# Patient Record
Sex: MALE | Race: WHITE | Employment: FULL TIME | URBAN - METROPOLITAN AREA
[De-identification: names, ages, dates, MRNs, and addresses within clinical notes are randomized per-mention and may not be internally consistent; named-entity substitution may affect disease eponyms.]

---

## 2017-01-04 ENCOUNTER — TELEPHONE (OUTPATIENT)
Dept: FAMILY MEDICINE | Facility: CLINIC | Age: 47
End: 2017-01-04

## 2017-01-04 ENCOUNTER — OFFICE VISIT (OUTPATIENT)
Dept: FAMILY MEDICINE | Facility: CLINIC | Age: 47
End: 2017-01-04
Payer: COMMERCIAL

## 2017-01-04 VITALS
BODY MASS INDEX: 40.46 KG/M2 | OXYGEN SATURATION: 94 % | TEMPERATURE: 98.3 F | WEIGHT: 289 LBS | HEIGHT: 71 IN | SYSTOLIC BLOOD PRESSURE: 120 MMHG | HEART RATE: 90 BPM | DIASTOLIC BLOOD PRESSURE: 80 MMHG

## 2017-01-04 DIAGNOSIS — E11.42 TYPE 2 DIABETES MELLITUS WITH DIABETIC POLYNEUROPATHY, WITHOUT LONG-TERM CURRENT USE OF INSULIN (H): ICD-10-CM

## 2017-01-04 DIAGNOSIS — G62.9 PERIPHERAL POLYNEUROPATHY: ICD-10-CM

## 2017-01-04 DIAGNOSIS — F33.40 RECURRENT MAJOR DEPRESSIVE DISORDER, IN REMISSION (H): ICD-10-CM

## 2017-01-04 DIAGNOSIS — R10.13 EPIGASTRIC PAIN: Primary | ICD-10-CM

## 2017-01-04 PROCEDURE — 99214 OFFICE O/P EST MOD 30 MIN: CPT | Performed by: PHYSICIAN ASSISTANT

## 2017-01-04 RX ORDER — NICOTINE POLACRILEX 4 MG/1
20 GUM, CHEWING ORAL DAILY
Qty: 30 TABLET | Refills: 1 | Status: SHIPPED | OUTPATIENT
Start: 2017-01-04 | End: 2017-03-06

## 2017-01-04 RX ORDER — ESCITALOPRAM OXALATE 10 MG/1
10 TABLET ORAL DAILY
Qty: 30 TABLET | Refills: 1 | Status: SHIPPED | OUTPATIENT
Start: 2017-01-04 | End: 2017-04-01

## 2017-01-04 RX ORDER — GABAPENTIN 400 MG/1
1200 CAPSULE ORAL 3 TIMES DAILY
Qty: 270 CAPSULE | Refills: 1 | Status: SHIPPED | OUTPATIENT
Start: 2017-01-04 | End: 2017-03-08

## 2017-01-04 ASSESSMENT — ANXIETY QUESTIONNAIRES
3. WORRYING TOO MUCH ABOUT DIFFERENT THINGS: MORE THAN HALF THE DAYS
GAD7 TOTAL SCORE: 19
IF YOU CHECKED OFF ANY PROBLEMS ON THIS QUESTIONNAIRE, HOW DIFFICULT HAVE THESE PROBLEMS MADE IT FOR YOU TO DO YOUR WORK, TAKE CARE OF THINGS AT HOME, OR GET ALONG WITH OTHER PEOPLE: SOMEWHAT DIFFICULT
6. BECOMING EASILY ANNOYED OR IRRITABLE: NEARLY EVERY DAY
1. FEELING NERVOUS, ANXIOUS, OR ON EDGE: NEARLY EVERY DAY
7. FEELING AFRAID AS IF SOMETHING AWFUL MIGHT HAPPEN: NEARLY EVERY DAY
2. NOT BEING ABLE TO STOP OR CONTROL WORRYING: NEARLY EVERY DAY
5. BEING SO RESTLESS THAT IT IS HARD TO SIT STILL: MORE THAN HALF THE DAYS

## 2017-01-04 ASSESSMENT — PATIENT HEALTH QUESTIONNAIRE - PHQ9: 5. POOR APPETITE OR OVEREATING: NEARLY EVERY DAY

## 2017-01-04 NOTE — PROGRESS NOTES
"  SUBJECTIVE:                                                    Ryland Gee is a 46 year old male who presents to clinic today for the following health issues:        Depression and Anxiety Follow-Up - Lexapro    Status since last visit: Worsened both    Other associated symptoms:None    Complicating factors:     Significant life event: Yes-  IRS - owes own business     Current substance abuse: no    PHQ-9 SCORE 1/18/2016 5/23/2016 11/21/2016   Total Score - - -   Total Score 4 13 19     BUSHRA-7 SCORE 5/23/2016 11/21/2016   Total Score 14 21        PHQ-9  English      PHQ-9   Any Language     GAD7     Medication Followup of Omeprazole    Taking Medication as prescribed: yes    Side Effects:  None    Medication Helping Symptoms:  yes        Medication Followup of Gabapentin - Neuropathy     Taking Medication as prescribed: yes    Side Effects:  None    Medication Helping Symptoms:  yes          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  He is here for the omeprazole, gabapentin and the Lexapro.  He states that he was recently started on medical marijuana by the pain clinic and he does not like it.      He also feels like the lexapro needs to be changed.  He is wondering if he can be changed to the brand name \"lexapro\" as he reports that the generic form makes him feel groggy and tired.   He also reports some issues with depression and anxiety, but is convinced that by changing his Lexapro to the brand name, things will improve.      He states that he was on the brand name in the past with better tolerance.      He reports that he tolerates the omeprazole and the gabapentin well and does not have concerns.         Problem list and histories reviewed & adjusted, as indicated.  Additional history: as " "documented      ROS:  Constitutional, HEENT, cardiovascular, pulmonary, GI, , musculoskeletal, neuro, skin, endocrine and psych systems are negative, except as otherwise noted.    OBJECTIVE:                                                    /80 mmHg  Pulse 90  Temp(Src) 98.3  F (36.8  C) (Oral)  Ht 5' 11\" (1.803 m)  Wt 289 lb (131.09 kg)  BMI 40.33 kg/m2  SpO2 94%  Body mass index is 40.33 kg/(m^2).  GENERAL: healthy, alert and no distress  EYES: Eyes grossly normal to inspection, PERRL and conjunctivae and sclerae normal  HENT: ear canals and TM's normal, nose and mouth without ulcers or lesions  NECK: no adenopathy, no asymmetry, masses, or scars and thyroid normal to palpation  RESP: lungs clear to auscultation - no rales, rhonchi or wheezes  CV: regular rate and rhythm, normal S1 S2, no S3 or S4, no murmur, click or rub, no peripheral edema and peripheral pulses strong  ABDOMEN: soft, nontender, no hepatosplenomegaly, no masses and bowel sounds normal  MS: no gross musculoskeletal defects noted, no edema  SKIN: no suspicious lesions or rashes  NEURO: Normal strength and tone, mentation intact and speech normal    Diagnostic Test Results:  none      ASSESSMENT/PLAN:                                                      Harm was seen today for recheck medication.    Diagnoses and all orders for this visit:    Epigastric pain  -     omeprazole 20 MG tablet; Take 1 tablet (20 mg) by mouth daily Take 30-60 minutes before a meal.    Peripheral polyneuropathy (H)  -     gabapentin (NEURONTIN) 400 MG capsule; Take 3 capsules (1,200 mg) by mouth 3 times daily    Type 2 diabetes mellitus with diabetic polyneuropathy, without long-term current use of insulin (H)  -     gabapentin (NEURONTIN) 400 MG capsule; Take 3 capsules (1,200 mg) by mouth 3 times daily    Recurrent major depressive disorder, in remission (H)  -     escitalopram (LEXAPRO) 10 MG tablet; Take 1 tablet (10 mg) by mouth daily    PHQ-9 and " "BUSHRA-7 are increased when compared to those at his last office visit.  He reports that it \"looks worse than it is.\"  He denies any thoughts of wanting to harm himself or others and states that would \"never happen.\"  He reports that he has been on the brand name lexapro in the past with better control of symptoms.    - He declined going for psych evaluation, although this was advised today.   - He also declines need to increase med dose or to change meds at this time.  He would like to first try to get back on the \"Lexapro.\"      See Patient Instructions        Elana Camilo PA-C    [facility] [place]    "

## 2017-01-04 NOTE — NURSING NOTE
"Chief Complaint   Patient presents with     Recheck Medication       Initial /80 mmHg  Pulse 90  Temp(Src) 98.3  F (36.8  C) (Oral)  Ht 5' 11\" (1.803 m)  Wt 289 lb (131.09 kg)  BMI 40.33 kg/m2  SpO2 94% Estimated body mass index is 40.33 kg/(m^2) as calculated from the following:    Height as of this encounter: 5' 11\" (1.803 m).    Weight as of this encounter: 289 lb (131.09 kg).  BP completed using cuff size: large  Csaba Mlnarik CMA    "

## 2017-01-04 NOTE — PATIENT INSTRUCTIONS
Please follow-up in 4-6 weeks to see how you are doing on the Lexapro as brand name.  Please follow-up sooner if needed. Getting this approved with insurance may be difficult and require a PA.  Pharmacy will let us know.     Please continue the gabapentin and Omeprazole as prescribed.     Please seek immediate medical attention if you develop any thoughts or harming yourself or others.

## 2017-01-04 NOTE — MR AVS SNAPSHOT
After Visit Summary   1/4/2017    Ryland Gee    MRN: [id number]           Patient Information     Date Of Birth          1970        Visit Information        Provider Department      1/4/2017 2:00 PM Elana Camilo, PA-C [facility]        Today's Diagnoses     Epigastric pain    -  1     Peripheral polyneuropathy (H)         Type 2 diabetes mellitus with diabetic polyneuropathy, without long-term current use of insulin (H)         Recurrent major depressive disorder, in remission (H)           Care Instructions    Please follow-up in 4-6 weeks to see how you are doing on the Lexapro as brand name.  Please follow-up sooner if needed. Getting this approved with insurance may be difficult and require a PA.  Pharmacy will let us know.     Please continue the gabapentin and Omeprazole as prescribed.     Please seek immediate medical attention if you develop any thoughts or harming yourself or others.           Follow-ups after your visit        Who to contact     If you have questions or need follow up information about today's clinic visit or your schedule please contact [facility] directly at [phone].  Normal or non-critical lab and imaging results will be communicated to you by [email or web address], letter or phone within 4 business days after the clinic has received the results. If you do not hear from us within 7 days, please contact the clinic through [employer] or phone. If you have a critical or abnormal lab result, we will notify you by phone as soon as possible.  Submit refill requests through [employer] or call your pharmacy and they will forward the refill request to us. Please allow 3 business days for your refill to be completed.          Additional Information About Your Visit        [email or web address] Information     [employer] gives you secure access to your electronic health record. If you see a primary care provider, you can also send messages to your care team and make  "appointments. If you have questions, please call your primary care clinic.  If you do not have a primary care provider, please call [phone] and they will assist you.        Care EveryWhere ID     This is your Care EveryWhere ID. This could be used by other organizations to access your [place] medical records  [id number]        Your Vitals Were     Pulse Temperature Height BMI (Body Mass Index) Pulse Oximetry       90 98.3  F (36.8  C) (Oral) 5' 11\" (1.803 m) 40.33 kg/m2 94%        Blood Pressure from Last 3 Encounters:   01/04/17 120/80   05/23/16 96/62   05/12/16 92/60    Weight from Last 3 Encounters:   01/04/17 289 lb (131.09 kg)   05/23/16 270 lb (122.471 kg)   05/12/16 263 lb (119.296 kg)              Today, you had the following     No orders found for display         Today's Medication Changes          These changes are accurate as of: 1/4/17  3:27 PM.  If you have any questions, ask your nurse or doctor.               These medicines have changed or have updated prescriptions.        Dose/Directions    * escitalopram 10 MG tablet   Commonly known as:  LEXAPRO   This may have changed:  Another medication with the same name was added. Make sure you understand how and when to take each.   Used for:  Situational anxiety, Major depressive disorder, recurrent episode, mild (H)   Changed by:  Todd Pastrana MD        Dose:  10 mg   Take 1 tablet (10 mg) by mouth daily   Quantity:  90 tablet   Refills:  0       * escitalopram 10 MG tablet   Commonly known as:  LEXAPRO   This may have changed:  You were already taking a medication with the same name, and this prescription was added. Make sure you understand how and when to take each.   Used for:  Recurrent major depressive disorder, in remission (H)   Changed by:  Elana Camilo PA-C        Dose:  10 mg   Take 1 tablet (10 mg) by mouth daily   Quantity:  30 tablet   Refills:  1       omeprazole 20 MG tablet   This may have changed:  when to take this "   Used for:  Epigastric pain   Changed by:  Elana Camilo PA-C        Dose:  20 mg   Take 1 tablet (20 mg) by mouth daily Take 30-60 minutes before a meal.   Quantity:  30 tablet   Refills:  1       * Notice:  This list has 2 medication(s) that are the same as other medications prescribed for you. Read the directions carefully, and ask your doctor or other care provider to review them with you.         Where to get your medicines      These medications were sent to [facility] - [address] [address]     Phone:  [phone]    - escitalopram 10 MG tablet  - gabapentin 400 MG capsule  - omeprazole 20 MG tablet             Primary Care Provider Office Phone # Fax #    Todd Pastrana [phone]       [address] [address]        Thank you!     Thank you for choosing [facility]  for your care. Our goal is always to provide you with excellent care. Hearing back from our patients is one way we can continue to improve our services. Please take a few minutes to complete the written survey that you may receive in the mail after your visit with us. Thank you!             Your Updated Medication List - Protect others around you: Learn how to safely use, store and throw away your medicines at www.disposemymeds.org.          This list is accurate as of: 1/4/17  3:27 PM.  Always use your most recent med list.                   Brand Name Dispense Instructions for use    * ACCU-CHEK SMARTVIEW test strip   Generic drug:  blood glucose monitoring     100 each    USE TO TEST BLOOD SUGARS THREE TIMES DAILY       * blood glucose monitoring test strip    ARTURO CONTOUR NEXT    100 each    Use to test blood sugar 3 times daily or as directed.       blood glucose calibration NORMAL solution     1 Bottle    Use to calibrate blood glucose monitor as needed as  directed.       * blood glucose monitoring lancets     102 Box    USE TO TEST BLOOD SUGARS THREE TIMES DAILY       * blood glucose monitoring lancets     100 Box    Use to test blood sugar 3 times daily or as directed.       blood glucose monitoring meter device kit     1 kit    Use to test blood sugar 3 times daily or as directed.       econazole nitrate 1 % cream     30 g    Apply topically daily Apply to affected nail daily       * escitalopram 10 MG tablet    LEXAPRO    90 tablet    Take 1 tablet (10 mg) by mouth daily       * escitalopram 10 MG tablet    LEXAPRO    30 tablet    Take 1 tablet (10 mg) by mouth daily       furosemide 20 MG tablet    LASIX    180 tablet    TAKE ONE TABLET BY MOUTH TWO TIMES A DAY       gabapentin 400 MG capsule    NEURONTIN    270 capsule    Take 3 capsules (1,200 mg) by mouth 3 times daily       glimepiride 4 MG tablet    AMARYL    90 tablet    Take 1 tablet (4 mg) by mouth every morning (before breakfast)       IBUPROFEN PO      Take 200-400 mg by mouth every 8 hours as needed for moderate pain       metFORMIN 500 MG tablet    GLUCOPHAGE    180 tablet    TAKE ONE TO TWO TABLETS BY MOUTH TWO TIMES A DAY WITH MEALS       omeprazole 20 MG tablet     30 tablet    Take 1 tablet (20 mg) by mouth daily Take 30-60 minutes before a meal.       ondansetron 8 MG tablet    ZOFRAN    20 tablet    Take 0.5-1 tablets (4-8 mg) by mouth every 8 hours as needed for nausea       polyethylene glycol Packet    MIRALAX/GLYCOLAX    7 packet    Take 17 g by mouth daily       Potassium Chloride ER 20 MEQ Tbcr     90 tablet    Take 1 tablet (20 mEq) by mouth 2 times daily       senna-docusate 8.6-50 MG per tablet    SENOKOT-S;PERICOLACE    100 tablet    Take 3 tablets by mouth 2 times daily       simvastatin 20 MG tablet    ZOCOR    90 tablet    TAKE ONE TABLET BY MOUTH AT BEDTIME       * Notice:  This list has 6 medication(s) that are the same as other medications prescribed for you. Read the directions  carefully, and ask your doctor or other care provider to review them with you.

## 2017-01-04 NOTE — TELEPHONE ENCOUNTER
Lexapro requires a Prior Authorization.   Reason / Alternatives per Insurance rejection:NON-FORMULARY DRUG, CONTACT PRESCRIBER    Would you like to change the medication or attempt the prior authorization?    Patient's prescription insurance is as follows:  Insurance Company: [employer]   Bin number: [id number]   Phone number: [phone]   ID # [id number]    Please advise.     -Kelsy Castorena, Certified Pharmacy Technician, [facility], [facility], Ph. [phone]

## 2017-01-05 ASSESSMENT — ANXIETY QUESTIONNAIRES: GAD7 TOTAL SCORE: 19

## 2017-01-05 ASSESSMENT — PATIENT HEALTH QUESTIONNAIRE - PHQ9: SUM OF ALL RESPONSES TO PHQ QUESTIONS 1-9: 18

## 2017-01-12 NOTE — TELEPHONE ENCOUNTER
Name of medication and dosage:  Lexapro 10 mg  Previously tried and failed:  none  Submitted PA via:  [email or web address]  To:  [employer]  Reference #:  [id number]  Standard or Urgent:  Standard  Date submitted:  01/12/2017  MA signature:  Shiela Armstrong CMA

## 2017-01-18 NOTE — TELEPHONE ENCOUNTER
Response received regarding PA for Lexapro (name of medication) - approved from 01/17/2017 to 01/17/2018  Informed patient  Forms sent to scan.  Hemanth Hardy CMA

## 2017-02-02 RX ORDER — GABAPENTIN 400 MG/1
CAPSULE ORAL
Qty: 810 CAPSULE | Refills: 3 | OUTPATIENT
Start: 2017-02-02

## 2017-02-02 NOTE — TELEPHONE ENCOUNTER
Pt should have a refill left at the pharmacy. [facility], was just filled 2/1/17.    Ina Pelletier RN

## 2017-03-06 DIAGNOSIS — R10.13 EPIGASTRIC PAIN: ICD-10-CM

## 2017-03-06 NOTE — TELEPHONE ENCOUNTER
Last Written Prescription Date: 1-4-17  Last Fill Quantity: 30,  # refills: 1   Last Office Visit with [facility], [facility] or [facility] prescribing provider: 1-4-17                                         Next 5 appointments (look out 90 days)     Mar 07, 2017 10:00 AM CST   MyChart Physical Adult with [facility]   [facility]    [address] [place] [address]   [phone]            Mar 07, 2017 10:45 AM CST   Lab visit with [facility]   [facility]    [address] [place] [id number]   [phone]                  Thank you,  Stefanie Murray CPhT  [facility]

## 2017-03-08 DIAGNOSIS — G62.9 PERIPHERAL POLYNEUROPATHY: ICD-10-CM

## 2017-03-08 DIAGNOSIS — E11.42 TYPE 2 DIABETES MELLITUS WITH DIABETIC POLYNEUROPATHY, WITHOUT LONG-TERM CURRENT USE OF INSULIN (H): ICD-10-CM

## 2017-03-08 NOTE — TELEPHONE ENCOUNTER
Controlled Substance Refill Request for Gabapentin 400mg  Problem List Complete:  No     PROVIDER TO CONSIDER COMPLETION OF PROBLEM LIST AND OVERVIEW/CONTROLLED SUBSTANCE AGREEMENT    Last Written Prescription Date:  1-4-17  Last Fill Quantity: 270,   # refills: 1    Last Office Visit with [facility] primary care provider: 1-4-17    Future Office visit:     Controlled substance agreement on file: No.     Processing:  Staff will hand deliver Rx to on-site pharmacy   checked in past 6 months?  No, route to RN     Thank you,  Stefanie Murray, [facility] [place]

## 2017-03-09 RX ORDER — NICOTINE POLACRILEX 4 MG/1
20 GUM, CHEWING ORAL DAILY
Qty: 30 TABLET | Refills: 1 | Status: SHIPPED | OUTPATIENT
Start: 2017-03-09 | End: 2017-04-18

## 2017-03-09 RX ORDER — GABAPENTIN 400 MG/1
1200 CAPSULE ORAL 3 TIMES DAILY
Qty: 270 CAPSULE | Refills: 0 | Status: SHIPPED | OUTPATIENT
Start: 2017-03-09 | End: 2017-04-12

## 2017-03-09 NOTE — TELEPHONE ENCOUNTER
Refill(s) for 1 month only.  Please call the patient and schedule a followup appointment within the next month.

## 2017-04-01 DIAGNOSIS — F33.40 RECURRENT MAJOR DEPRESSIVE DISORDER, IN REMISSION (H): ICD-10-CM

## 2017-04-03 NOTE — TELEPHONE ENCOUNTER
Pt was to follow up in 4-6 weeks from 1/2017 OV. No showed 3/7/17 OV.    mychart sent with PHQ-9 attached.  Ina Pelletier RN

## 2017-04-03 NOTE — TELEPHONE ENCOUNTER
escitalopram (LEXAPRO) 10 MG tablet     Last Written Prescription Date: 01/04/17  Last Fill Quantity: 30, # refills: 1  Last Office Visit with [facility] primary care provider:  03/07/17        Last PHQ-9 score on record=   PHQ-9 SCORE 1/4/2017   Total Score -   Total Score 18           Heidi James  Patient Representative

## 2017-04-05 NOTE — TELEPHONE ENCOUNTER
PHQ-9 SCORE 11/21/2016 1/4/2017 4/4/2017   Total Score - - -   Total Score MyChart - - 12 (Moderate depression)   Total Score 19 18 -     Routing refill request to provider for review/approval because:  Higher than protocol PHQ-9.  Ina Pelletier RN

## 2017-04-06 RX ORDER — ESCITALOPRAM OXALATE 10 MG
10 TABLET ORAL DAILY
Qty: 30 TABLET | Refills: 0 | Status: SHIPPED | OUTPATIENT
Start: 2017-04-06 | End: 2018-01-22

## 2017-04-12 DIAGNOSIS — E11.42 TYPE 2 DIABETES MELLITUS WITH DIABETIC POLYNEUROPATHY, WITHOUT LONG-TERM CURRENT USE OF INSULIN (H): ICD-10-CM

## 2017-04-12 DIAGNOSIS — G62.9 PERIPHERAL POLYNEUROPATHY: ICD-10-CM

## 2017-04-12 NOTE — TELEPHONE ENCOUNTER
Reason for Call: Patient called and stated that he is completely out and would need a refill ASAP. He does have a med check appt scheduled for this Tuesday     Best phone number to reach pt at is: [phone]  Ok to leave a message with medical info? Yes    Pharmacy preferred (if calling for a refill): [facility] [facility]-Patient Representative

## 2017-04-12 NOTE — TELEPHONE ENCOUNTER
Controlled Substance Refill Request for gabapentin  Problem List Complete:  Yes    Last Written Prescription Date:  3/9/2017  Last Fill Quantity: 270,   # refills: 0    Last Office Visit with [facility] primary care provider: 3/7/2017    Clinic visit frequency required: Q 6  months     Future Office visit:   Next 5 appointments (look out 90 days)     Apr 18, 2017 10:20 AM CDT   Office Visit with Todd Pastrana MD   [facility]    [address] [place] [id number]   [phone]                  Controlled substance agreement on file: Yes:  Date 11/30/2015.     Processing:  Staff will hand deliver Rx to on-site pharmacy   checked in past 6 months?  No, route to RN   Routing refill request to provider for review/approval because:  Drug not on the [facility] refill protocol   Ina Pelletier RN

## 2017-04-13 RX ORDER — GABAPENTIN 400 MG/1
1200 CAPSULE ORAL 3 TIMES DAILY
Qty: 270 CAPSULE | Refills: 0 | Status: SHIPPED | OUTPATIENT
Start: 2017-04-13 | End: 2017-04-18

## 2017-04-13 RX ORDER — GLIMEPIRIDE 4 MG/1
4 TABLET ORAL
Qty: 90 TABLET | Refills: 0 | Status: SHIPPED | OUTPATIENT
Start: 2017-04-13 | End: 2017-04-18

## 2017-04-17 NOTE — PROGRESS NOTES
SUBJECTIVE:                                                    Ryland Gee is a 47 year old male who presents to clinic today for the following health issues:    Chest Pain:  The patient occasionally experiences a brief, sharp pain in the center of the chest. This brief spasm causes shortness of breath. He also has associated left arm aching. He denies acid reflux.    Diabetes Follow-up      Patient is checking blood sugars: couple days a week - 70's-190's mg/dL. The patient has not checked his blood glucose levels recently due to being out of strips. The patient has been taking one metformin in the morning and one in the evening.    Diabetic concerns: None     Symptoms of hypoglycemia (low blood sugar): shaky, weak     Paresthesias (numbness or burning in feet) or sores: Yes - neuropathy is worsening     Date of last diabetic eye exam: no     The patient does not drink alcohol and sugary beverages     Hyperlipidemia Follow-Up      Rate your low fat/cholesterol diet?: good    Taking statin?  Yes, no muscle aches from statin    Other lipid medications/supplements?:  none       Depression and Anxiety Follow-Up    Status since last visit: Worsened - but improved in the last few weeks    Other associated symptoms:None    Complicating factors:     Significant life event: business life [employer]     Current substance abuse: None    PHQ-9 SCORE 11/21/2016 1/4/2017 4/4/2017   Total Score - - -   Total Score MyChart - - 12 (Moderate depression)   Total Score 19 18 -     BUSHRA-7 SCORE 5/23/2016 11/21/2016 1/4/2017   Total Score 14 21 19        PHQ-9  English      PHQ-9   Any Language     GAD7       Amount of exercise or physical activity: None    Problems taking medications regularly: No    Medication side effects: none    Diet: low fat/cholesterol and diabetic      Problem list and histories reviewed & adjusted, as indicated.  Additional history: as documented    ROS:  Constitutional, HEENT, cardiovascular, pulmonary, GI, ,  "musculoskeletal, neuro, skin, endocrine and psych systems are negative, except as otherwise noted.    This document serves as a record of the services and decisions personally performed and made by Todd Pastrana MD. It was created on his behalf by Kat Lai, a trained medical scribe. The creation of this document is based on the provider's statements to the medical scribe.  Kat Lai 8:06 AM 4/18/2017  OBJECTIVE:                                                    /70 (BP Location: Left arm, Patient Position: Chair, Cuff Size: Adult Large)  Pulse 90  Temp 98.4  F (36.9  C) (Oral)  Ht 1.803 m (5' 11\")  Wt 127 kg (280 lb)  SpO2 95%  BMI 39.05 kg/m2 Body mass index is 39.05 kg/(m^2).   GENERAL: healthy, alert, well nourished, well hydrated, no distress  HENT: ear canals- normal; TMs- normal; Nose- normal; Mouth- no ulcers, no lesions  NECK: no tenderness, no adenopathy, no asymmetry, no masses, no stiffness; thyroid- normal to palpation  RESP: lungs clear to auscultation - no rales, no rhonchi, no wheezes  CV: regular rates and rhythm, normal S1 S2, no S3 or S4 and no murmur, no click or rub -  ABDOMEN: soft, no tenderness, no  hepatosplenomegaly, no masses, normal bowel sounds  MS: extremities- no gross deformities noted, no edema  SKIN: no suspicious lesions, no rashes  Diabetic foot exam: normal DP and PT pulses, no trophic changes or ulcerative lesions and reduced sensation at to distal shin bilateral.     Diagnostic test results:  Results for orders placed or performed in visit on 04/18/17 (from the past 24 hour(s))   HEMOGLOBIN A1C   Result Value Ref Range    Hemoglobin A1C 7.5 (H) 4.3 - 6.0 %     Pending     ASSESSMENT/PLAN:       Harm was seen today for recheck medication.    Diagnoses and all orders for this visit:    Need for prophylactic vaccination against Streptococcus pneumoniae (pneumococcus)    Peripheral polyneuropathy (H) - decrease and wean off gabapentin dosage to 1 capsule " three times daily then stop, start taking 1-2 capsules of lyrica three times daily, continue taking glimepiride as prescribed  -     gabapentin (NEURONTIN) 400 MG capsule; Take 3 capsules (1,200 mg) by mouth 3 times daily  -     glimepiride (AMARYL) 4 MG tablet; Take 1 tablet (4 mg) by mouth every morning (before breakfast)  -     pregabalin (LYRICA) 50 MG capsule; Take 1-2 capsules ( mg) by mouth 3 times daily    Type 2 diabetes mellitus with diabetic polyneuropathy, without long-term current use of insulin (H) - uncontrolled - continue medication. Healthy diet and exercise. Continue to monitor blood glucose levels. Take one tablet of metformin in the morning and two tablets of metformin in the evening. Decrease gabapentin dosage to 1 capsule three times daily. Continue taking glimepiride as prescribed.  -     BASIC METABOLIC PANEL  -     HEMOGLOBIN A1C  -     Albumin Random Urine Quantitative  -     gabapentin (NEURONTIN) 400 MG capsule; Take 3 capsules (1,200 mg) by mouth 3 times daily  -     glimepiride (AMARYL) 4 MG tablet; Take 1 tablet (4 mg) by mouth every morning (before breakfast)  -     metFORMIN (GLUCOPHAGE) 500 MG tablet; Take 1-2 tablets (500-1,000 mg) by mouth 2 times daily (with meals)  -     blood glucose monitoring ([employer] CONTOUR NEXT) test strip; Use to test blood sugar 3 times daily or as directed.  -     blood glucose calibration (CONTOUR NEXT CONTROL LEVEL 2) NORMAL solution; Use to calibrate blood glucose monitor as needed as directed.  -     OPHTHALMOLOGY ADULT REFERRAL    Recurrent major depressive disorder, in remission (H) - increase dosage of lexapro to 20 mg tablet once daily  -     escitalopram (LEXAPRO) 20 MG tablet; Take 1 tablet (20 mg) by mouth daily    Epigastric pain - controlled - continue medication.  -     omeprazole 20 MG tablet; Take 1 tablet (20 mg) by mouth daily Take 30-60 minutes before a meal.    Peripheral edema - controlled - continue medication.  -      "furosemide (LASIX) 20 MG tablet; Take 1 tablet (20 mg) by mouth daily    Hyperlipidemia LDL goal <70 - controlled - continue medication.  -     Lipid panel reflex to direct LDL    Type 2 diabetes mellitus with diabetic polyneuropathy; a1c goal <7 - uncontrolled - continue medication. Healthy diet and exercise. Continue to monitor blood glucose levels. Take one tablet of metformin in the morning and two tablets of metformin in the evening. Decrease gabapentin dosage to 1 capsule three times daily. Continue taking glimepiride as prescribed.    Ulcerative colitis with complication, unspecified location (H) - controlled    Morbid obesity due to excess calories (H) - healthy diet and exercise    Major depressive disorder, recurrent episode, mild (H)  - increase dosage of lexapro to 20 mg tablet once daily    Chronic pain - seen at [facility] in [place] - controlled - continue medication.    Screening for prostate cancer  -     PSA, screen    Fatigue, unspecified type  -     CBC with platelets and differential  -     TSH with free T4 reflex    Encounter for immunization  -     VACCINE ADMINISTRATION, INITIAL  -     VACCINE ADMINISTRATION, EACH ADDITIONAL  -     TDAP VACCINE (ADACEL)  -     PNEUMOCOCCAL VACCINE,ADULT,SQ OR IM          Risks, benefits and alternatives of treatments discussed. Plan agreed on.      Followup: 6 months    Will call, return to clinic, or go to ED if worsening or symptoms not improving as discussed.    See patient instructions.       Tobacco Cessation:   reports that he has never smoked. He has never used smokeless tobacco.      BMI:   Estimated body mass index is 39.05 kg/(m^2) as calculated from the following:    Height as of this encounter: 1.803 m (5' 11\").    Weight as of this encounter: 127 kg (280 lb).   Weight management plan: Discussed healthy diet and exercise guidelines and patient will follow up in 12 months in clinic to re-evaluate.      Health Maintenance Topics with " due status: Overdue       Topic Date Due    EYE EXAM Q1 YEAR( NO INBASKET) 01/09/1973    PNEUMOVAX 1X HI RISK PATIENT < 65 (NO IB MSG) 01/09/1974    URINE DRUG SCREEN Q1 YR 01/09/1987    A1C Q3 MO( NO INBASKET) 07/15/2016    MICROALBUMIN Q1 YEAR( NO INBASKET) 01/18/2017     Health Maintenance Topics with due status: Due Soon       Topic Date Due    BMP Q1 YR (NO INBASKET) 05/11/2017    LIPID MONITORING Q1 YEAR( NO INBASKET) 05/12/2017       Health maintenance reviewed/updated? Yes    The information in this document, created by a scribe for me, accurately reflects the services I personally performed and the decisions made by me. I have reviewed and approved this document for accuracy.      Dannie Pastrana MD

## 2017-04-18 ENCOUNTER — OFFICE VISIT (OUTPATIENT)
Dept: FAMILY MEDICINE | Facility: CLINIC | Age: 47
End: 2017-04-18
Payer: COMMERCIAL

## 2017-04-18 ENCOUNTER — TELEPHONE (OUTPATIENT)
Dept: FAMILY MEDICINE | Facility: CLINIC | Age: 47
End: 2017-04-18

## 2017-04-18 VITALS
WEIGHT: 280 LBS | SYSTOLIC BLOOD PRESSURE: 110 MMHG | OXYGEN SATURATION: 95 % | HEART RATE: 90 BPM | TEMPERATURE: 98.4 F | DIASTOLIC BLOOD PRESSURE: 70 MMHG | HEIGHT: 71 IN | BODY MASS INDEX: 39.2 KG/M2

## 2017-04-18 DIAGNOSIS — T40.2X5A CONSTIPATION DUE TO OPIOID THERAPY: ICD-10-CM

## 2017-04-18 DIAGNOSIS — Z12.5 SCREENING FOR PROSTATE CANCER: ICD-10-CM

## 2017-04-18 DIAGNOSIS — F33.0 MAJOR DEPRESSIVE DISORDER, RECURRENT EPISODE, MILD (H): ICD-10-CM

## 2017-04-18 DIAGNOSIS — Z23 ENCOUNTER FOR IMMUNIZATION: ICD-10-CM

## 2017-04-18 DIAGNOSIS — Z23 NEED FOR PROPHYLACTIC VACCINATION AGAINST STREPTOCOCCUS PNEUMONIAE (PNEUMOCOCCUS): Primary | ICD-10-CM

## 2017-04-18 DIAGNOSIS — K51.919 ULCERATIVE COLITIS WITH COMPLICATION, UNSPECIFIED LOCATION (H): ICD-10-CM

## 2017-04-18 DIAGNOSIS — R10.13 EPIGASTRIC PAIN: ICD-10-CM

## 2017-04-18 DIAGNOSIS — E11.42 TYPE 2 DIABETES MELLITUS WITH DIABETIC POLYNEUROPATHY, WITHOUT LONG-TERM CURRENT USE OF INSULIN (H): ICD-10-CM

## 2017-04-18 DIAGNOSIS — R60.0 PERIPHERAL EDEMA: ICD-10-CM

## 2017-04-18 DIAGNOSIS — G62.9 PERIPHERAL POLYNEUROPATHY: ICD-10-CM

## 2017-04-18 DIAGNOSIS — K59.03 CONSTIPATION DUE TO OPIOID THERAPY: ICD-10-CM

## 2017-04-18 DIAGNOSIS — E78.5 HYPERLIPIDEMIA LDL GOAL <70: ICD-10-CM

## 2017-04-18 DIAGNOSIS — R53.83 FATIGUE, UNSPECIFIED TYPE: ICD-10-CM

## 2017-04-18 DIAGNOSIS — E66.01 MORBID OBESITY DUE TO EXCESS CALORIES (H): ICD-10-CM

## 2017-04-18 DIAGNOSIS — G89.4 CHRONIC PAIN SYNDROME: Chronic | ICD-10-CM

## 2017-04-18 DIAGNOSIS — F33.40 RECURRENT MAJOR DEPRESSIVE DISORDER, IN REMISSION (H): ICD-10-CM

## 2017-04-18 LAB
ANION GAP SERPL CALCULATED.3IONS-SCNC: 9 MMOL/L (ref 3–14)
BASOPHILS # BLD AUTO: 0 10E9/L (ref 0–0.2)
BASOPHILS NFR BLD AUTO: 0.6 %
BUN SERPL-MCNC: 11 MG/DL (ref 7–30)
CALCIUM SERPL-MCNC: 9.2 MG/DL (ref 8.5–10.1)
CHLORIDE SERPL-SCNC: 107 MMOL/L (ref 94–109)
CHOLEST SERPL-MCNC: 104 MG/DL
CO2 SERPL-SCNC: 28 MMOL/L (ref 20–32)
CREAT SERPL-MCNC: 1.14 MG/DL (ref 0.66–1.25)
DIFFERENTIAL METHOD BLD: ABNORMAL
EOSINOPHIL # BLD AUTO: 1 10E9/L (ref 0–0.7)
EOSINOPHIL NFR BLD AUTO: 14.4 %
ERYTHROCYTE [DISTWIDTH] IN BLOOD BY AUTOMATED COUNT: 13.3 % (ref 10–15)
GFR SERPL CREATININE-BSD FRML MDRD: 69 ML/MIN/1.7M2
GLUCOSE SERPL-MCNC: 120 MG/DL (ref 70–99)
HBA1C MFR BLD: 7.5 % (ref 4.3–6)
HCT VFR BLD AUTO: 42.9 % (ref 40–53)
HDLC SERPL-MCNC: 38 MG/DL
HGB BLD-MCNC: 14 G/DL (ref 13.3–17.7)
LDLC SERPL CALC-MCNC: 48 MG/DL
LYMPHOCYTES # BLD AUTO: 2.9 10E9/L (ref 0.8–5.3)
LYMPHOCYTES NFR BLD AUTO: 41.1 %
MCH RBC QN AUTO: 29.1 PG (ref 26.5–33)
MCHC RBC AUTO-ENTMCNC: 32.6 G/DL (ref 31.5–36.5)
MCV RBC AUTO: 89 FL (ref 78–100)
MONOCYTES # BLD AUTO: 0.6 10E9/L (ref 0–1.3)
MONOCYTES NFR BLD AUTO: 7.9 %
NEUTROPHILS # BLD AUTO: 2.5 10E9/L (ref 1.6–8.3)
NEUTROPHILS NFR BLD AUTO: 36 %
NONHDLC SERPL-MCNC: 66 MG/DL
PLATELET # BLD AUTO: 242 10E9/L (ref 150–450)
POTASSIUM SERPL-SCNC: 4.2 MMOL/L (ref 3.4–5.3)
PSA SERPL-ACNC: 0.81 UG/L (ref 0–4)
RBC # BLD AUTO: 4.81 10E12/L (ref 4.4–5.9)
SODIUM SERPL-SCNC: 144 MMOL/L (ref 133–144)
TRIGL SERPL-MCNC: 92 MG/DL
TSH SERPL DL<=0.005 MIU/L-ACNC: 0.64 MU/L (ref 0.4–4)
WBC # BLD AUTO: 6.9 10E9/L (ref 4–11)

## 2017-04-18 PROCEDURE — 80061 LIPID PANEL: CPT | Performed by: FAMILY MEDICINE

## 2017-04-18 PROCEDURE — 90732 PPSV23 VACC 2 YRS+ SUBQ/IM: CPT | Performed by: FAMILY MEDICINE

## 2017-04-18 PROCEDURE — 85025 COMPLETE CBC W/AUTO DIFF WBC: CPT | Performed by: FAMILY MEDICINE

## 2017-04-18 PROCEDURE — 36415 COLL VENOUS BLD VENIPUNCTURE: CPT | Performed by: FAMILY MEDICINE

## 2017-04-18 PROCEDURE — G0103 PSA SCREENING: HCPCS | Performed by: FAMILY MEDICINE

## 2017-04-18 PROCEDURE — 99214 OFFICE O/P EST MOD 30 MIN: CPT | Mod: 25 | Performed by: FAMILY MEDICINE

## 2017-04-18 PROCEDURE — 90471 IMMUNIZATION ADMIN: CPT | Performed by: FAMILY MEDICINE

## 2017-04-18 PROCEDURE — 90472 IMMUNIZATION ADMIN EACH ADD: CPT | Performed by: FAMILY MEDICINE

## 2017-04-18 PROCEDURE — 80048 BASIC METABOLIC PNL TOTAL CA: CPT | Performed by: FAMILY MEDICINE

## 2017-04-18 PROCEDURE — 90715 TDAP VACCINE 7 YRS/> IM: CPT | Performed by: FAMILY MEDICINE

## 2017-04-18 PROCEDURE — 84443 ASSAY THYROID STIM HORMONE: CPT | Performed by: FAMILY MEDICINE

## 2017-04-18 PROCEDURE — 83036 HEMOGLOBIN GLYCOSYLATED A1C: CPT | Performed by: FAMILY MEDICINE

## 2017-04-18 RX ORDER — ESCITALOPRAM OXALATE 10 MG
10 TABLET ORAL DAILY
Qty: 90 TABLET | Refills: 1 | Status: CANCELLED | OUTPATIENT
Start: 2017-04-18

## 2017-04-18 RX ORDER — GLIMEPIRIDE 4 MG/1
4 TABLET ORAL
Qty: 90 TABLET | Refills: 1 | Status: SHIPPED | OUTPATIENT
Start: 2017-04-18 | End: 2018-01-22

## 2017-04-18 RX ORDER — ESCITALOPRAM OXALATE 20 MG/1
20 TABLET ORAL DAILY
Qty: 90 TABLET | Refills: 3 | Status: SHIPPED | OUTPATIENT
Start: 2017-04-18 | End: 2018-01-22 | Stop reason: DRUGHIGH

## 2017-04-18 RX ORDER — OXYCODONE HYDROCHLORIDE 60 MG/1
60 TABLET, FILM COATED, EXTENDED RELEASE ORAL 2 TIMES DAILY
Refills: 0 | COMMUNITY
Start: 2017-03-23 | End: 2017-04-28

## 2017-04-18 RX ORDER — NALOXEGOL OXALATE 25 MG/1
25 TABLET, FILM COATED ORAL DAILY
Refills: 1 | COMMUNITY
Start: 2017-03-15 | End: 2018-10-23

## 2017-04-18 RX ORDER — PREGABALIN 50 MG/1
50 CAPSULE ORAL 3 TIMES DAILY
Qty: 90 CAPSULE | Refills: 1 | Status: SHIPPED | OUTPATIENT
Start: 2017-04-18 | End: 2018-01-22

## 2017-04-18 RX ORDER — PREGABALIN 50 MG/1
50-100 CAPSULE ORAL 3 TIMES DAILY
Qty: 90 CAPSULE | Refills: 5 | Status: SHIPPED | OUTPATIENT
Start: 2017-04-18 | End: 2017-04-18

## 2017-04-18 RX ORDER — OXYCODONE HYDROCHLORIDE 15 MG/1
2 TABLET ORAL DAILY PRN
Refills: 0 | COMMUNITY
Start: 2017-03-27 | End: 2017-04-28

## 2017-04-18 RX ORDER — FUROSEMIDE 20 MG
20 TABLET ORAL DAILY
Qty: 180 TABLET | Refills: 1 | Status: SHIPPED | OUTPATIENT
Start: 2017-04-18 | End: 2018-01-22

## 2017-04-18 RX ORDER — NICOTINE POLACRILEX 4 MG/1
20 GUM, CHEWING ORAL DAILY
Qty: 90 TABLET | Refills: 1 | Status: SHIPPED | OUTPATIENT
Start: 2017-04-18 | End: 2017-10-09

## 2017-04-18 RX ORDER — GABAPENTIN 400 MG/1
1200 CAPSULE ORAL 3 TIMES DAILY
Qty: 270 CAPSULE | Refills: 5 | Status: SHIPPED | OUTPATIENT
Start: 2017-04-18 | End: 2017-12-01

## 2017-04-18 NOTE — NURSING NOTE
"Chief Complaint   Patient presents with     Recheck Medication       Initial /70 (BP Location: Left arm, Patient Position: Chair, Cuff Size: Adult Large)  Pulse 90  Temp 98.4  F (36.9  C) (Oral)  Ht 5' 11\" (1.803 m)  Wt 280 lb (127 kg)  SpO2 95%  BMI 39.05 kg/m2 Estimated body mass index is 39.05 kg/(m^2) as calculated from the following:    Height as of this encounter: 5' 11\" (1.803 m).    Weight as of this encounter: 280 lb (127 kg).  Medication Reconciliation: complete  "

## 2017-04-18 NOTE — TELEPHONE ENCOUNTER
Lyrica requires a Prior Authorization.   Reason / Alternatives per Insurance rejection:Plan limitations exceeded- max daily dose of 3      Would you like to change the medication or attempt the prior authorization?    Patient's prescription insurance is as follows:  Insurance Company: [employer]   Bin number: [id number]   Phone number: [phone]   ID # [id number]  Group #   [facility] # [id number]    Please advise and let pharmacy know if and when PA is approved or denied.    - Stefanie Murray, Certified Pharmacy Technician, CPhT, [facility], Ph. [phone]

## 2017-04-18 NOTE — TELEPHONE ENCOUNTER
Prescription changed to 50 mg tid - will increase the dose in 3-4 weeks prn  Signed and in NORTH in basket

## 2017-04-18 NOTE — MR AVS SNAPSHOT
After Visit Summary   4/18/2017    Ryland Gee    MRN: [id number]           Patient Information     Date Of Birth          1970        Visit Information        Provider Department      4/18/2017 10:20 AM Todd Pastrana MD [facility] [place]        Today's Diagnoses     Need for prophylactic vaccination against Streptococcus pneumoniae (pneumococcus)    -  1    Peripheral polyneuropathy (H)        Type 2 diabetes mellitus with diabetic polyneuropathy, without long-term current use of insulin (H)        Recurrent major depressive disorder, in remission (H)        Epigastric pain        Peripheral edema        Hyperlipidemia LDL goal <70        Type 2 diabetes mellitus with diabetic polyneuropathy; a1c goal <7        Ulcerative colitis with complication, unspecified location (H)        Morbid obesity due to excess calories (H)        Major depressive disorder, recurrent episode, mild (H)        Chronic pain - seen at [facility] in [place]        Screening for prostate cancer        Fatigue, unspecified type        Encounter for immunization           Follow-ups after your visit        Additional Services     OPHTHALMOLOGY ADULT REFERRAL       Your provider has referred you to:   [facility] -   [place] [phone]   [email or web address]  [facility] [phone]   [email or web address]  [place] [phone]   [email or web address]    Please be aware that coverage of these services is subject to the terms and limitations of your health insurance plan.  Call member services at your health plan with any benefit or coverage questions.      Please bring the following with you to your appointment:    (1) Any X-Rays, CTs or MRIs which have been performed.  Contact the facility where they were done to arrange for  prior to your scheduled appointment.  Any new CT, MRI or other procedures ordered by your specialist must be performed at a  "[facility] or coordinated by your clinic's referral office.  (2) List of current medications  (3) This referral request   (4) Any documents/labs given to you for this referral                  Follow-up notes from your care team     Return in about 6 months (around 10/18/2017).      Who to contact     If you have questions or need follow up information about today's clinic visit or your schedule please contact [facility] directly at [phone].  Normal or non-critical lab and imaging results will be communicated to you by Blekkohart, letter or phone within 4 business days after the clinic has received the results. If you do not hear from us within 7 days, please contact the clinic through [employer] or phone. If you have a critical or abnormal lab result, we will notify you by phone as soon as possible.  Submit refill requests through Vaultize or call your pharmacy and they will forward the refill request to us. Please allow 3 business days for your refill to be completed.          Additional Information About Your Visit        Vaultize Information     [employer]ize gives you secure access to your electronic health record. If you see a primary care provider, you can also send messages to your care team and make appointments. If you have questions, please call your primary care clinic.  If you do not have a primary care provider, please call [phone] and they will assist you.        Care EveryWhere ID     This is your Care EveryWhere ID. This could be used by other organizations to access your [facility] medical records  [id number]        Your Vitals Were     Pulse Temperature Height Pulse Oximetry BMI (Body Mass Index)       90 98.4  F (36.9  C) (Oral) 5' 11\" (1.803 m) 95% 39.05 kg/m2        Blood Pressure from Last 3 Encounters:   04/18/17 110/70   01/04/17 120/80   05/23/16 96/62    Weight from Last 3 Encounters:   04/18/17 280 lb (127 kg)   01/04/17 289 lb (131.1 kg)   05/23/16 270 lb (122.5 kg) "              We Performed the Following     Albumin Random Urine Quantitative     BASIC METABOLIC PANEL     CBC with platelets and differential     HEMOGLOBIN A1C     Lipid panel reflex to direct LDL     OPHTHALMOLOGY ADULT REFERRAL     PNEUMOCOCCAL VACCINE,ADULT,SQ OR IM     PSA, screen     TDAP VACCINE (ADACEL)     TSH with free T4 reflex     VACCINE ADMINISTRATION, EACH ADDITIONAL     VACCINE ADMINISTRATION, INITIAL          Today's Medication Changes          These changes are accurate as of: 4/18/17 11:33 AM.  If you have any questions, ask your nurse or doctor.               Start taking these medicines.        Dose/Directions    pregabalin 50 MG capsule   Commonly known as:  LYRICA   Used for:  Peripheral polyneuropathy (H)   Started by:  Todd Pastrana MD        Dose:   mg   Take 1-2 capsules ( mg) by mouth 3 times daily   Quantity:  90 capsule   Refills:  5         These medicines have changed or have updated prescriptions.        Dose/Directions    blood glucose monitoring lancets   This may have changed:  Another medication with the same name was removed. Continue taking this medication, and follow the directions you see here.   Used for:  Type 2 diabetes mellitus with diabetic polyneuropathy (H)   Changed by:  Todd Pastrana MD        Use to test blood sugar 3 times daily or as directed.   Quantity:  100 Box   Refills:  11       blood glucose monitoring test strip   Commonly known as:  ARTURO CONTOUR NEXT   This may have changed:  Another medication with the same name was removed. Continue taking this medication, and follow the directions you see here.   Used for:  Type 2 diabetes mellitus with diabetic polyneuropathy, without long-term current use of insulin (H)   Changed by:  Todd Pastrana MD        Use to test blood sugar 3 times daily or as directed.   Quantity:  100 each   Refills:  11       furosemide 20 MG tablet   Commonly known as:  LASIX   This may have changed:  See the  new instructions.   Used for:  Peripheral edema   Changed by:  Todd Pastrana MD        Dose:  20 mg   Take 1 tablet (20 mg) by mouth daily   Quantity:  180 tablet   Refills:  1       * LEXAPRO 10 MG tablet   This may have changed:  Another medication with the same name was changed. Make sure you understand how and when to take each.   Used for:  Recurrent major depressive disorder, in remission (H)   Generic drug:  escitalopram   Changed by:  Elana Camilo PA-C        Dose:  10 mg   Take 1 tablet (10 mg) by mouth daily   Quantity:  30 tablet   Refills:  0       * escitalopram 20 MG tablet   Commonly known as:  LEXAPRO   This may have changed:    - medication strength  - how much to take   Used for:  Recurrent major depressive disorder, in remission (H)   Changed by:  Todd Pastrana MD        Dose:  20 mg   Take 1 tablet (20 mg) by mouth daily   Quantity:  90 tablet   Refills:  3       metFORMIN 500 MG tablet   Commonly known as:  GLUCOPHAGE   This may have changed:  See the new instructions.   Used for:  Type 2 diabetes mellitus with diabetic polyneuropathy, without long-term current use of insulin (H)   Changed by:  Todd Pastrana MD        Dose:  500-1000 mg   Take 1-2 tablets (500-1,000 mg) by mouth 2 times daily (with meals)   Quantity:  270 tablet   Refills:  1       * Notice:  This list has 2 medication(s) that are the same as other medications prescribed for you. Read the directions carefully, and ask your doctor or other care provider to review them with you.         Where to get your medicines      These medications were sent to [facility] [address] [place] [id number]     Phone:  [phone]     blood glucose calibration NORMAL solution    blood glucose monitoring test strip    escitalopram 20 MG tablet    furosemide 20 MG tablet    gabapentin 400 MG capsule    glimepiride 4 MG tablet    metFORMIN 500 MG  tablet    omeprazole 20 MG tablet         Some of these will need a paper prescription and others can be bought over the counter.  Ask your nurse if you have questions.     Bring a paper prescription for each of these medications     pregabalin 50 MG capsule                Primary Care Provider Office Phone # Fax #    Todd Pastrana [phone]       [facility] [address] [facility] [address]        Thank you!     Thank you for choosing [facility]  for your care. Our goal is always to provide you with excellent care. Hearing back from our patients is one way we can continue to improve our services. Please take a few minutes to complete the written survey that you may receive in the mail after your visit with us. Thank you!             Your Updated Medication List - Protect others around you: Learn how to safely use, store and throw away your medicines at www.disposemymeds.org.          This list is accurate as of: 4/18/17 11:33 AM.  Always use your most recent med list.                   Brand Name Dispense Instructions for use    blood glucose calibration NORMAL solution     1 Bottle    Use to calibrate blood glucose monitor as needed as directed.       blood glucose monitoring lancets     100 Box    Use to test blood sugar 3 times daily or as directed.       blood glucose monitoring meter device kit     1 kit    Use to test blood sugar 3 times daily or as directed.       blood glucose monitoring test strip    ARTURO CONTOUR NEXT    100 each    Use to test blood sugar 3 times daily or as directed.       econazole nitrate 1 % cream     30 g    Apply topically daily Apply to affected nail daily       furosemide 20 MG tablet    LASIX    180 tablet    Take 1 tablet (20 mg) by mouth daily       gabapentin 400 MG capsule    NEURONTIN    270 capsule    Take 3 capsules (1,200 mg) by mouth 3 times daily       glimepiride 4 MG tablet    AMARYL    90 tablet    Take  1 tablet (4 mg) by mouth every morning (before breakfast)       IBUPROFEN PO      Take 200-400 mg by mouth every 8 hours as needed for moderate pain       * LEXAPRO 10 MG tablet   Generic drug:  escitalopram     30 tablet    Take 1 tablet (10 mg) by mouth daily       * escitalopram 20 MG tablet    LEXAPRO    90 tablet    Take 1 tablet (20 mg) by mouth daily       metFORMIN 500 MG tablet    GLUCOPHAGE    270 tablet    Take 1-2 tablets (500-1,000 mg) by mouth 2 times daily (with meals)       MOVANTIK 25 MG Tabs tablet   Generic drug:  naloxegol      Take 25 mg by mouth daily       omeprazole 20 MG tablet     90 tablet    Take 1 tablet (20 mg) by mouth daily Take 30-60 minutes before a meal.       ondansetron 8 MG tablet    ZOFRAN    20 tablet    Take 0.5-1 tablets (4-8 mg) by mouth every 8 hours as needed for nausea       * OXYCONTIN 20 MG 12 hr tablet   Generic drug:  oxyCODONE      Take 1 tablet by mouth daily       * OXYCONTIN 60 MG T12a 12 hr tablet   Generic drug:  oxyCODONE      Take 60 mg by mouth 2 times daily       * oxyCODONE 15 MG IR tablet    ROXICODONE     Take 2 tablets by mouth daily as needed       polyethylene glycol Packet    MIRALAX/GLYCOLAX    7 packet    Take 17 g by mouth daily       Potassium Chloride ER 20 MEQ Tbcr     90 tablet    Take 1 tablet (20 mEq) by mouth 2 times daily       pregabalin 50 MG capsule    LYRICA    90 capsule    Take 1-2 capsules ( mg) by mouth 3 times daily       senna-docusate 8.6-50 MG per tablet    SENOKOT-S;PERICOLACE    100 tablet    Take 3 tablets by mouth 2 times daily       simvastatin 20 MG tablet    ZOCOR    90 tablet    TAKE ONE TABLET BY MOUTH AT BEDTIME       * Notice:  This list has 5 medication(s) that are the same as other medications prescribed for you. Read the directions carefully, and ask your doctor or other care provider to review them with you.

## 2017-04-19 ENCOUNTER — TELEPHONE (OUTPATIENT)
Dept: FAMILY MEDICINE | Facility: CLINIC | Age: 47
End: 2017-04-19

## 2017-04-19 DIAGNOSIS — G62.9 PERIPHERAL POLYNEUROPATHY: Primary | ICD-10-CM

## 2017-04-19 NOTE — TELEPHONE ENCOUNTER
Lyrica requires a Prior Authorization.   Reason / Alternatives per Insurance rejection:Non formulary drug  - sorry I didn't get this reject last time.      Would you like to change the medication or attempt the prior authorization?    Patient's prescription insurance is as follows:  Insurance Company: [email or web address]   Bin number: [id number]   Phone number: [phone]   ID # [id number]  Group #   PCN #     Please advise and let pharmacy know if and when PA is approved or denied.    -Stefanie Murray, Certified Pharmacy Technician, CPhT, [facility], Ph. [phone]

## 2017-04-24 NOTE — TELEPHONE ENCOUNTER
Name of medication and dosage:  Lyrica 50 mg  Previously tried and failed:  Pt is on gabapentin already  Submitted PA via:  CoverMyMeds.com  To:  [facility]  Reference #:  [id number]  Standard or Urgent:  Standard  Date submitted:  04/24/2017  MA signature:  Shiela Armstrong CMA

## 2017-04-25 NOTE — PROGRESS NOTES
Dear Ryland,    Here is a summary of your recent test results:  -Kidney function (GFR) is normal.  -Sodium is normal.  -Potassium is normal.  -Cholesterol levels are at your goal levels.  ADVISE: Continuing your medication, a regular exercise program with at least 30 minutes of aerobic exercise 3-4 days/week ( 45 minutes 4-6 days/week if weight loss needed), and a low saturated fat,/low carbohydrate diet are helpful to maintain this.  Rechecking your fasting cholesterol panel in 12 months is recommended (Lipid w/ LDL reflex).  -PSA (prostate specific antigen) test is normal.  This indicates a low likelihood of prostate cancer.  ADVISE: yearly recheck.   -TSH (thyroid stimulating hormone) level is normal which indicates normal thyroid function.  -Normal red blood cell (hgb) levels, normal white blood cell count and normal platelet levels.  -A1C (test of diabetes control the last 2-3 months) is slightly above your goal. Please continue with current plan. Also, see me and recheck your A1C test in 6 months.     For additional lab test information, labtestsonline.org is an excellent reference.           Thank you very much for trusting me and [facility].     Healthy regards,  Dannie Pastrana MD

## 2017-04-28 DIAGNOSIS — G89.4 CHRONIC PAIN SYNDROME: Chronic | ICD-10-CM

## 2017-04-28 DIAGNOSIS — G62.9 PERIPHERAL POLYNEUROPATHY: ICD-10-CM

## 2017-04-28 RX ORDER — OXYCODONE HYDROCHLORIDE 15 MG/1
30 TABLET ORAL DAILY PRN
Qty: 4 TABLET | Refills: 0 | Status: SHIPPED | OUTPATIENT
Start: 2017-04-28 | End: 2020-01-16

## 2017-04-28 RX ORDER — OXYCODONE HYDROCHLORIDE 30 MG/1
30 TABLET, FILM COATED, EXTENDED RELEASE ORAL EVERY 12 HOURS
Qty: 4 TABLET | Refills: 0 | Status: SHIPPED | OUTPATIENT
Start: 2017-04-28 | End: 2020-01-16

## 2017-04-28 RX ORDER — OXYCODONE HYDROCHLORIDE 60 MG/1
60 TABLET, FILM COATED, EXTENDED RELEASE ORAL 2 TIMES DAILY
Refills: 0 | Status: CANCELLED | OUTPATIENT
Start: 2017-04-28

## 2017-04-28 NOTE — TELEPHONE ENCOUNTER
Please call Ryland at [phone] when rx is ready to  at .  (Ok to leave detailed message).    Refill request received via: phone  Patient requesting refill for: Oxycodone 15mg and Oxycontin 30mg - emergency supply      Ryland says he usually gets these Rx's from his pain clinic, however they are closed on Fridays and he just discovered he doesn't have enough to get him through the week-end. He is asking for at least 4 Oxycontin 60mg and 4 Oxycodone 15mg to get him thru until his clinic opens back up on Monday.    If these are approved in time please walk to pharmacy.    Last Office Visit: 04/18/2017  Last Refill (see below):    Elizabeth Wilder  Patient Representative

## 2017-04-28 NOTE — TELEPHONE ENCOUNTER
Controlled Substance Refill Request for oxycodone 15 and 30mg  Problem List Complete:  Yes    Last Written Prescription Date:  3/23 and 3/27/2017  Last Fill Quantity: historical,   # refills: historical    Last Office Visit with [facility] primary care provider: 4/18/2017    Clinic visit frequency required: none noted     Future Office visit:     Controlled substance agreement on file: Yes:  Date 11/30/2015.     Processing:  Staff will hand deliver Rx to on-site pharmacy   checked in past 6 months?  No, route to RN   Routing refill request to provider for review/approval because:  Drug not on the [facility] refill protocol   Ina Pelletier RN

## 2017-05-04 NOTE — TELEPHONE ENCOUNTER
Response received regarding PA for Lyrica (name of medication) - denied.  Forwarding to provider for review.  Informed [facility]  Forms sent to scan.

## 2017-05-04 NOTE — TELEPHONE ENCOUNTER
Since he is already on gabapentin I do not have other suggestions with that class of medication.  Ok to stop and see if lyrica + gabapentin was truly needed (it is not usual to take both.)  Does he want to try a [facility] pain clinic referral?

## 2017-05-05 NOTE — TELEPHONE ENCOUNTER
Called # [phone]    Advised pt on the above - he stated he was just on the gabapentin and it was not helping with pain management that is why the lyrica was going to be added, pt asked that we my chart the information to him     My chart message sent and orders placed     Mariely Lou RN, BSN  [facility]

## 2017-06-02 NOTE — TELEPHONE ENCOUNTER
I spoke to patient- his intent was to switch from the gabapentin, which is not working, and start on the Lyrica.  He is still under the impression that PA is being pursued.  Patient is already seeing a pain management clinic (Dr Dain Tsang and Monika Mcdermott) (, I told him to talk to his pain management clinic regarding the gabapentin not working and switching to the Lyrica.     Patient understood and said he will talk to his current pain management team.    -Kelsy Castorena, Certified Pharmacy Technician, CP, [facility], Ph. [phone]

## 2017-06-27 ENCOUNTER — TRANSFERRED RECORDS (OUTPATIENT)
Dept: HEALTH INFORMATION MANAGEMENT | Facility: CLINIC | Age: 47
End: 2017-06-27

## 2017-06-30 ENCOUNTER — TRANSFERRED RECORDS (OUTPATIENT)
Dept: HEALTH INFORMATION MANAGEMENT | Facility: CLINIC | Age: 47
End: 2017-06-30

## 2017-07-01 ENCOUNTER — TRANSFERRED RECORDS (OUTPATIENT)
Dept: HEALTH INFORMATION MANAGEMENT | Facility: CLINIC | Age: 47
End: 2017-07-01

## 2017-10-09 DIAGNOSIS — R10.13 EPIGASTRIC PAIN: ICD-10-CM

## 2017-10-10 NOTE — TELEPHONE ENCOUNTER
omeprazole 20 MG tablet      Last Written Prescription Date: 4.18.17  Last Fill Quantity: 90,  # refills: 1   Last Office Visit with G, P or [facility] prescribing provider: 4.18.17

## 2017-11-08 DIAGNOSIS — E11.42 TYPE 2 DIABETES MELLITUS WITH DIABETIC POLYNEUROPATHY, WITHOUT LONG-TERM CURRENT USE OF INSULIN (H): ICD-10-CM

## 2017-11-09 NOTE — TELEPHONE ENCOUNTER
Due for an Office visit for further refills, only fill for 30 days     Mariely Lou RN, BSN  [facility]

## 2017-11-09 NOTE — TELEPHONE ENCOUNTER
Requested Prescriptions   Pending Prescriptions Disp Refills     metFORMIN (GLUCOPHAGE) 500 MG tablet [Pharmacy Med Name: METFORMIN HCL 500MG TABS]  Medication may not be due for refill.  Last Written Prescription Date:  4/18/2017  Last Fill Quantity: 270 tablet,  # refills: 1   Last Office Visit with [facility], [facility] or [facility] prescribing provider:  4/18/2017   Future Office Visit:    Next 5 appointments (look out 90 days)     Nov 09, 2017  3:00 PM CST   [facility] Raegan [facility]-C   [facility]    [address] [place] [id number] [phone]                  270 tablet 1     Sig: TAKE ONE TO TWO TABLETS BY MOUTH TWICE A DAY WITH MEALS    Biguanide Agents Failed    11/8/2017  4:02 PM       Failed - Patient's BP is less than 140/90    BP Readings from Last 3 Encounters:   04/18/17 110/70   01/04/17 120/80   05/23/16 96/62                Failed - Patient has had a Microalbumin in the past 12 mos.    Recent Labs   Lab Test  01/18/16   1556   MICROL  10   UMALCR  5.51            Failed - Patient has documented A1c within the specified period of time.    Recent Labs   Lab Test  04/18/17   1033   A1C  7.5*            Failed - Recent (6 mos) or future visit with authorizing provider's specialty    Patient had office visit in the last 6 months or has a visit in the next 30 days with authorizing provider.  See chart review.            Passed - Patient has documented LDL within the past 12 mos.    Recent Labs   Lab Test  04/18/17   1033   LDL  48            Passed - Patient is age 10 or older       Passed - Patient's CR is NOT>1.4 OR Patient's EGFR is NOT<45 within past 12 mos.    Recent Labs   Lab Test  04/18/17   1033   GFRESTIMATED  69   GFRESTBLACK  84       Recent Labs   Lab Test  04/18/17   1033   CR  1.14            Passed - Patient does NOT have a diagnosis of CHF.

## 2017-11-26 DIAGNOSIS — E78.5 HYPERLIPIDEMIA LDL GOAL <70: ICD-10-CM

## 2017-11-28 NOTE — TELEPHONE ENCOUNTER
Requested Prescriptions   Pending Prescriptions Disp Refills     simvastatin (ZOCOR) 20 MG tablet [Pharmacy Med Name: SIMVASTATIN 20MG TABS]  Last Written Prescription Date:  12/15/2016  Last Fill Quantity: 90 tablet,  # refills: 3   Last Office Visit with [facility], [facility] or [facility] prescribing provider:  4/18/2017   Future Office Visit:      90 tablet 3     Sig: TAKE ONE TABLET BY MOUTH AT BEDTIME    Statins Protocol Passed    11/26/2017  8:59 AM       Passed - LDL on file in past 12 months    Recent Labs   Lab Test  04/18/17   1033   LDL  48            Passed - No abnormal creatine kinase in past 12 months    No lab results found.         Passed - Recent or future visit with authorizing provider    Patient had office visit in the last year or has a visit in the next 30 days with authorizing provider.  See chart review.              Passed - Patient is age 18 or older

## 2017-11-29 RX ORDER — SIMVASTATIN 20 MG
TABLET ORAL
Qty: 90 TABLET | Refills: 1 | Status: SHIPPED | OUTPATIENT
Start: 2017-11-29 | End: 2018-07-25

## 2017-11-29 NOTE — TELEPHONE ENCOUNTER
Refilled per RN Protocol.     Heidi James, RN  [facility]

## 2017-12-01 DIAGNOSIS — E11.42 TYPE 2 DIABETES MELLITUS WITH DIABETIC POLYNEUROPATHY, WITHOUT LONG-TERM CURRENT USE OF INSULIN (H): ICD-10-CM

## 2017-12-01 DIAGNOSIS — G62.9 PERIPHERAL POLYNEUROPATHY: ICD-10-CM

## 2017-12-04 NOTE — TELEPHONE ENCOUNTER
gabapentin (NEURONTIN) 400 MG capsule      Last Written Prescription Date:  4/18/2017  Last Fill Quantity: 270 capsule,   # refills: 5  Last Office Visit: 4/18/2017  Future Office visit:       Routing refill request to provider for review/approval because:  Drug not on the [facility], [facility] or [facility] refill protocol or controlled substance

## 2017-12-05 RX ORDER — GABAPENTIN 400 MG/1
CAPSULE ORAL
Qty: 270 CAPSULE | Refills: 2 | Status: SHIPPED | OUTPATIENT
Start: 2017-12-05 | End: 2018-04-06 | Stop reason: SINTOL

## 2018-01-19 ENCOUNTER — TRANSFERRED RECORDS (OUTPATIENT)
Dept: HEALTH INFORMATION MANAGEMENT | Facility: CLINIC | Age: 48
End: 2018-01-19

## 2018-01-22 ENCOUNTER — OFFICE VISIT (OUTPATIENT)
Dept: FAMILY MEDICINE | Facility: CLINIC | Age: 48
End: 2018-01-22
Payer: COMMERCIAL

## 2018-01-22 VITALS
HEIGHT: 71 IN | TEMPERATURE: 98.7 F | SYSTOLIC BLOOD PRESSURE: 118 MMHG | DIASTOLIC BLOOD PRESSURE: 78 MMHG | HEART RATE: 83 BPM | OXYGEN SATURATION: 95 % | BODY MASS INDEX: 38.64 KG/M2 | WEIGHT: 276 LBS

## 2018-01-22 DIAGNOSIS — R60.0 PERIPHERAL EDEMA: ICD-10-CM

## 2018-01-22 DIAGNOSIS — E11.42 TYPE 2 DIABETES MELLITUS WITH DIABETIC POLYNEUROPATHY, WITHOUT LONG-TERM CURRENT USE OF INSULIN (H): Primary | ICD-10-CM

## 2018-01-22 DIAGNOSIS — G89.4 CHRONIC PAIN SYNDROME: Chronic | ICD-10-CM

## 2018-01-22 DIAGNOSIS — F41.8 SITUATIONAL ANXIETY: ICD-10-CM

## 2018-01-22 DIAGNOSIS — F33.40 RECURRENT MAJOR DEPRESSIVE DISORDER, IN REMISSION (H): ICD-10-CM

## 2018-01-22 DIAGNOSIS — R10.13 EPIGASTRIC PAIN: ICD-10-CM

## 2018-01-22 DIAGNOSIS — Z23 NEED FOR PROPHYLACTIC VACCINATION AND INOCULATION AGAINST INFLUENZA: ICD-10-CM

## 2018-01-22 DIAGNOSIS — N52.9 ERECTILE DYSFUNCTION, UNSPECIFIED ERECTILE DYSFUNCTION TYPE: ICD-10-CM

## 2018-01-22 DIAGNOSIS — Z13.5 SCREENING FOR DIABETIC RETINOPATHY: ICD-10-CM

## 2018-01-22 DIAGNOSIS — E66.01 MORBID OBESITY DUE TO EXCESS CALORIES (H): ICD-10-CM

## 2018-01-22 DIAGNOSIS — K51.919 ULCERATIVE COLITIS WITH COMPLICATION, UNSPECIFIED LOCATION (H): ICD-10-CM

## 2018-01-22 DIAGNOSIS — G62.9 PERIPHERAL POLYNEUROPATHY: ICD-10-CM

## 2018-01-22 DIAGNOSIS — F33.0 MAJOR DEPRESSIVE DISORDER, RECURRENT EPISODE, MILD (H): ICD-10-CM

## 2018-01-22 LAB
CREAT UR-MCNC: 199 MG/DL
HBA1C MFR BLD: 6.9 % (ref 4.3–6)
MICROALBUMIN UR-MCNC: 6 MG/L
MICROALBUMIN/CREAT UR: 3.03 MG/G CR (ref 0–17)

## 2018-01-22 PROCEDURE — 84443 ASSAY THYROID STIM HORMONE: CPT | Performed by: FAMILY MEDICINE

## 2018-01-22 PROCEDURE — 83036 HEMOGLOBIN GLYCOSYLATED A1C: CPT | Performed by: FAMILY MEDICINE

## 2018-01-22 PROCEDURE — 82043 UR ALBUMIN QUANTITATIVE: CPT | Performed by: FAMILY MEDICINE

## 2018-01-22 PROCEDURE — 80053 COMPREHEN METABOLIC PANEL: CPT | Performed by: FAMILY MEDICINE

## 2018-01-22 PROCEDURE — 84403 ASSAY OF TOTAL TESTOSTERONE: CPT | Performed by: FAMILY MEDICINE

## 2018-01-22 PROCEDURE — 84270 ASSAY OF SEX HORMONE GLOBUL: CPT | Performed by: FAMILY MEDICINE

## 2018-01-22 PROCEDURE — 36415 COLL VENOUS BLD VENIPUNCTURE: CPT | Performed by: FAMILY MEDICINE

## 2018-01-22 PROCEDURE — 99214 OFFICE O/P EST MOD 30 MIN: CPT | Performed by: FAMILY MEDICINE

## 2018-01-22 RX ORDER — FUROSEMIDE 20 MG
20 TABLET ORAL DAILY
Qty: 180 TABLET | Refills: 1 | Status: CANCELLED | OUTPATIENT
Start: 2018-01-22

## 2018-01-22 RX ORDER — PREGABALIN 50 MG/1
50 CAPSULE ORAL 3 TIMES DAILY
Qty: 90 CAPSULE | Refills: 1 | Status: CANCELLED | OUTPATIENT
Start: 2018-01-22

## 2018-01-22 RX ORDER — POTASSIUM CHLORIDE 1500 MG/1
20 TABLET, EXTENDED RELEASE ORAL 2 TIMES DAILY
Qty: 90 TABLET | Refills: 1 | Status: CANCELLED | OUTPATIENT
Start: 2018-01-22

## 2018-01-22 RX ORDER — SILDENAFIL CITRATE 20 MG/1
40-100 TABLET ORAL DAILY PRN
Qty: 30 TABLET | Refills: 5 | Status: SHIPPED | OUTPATIENT
Start: 2018-01-22 | End: 2018-04-06

## 2018-01-22 RX ORDER — GLIMEPIRIDE 4 MG/1
4 TABLET ORAL
Qty: 90 TABLET | Refills: 1 | Status: SHIPPED | OUTPATIENT
Start: 2018-01-22 | End: 2018-07-25

## 2018-01-22 RX ORDER — ESCITALOPRAM OXALATE 10 MG/1
5-10 TABLET ORAL DAILY
Qty: 90 TABLET | Refills: 1 | Status: SHIPPED | OUTPATIENT
Start: 2018-01-22 | End: 2018-04-06 | Stop reason: SINTOL

## 2018-01-22 ASSESSMENT — ANXIETY QUESTIONNAIRES
1. FEELING NERVOUS, ANXIOUS, OR ON EDGE: SEVERAL DAYS
6. BECOMING EASILY ANNOYED OR IRRITABLE: NEARLY EVERY DAY
3. WORRYING TOO MUCH ABOUT DIFFERENT THINGS: NEARLY EVERY DAY
2. NOT BEING ABLE TO STOP OR CONTROL WORRYING: NEARLY EVERY DAY
5. BEING SO RESTLESS THAT IT IS HARD TO SIT STILL: MORE THAN HALF THE DAYS
7. FEELING AFRAID AS IF SOMETHING AWFUL MIGHT HAPPEN: NEARLY EVERY DAY
IF YOU CHECKED OFF ANY PROBLEMS ON THIS QUESTIONNAIRE, HOW DIFFICULT HAVE THESE PROBLEMS MADE IT FOR YOU TO DO YOUR WORK, TAKE CARE OF THINGS AT HOME, OR GET ALONG WITH OTHER PEOPLE: VERY DIFFICULT
GAD7 TOTAL SCORE: 18

## 2018-01-22 ASSESSMENT — PATIENT HEALTH QUESTIONNAIRE - PHQ9
SUM OF ALL RESPONSES TO PHQ QUESTIONS 1-9: 19
5. POOR APPETITE OR OVEREATING: NEARLY EVERY DAY

## 2018-01-22 NOTE — NURSING NOTE
"Chief Complaint   Patient presents with     Recheck Medication       Initial /78  Pulse 83  Temp 98.7  F (37.1  C) (Oral)  Ht 5' 11\" (1.803 m)  Wt 276 lb (125.2 kg)  SpO2 95%  BMI 38.49 kg/m2 Estimated body mass index is 38.49 kg/(m^2) as calculated from the following:    Height as of this encounter: 5' 11\" (1.803 m).    Weight as of this encounter: 276 lb (125.2 kg).  Medication Reconciliation: complete  "

## 2018-01-22 NOTE — MR AVS SNAPSHOT
After Visit Summary   1/22/2018    Ryland Gee    MRN: [id number]           Patient Information     Date Of Birth          1970        Visit Information        Provider Department      1/22/2018 10:00 AM Todd Pastrana MD [facility]        Today's Diagnoses     Type 2 diabetes mellitus with diabetic polyneuropathy, without long-term current use of insulin (H)    -  1    Screening for diabetic retinopathy        Need for prophylactic vaccination and inoculation against influenza        Peripheral edema        Peripheral polyneuropathy        Chronic pain - seen at [facility] in [place]        Recurrent major depressive disorder, in remission (H)        Epigastric pain        Morbid obesity due to excess calories (H)        Ulcerative colitis with complication, unspecified location (H)        Major depressive disorder, recurrent episode, mild (H)        Erectile dysfunction, unspecified erectile dysfunction type        Situational anxiety           Follow-ups after your visit        Additional Services     MENTAL HEALTH REFERRAL  - Adult; Outpatient Treatment; Individual/Couples/Family/Group Therapy/Health Psychology; [facility]: [facility] [phone]; We will contact you to schedule the appointment or please call with any questions       All scheduling is subject to the client's specific insurance plan & benefits, provider/location availability, and provider clinical specialities.  Please arrive 15 minutes early for your first appointment and bring your completed paperwork.    Please be aware that coverage of these services is subject to the terms and limitations of your health insurance plan.  Call member services at your health plan with any benefit or coverage questions.                            Who to contact     If you have questions or need follow up information about today's clinic visit or your schedule please contact [facility] directly at [phone].  Normal or non-critical lab and imaging results will be communicated to you by imedohart, letter or phone within 4 business days after the clinic has received the results. If you do not hear from us within 7 days, please contact the clinic through imedohart or phone. If you have a critical or abnormal lab result, we will notify you by phone as soon as possible.  Submit refill requests through [email or web address] or call your pharmacy and they will forward the refill request to us. Please allow 3 business days for your refill to be completed.          Additional Information About Your Visit        [email or web address] Information     [email or web address] gives you secure access to your electronic health record. If you see a primary care provider, you can also send messages to your care team and make appointments. If you have questions, please call your primary care clinic.  If you do not have a primary care provider, please call [phone] and they will assist you.        Care EveryWhere ID     This is your Care EveryWhere ID. This could be used by other organizations to access your [facility] medical records  [id number]        Your Vitals Were     Pulse Temperature Height Pulse Oximetry BMI (Body Mass Index)       83 98.7  F (37.1  C) (Oral) 5' 11\" (1.803 m) 95% 38.49 kg/m2        Blood Pressure from Last 3 Encounters:   01/22/18 118/78   04/18/17 110/70   01/04/17 120/80    Weight from Last 3 Encounters:   01/22/18 276 lb (125.2 kg)   04/18/17 280 lb (127 kg)   01/04/17 289 lb (131.1 kg)              We Performed the Following     Albumin Random Urine Quantitative with Creat Ratio     Comprehensive metabolic panel (BMP + Alb, Alk Phos, ALT, AST, Total. Bili, TP)     HEMOGLOBIN A1C     MENTAL HEALTH REFERRAL  - Adult; Outpatient Treatment; Individual/Couples/Family/Group Therapy/Health Psychology; [facility]: [facility] [phone]; We will contact you to schedule the appointment or please call with any " questions     Testosterone Free and Total     TSH with free T4 reflex          Today's Medication Changes          These changes are accurate as of: 1/22/18 11:01 AM.  If you have any questions, ask your nurse or doctor.               Start taking these medicines.        Dose/Directions    sildenafil 20 MG tablet   Commonly known as:  REVATIO   Used for:  Erectile dysfunction, unspecified erectile dysfunction type   Started by:  Todd Pastrana MD        Dose:   mg   Take 2-5 tablets ( mg) by mouth daily as needed   Quantity:  30 tablet   Refills:  5         These medicines have changed or have updated prescriptions.        Dose/Directions    escitalopram 10 MG tablet   Commonly known as:  LEXAPRO   This may have changed:    - medication strength  - how much to take  - Another medication with the same name was removed. Continue taking this medication, and follow the directions you see here.   Used for:  Recurrent major depressive disorder, in remission (H), Major depressive disorder, recurrent episode, mild (H), Situational anxiety   Changed by:  Todd Pastrana MD        Dose:  5-10 mg   Take 0.5-1 tablets (5-10 mg) by mouth daily   Quantity:  90 tablet   Refills:  1       metFORMIN 500 MG tablet   Commonly known as:  GLUCOPHAGE   This may have changed:  See the new instructions.   Used for:  Type 2 diabetes mellitus with diabetic polyneuropathy, without long-term current use of insulin (H)   Changed by:  Todd Pastrana MD        Dose:  500 mg   Take 1 tablet (500 mg) by mouth 2 times daily (with meals)   Quantity:  180 tablet   Refills:  1       omeprazole 20 MG CR capsule   Commonly known as:  priLOSEC   This may have changed:  See the new instructions.   Used for:  Epigastric pain   Changed by:  Todd Pastrana MD        Dose:  20 mg   Take 1 capsule (20 mg) by mouth daily   Quantity:  90 capsule   Refills:  1         Stop taking these medicines if you haven't already. Please contact your  care team if you have questions.     furosemide 20 MG tablet   Commonly known as:  LASIX   Stopped by:  Todd Pastrana MD           Potassium Chloride ER 20 MEQ Tbcr   Stopped by:  Todd Pastrana MD           pregabalin 50 MG capsule   Commonly known as:  LYRICA   Stopped by:  Todd Pastrana MD                Where to get your medicines      These medications were sent to [facility] [address] [facility]  [address] [place] [address]     Phone:  [phone]     escitalopram 10 MG tablet    glimepiride 4 MG tablet    metFORMIN 500 MG tablet    omeprazole 20 MG CR capsule         Some of these will need a paper prescription and others can be bought over the counter.  Ask your nurse if you have questions.     Bring a paper prescription for each of these medications     sildenafil 20 MG tablet                Primary Care Provider Office Phone # Fax #    Todd Pastrana [phone]       [address] [place] [address]        Equal Access to Services     MIKAL [facility] Center[facility]AMEKA : Hadii aad ku hadasho Soomaali, waaxda luqadaha, qaybta kaalmada adeegyada, waxay idiin hayaan gamal miles . So [facility] [phone].    ATENCIÓN: Si habla español, tiene a cortez disposición servicios gratuitos de asistencia lingüística. [facility] [phone].    We comply with applicable federal civil rights laws and [place] laws. We do not discriminate on the basis of race, color, national origin, age, disability, sex, sexual orientation, or gender identity.            Thank you!     Thank you for choosing [facility]  for your care. Our goal is always to provide you with excellent care. Hearing back from our patients is one way we can continue to improve our services. Please take a few minutes to complete the written survey that you may receive in the mail after your visit with us. Thank you!             Your Updated Medication List -  Protect others around you: Learn how to safely use, store and throw away your medicines at www.disposemymeds.org.          This list is accurate as of: 1/22/18 11:01 AM.  Always use your most recent med list.                   Brand Name Dispense Instructions for use Diagnosis    blood glucose calibration NORMAL solution     1 Bottle    Use to calibrate blood glucose monitor as needed as directed.    Type 2 diabetes mellitus with diabetic polyneuropathy, without long-term current use of insulin (H)       blood glucose monitoring lancets     100 Box    Use to test blood sugar 3 times daily or as directed.    Type 2 diabetes mellitus with diabetic polyneuropathy (H)       blood glucose monitoring meter device kit     1 kit    Use to test blood sugar 3 times daily or as directed.    Type 2 diabetes mellitus with diabetic polyneuropathy (H)       blood glucose monitoring test strip    ARTURO CONTOUR NEXT    100 each    Use to test blood sugar 3 times daily or as directed.    Type 2 diabetes mellitus with diabetic polyneuropathy, without long-term current use of insulin (H)       econazole nitrate 1 % cream     30 g    Apply topically daily Apply to affected nail daily    Foot pain, bilateral, Numbness in feet, Edema of both legs, Metatarsalgia of both feet, Sciatica of left side, Sciatica of right side, Onychomycosis of toenail, Varicosities       escitalopram 10 MG tablet    LEXAPRO    90 tablet    Take 0.5-1 tablets (5-10 mg) by mouth daily    Recurrent major depressive disorder, in remission (H), Major depressive disorder, recurrent episode, mild (H), Situational anxiety       gabapentin 400 MG capsule    NEURONTIN    270 capsule    TAKE THREE CAPSULES BY MOUTH THREE TIMES A DAY    Peripheral polyneuropathy, Type 2 diabetes mellitus with diabetic polyneuropathy, without long-term current use of insulin (H)       glimepiride 4 MG tablet    AMARYL    90 tablet    Take 1 tablet (4 mg) by mouth every morning (before  breakfast)    Type 2 diabetes mellitus with diabetic polyneuropathy, without long-term current use of insulin (H), Peripheral polyneuropathy       IBUPROFEN PO      Take 200-400 mg by mouth every 8 hours as needed for moderate pain        medical cannabis inhalation (Patient's own supply.  Not a prescription)     0 Information only    (This is NOT a prescription, and does not certify that the patient has a qualifying medical condition for medical cannabis.  The purpose of this order is  to document that the patient reports taking medical cannabis.)    Chronic pain syndrome       metFORMIN 500 MG tablet    GLUCOPHAGE    180 tablet    Take 1 tablet (500 mg) by mouth 2 times daily (with meals)    Type 2 diabetes mellitus with diabetic polyneuropathy, without long-term current use of insulin (H)       MOVANTIK 25 MG Tabs tablet   Generic drug:  naloxegol      Take 25 mg by mouth daily    Constipation due to opioid therapy       omeprazole 20 MG CR capsule    priLOSEC    90 capsule    Take 1 capsule (20 mg) by mouth daily    Epigastric pain       ondansetron 8 MG tablet    ZOFRAN    20 tablet    Take 0.5-1 tablets (4-8 mg) by mouth every 8 hours as needed for nausea    Non-intractable vomiting with nausea, vomiting of unspecified type       * oxyCODONE IR 15 MG tablet    ROXICODONE    4 tablet    Take 2 tablets (30 mg) by mouth daily as needed    Peripheral polyneuropathy, Chronic pain syndrome       * oxyCODONE 30 MG 12 hr tablet    OXYCONTIN    4 tablet    Take 1 tablet (30 mg) by mouth every 12 hours    Chronic pain syndrome, Peripheral polyneuropathy       polyethylene glycol Packet    MIRALAX/GLYCOLAX    7 packet    Take 17 g by mouth daily    Abdominal pain, generalized       senna-docusate 8.6-50 MG per tablet    SENOKOT-S;PERICOLACE    100 tablet    Take 3 tablets by mouth 2 times daily    Abdominal pain, generalized       sildenafil 20 MG tablet    REVATIO    30 tablet    Take 2-5 tablets ( mg) by mouth  daily as needed    Erectile dysfunction, unspecified erectile dysfunction type       simvastatin 20 MG tablet    ZOCOR    90 tablet    TAKE ONE TABLET BY MOUTH AT BEDTIME    Hyperlipidemia LDL goal <70       * Notice:  This list has 2 medication(s) that are the same as other medications prescribed for you. Read the directions carefully, and ask your doctor or other care provider to review them with you.

## 2018-01-22 NOTE — PROGRESS NOTES
SUBJECTIVE:                                                    Ryland Gee is a 48 year old male who presents to clinic today for the following health issues:    Diabetes Follow-up    Patient is checking blood sugars: not often- when checked- low as 63 high as [phone]    Diabetic concerns: None     Symptoms of hypoglycemia (low blood sugar): shaky, weak     Paresthesias (numbness or burning in feet) or sores: Yes using medical cannabis -CBD- helps with the feet     Date of last diabetic eye exam: 1/19/2018    The patient has been taking 500 mg of Metformin BID and 4 mg of glimepiride daily    BP Readings from Last 2 Encounters:   01/22/18 118/78   04/18/17 110/70     Hemoglobin A1C (%)   Date Value   04/18/2017 7.5 (H)   04/15/2016 6.8 (H)     LDL Cholesterol Calculated (mg/dL)   Date Value   04/18/2017 48   05/12/2016 66     Hyperlipidemia Follow-Up    Rate your low fat/cholesterol diet?: good    Taking statin?  Yes, no muscle aches from statin. He is currently taking 20 mg of simvastatin daily.    Other lipid medications/supplements?:  none    Depression and Anxiety Follow-Up    Status since last visit: No change    Other associated symptoms:None    Complicating factors:     Significant life event: No     Current substance abuse: None    The patient has been taking 1/4 tablet of 20 mg Lexapro daily for management of anxiety and depression.    He states that he has had increased depression symptoms due to stress from work and his mother's worsening health. He also feels like he has been quick to anger recently.    PHQ-9 Score and MyChart F/U Questions 5/23/2016 11/21/2016 1/4/2017   Total Score 13 19 18   Q9: Suicide Ideation Not at all Not at all Not at all     BUSHRA-7 SCORE 5/23/2016 11/21/2016 1/4/2017   Total Score 14 21 19     PHQ-9  English  PHQ-9   Any Language  GAD7  Suicide Assessment Five-step Evaluation and Treatment (SAFE-T)    Heartburn - controlled. He currently takes 20 mg of omeprazole  "daily.    Chronic Pain and Neuropathy - The patient is currently using medical marijuana which he feels is controlling symptoms well. He also is taking gabapentin 3x 400 mg in the morning and 4x 400 mg in the evening. He is also taking Roxicodone and Oxycontin as needed for pain.     Erectile Dysfunction - The patient states that he has had recent changes in his erection. He denies changes in libido. He is currently taking Lexapro.       Problem list and histories reviewed & adjusted, as indicated.  Additional history: as documented      ROS:  Constitutional, HEENT, cardiovascular, pulmonary, GI, , musculoskeletal, neuro, skin, endocrine and psych systems are negative, except as otherwise noted.    This document serves as a record of the services and decisions personally performed and made by Tdod Pastrana MD. It was created on his behalf by Kat Lai, a trained medical scribe. The creation of this document is based on the provider's statements to the medical scribe.  Kat Lai 8:01 AM 1/22/2018  OBJECTIVE:                                                    /78  Pulse 83  Temp 98.7  F (37.1  C) (Oral)  Ht 1.803 m (5' 11\")  Wt 125.2 kg (276 lb)  SpO2 95%  BMI 38.49 kg/m2 Body mass index is 38.49 kg/(m^2).   GENERAL: healthy, alert, well nourished, well hydrated, no distress  HENT: ear canals- normal; TMs- normal; Nose- normal; Mouth- no ulcers, no lesions  NECK: no tenderness, no adenopathy, no asymmetry, no masses, no stiffness; thyroid- normal to palpation  RESP: lungs clear to auscultation - no rales, no rhonchi, no wheezes  CV: regular rates and rhythm, normal S1 S2, no S3 or S4 and no murmur, no click or rub -  ABDOMEN: soft, no tenderness, no  hepatosplenomegaly, no masses, normal bowel sounds  MS: trace lower leg edema bilaterally, otherwise extremities- no gross deformities noted, no edema  SKIN: no suspicious lesions, no rashes  Diabetic foot exam: normal DP and PT pulses, no " trophic changes or ulcerative lesions and normal sensory exam    Diagnostic test results:  Pending     ASSESSMENT/PLAN:       Ryland was seen today for recheck medication.    Diagnoses and all orders for this visit:    Type 2 diabetes mellitus with diabetic polyneuropathy, without long-term current use of insulin (H) - controlled - continue medication.  -     HEMOGLOBIN A1C  -     Albumin Random Urine Quantitative with Creat Ratio  -     glimepiride (AMARYL) 4 MG tablet; Take 1 tablet (4 mg) by mouth every morning (before breakfast)  -     metFORMIN (GLUCOPHAGE) 500 MG tablet; Take 1 tablet (500 mg) by mouth 2 times daily (with meals)  -     Comprehensive metabolic panel (BMP + Alb, Alk Phos, ALT, AST, Total. Bili, TP)  -     TSH with free T4 reflex    Screening for diabetic retinopathy - continue with ophthalmologist     Need for prophylactic vaccination and inoculation against influenza    Peripheral edema    Peripheral polyneuropathy - controlled - continue medication.  -     glimepiride (AMARYL) 4 MG tablet; Take 1 tablet (4 mg) by mouth every morning (before breakfast)    Chronic pain - seen at [facility] in [place] - controlled - continue medication.    Recurrent major depressive disorder, in remission (H)  - continue medication. Schedule therapy appointment.  -     escitalopram (LEXAPRO) 10 MG tablet; Take 0.5-1 tablets (5-10 mg) by mouth daily    Epigastric pain - controlled - continue medication.  -     omeprazole (PRILOSEC) 20 MG CR capsule; Take 1 capsule (20 mg) by mouth daily    Morbid obesity due to excess calories (H)    Ulcerative colitis with complication, unspecified location (H)    Major depressive disorder, recurrent episode, mild (H) - continue medication. Schedule therapy appointment.  -     MENTAL HEALTH REFERRAL  - Adult; Outpatient Treatment; Individual/Couples/Family/Group Therapy/Health Psychology; G: [facility] [phone]; We will contact you to  schedule the appointment or please call with any questions    Erectile dysfunction, unspecified erectile dysfunction type - Start taking 2-3 tablets of sildenafil as needed. Coupon printed for patient.  -     Testosterone Free and Total  -     sildenafil (REVATIO) 20 MG tablet; Take 2-5 tablets ( mg) by mouth daily as needed          Risks, benefits and alternatives of treatments discussed. Plan agreed on.      Followup: 6 months    Will call, return to clinic, or go to ED if worsening or symptoms not improving as discussed.    See patient instructions.       The information in this document, created by a scribe for me, accurately reflects the services I personally performed and the decisions made by me. I have reviewed and approved this document for accuracy.      Dannie Pastrana MD   Pager: [phone]

## 2018-01-23 LAB
ALBUMIN SERPL-MCNC: 4.1 G/DL (ref 3.4–5)
ALP SERPL-CCNC: 85 U/L (ref 40–150)
ALT SERPL W P-5'-P-CCNC: 31 U/L (ref 0–70)
ANION GAP SERPL CALCULATED.3IONS-SCNC: 4 MMOL/L (ref 3–14)
AST SERPL W P-5'-P-CCNC: 16 U/L (ref 0–45)
BILIRUB SERPL-MCNC: 0.3 MG/DL (ref 0.2–1.3)
BUN SERPL-MCNC: 10 MG/DL (ref 7–30)
CALCIUM SERPL-MCNC: 8.6 MG/DL (ref 8.5–10.1)
CHLORIDE SERPL-SCNC: 103 MMOL/L (ref 94–109)
CO2 SERPL-SCNC: 32 MMOL/L (ref 20–32)
CREAT SERPL-MCNC: 1.02 MG/DL (ref 0.66–1.25)
GFR SERPL CREATININE-BSD FRML MDRD: 79 ML/MIN/1.7M2
GLUCOSE SERPL-MCNC: 102 MG/DL (ref 70–99)
POTASSIUM SERPL-SCNC: 4.2 MMOL/L (ref 3.4–5.3)
PROT SERPL-MCNC: 7.4 G/DL (ref 6.8–8.8)
SODIUM SERPL-SCNC: 139 MMOL/L (ref 133–144)
TSH SERPL DL<=0.005 MIU/L-ACNC: 1.27 MU/L (ref 0.4–4)

## 2018-01-23 ASSESSMENT — ANXIETY QUESTIONNAIRES: GAD7 TOTAL SCORE: 18

## 2018-01-25 ENCOUNTER — TELEPHONE (OUTPATIENT)
Dept: FAMILY MEDICINE | Facility: CLINIC | Age: 48
End: 2018-01-25

## 2018-01-25 LAB
SHBG SERPL-SCNC: 16 NMOL/L (ref 11–80)
TESTOST FREE SERPL-MCNC: 1.71 NG/DL (ref 4.7–24.4)
TESTOST SERPL-MCNC: 66 NG/DL (ref 240–950)

## 2018-01-25 NOTE — TELEPHONE ENCOUNTER
Routing to PCP for further review/recommendations/orders.  Please review recent labs.  Ina Pelletier RN  [facility]

## 2018-01-25 NOTE — TELEPHONE ENCOUNTER
Reason for Call:  Request for results:  A1C    Name of test or procedure: A1C TEST     Date of test of procedure: 1/22/2018    Location of the test or procedure: at the clinic    OK to leave the result message on voice mail or with a family member? YES    Phone number Patient can be reached at:  Home number on file [phone] (home)    Additional comments: no    Call taken on 1/25/2018 at 2:55 PM by Amanda Linares

## 2018-01-26 NOTE — TELEPHONE ENCOUNTER
Pt calling back to inquire on results of his labs test from 1/24/18. Please call patient back with the results.

## 2018-01-29 NOTE — TELEPHONE ENCOUNTER
Routing to PCP for further review/recommendations/orders.    Heidi James RN  [facility]

## 2018-01-30 PROBLEM — E29.1 HYPOTESTOSTERONEMIA IN MALE: Status: ACTIVE | Noted: 2018-01-30

## 2018-01-30 NOTE — PROGRESS NOTES
Dear Ryland,    Here is a summary of your recent test results:  -Liver and gallbladder tests are normal. (ALT,AST, Alk phos, bilirubin), kidney function is normal (Cr, GFR), Sodium is normal, Potassium is normal, Calcium is normal, Glucose is normal (diabetes screening test).   -TSH (thyroid stimulating hormone) level is normal which indicates normal thyroid function.  -A1C (test of diabetes control the last 2-3 months) is at your goal. Please continue with current plan. Also, see me and recheck your A1C test in 6 months.   -Microalbumin (urine protein) test is normal.  ADVISE: recheck annually    -Testosterone levels are low.  It is reasonable to get another morning sample to confirm this.  You could start testosterone replacement.  You can apply gel or a patch to the skin or an injection (shot) can be given every 2-3 weeks (pills are not an option).   A side effect is a slight increase in prostate cancer risk and therefore your PSA blood test will be monitored every 6 months. Let me know what you would like to do or otherwise I would recommend an office visit to review risks and benefits.      For additional lab test information, labtestsonline.org is an excellent reference.           Thank you very much for trusting me and [facility].     Healthy regards,  Dannie Pastrana MD

## 2018-02-19 ENCOUNTER — TRANSFERRED RECORDS (OUTPATIENT)
Dept: HEALTH INFORMATION MANAGEMENT | Facility: CLINIC | Age: 48
End: 2018-02-19

## 2018-04-06 ENCOUNTER — OFFICE VISIT (OUTPATIENT)
Dept: FAMILY MEDICINE | Facility: CLINIC | Age: 48
End: 2018-04-06
Payer: COMMERCIAL

## 2018-04-06 ENCOUNTER — TELEPHONE (OUTPATIENT)
Dept: FAMILY MEDICINE | Facility: CLINIC | Age: 48
End: 2018-04-06

## 2018-04-06 VITALS
WEIGHT: 271 LBS | TEMPERATURE: 98.4 F | SYSTOLIC BLOOD PRESSURE: 110 MMHG | HEART RATE: 76 BPM | OXYGEN SATURATION: 98 % | DIASTOLIC BLOOD PRESSURE: 72 MMHG | BODY MASS INDEX: 37.94 KG/M2 | HEIGHT: 71 IN

## 2018-04-06 DIAGNOSIS — G89.29 CHRONIC LOW BACK PAIN, UNSPECIFIED BACK PAIN LATERALITY, WITH SCIATICA PRESENCE UNSPECIFIED: ICD-10-CM

## 2018-04-06 DIAGNOSIS — G62.9 PERIPHERAL POLYNEUROPATHY: Primary | ICD-10-CM

## 2018-04-06 DIAGNOSIS — M54.5 CHRONIC LOW BACK PAIN, UNSPECIFIED BACK PAIN LATERALITY, WITH SCIATICA PRESENCE UNSPECIFIED: ICD-10-CM

## 2018-04-06 DIAGNOSIS — F33.0 MAJOR DEPRESSIVE DISORDER, RECURRENT EPISODE, MILD (H): ICD-10-CM

## 2018-04-06 DIAGNOSIS — N52.9 ERECTILE DYSFUNCTION, UNSPECIFIED ERECTILE DYSFUNCTION TYPE: ICD-10-CM

## 2018-04-06 PROCEDURE — 99214 OFFICE O/P EST MOD 30 MIN: CPT | Performed by: FAMILY MEDICINE

## 2018-04-06 RX ORDER — VENLAFAXINE HYDROCHLORIDE 37.5 MG/1
37.5 CAPSULE, EXTENDED RELEASE ORAL DAILY
Qty: 14 CAPSULE | Refills: 0 | Status: SHIPPED | OUTPATIENT
Start: 2018-04-06 | End: 2019-01-03

## 2018-04-06 RX ORDER — SILDENAFIL CITRATE 20 MG/1
40-100 TABLET ORAL DAILY PRN
Qty: 30 TABLET | Refills: 11 | Status: SHIPPED | OUTPATIENT
Start: 2018-04-06 | End: 2018-10-23

## 2018-04-06 RX ORDER — VENLAFAXINE HYDROCHLORIDE 75 MG/1
75 CAPSULE, EXTENDED RELEASE ORAL DAILY
Qty: 90 CAPSULE | Refills: 1 | Status: SHIPPED | OUTPATIENT
Start: 2018-04-06 | End: 2018-06-20

## 2018-04-06 RX ORDER — PREGABALIN 50 MG/1
50 CAPSULE ORAL 3 TIMES DAILY
Qty: 90 CAPSULE | Refills: 1 | Status: SHIPPED | OUTPATIENT
Start: 2018-04-06 | End: 2018-06-25

## 2018-04-06 RX ORDER — VENLAFAXINE HYDROCHLORIDE 37.5 MG/1
CAPSULE, EXTENDED RELEASE ORAL
Qty: 14 CAPSULE | Refills: 0 | Status: SHIPPED | OUTPATIENT
Start: 2018-04-06 | End: 2018-04-06

## 2018-04-06 ASSESSMENT — ANXIETY QUESTIONNAIRES
1. FEELING NERVOUS, ANXIOUS, OR ON EDGE: NEARLY EVERY DAY
5. BEING SO RESTLESS THAT IT IS HARD TO SIT STILL: NEARLY EVERY DAY
7. FEELING AFRAID AS IF SOMETHING AWFUL MIGHT HAPPEN: NEARLY EVERY DAY
3. WORRYING TOO MUCH ABOUT DIFFERENT THINGS: NEARLY EVERY DAY
6. BECOMING EASILY ANNOYED OR IRRITABLE: MORE THAN HALF THE DAYS
2. NOT BEING ABLE TO STOP OR CONTROL WORRYING: NEARLY EVERY DAY
IF YOU CHECKED OFF ANY PROBLEMS ON THIS QUESTIONNAIRE, HOW DIFFICULT HAVE THESE PROBLEMS MADE IT FOR YOU TO DO YOUR WORK, TAKE CARE OF THINGS AT HOME, OR GET ALONG WITH OTHER PEOPLE: VERY DIFFICULT
GAD7 TOTAL SCORE: 19

## 2018-04-06 ASSESSMENT — PATIENT HEALTH QUESTIONNAIRE - PHQ9: 5. POOR APPETITE OR OVEREATING: MORE THAN HALF THE DAYS

## 2018-04-06 NOTE — MR AVS SNAPSHOT
After Visit Summary   4/6/2018    Ryland Gee    MRN: [id number]           Patient Information     Date Of Birth          1970        Visit Information        Provider Department      4/6/2018 2:00 PM Todd Pastrana MD [facility] [place]        Today's Diagnoses     Peripheral polyneuropathy    -  1    Major depressive disorder, recurrent episode, mild (H)        Chronic low back pain, unspecified back pain laterality, with sciatica presence unspecified        Erectile dysfunction, unspecified erectile dysfunction type           Follow-ups after your visit        Additional Services     PAIN MANAGEMENT REFERRAL       [facility] Referral    Please be aware that coverage of these services is subject to the terms and limitations of your health insurance plan.  Call member services at your health plan with any benefit or coverage questions.      Please bring the following to your appointment:  Any x-rays, CTs or MRIs which have been performed.  Contact the facility where they were done to arrange for  prior to your scheduled appointment.  Any new CT, MRI or other procedures ordered by your specialist must be performed at a [facility] facility or coordinated by your clinic's referral office.    List of current medications   This referral request  Any documents/labs given to you for this referral      Reason for Consult:  Evaluation for comprehensive services- patients will be evaluated if appropriate for comprehensive service including medication changes, procedures, pain psychology, and pain physical therapy.  While involved with comprehensive services, pain providers will work with referring provider/PCP to stabilize appropriate medication management, with long-term plan of transition of prescribing back to referring provider/PCP upon completion of comprehensive services.      Please complete the following questions:    Do you have any specific questions for the  pain specialist? Yes: help for ongoing peripheral neuropathy    Are there any red flags that may impact the assessment or management of the patient? Mental Illness / Communication Difficulties (explain): some dpepression symptoms. and Patient has already been evaluated/treated at a pain clinic: Where: [facility]  When: last month    Please answer the following questions:    What is your diagnosis for this patient's pain?  Peripheral neuroapthy and possible lumbar radiculopathy        Are there any red flags that may impact the assessment or management of this patient?        ANY DIAGNOSTIC TESTS THAT ARE NOT IN EPIC SHOULD BE SENT TO THE PAIN CENTER    Please note the pre op pain consult must be scheduled 2-3 weeks prior to the patient's surgery. Patient's trying to schedule within 2 weeks of surgery may not be accommodated.    Pre Op Pain Consults are only good for 30 days.    REGARDING OPIOID MEDICATIONS:  We will always address appropriateness of opioid pain medications, but we generally will not automatically take on a prescribing role. When we do take on prescribing of opioids for chronic pain, it is in collaboration with the referring physician for an intermediate period of time (months), with an expectation that the primary physician or provider will assume the prescribing role if medications are effective at stable doses with demonstrated compliance.  Therefore, please do not assume that your prescribing responsibilities end on the day of pain clinic consultation.  Is this agreeable to you? YES    For any questions, contact the [facility] at [phone]                  Who to contact     If you have questions or need follow up information about today's clinic visit or your schedule please contact [facility] directly at [phone].  Normal or non-critical lab and imaging results will be communicated to you by MyChart, letter or phone within 4 business  "days after the clinic has received the results. If you do not hear from us within 7 days, please contact the clinic through [employer] or phone. If you have a critical or abnormal lab result, we will notify you by phone as soon as possible.  Submit refill requests through [employer] or call your pharmacy and they will forward the refill request to us. Please allow 3 business days for your refill to be completed.          Additional Information About Your Visit        [employer] Information     [employer] gives you secure access to your electronic health record. If you see a primary care provider, you can also send messages to your care team and make appointments. If you have questions, please call your primary care clinic.  If you do not have a primary care provider, please call [phone] and they will assist you.        Care EveryWhere ID     This is your Care EveryWhere ID. This could be used by other organizations to access your [facility] medical records  [id number]        Your Vitals Were     Pulse Temperature Height Pulse Oximetry BMI (Body Mass Index)       76 98.4  F (36.9  C) (Oral) 5' 11\" (1.803 m) 98% 37.8 kg/m2        Blood Pressure from Last 3 Encounters:   04/06/18 110/72   01/22/18 118/78   04/18/17 110/70    Weight from Last 3 Encounters:   04/06/18 271 lb (122.9 kg)   01/22/18 276 lb (125.2 kg)   04/18/17 280 lb (127 kg)              We Performed the Following     PAIN MANAGEMENT REFERRAL          Today's Medication Changes          These changes are accurate as of 4/6/18  2:55 PM.  If you have any questions, ask your nurse or doctor.               Start taking these medicines.        Dose/Directions    pregabalin 50 MG capsule   Commonly known as:  LYRICA   Used for:  Peripheral polyneuropathy, Chronic low back pain, unspecified back pain laterality, with sciatica presence unspecified   Started by:  Todd Pastrana MD        Dose:  50 mg   Take 1 capsule (50 mg) by mouth 3 times daily   Quantity:  90 " capsule   Refills:  1       * venlafaxine 37.5 MG 24 hr capsule   Commonly known as:  EFFEXOR-XR   Used for:  Major depressive disorder, recurrent episode, mild (H), Chronic low back pain, unspecified back pain laterality, with sciatica presence unspecified   Started by:  Todd Pastrana MD        Take 1 capsule daily for 14 days, then take 2 capsules daily.   Quantity:  14 capsule   Refills:  0       * venlafaxine 75 MG 24 hr capsule   Commonly known as:  EFFEXOR-XR   Used for:  Major depressive disorder, recurrent episode, mild (H), Chronic low back pain, unspecified back pain laterality, with sciatica presence unspecified   Started by:  Todd Pastrana MD        Dose:  75 mg   Take 1 capsule (75 mg) by mouth daily   Quantity:  90 capsule   Refills:  1       * Notice:  This list has 2 medication(s) that are the same as other medications prescribed for you. Read the directions carefully, and ask your doctor or other care provider to review them with you.      Stop taking these medicines if you haven't already. Please contact your care team if you have questions.     escitalopram 10 MG tablet   Commonly known as:  LEXAPRO   Stopped by:  Todd Pastrana MD           gabapentin 400 MG capsule   Commonly known as:  NEURONTIN   Stopped by:  Todd Pastrana MD                Where to get your medicines      These medications were sent to [facility] [address] [address]     Phone:  [phone]     venlafaxine 37.5 MG 24 hr capsule    venlafaxine 75 MG 24 hr capsule         Some of these will need a paper prescription and others can be bought over the counter.  Ask your nurse if you have questions.     Bring a paper prescription for each of these medications     pregabalin 50 MG capsule    sildenafil 20 MG tablet                Primary Care Provider Office Phone # Fax #    Todd Pastrana [phone]        [address] [facility] [address]        Equal Access to Services     MKIAL VEGA : Hadii aad ku hadclaudiotrudy Sokamaljit, waaxda luqadaha, qaybta kaalmayunior snider, brea huntjohnnancy gill. So [facility] [phone].    ATENCIÓN: Si habla español, tiene a cortez disposición servicios gratuitos de asistencia lingüística. Llame al [phone].    We comply with applicable federal civil rights laws and [place] laws. We do not discriminate on the basis of race, color, national origin, age, disability, sex, sexual orientation, or gender identity.            Thank you!     Thank you for choosing [facility]  for your care. Our goal is always to provide you with excellent care. Hearing back from our patients is one way we can continue to improve our services. Please take a few minutes to complete the written survey that you may receive in the mail after your visit with us. Thank you!             Your Updated Medication List - Protect others around you: Learn how to safely use, store and throw away your medicines at www.disposemymeds.org.          This list is accurate as of 4/6/18  2:55 PM.  Always use your most recent med list.                   Brand Name Dispense Instructions for use Diagnosis    blood glucose calibration NORMAL solution     1 Bottle    Use to calibrate blood glucose monitor as needed as directed.    Type 2 diabetes mellitus with diabetic polyneuropathy, without long-term current use of insulin (H)       blood glucose monitoring lancets     100 Box    Use to test blood sugar 3 times daily or as directed.    Type 2 diabetes mellitus with diabetic polyneuropathy (H)       blood glucose monitoring meter device kit     1 kit    Use to test blood sugar 3 times daily or as directed.    Type 2 diabetes mellitus with diabetic polyneuropathy (H)       blood glucose monitoring test strip    ARTURO CONTOUR NEXT    100 each    Use to test blood sugar 3 times daily or as directed.     Type 2 diabetes mellitus with diabetic polyneuropathy, without long-term current use of insulin (H)       econazole nitrate 1 % cream     30 g    Apply topically daily Apply to affected nail daily    Foot pain, bilateral, Numbness in feet, Edema of both legs, Metatarsalgia of both feet, Sciatica of left side, Sciatica of right side, Onychomycosis of toenail, Varicosities       glimepiride 4 MG tablet    AMARYL    90 tablet    Take 1 tablet (4 mg) by mouth every morning (before breakfast)    Type 2 diabetes mellitus with diabetic polyneuropathy, without long-term current use of insulin (H), Peripheral polyneuropathy       IBUPROFEN PO      Take 200-400 mg by mouth every 8 hours as needed for moderate pain        medical cannabis inhalation (Patient's own supply.  Not a prescription)     0 Information only    (This is NOT a prescription, and does not certify that the patient has a qualifying medical condition for medical cannabis.  The purpose of this order is  to document that the patient reports taking medical cannabis.)    Chronic pain syndrome       metFORMIN 500 MG tablet    GLUCOPHAGE    180 tablet    Take 1 tablet (500 mg) by mouth 2 times daily (with meals)    Type 2 diabetes mellitus with diabetic polyneuropathy, without long-term current use of insulin (H)       MOVANTIK 25 MG Tabs tablet   Generic drug:  naloxegol      Take 25 mg by mouth daily    Constipation due to opioid therapy       omeprazole 20 MG CR capsule    priLOSEC    90 capsule    Take 1 capsule (20 mg) by mouth daily    Epigastric pain       ondansetron 8 MG tablet    ZOFRAN    20 tablet    Take 0.5-1 tablets (4-8 mg) by mouth every 8 hours as needed for nausea    Non-intractable vomiting with nausea, vomiting of unspecified type       * oxyCODONE IR 15 MG tablet    ROXICODONE    4 tablet    Take 2 tablets (30 mg) by mouth daily as needed    Peripheral polyneuropathy, Chronic pain syndrome       * oxyCODONE 30 MG 12 hr tablet    OXYCONTIN    4  tablet    Take 1 tablet (30 mg) by mouth every 12 hours    Chronic pain syndrome, Peripheral polyneuropathy       polyethylene glycol Packet    MIRALAX/GLYCOLAX    7 packet    Take 17 g by mouth daily    Abdominal pain, generalized       pregabalin 50 MG capsule    LYRICA    90 capsule    Take 1 capsule (50 mg) by mouth 3 times daily    Peripheral polyneuropathy, Chronic low back pain, unspecified back pain laterality, with sciatica presence unspecified       senna-docusate 8.6-50 MG per tablet    SENOKOT-S;PERICOLACE    100 tablet    Take 3 tablets by mouth 2 times daily    Abdominal pain, generalized       sildenafil 20 MG tablet    REVATIO    30 tablet    Take 2-5 tablets ( mg) by mouth daily as needed    Erectile dysfunction, unspecified erectile dysfunction type       simvastatin 20 MG tablet    ZOCOR    90 tablet    TAKE ONE TABLET BY MOUTH AT BEDTIME    Hyperlipidemia LDL goal <70       * venlafaxine 37.5 MG 24 hr capsule    EFFEXOR-XR    14 capsule    Take 1 capsule daily for 14 days, then take 2 capsules daily.    Major depressive disorder, recurrent episode, mild (H), Chronic low back pain, unspecified back pain laterality, with sciatica presence unspecified       * venlafaxine 75 MG 24 hr capsule    EFFEXOR-XR    90 capsule    Take 1 capsule (75 mg) by mouth daily    Major depressive disorder, recurrent episode, mild (H), Chronic low back pain, unspecified back pain laterality, with sciatica presence unspecified       * Notice:  This list has 4 medication(s) that are the same as other medications prescribed for you. Read the directions carefully, and ask your doctor or other care provider to review them with you.

## 2018-04-06 NOTE — TELEPHONE ENCOUNTER
Prior Authorization Retail Medication Request    Medication/Dose: Lyrica 50mg  ICD code (if different than what is on RX):    Previously Tried and Failed:    Rationale:      Insurance Name:  [facility]  Insurance ID:  [id number]      Pharmacy Information (if different than what is on RX)  Name:  [facility]  Phone:  [phone]    Thank you,  Stefanie Murray, [place]  [facility]

## 2018-04-06 NOTE — PROGRESS NOTES
SUBJECTIVE:                                                    Ryland Gee is a 48 year old male who presents to clinic today for the following health issues:    Diabetes Follow-up    Controlled with glimepiride and metformin.    Patient is checking blood sugars: check once in a while- Sunday 33    Diabetic concerns: low blood sugar several less than 70 in the past few weeks     Symptoms of hypoglycemia (low blood sugar): shaky, weak     Paresthesias (numbness or burning in feet) or sores: Yes      Date of last diabetic eye exam: within last year    The patient is hoping to discontinue his gabapentin due to the side effects of the medications. He has had increased fatigue, motivation, and feelings of depression. He has began to taper his dose and these side effects have began to improve. He started taking the medication in 5/2015 for diabetic neuropathy. He was previously prescribed Lyrica but the patient's insurance company did not cover the medication. He has been using canibus lotion for control of the neuropathy.    BP Readings from Last 2 Encounters:   04/06/18 110/72   01/22/18 118/78     Hemoglobin A1C (%)   Date Value   01/22/2018 6.9 (H)   04/18/2017 7.5 (H)     LDL Cholesterol Calculated (mg/dL)   Date Value   04/18/2017 48   05/12/2016 66     Hyperlipidemia Follow-Up    Controlled with simvastatin      Rate your low fat/cholesterol diet?: good    Taking statin?  Yes, no muscle aches from statin    Other lipid medications/supplements?:  none    Hypertension Follow-up    Outpatient blood pressures runs 120/80 or less    Low Salt Diet: no added salt    Chronic Pain - The patient is on chronic pain medication and uses medical marijuana for management of low back pain and bilateral shoulder pain. His previous pain clinic is closing and he is hoping to get referred to a new clinic.     Mood Problems - The patient was started on Lexapro in 1/2018 but he took only two doses of the medication because it caused  "him to have increased fatigue.       Problem list and histories reviewed & adjusted, as indicated.  Additional history: as documented    ROS:  Constitutional, HEENT, cardiovascular, pulmonary, GI, , musculoskeletal, neuro, skin, endocrine and psych systems are negative, except as otherwise noted.    This document serves as a record of the services and decisions personally performed and made by Todd Pastrana MD. It was created on his behalf by Kat Lai, a trained medical scribe. The creation of this document is based on the provider's statements to the medical scribe.  Kat Lai 2:30 PM 4/6/2018  OBJECTIVE:                                                    /72  Pulse 76  Temp 98.4  F (36.9  C) (Oral)  Ht 1.803 m (5' 11\")  Wt 122.9 kg (271 lb)  SpO2 98%  BMI 37.8 kg/m2 Body mass index is 37.8 kg/(m^2).   GENERAL: healthy, alert, well nourished, well hydrated, no distress  HENT: ear canals- normal; TMs- normal; Nose- normal; Mouth- no ulcers, no lesions  NECK: no tenderness, no adenopathy, no asymmetry, no masses, no stiffness; thyroid- normal to palpation  RESP: lungs clear to auscultation - no rales, no rhonchi, no wheezes  CV: regular rates and rhythm, normal S1 S2, no S3 or S4 and no murmur, no click or rub -  ABDOMEN: soft, no tenderness, no  hepatosplenomegaly, no masses, normal bowel sounds  MS: extremities- no gross deformities noted, no edema  SKIN: no suspicious lesions, no rashes  NEURO: numbness of left lateral lower leg, otherwise strength and tone- normal, sensory exam- grossly normal, mentation- intact, speech- normal, reflexes- symmetric  BACK: no CVA tenderness, no paralumbar tenderness    Diagnostic test results:  none      ASSESSMENT/PLAN:         Harm was seen today for recheck medication.    Diagnoses and all orders for this visit:    Peripheral polyneuropathy  -     pregabalin (LYRICA) 50 MG capsule; Take 1 capsule (50 mg) by mouth 3 times daily  -     PAIN " MANAGEMENT REFERRAL    Major depressive disorder, recurrent episode, mild (H) - Start taking Venlafaxine with a slow increase of the dose.   -     venlafaxine (EFFEXOR-XR) 75 MG 24 hr capsule; Take 1 capsule (75 mg) by mouth daily  -     venlafaxine (EFFEXOR-XR) 37.5 MG 24 hr capsule; Take 1 capsule (37.5 mg) by mouth daily for 14 days    Chronic low back pain, unspecified back pain laterality, with sciatica presence unspecified - Slowly begin taper of gabapentin due to side effects of fatigue, decreased motivation, and mood issues. Once gabapentin is discontinued, start taking Lyrica three times daily. Start taking Venlafaxine with a slow increase of the dose. Followup with pain clinic to begin management of chronic pain.   -     pregabalin (LYRICA) 50 MG capsule; Take 1 capsule (50 mg) by mouth 3 times daily  -     venlafaxine (EFFEXOR-XR) 75 MG 24 hr capsule; Take 1 capsule (75 mg) by mouth daily  -     PAIN MANAGEMENT REFERRAL  -     venlafaxine (EFFEXOR-XR) 37.5 MG 24 hr capsule; Take 1 capsule (37.5 mg) by mouth daily for 14 days    Erectile dysfunction, unspecified erectile dysfunction type - Start taking sildenafil as needed for ED symptoms.   -     sildenafil (REVATIO) 20 MG tablet; Take 2-5 tablets ( mg) by mouth daily as needed      Risks, benefits and alternatives of treatments discussed. Plan agreed on.      Followup: As needed    Will call, return to clinic, or go to ED if worsening or symptoms not improving as discussed.    See patient instructions.     The information in this document, created by a scribe for me, accurately reflects the services I personally performed and the decisions made by me. I have reviewed and approved this document for accuracy.      Dannie Pastrana MD   Pager: [phone]

## 2018-04-06 NOTE — PROGRESS NOTES
"  SUBJECTIVE:                                                    Ryland Gee is a 48 year old male who presents to clinic today for the following health issues:    Diabetes Follow-up      Patient is checking blood sugars: { :025799}    Diabetic concerns: {Diabetic Concerns:386627::\"None\"}     Symptoms of hypoglycemia (low blood sugar): { :094863::\"none\"}     Paresthesias (numbness or burning in feet) or sores: { :549922::\"No\"}     Date of last diabetic eye exam: ***    BP Readings from Last 2 Encounters:   01/22/18 118/78   04/18/17 110/70     Hemoglobin A1C (%)   Date Value   01/22/2018 6.9 (H)   04/18/2017 7.5 (H)     LDL Cholesterol Calculated (mg/dL)   Date Value   04/18/2017 48   05/12/2016 66     Hyperlipidemia Follow-Up      Rate your low fat/cholesterol diet?: { :726779::\"good\"}    Taking statin?  { :347472::\"No\"}    Other lipid medications/supplements?:  { :675386::\"none\"}    Depression and Anxiety Follow-Up    Status since last visit: { :344128::\"No change\"}    Other associated symptoms:{ :347776::\"None\"}    Complicating factors:     Significant life event: { :432662::\"No\"}     Current substance abuse: { :916873::\"None\"}    PHQ-9 11/21/2016 1/4/2017 1/22/2018   Total Score 19 18 19   Q9: Suicide Ideation Not at all Not at all Several days     BUSHRA-7 SCORE 11/21/2016 1/4/2017 1/22/2018   Total Score 21 19 18     {PROVIDER ONLY Complete follow-up questions for patients who report suicide ideation  (Optional):506270}  PHQ-9  English  PHQ-9   Any Language  BUSHRA-7  Suicide Assessment Five-step Evaluation and Treatment (SAFE-T)    Amount of exercise or physical activity: {Exercise frequency days per week:685492}    Problems taking medications regularly: {Med Problems:692667::\"No\"}    Medication side effects: {CHRONIC MED SIDE EFFECTS:267646::\"none\"}    Diet: { :877157}        Problem list and histories reviewed & adjusted, as indicated.  Additional history: as documented    ROS:  Constitutional, HEENT, " "cardiovascular, pulmonary, GI, , musculoskeletal, neuro, skin, endocrine and psych systems are negative, except as otherwise noted.    OBJECTIVE:                                                    There were no vitals taken for this visit. There is no height or weight on file to calculate BMI.   {.:174479::\"GENERAL: healthy, alert, well nourished, well hydrated, no distress\",\"HENT: ear canals- normal; TMs- normal; Nose- normal; Mouth- no ulcers, no lesions\",\"NECK: no tenderness, no adenopathy, no asymmetry, no masses, no stiffness; thyroid- normal to palpation\",\"RESP: lungs clear to auscultation - no rales, no rhonchi, no wheezes\",\"CV: regular rates and rhythm, normal S1 S2, no S3 or S4 and no murmur, no click or rub -\",\"ABDOMEN: soft, no tenderness, no  hepatosplenomegaly, no masses, normal bowel sounds\"}  Diagnostic test results:  {DIAGNOSTIC TEST RESULTS:070738::\"none \"}     ASSESSMENT/PLAN:         There are no diagnoses linked to this encounter.    Risks, benefits and alternatives of treatments discussed. Plan agreed on.      Followup:***    Will call, return to clinic, or go to ED if worsening or symptoms not improving as discussed.    See patient instructions.     {Quality Requirements:970061}        Dannie Pastrana MD   Pager: [phone]    "

## 2018-04-06 NOTE — NURSING NOTE
"Chief Complaint   Patient presents with     Recheck Medication       Initial /72  Pulse 76  Temp 98.4  F (36.9  C) (Oral)  Ht 5' 11\" (1.803 m)  Wt 271 lb (122.9 kg)  SpO2 98%  BMI 37.8 kg/m2 Estimated body mass index is 37.8 kg/(m^2) as calculated from the following:    Height as of this encounter: 5' 11\" (1.803 m).    Weight as of this encounter: 271 lb (122.9 kg).  Medication Reconciliation: complete  "

## 2018-04-07 ASSESSMENT — ANXIETY QUESTIONNAIRES: GAD7 TOTAL SCORE: 19

## 2018-04-07 ASSESSMENT — PATIENT HEALTH QUESTIONNAIRE - PHQ9: SUM OF ALL RESPONSES TO PHQ QUESTIONS 1-9: 22

## 2018-04-09 ENCOUNTER — TELEPHONE (OUTPATIENT)
Dept: PALLIATIVE MEDICINE | Facility: CLINIC | Age: 48
End: 2018-04-09

## 2018-04-09 NOTE — TELEPHONE ENCOUNTER
[facility] Referral      1. Confirmed address with patient? Yes  2. Confirmed phone number with patient? Yes  3. Confirmed referring provider? Yes  4. Is the PCP the same as the referring provider? Yes  5. Has the patient been to any previous pain clinics? Yes  (If yes, send EVA with welcome letter)  6. Which insurance are we to bill for this appointment?  [employer]    7. Informed pt of cancellation (48 hour) policy? Yes    REGARDING OPIOID MEDICATIONS: We will always address appropriateness of opioid pain medications, but we generally will not automatically take on a prescribing role. When we do take on prescribing of opioids for chronic pain, it is in collaboration with the referring physician for an intermediate period of time (months), with an expectation that the primary physician or provider will assume the prescribing role if medications are effective at stable doses with demonstrated compliance. Therefore, please do not assume that your prescribing responsibilities end on the day of pain clinic consultation.  7. Informed pt of prescribing policy? Yes      8. Referring Provider: Todd Pastrana    [facility]

## 2018-04-11 NOTE — TELEPHONE ENCOUNTER
Central Prior Authorization Team   Phone: [phone]      PA Initiation    Medication: Lyrica 50mg - initiated  Insurance Company: [employer] - Phone [phone] Fax [phone]  Pharmacy Filling the Rx: [facility] [place] [address] [place] [address]  Filling Pharmacy Phone: [phone]  Filling Pharmacy Fax:    Start Date: 4/11/2018

## 2018-04-12 NOTE — TELEPHONE ENCOUNTER
Central Prior Authorization Team   Phone: [phone]      Prior Authorization Approval    Authorization Effective Date: 4/6/2018  Authorization Expiration Date: 4/6/2019  Medication: Lyrica 50mg - Approved  Approved Dose/Quantity: 270 per 90  Insurance Company: [employer] - Phone [phone] Fax [phone]  Expected CoPay:       Which Pharmacy is filling the prescription (Not needed for infusion/clinic administered): [facility] [place] - [place], [place] - [address] [place] [address]  Pharmacy Notified: Yes  Patient Notified: Yes
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
## 2018-04-17 ENCOUNTER — OFFICE VISIT (OUTPATIENT)
Dept: PALLIATIVE MEDICINE | Facility: CLINIC | Age: 48
End: 2018-04-17
Payer: COMMERCIAL

## 2018-04-17 VITALS
OXYGEN SATURATION: 96 % | DIASTOLIC BLOOD PRESSURE: 78 MMHG | BODY MASS INDEX: 37.8 KG/M2 | HEART RATE: 57 BPM | WEIGHT: 271 LBS | SYSTOLIC BLOOD PRESSURE: 127 MMHG

## 2018-04-17 DIAGNOSIS — G62.9 PERIPHERAL POLYNEUROPATHY: ICD-10-CM

## 2018-04-17 DIAGNOSIS — Z79.891 ENCOUNTER FOR LONG-TERM OPIATE ANALGESIC USE: ICD-10-CM

## 2018-04-17 DIAGNOSIS — E11.42 TYPE 2 DIABETES MELLITUS WITH DIABETIC POLYNEUROPATHY, WITHOUT LONG-TERM CURRENT USE OF INSULIN (H): ICD-10-CM

## 2018-04-17 DIAGNOSIS — G89.29 CHRONIC PAIN OF BOTH SHOULDERS: ICD-10-CM

## 2018-04-17 DIAGNOSIS — M51.369 DDD (DEGENERATIVE DISC DISEASE), LUMBAR: Primary | ICD-10-CM

## 2018-04-17 DIAGNOSIS — G89.4 CHRONIC PAIN SYNDROME: ICD-10-CM

## 2018-04-17 DIAGNOSIS — G62.9 NEUROPATHY: ICD-10-CM

## 2018-04-17 DIAGNOSIS — F11.90 CHRONIC, CONTINUOUS USE OF OPIOIDS: ICD-10-CM

## 2018-04-17 DIAGNOSIS — M25.512 CHRONIC PAIN OF BOTH SHOULDERS: ICD-10-CM

## 2018-04-17 DIAGNOSIS — M25.511 CHRONIC PAIN OF BOTH SHOULDERS: ICD-10-CM

## 2018-04-17 PROCEDURE — 80307 DRUG TEST PRSMV CHEM ANLYZR: CPT | Mod: 90 | Performed by: NURSE PRACTITIONER

## 2018-04-17 PROCEDURE — 99244 OFF/OP CNSLTJ NEW/EST MOD 40: CPT | Performed by: NURSE PRACTITIONER

## 2018-04-17 PROCEDURE — 99000 SPECIMEN HANDLING OFFICE-LAB: CPT | Performed by: NURSE PRACTITIONER

## 2018-04-17 RX ORDER — GABAPENTIN 400 MG/1
CAPSULE ORAL
Refills: 2 | COMMUNITY
Start: 2018-03-05 | End: 2018-10-23

## 2018-04-17 ASSESSMENT — PAIN SCALES - GENERAL: PAINLEVEL: MODERATE PAIN (5)

## 2018-04-17 NOTE — MR AVS SNAPSHOT
After Visit Summary   4/17/2018    Ryland Gee    MRN: [id number]           Patient Information     Date Of Birth          1970        Visit Information        Provider Department      4/17/2018 1:00 PM Kat Story APRN CNP [facility]        Today's Diagnoses     DDD (degenerative disc disease), lumbar    -  1    Chronic pain syndrome        Neuropathy        Chronic pain of both shoulders        Encounter for long-term opiate analgesic use          Care Instructions    Diagnosis reviewed, treatment option addressed, and risk/benifits discussed.  Self-care instructions given.  I am recommending a multidisciplinary treatment plan to help this patient better manage his pain.       1.  Pain Physical Therapy:  YES   Schedule first visit with JERAMY Clifton    2.  Pain Psychologist to address relaxation, behavioral change, coping style, and other factors important to improvement.  YES  Schedule first visit with Laisha Singleton, [facility]   [address], [address]   [place] [address]   [phone]   3.  Medication Management:     1. Urine drug screen today. If results appropriately, you are actively participating in our program, and Dr. flores agrees to take over prescribing, I will likely take over opioid taper at next visit.    4.  Potential procedures: can discuss in the future.     5.  Referrals: Referral for neurology and orthopedics today. They will contact you to schedule.    6.  Follow up with ADELE Cuevas CNP in 4 weeks.       ----------------------------------------------------------------  Nurse Triage line:  [phone]   Call this number with any questions or concerns. You may leave a detailed message anytime. Calls are typically returned Monday through Friday between 8 AM and 4:30 PM. We usually get back to you within 2 business days depending on the issue/request.       Medication refills:    For non-narcotic medications, call your pharmacy directly to  request a refill. The pharmacy will contact the [facility] for authorization. Please allow 3-4 days for these refills to be processed.     For narcotic refills, call the nurse triage line or send a Voxware message. Please contact us 7-10 days before your refill is due. The message MUST include the name of the specific medication(s) requested and how you would like to receive the prescription(s). The options are as follows:    Pain Clinic staff can mail the prescription to your pharmacy. Please tell us the name of the pharmacy.    You may pick the prescription up at the Pain Clinic (tell us the location) or during a clinic visit with your pain provider    Pain Clinic staff can deliver the prescription to the [facility] [place] pharmacy in the clinic building. Please tell us the location.      Scheduling number: [phone].  Call this number to schedule or change appointments.    We believe regular attendance is key to your success in our program.    Any time you are unable to keep your appointment we ask that you call us at least 24 hours in advance to let us know. This will allow us to offer the appointment time to another patient.               Follow-ups after your visit        Additional Services     NEUROLOGY ADULT REFERRAL       Your provider has referred you for the following:   Consult at [facility]: [facility] [phone]   [email or web address]    Please be aware that coverage of these services is subject to the terms and limitations of your health insurance plan.  Call member services at your health plan with any benefit or coverage questions.      Please bring the following with you to your appointment:    (1) Any X-Rays, CTs or MRIs which have been performed.  Contact the facility where they were done to arrange for  prior to your scheduled appointment.    (2) List of current medications  (3) This referral request   (4) Any documents/labs  given to you for this referral            ORTHO  REFERRAL       [facility] is referring you to the Orthopedic  Services at [facility].       The  Representative will assist you in the coordination of your Orthopedic and Musculoskeletal Care as prescribed by your physician.    The  Representative will call you within 1 business day to help schedule your appointment, or you may contact the  Representative at:    All areas ~ [phone]     Type of Referral : Non Surgical       Timeframe requested: Routine    Coverage of these services is subject to the terms and limitations of your health insurance plan.  Please call member services at your health plan with any benefit or coverage questions.      If X-rays, CT or MRI's have been performed, please contact the facility where they were done to arrange for , prior to your scheduled appointment.  Please bring this referral request to your appointment and present it to your specialist.            PAIN PHD EVAL/TREAT/FOLLOW UP           PAIN PT EVAL AND TREAT                 Future tests that were ordered for you today     Open Future Orders        Priority Expected Expires Ordered    Pain Drug Scr UR W Rptd Meds Routine  4/17/2019 4/17/2018            Who to contact     If you have questions or need follow up information about today's clinic visit or your schedule please contact [facility] directly at [phone].  Normal or non-critical lab and imaging results will be communicated to you by MyChart, letter or phone within 4 business days after the clinic has received the results. If you do not hear from us within 7 days, please contact the clinic through MyChart or phone. If you have a critical or abnormal lab result, we will notify you by phone as soon as possible.  Submit refill requests through SharedReviews or call your pharmacy and they will forward the refill  request to us. Please allow 3 business days for your refill to be completed.          Additional Information About Your Visit        MyChart Information     [employer] gives you secure access to your electronic health record. If you see a primary care provider, you can also send messages to your care team and make appointments. If you have questions, please call your primary care clinic.  If you do not have a primary care provider, please call [phone] and they will assist you.        Care EveryWhere ID     This is your Care EveryWhere ID. This could be used by other organizations to access your [facility] medical records  [id number]        Your Vitals Were     Pulse Pulse Oximetry BMI (Body Mass Index)             57 96% 37.8 kg/m2          Blood Pressure from Last 3 Encounters:   04/17/18 127/78   04/06/18 110/72   01/22/18 118/78    Weight from Last 3 Encounters:   04/17/18 122.9 kg (271 lb)   04/06/18 122.9 kg (271 lb)   01/22/18 125.2 kg (276 lb)              We Performed the Following     NEUROLOGY ADULT REFERRAL     ORTHO  REFERRAL     PAIN PHD EVAL/TREAT/FOLLOW UP     PAIN PT EVAL AND TREAT        Primary Care Provider Office Phone # Fax #    Todd Pastrana [phone]       [address] [place] [id number]        Equal Access to Services     MIKAL VEGA AH: Hadii agustina ku hadasho Soomaali, waaxda luqadaha, qaybta kaalmada adeegyada, waxay floridain hayadriann gamal gill. So [facility] [phone].    ATENCIÓN: Si habla español, tiene a cortez disposición servicios gratuitos de asistencia lingüística. Llame al [phone].    We comply with applicable federal civil rights laws and [place] laws. We do not discriminate on the basis of race, color, national origin, age, disability, sex, sexual orientation, or gender identity.            Thank you!     Thank you for choosing [facility]  for your care. Our goal is always to provide you with excellent care. Hearing  back from our patients is one way we can continue to improve our services. Please take a few minutes to complete the written survey that you may receive in the mail after your visit with us. Thank you!             Your Updated Medication List - Protect others around you: Learn how to safely use, store and throw away your medicines at www.disposemymeds.org.          This list is accurate as of 4/17/18  1:49 PM.  Always use your most recent med list.                   Brand Name Dispense Instructions for use Diagnosis    blood glucose calibration Normal solution     1 Bottle    Use to calibrate blood glucose monitor as needed as directed.    Type 2 diabetes mellitus with diabetic polyneuropathy, without long-term current use of insulin (H)       blood glucose monitoring lancets     100 Box    Use to test blood sugar 3 times daily or as directed.    Type 2 diabetes mellitus with diabetic polyneuropathy (H)       blood glucose monitoring meter device kit     1 kit    Use to test blood sugar 3 times daily or as directed.    Type 2 diabetes mellitus with diabetic polyneuropathy (H)       blood glucose monitoring test strip    ARTURO CONTOUR NEXT    100 each    Use to test blood sugar 3 times daily or as directed.    Type 2 diabetes mellitus with diabetic polyneuropathy, without long-term current use of insulin (H)       econazole nitrate 1 % cream     30 g    Apply topically daily Apply to affected nail daily    Foot pain, bilateral, Numbness in feet, Edema of both legs, Metatarsalgia of both feet, Sciatica of left side, Sciatica of right side, Onychomycosis of toenail, Varicosities       gabapentin 400 MG capsule    NEURONTIN          glimepiride 4 MG tablet    AMARYL    90 tablet    Take 1 tablet (4 mg) by mouth every morning (before breakfast)    Type 2 diabetes mellitus with diabetic polyneuropathy, without long-term current use of insulin (H), Peripheral polyneuropathy       IBUPROFEN PO      Take 200-400 mg by mouth  every 8 hours as needed for moderate pain        medical cannabis inhalation (Patient's own supply.  Not a prescription)     0 Information only    (This is NOT a prescription, and does not certify that the patient has a qualifying medical condition for medical cannabis.  The purpose of this order is  to document that the patient reports taking medical cannabis.)    Chronic pain syndrome       metFORMIN 500 MG tablet    GLUCOPHAGE    180 tablet    Take 1 tablet (500 mg) by mouth 2 times daily (with meals)    Type 2 diabetes mellitus with diabetic polyneuropathy, without long-term current use of insulin (H)       MOVANTIK 25 MG Tabs tablet   Generic drug:  naloxegol      Take 25 mg by mouth daily    Constipation due to opioid therapy       omeprazole 20 MG CR capsule    priLOSEC    90 capsule    Take 1 capsule (20 mg) by mouth daily    Epigastric pain       ondansetron 8 MG tablet    ZOFRAN    20 tablet    Take 0.5-1 tablets (4-8 mg) by mouth every 8 hours as needed for nausea    Non-intractable vomiting with nausea, vomiting of unspecified type       * oxyCODONE IR 15 MG tablet    ROXICODONE    4 tablet    Take 2 tablets (30 mg) by mouth daily as needed    Peripheral polyneuropathy, Chronic pain syndrome       * oxyCODONE 30 MG 12 hr tablet    OXYCONTIN    4 tablet    Take 1 tablet (30 mg) by mouth every 12 hours    Chronic pain syndrome, Peripheral polyneuropathy       polyethylene glycol Packet    MIRALAX/GLYCOLAX    7 packet    Take 17 g by mouth daily    Abdominal pain, generalized       pregabalin 50 MG capsule    LYRICA    90 capsule    Take 1 capsule (50 mg) by mouth 3 times daily    Peripheral polyneuropathy, Chronic low back pain, unspecified back pain laterality, with sciatica presence unspecified       senna-docusate 8.6-50 MG per tablet    SENOKOT-S;PERICOLACE    100 tablet    Take 3 tablets by mouth 2 times daily    Abdominal pain, generalized       sildenafil 20 MG tablet    REVATIO    30 tablet    Take  2-5 tablets ( mg) by mouth daily as needed    Erectile dysfunction, unspecified erectile dysfunction type       simvastatin 20 MG tablet    ZOCOR    90 tablet    TAKE ONE TABLET BY MOUTH AT BEDTIME    Hyperlipidemia LDL goal <70       * venlafaxine 75 MG 24 hr capsule    EFFEXOR-XR    90 capsule    Take 1 capsule (75 mg) by mouth daily    Major depressive disorder, recurrent episode, mild (H), Chronic low back pain, unspecified back pain laterality, with sciatica presence unspecified       * venlafaxine 37.5 MG 24 hr capsule    EFFEXOR-XR    14 capsule    Take 1 capsule (37.5 mg) by mouth daily for 14 days    Major depressive disorder, recurrent episode, mild (H), Chronic low back pain, unspecified back pain laterality, with sciatica presence unspecified       * Notice:  This list has 4 medication(s) that are the same as other medications prescribed for you. Read the directions carefully, and ask your doctor or other care provider to review them with you.

## 2018-04-17 NOTE — PATIENT INSTRUCTIONS
Diagnosis reviewed, treatment option addressed, and risk/benifits discussed.  Self-care instructions given.  I am recommending a multidisciplinary treatment plan to help this patient better manage his pain.       1.  Pain Physical Therapy:  YES   Schedule first visit with JERAMY Clifton    2.  Pain Psychologist to address relaxation, behavioral change, coping style, and other factors important to improvement.  YES  Schedule first visit with Laisha Singleton, LMFT   [address], [address]   [place] [address]   [phone]   3.  Medication Management:     1. Urine drug screen today. If results appropriately, you are actively participating in our program, and Dr. flores agrees to take over prescribing, I will likely take over opioid taper at next visit.    4.  Potential procedures: can discuss in the future.     5.  Referrals: Referral for neurology and orthopedics today. They will contact you to schedule.    6.  Follow up with ADELE Cuevas CNP in 4 weeks.       ----------------------------------------------------------------  Nurse Triage line:  [phone]   Call this number with any questions or concerns. You may leave a detailed message anytime. Calls are typically returned Monday through Friday between 8 AM and 4:30 PM. We usually get back to you within 2 business days depending on the issue/request.       Medication refills:    For non-narcotic medications, call your pharmacy directly to request a refill. The pharmacy will contact the [facility] for authorization. Please allow 3-4 days for these refills to be processed.     For narcotic refills, call the nurse triage line or send a Vungle message. Please contact us 7-10 days before your refill is due. The message MUST include the name of the specific medication(s) requested and how you would like to receive the prescription(s). The options are as follows:    Pain Clinic staff can mail the prescription to your pharmacy. Please tell us  the name of the pharmacy.    You may pick the prescription up at the Pain Clinic (tell us the location) or during a clinic visit with your pain provider    Pain Clinic staff can deliver the prescription to the [place] pharmacy in the clinic building. Please tell us the location.      Scheduling number: [phone].  Call this number to schedule or change appointments.    We believe regular attendance is key to your success in our program.    Any time you are unable to keep your appointment we ask that you call us at least 24 hours in advance to let us know. This will allow us to offer the appointment time to another patient.

## 2018-04-18 NOTE — TELEPHONE ENCOUNTER
Controlled Substance Refill Request for gabapentin - not active on med list/historical  Problem List Complete:  Yes    Last Written Prescription Date:  na  Last Fill Quantity: na,   # refills: na    Last Office Visit with G primary care provider: 4/6/18    Clinic visit frequency required: not listed     Future Office visit:   Next 5 appointments (look out 90 days)     May 08, 2018 10:30 AM CDT   Return Visit with ADELE Cleary CNP   [facility] ([facility])    [address] [address]   [phone]                  Controlled substance agreement on file: Yes:  Date 11/30/15.     Processing:  Staff will hand deliver Rx to on-site pharmacy    Vianey Coronel

## 2018-04-20 RX ORDER — GABAPENTIN 400 MG/1
CAPSULE ORAL
Qty: 270 CAPSULE | Refills: 2 | Status: SHIPPED | OUTPATIENT
Start: 2018-04-20 | End: 2018-10-23

## 2018-04-21 LAB — PAIN DRUG SCR UR W RPTD MEDS: NORMAL

## 2018-04-24 ENCOUNTER — OFFICE VISIT (OUTPATIENT)
Dept: PALLIATIVE MEDICINE | Facility: CLINIC | Age: 48
End: 2018-04-24
Payer: COMMERCIAL

## 2018-04-24 DIAGNOSIS — G89.4 CHRONIC PAIN SYNDROME: ICD-10-CM

## 2018-04-24 DIAGNOSIS — M51.369 DDD (DEGENERATIVE DISC DISEASE), LUMBAR: Primary | ICD-10-CM

## 2018-04-24 DIAGNOSIS — G62.9 NEUROPATHY: ICD-10-CM

## 2018-04-24 PROCEDURE — 97162 PT EVAL MOD COMPLEX 30 MIN: CPT | Mod: GP | Performed by: PHYSICAL THERAPIST

## 2018-04-24 PROCEDURE — 97530 THERAPEUTIC ACTIVITIES: CPT | Mod: GP | Performed by: PHYSICAL THERAPIST

## 2018-04-24 NOTE — MR AVS SNAPSHOT
After Visit Summary   4/24/2018    Ryland Gee    MRN: [id number]           Patient Information     Date Of Birth          1970        Visit Information        Provider Department      4/24/2018 2:00 PM Elodia Hill, PT [facility]        Today's Diagnoses     DDD (degenerative disc disease), lumbar    -  1    Neuropathy        Chronic pain syndrome           Follow-ups after your visit        Your next 10 appointments already scheduled     May 01, 2018 10:40 AM CDT   New Visit with Mateo Nolasco DO   [facility] ([facility])    [address] [address] [address]   [phone]            May 08, 2018 10:30 AM CDT   Return Visit with ADELE Cleary CNP   [facility] ([facility])    [address] [address] [address]   [phone]              Who to contact     If you have questions or need follow up information about today's clinic visit or your schedule please contact [facility] directly at [phone].  Normal or non-critical lab and imaging results will be communicated to you by Newslabshart, letter or phone within 4 business days after the clinic has received the results. If you do not hear from us within 7 days, please contact the clinic through Newslabshart or phone. If you have a critical or abnormal lab result, we will notify you by phone as soon as possible.  Submit refill requests through DotProduct or call your pharmacy and they will forward the refill request to us. Please allow 3 business days for your refill to be completed.          Additional Information About Your Visit        MyChart Information     DotProduct gives you secure access to your electronic health record. If you see a primary care provider, you can also send messages to your care team and make appointments. If you have questions, please call your primary care  clinic.  If you do not have a primary care provider, please call [phone] and they will assist you.        Care EveryWhere ID     This is your Care EveryWhere ID. This could be used by other organizations to access your [place] medical records  [id number]         Blood Pressure from Last 3 Encounters:   04/17/18 127/78   04/06/18 110/72   01/22/18 118/78    Weight from Last 3 Encounters:   04/17/18 122.9 kg (271 lb)   04/06/18 122.9 kg (271 lb)   01/22/18 125.2 kg (276 lb)              We Performed the Following     HC PT EVAL, MODERATE COMPLEXITY     THERAPEUTIC ACTIVITIES        Primary Care Provider Office Phone # Fax #    Todd Pastrana [phone] [address] [facility] [address]        Equal Access to Services     [facility] : Hadii aad ku hadasho Soomaali, waaxda luqadaha, qaybta kaalmada adeegyada, brea fiore hayla miles . So [facility] [phone].    ATENCIÓN: Si habla español, tiene a cortez disposición servicios gratuitos de asistencia lingüística. Llame al [phone].    We comply with applicable federal civil rights laws and [place] laws. We do not discriminate on the basis of race, color, national origin, age, disability, sex, sexual orientation, or gender identity.            Thank you!     Thank you for choosing [facility]  for your care. Our goal is always to provide you with excellent care. Hearing back from our patients is one way we can continue to improve our services. Please take a few minutes to complete the written survey that you may receive in the mail after your visit with us. Thank you!             Your Updated Medication List - Protect others around you: Learn how to safely use, store and throw away your medicines at www.disposemymeds.org.          This list is accurate as of 4/24/18 11:59 PM.  Always use your most recent med list.                   Brand Name Dispense Instructions for use Diagnosis    blood  glucose calibration Normal solution     1 Bottle    Use to calibrate blood glucose monitor as needed as directed.    Type 2 diabetes mellitus with diabetic polyneuropathy, without long-term current use of insulin (H)       blood glucose monitoring lancets     100 Box    Use to test blood sugar 3 times daily or as directed.    Type 2 diabetes mellitus with diabetic polyneuropathy (H)       blood glucose monitoring meter device kit     1 kit    Use to test blood sugar 3 times daily or as directed.    Type 2 diabetes mellitus with diabetic polyneuropathy (H)       blood glucose monitoring test strip    ARTURO CONTOUR NEXT    100 each    Use to test blood sugar 3 times daily or as directed.    Type 2 diabetes mellitus with diabetic polyneuropathy, without long-term current use of insulin (H)       econazole nitrate 1 % cream     30 g    Apply topically daily Apply to affected nail daily    Foot pain, bilateral, Numbness in feet, Edema of both legs, Metatarsalgia of both feet, Sciatica of left side, Sciatica of right side, Onychomycosis of toenail, Varicosities       * gabapentin 400 MG capsule    NEURONTIN          * gabapentin 400 MG capsule    NEURONTIN    270 capsule    TAKE THREE CAPSULES BY MOUTH THREE TIMES A DAY    Peripheral polyneuropathy, Type 2 diabetes mellitus with diabetic polyneuropathy, without long-term current use of insulin (H)       glimepiride 4 MG tablet    AMARYL    90 tablet    Take 1 tablet (4 mg) by mouth every morning (before breakfast)    Type 2 diabetes mellitus with diabetic polyneuropathy, without long-term current use of insulin (H), Peripheral polyneuropathy       IBUPROFEN PO      Take 200-400 mg by mouth every 8 hours as needed for moderate pain        medical cannabis inhalation (Patient's own supply.  Not a prescription)     0 Information only    (This is NOT a prescription, and does not certify that the patient has a qualifying medical condition for medical cannabis.  The purpose of  this order is  to document that the patient reports taking medical cannabis.)    Chronic pain syndrome       metFORMIN 500 MG tablet    GLUCOPHAGE    180 tablet    Take 1 tablet (500 mg) by mouth 2 times daily (with meals)    Type 2 diabetes mellitus with diabetic polyneuropathy, without long-term current use of insulin (H)       MOVANTIK 25 MG Tabs tablet   Generic drug:  naloxegol      Take 25 mg by mouth daily    Constipation due to opioid therapy       omeprazole 20 MG CR capsule    priLOSEC    90 capsule    Take 1 capsule (20 mg) by mouth daily    Epigastric pain       ondansetron 8 MG tablet    ZOFRAN    20 tablet    Take 0.5-1 tablets (4-8 mg) by mouth every 8 hours as needed for nausea    Non-intractable vomiting with nausea, vomiting of unspecified type       * oxyCODONE IR 15 MG tablet    ROXICODONE    4 tablet    Take 2 tablets (30 mg) by mouth daily as needed    Peripheral polyneuropathy, Chronic pain syndrome       * oxyCODONE 30 MG 12 hr tablet    OXYCONTIN    4 tablet    Take 1 tablet (30 mg) by mouth every 12 hours    Chronic pain syndrome, Peripheral polyneuropathy       polyethylene glycol Packet    MIRALAX/GLYCOLAX    7 packet    Take 17 g by mouth daily    Abdominal pain, generalized       pregabalin 50 MG capsule    LYRICA    90 capsule    Take 1 capsule (50 mg) by mouth 3 times daily    Peripheral polyneuropathy, Chronic low back pain, unspecified back pain laterality, with sciatica presence unspecified       senna-docusate 8.6-50 MG per tablet    SENOKOT-S;PERICOLACE    100 tablet    Take 3 tablets by mouth 2 times daily    Abdominal pain, generalized       sildenafil 20 MG tablet    REVATIO    30 tablet    Take 2-5 tablets ( mg) by mouth daily as needed    Erectile dysfunction, unspecified erectile dysfunction type       simvastatin 20 MG tablet    ZOCOR    90 tablet    TAKE ONE TABLET BY MOUTH AT BEDTIME    Hyperlipidemia LDL goal <70       * venlafaxine 75 MG 24 hr capsule     EFFEXOR-XR    90 capsule    Take 1 capsule (75 mg) by mouth daily    Major depressive disorder, recurrent episode, mild (H), Chronic low back pain, unspecified back pain laterality, with sciatica presence unspecified       * venlafaxine 37.5 MG 24 hr capsule    EFFEXOR-XR    14 capsule    Take 1 capsule (37.5 mg) by mouth daily for 14 days    Major depressive disorder, recurrent episode, mild (H), Chronic low back pain, unspecified back pain laterality, with sciatica presence unspecified       * Notice:  This list has 6 medication(s) that are the same as other medications prescribed for you. Read the directions carefully, and ask your doctor or other care provider to review them with you.
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
## 2018-05-10 ENCOUNTER — OFFICE VISIT (OUTPATIENT)
Dept: PALLIATIVE MEDICINE | Facility: CLINIC | Age: 48
End: 2018-05-10
Payer: COMMERCIAL

## 2018-05-10 DIAGNOSIS — Z53.9 NO SHOW: Primary | ICD-10-CM

## 2018-05-10 NOTE — MR AVS SNAPSHOT
After Visit Summary   5/10/2018    Ryland Gee    MRN: [id number]           Patient Information     Date Of Birth          1970        Visit Information        Provider Department      5/10/2018 8:23 AM Kat Story APRN CNP [facility]        Today's Diagnoses     NO SHOW    -  1       Follow-ups after your visit        Who to contact     If you have questions or need follow up information about today's clinic visit or your schedule please contact [facility] directly at [phone].  Normal or non-critical lab and imaging results will be communicated to you by DanceOnhart, letter or phone within 4 business days after the clinic has received the results. If you do not hear from us within 7 days, please contact the clinic through Eyelationt or phone. If you have a critical or abnormal lab result, we will notify you by phone as soon as possible.  Submit refill requests through LogicNets or call your pharmacy and they will forward the refill request to us. Please allow 3 business days for your refill to be completed.          Additional Information About Your Visit        MyChart Information     LogicNets gives you secure access to your electronic health record. If you see a primary care provider, you can also send messages to your care team and make appointments. If you have questions, please call your primary care clinic.  If you do not have a primary care provider, please call [phone] and they will assist you.        Care EveryWhere ID     This is your Care EveryWhere ID. This could be used by other organizations to access your [place] medical records  [id number]         Blood Pressure from Last 3 Encounters:   04/17/18 127/78   04/06/18 110/72   01/22/18 118/78    Weight from Last 3 Encounters:   04/17/18 122.9 kg (271 lb)   04/06/18 122.9 kg (271 lb)   01/22/18 125.2 kg (276 lb)              Today, you had the following     No orders found for display        Primary Care Provider Office Phone # Fax #    Todd Pastrana [phone] [address] [facility] [address]        Equal Access to Services     MIKAL VEGA : Haderin agustina rich liudmila Mireles, waaxda luqadaha, qaybta kaalmada agatha, brea nieves laViryla gill. So [facility] [phone].    ATENCIÓN: Si habla español, tiene a cortez disposición servicios gratuitos de asistencia lingüística. Llame al [phone].    We comply with applicable federal civil rights laws and [place] laws. We do not discriminate on the basis of race, color, national origin, age, disability, sex, sexual orientation, or gender identity.            Thank you!     Thank you for choosing [facility]  for your care. Our goal is always to provide you with excellent care. Hearing back from our patients is one way we can continue to improve our services. Please take a few minutes to complete the written survey that you may receive in the mail after your visit with us. Thank you!             Your Updated Medication List - Protect others around you: Learn how to safely use, store and throw away your medicines at www.disposemymeds.org.          This list is accurate as of 5/10/18 11:59 PM.  Always use your most recent med list.                   Brand Name Dispense Instructions for use Diagnosis    blood glucose calibration Normal solution     1 Bottle    Use to calibrate blood glucose monitor as needed as directed.    Type 2 diabetes mellitus with diabetic polyneuropathy, without long-term current use of insulin (H)       blood glucose monitoring lancets     100 Box    Use to test blood sugar 3 times daily or as directed.    Type 2 diabetes mellitus with diabetic polyneuropathy (H)       blood glucose monitoring meter device kit     1 kit    Use to test blood sugar 3 times daily or as directed.    Type 2 diabetes mellitus with diabetic polyneuropathy (H)       blood glucose monitoring test  strip    ARTURO CONTOUR NEXT    100 each    Use to test blood sugar 3 times daily or as directed.    Type 2 diabetes mellitus with diabetic polyneuropathy, without long-term current use of insulin (H)       econazole nitrate 1 % cream     30 g    Apply topically daily Apply to affected nail daily    Foot pain, bilateral, Numbness in feet, Edema of both legs, Metatarsalgia of both feet, Sciatica of left side, Sciatica of right side, Onychomycosis of toenail, Varicosities       * gabapentin 400 MG capsule    NEURONTIN          * gabapentin 400 MG capsule    NEURONTIN    270 capsule    TAKE THREE CAPSULES BY MOUTH THREE TIMES A DAY    Peripheral polyneuropathy, Type 2 diabetes mellitus with diabetic polyneuropathy, without long-term current use of insulin (H)       glimepiride 4 MG tablet    AMARYL    90 tablet    Take 1 tablet (4 mg) by mouth every morning (before breakfast)    Type 2 diabetes mellitus with diabetic polyneuropathy, without long-term current use of insulin (H), Peripheral polyneuropathy       IBUPROFEN PO      Take 200-400 mg by mouth every 8 hours as needed for moderate pain        medical cannabis inhalation (Patient's own supply.  Not a prescription)     0 Information only    (This is NOT a prescription, and does not certify that the patient has a qualifying medical condition for medical cannabis.  The purpose of this order is  to document that the patient reports taking medical cannabis.)    Chronic pain syndrome       metFORMIN 500 MG tablet    GLUCOPHAGE    180 tablet    Take 1 tablet (500 mg) by mouth 2 times daily (with meals)    Type 2 diabetes mellitus with diabetic polyneuropathy, without long-term current use of insulin (H)       MOVANTIK 25 MG Tabs tablet   Generic drug:  naloxegol      Take 25 mg by mouth daily    Constipation due to opioid therapy       omeprazole 20 MG CR capsule    priLOSEC    90 capsule    Take 1 capsule (20 mg) by mouth daily    Epigastric pain       ondansetron 8 MG  tablet    ZOFRAN    20 tablet    Take 0.5-1 tablets (4-8 mg) by mouth every 8 hours as needed for nausea    Non-intractable vomiting with nausea, vomiting of unspecified type       * oxyCODONE IR 15 MG tablet    ROXICODONE    4 tablet    Take 2 tablets (30 mg) by mouth daily as needed    Peripheral polyneuropathy, Chronic pain syndrome       * oxyCODONE 30 MG 12 hr tablet    OXYCONTIN    4 tablet    Take 1 tablet (30 mg) by mouth every 12 hours    Chronic pain syndrome, Peripheral polyneuropathy       polyethylene glycol Packet    MIRALAX/GLYCOLAX    7 packet    Take 17 g by mouth daily    Abdominal pain, generalized       pregabalin 50 MG capsule    LYRICA    90 capsule    Take 1 capsule (50 mg) by mouth 3 times daily    Peripheral polyneuropathy, Chronic low back pain, unspecified back pain laterality, with sciatica presence unspecified       senna-docusate 8.6-50 MG per tablet    SENOKOT-S;PERICOLACE    100 tablet    Take 3 tablets by mouth 2 times daily    Abdominal pain, generalized       sildenafil 20 MG tablet    REVATIO    30 tablet    Take 2-5 tablets ( mg) by mouth daily as needed    Erectile dysfunction, unspecified erectile dysfunction type       simvastatin 20 MG tablet    ZOCOR    90 tablet    TAKE ONE TABLET BY MOUTH AT BEDTIME    Hyperlipidemia LDL goal <70       venlafaxine 75 MG 24 hr capsule    EFFEXOR-XR    90 capsule    Take 1 capsule (75 mg) by mouth daily    Major depressive disorder, recurrent episode, mild (H), Chronic low back pain, unspecified back pain laterality, with sciatica presence unspecified       * Notice:  This list has 4 medication(s) that are the same as other medications prescribed for you. Read the directions carefully, and ask your doctor or other care provider to review them with you.

## 2018-06-05 ENCOUNTER — TELEPHONE (OUTPATIENT)
Dept: FAMILY MEDICINE | Facility: CLINIC | Age: 48
End: 2018-06-05

## 2018-06-05 ENCOUNTER — MYC MEDICAL ADVICE (OUTPATIENT)
Dept: FAMILY MEDICINE | Facility: CLINIC | Age: 48
End: 2018-06-05

## 2018-06-05 DIAGNOSIS — F33.0 MAJOR DEPRESSIVE DISORDER, RECURRENT EPISODE, MILD (H): ICD-10-CM

## 2018-06-05 DIAGNOSIS — M54.5 CHRONIC LOW BACK PAIN, UNSPECIFIED BACK PAIN LATERALITY, WITH SCIATICA PRESENCE UNSPECIFIED: ICD-10-CM

## 2018-06-05 DIAGNOSIS — G89.29 CHRONIC LOW BACK PAIN, UNSPECIFIED BACK PAIN LATERALITY, WITH SCIATICA PRESENCE UNSPECIFIED: ICD-10-CM

## 2018-06-05 DIAGNOSIS — F33.0 MAJOR DEPRESSIVE DISORDER, RECURRENT EPISODE, MILD (H): Primary | ICD-10-CM

## 2018-06-05 NOTE — TELEPHONE ENCOUNTER
Pt is due now to update PHQ9.  mychart message sent to pt. Follow up end date 9/22/18.   PHQ-9 SCORE 4/4/2017 1/22/2018 4/6/2018   Total Score - - -   Total Score MyChart 12 (Moderate depression) - -   Total Score - 19 22     Ludwin RAJAN, TRINIDAD

## 2018-06-19 ASSESSMENT — ANXIETY QUESTIONNAIRES
IF YOU CHECKED OFF ANY PROBLEMS ON THIS QUESTIONNAIRE, HOW DIFFICULT HAVE THESE PROBLEMS MADE IT FOR YOU TO DO YOUR WORK, TAKE CARE OF THINGS AT HOME, OR GET ALONG WITH OTHER PEOPLE: NOT DIFFICULT AT ALL
2. NOT BEING ABLE TO STOP OR CONTROL WORRYING: NOT AT ALL
GAD7 TOTAL SCORE: 3
1. FEELING NERVOUS, ANXIOUS, OR ON EDGE: SEVERAL DAYS
6. BECOMING EASILY ANNOYED OR IRRITABLE: NOT AT ALL
5. BEING SO RESTLESS THAT IT IS HARD TO SIT STILL: NOT AT ALL
7. FEELING AFRAID AS IF SOMETHING AWFUL MIGHT HAPPEN: SEVERAL DAYS
3. WORRYING TOO MUCH ABOUT DIFFERENT THINGS: NOT AT ALL

## 2018-06-19 ASSESSMENT — PATIENT HEALTH QUESTIONNAIRE - PHQ9: 5. POOR APPETITE OR OVEREATING: SEVERAL DAYS

## 2018-06-19 NOTE — TELEPHONE ENCOUNTER
Called #   Telephone Information:   Mobile [phone]     Pt updated phq-9 and dylan-7     DYLAN-7 SCORE 1/22/2018 4/6/2018 6/19/2018   Total Score 18 19 3       PHQ-9 SCORE 1/22/2018 4/6/2018 6/19/2018   Total Score - - -   Total Score MyChart - - -   Total Score 19 22 9       Pt has only one complaint - if he could actually sleep through the night - and not be so tired     Please advise - pt is taking Effexor 75 mg daily     Best # to reach pt is above and ok to LM       Mariely Lou RN, BSN  [facility]

## 2018-06-19 NOTE — TELEPHONE ENCOUNTER
If on caffeine at any time during the day - he should stop this.  Can increase the Effexor to XR to 150 mg daily and that may help too.  (he can double up the 75 mg capsules in the morning and OK to send in #90 of the 150  Mg capsules.

## 2018-06-20 RX ORDER — VENLAFAXINE HYDROCHLORIDE 75 MG/1
150 CAPSULE, EXTENDED RELEASE ORAL DAILY
Qty: 90 CAPSULE | Refills: 0 | Status: SHIPPED | OUTPATIENT
Start: 2018-06-20 | End: 2018-06-22 | Stop reason: DRUGHIGH

## 2018-06-20 ASSESSMENT — ANXIETY QUESTIONNAIRES: GAD7 TOTAL SCORE: 3

## 2018-06-20 ASSESSMENT — PATIENT HEALTH QUESTIONNAIRE - PHQ9: SUM OF ALL RESPONSES TO PHQ QUESTIONS 1-9: 9

## 2018-06-20 NOTE — TELEPHONE ENCOUNTER
Called # below     Left a detailed Vm with the information below     Mariely Lou RN, BSN  [facility]

## 2018-06-20 NOTE — TELEPHONE ENCOUNTER
Pt calling back about phone call to him today.    Per RL-If on caffeine at any time during the day - he should stop this.  Can increase the Effexor to XR to 150 mg daily and that may help too.  (he can double up the 75 mg capsules in the morning and OK to send in #90 of the 150  Mg capsules.     Pt uses [facility] pharmacy. This was put through per RL.     Pt wonders if taking this medication at night is why the pt is feeling these symptoms?    Routing to PCP for further review/recommendations/orders.  Ina Pelletier RN  [facility]

## 2018-06-22 RX ORDER — VENLAFAXINE HYDROCHLORIDE 150 MG/1
150 CAPSULE, EXTENDED RELEASE ORAL DAILY
Qty: 90 CAPSULE | Refills: 0 | Status: SHIPPED | OUTPATIENT
Start: 2018-06-22 | End: 2018-09-17

## 2018-06-22 NOTE — TELEPHONE ENCOUNTER
Patient notified by phone.  Effexor  mg capsules sent to pharmacy per RL note below.  Patient verbalized understanding and agreed with plan.    Zuly Monterroso, AUGUST, RN, PHN  [facility]) [phone]

## 2018-06-25 DIAGNOSIS — G62.9 PERIPHERAL POLYNEUROPATHY: ICD-10-CM

## 2018-06-25 DIAGNOSIS — M54.5 CHRONIC LOW BACK PAIN, UNSPECIFIED BACK PAIN LATERALITY, WITH SCIATICA PRESENCE UNSPECIFIED: ICD-10-CM

## 2018-06-25 DIAGNOSIS — G89.29 CHRONIC LOW BACK PAIN, UNSPECIFIED BACK PAIN LATERALITY, WITH SCIATICA PRESENCE UNSPECIFIED: ICD-10-CM

## 2018-06-25 NOTE — TELEPHONE ENCOUNTER
Controlled Substance Refill Request for Lyrica  Problem List Complete:  Yes    Last Written Prescription Date:  4.6.18  Last Fill Quantity: 90,  # refills: 1   Last Office Visit: 4/6/2018     Clinic visit frequency required: none noted     Future Office visit:     Controlled substance agreement on file: Yes:  Date 11/30/2015.     Processing:  Staff will hand deliver Rx to on-site pharmacy   checked in past 3 months?  No, route to RN   Routing refill request to provider for review/approval because:  Drug not on the [facility] refill protocol   Ina Pelletier RN  [facility] [facility]

## 2018-06-25 NOTE — TELEPHONE ENCOUNTER
pregabalin (LYRICA) 50 MG capsule        Last Written Prescription Date:  4.6.18  Last Fill Quantity: 90,  # refills: 1   Last Office Visit: 4/6/2018   Future Office Visit:            Routing refill request to provider for review/approval because:  Drug not on the [facility], [facility] or [facility] refill protocol or controlled substance

## 2018-06-26 DIAGNOSIS — E11.42 TYPE 2 DIABETES MELLITUS WITH DIABETIC POLYNEUROPATHY, WITHOUT LONG-TERM CURRENT USE OF INSULIN (H): ICD-10-CM

## 2018-06-26 RX ORDER — PREGABALIN 50 MG/1
50 CAPSULE ORAL 3 TIMES DAILY
Qty: 90 CAPSULE | Refills: 1 | Status: SHIPPED | OUTPATIENT
Start: 2018-06-26 | End: 2018-09-18

## 2018-06-26 NOTE — TELEPHONE ENCOUNTER
Prescription approved per [facility] Refill Protocol.  30 day supply given.  Due for diabetes follow up office visit.    AUGUST Cowan, RN, PHN  [facility]) [phone]

## 2018-06-26 NOTE — TELEPHONE ENCOUNTER
"Requested Prescriptions   Pending Prescriptions Disp Refills     CONTOUR NEXT TEST test strip [Pharmacy Med Name: CONTOUR NEXT TEST  STRP] 100 each 11      Last Written Prescription Date:  04/18/2017  Last Fill Quantity: 100,  # refills: 11   Last office visit: 4/6/2018  Sig: USE TO TEST BLOOD SUGAR THREE TIMES A DAY OR AS DIRECTED    Diabetic Supplies Protocol Passed    6/26/2018  3:12 PM       Passed - Patient is 18 years of age or older       Passed - Recent (6 mo) or future (30 days) visit within the authorizing provider's specialty    Patient had office visit in the last 6 months or has a visit in the next 30 days with authorizing provider.  See \"Patient Info\" tab in inbasket, or \"Choose Columns\" in Meds & Orders section of the refill encounter.              "

## 2018-07-25 DIAGNOSIS — G62.9 PERIPHERAL POLYNEUROPATHY: ICD-10-CM

## 2018-07-25 DIAGNOSIS — E78.5 HYPERLIPIDEMIA LDL GOAL <70: ICD-10-CM

## 2018-07-25 DIAGNOSIS — E11.42 TYPE 2 DIABETES MELLITUS WITH DIABETIC POLYNEUROPATHY, WITHOUT LONG-TERM CURRENT USE OF INSULIN (H): ICD-10-CM

## 2018-07-26 RX ORDER — SIMVASTATIN 20 MG
TABLET ORAL
Qty: 30 TABLET | Refills: 0 | Status: SHIPPED | OUTPATIENT
Start: 2018-07-26 | End: 2018-08-27

## 2018-07-26 RX ORDER — GLIMEPIRIDE 4 MG/1
TABLET ORAL
Qty: 30 TABLET | Refills: 0 | Status: SHIPPED | OUTPATIENT
Start: 2018-07-26 | End: 2018-08-27

## 2018-07-26 NOTE — TELEPHONE ENCOUNTER
"Requested Prescriptions   Pending Prescriptions Disp Refills     glimepiride (AMARYL) 4 MG tablet [Pharmacy Med Name: GLIMEPIRIDE 4MG TABS]  Last Written Prescription Date:  1/22/18  Last Fill Quantity: 90,  # refills: 1   Last office visit: 4/6/2018 with prescribing provider:  Victoriano Herman Office Visit:     90 tablet 1     Sig: TAKE ONE TABLET BY MOUTH EVERY MORNING BEFORE BREAKFAST    Sulfonylurea Agents Failed    7/25/2018  5:51 PM       Failed - Patient has documented LDL within the past 12 mos.    Recent Labs   Lab Test  04/18/17   1033   LDL  48          Failed - Patient has documented A1c within the specified period of time.    If HgbA1C is 8 or greater, it needs to be on file within the past 3 months.  If less than 8, must be on file within the past 6 months.     Recent Labs   Lab Test  01/22/18   1117   A1C  6.9*          Passed - Blood pressure less than 140/90 in past 6 months    BP Readings from Last 3 Encounters:   04/17/18 127/78   04/06/18 110/72   01/22/18 118/78          Passed - Patient has had a Microalbumin in the past 12 mos.    Recent Labs   Lab Test  01/22/18   1122   MICROL  6   UMALCR  3.03          Passed - Patient is age 18 or older       Passed - Patient has a recent creatinine (normal) within the past 12 mos.    Recent Labs   Lab Test  01/22/18   1117   CR  1.02          Passed - Recent (6 mo) or future (30 days) visit within the authorizing provider's specialty    Patient had office visit in the last 6 months or has a visit in the next 30 days with authorizing provider or within the authorizing provider's specialty.  See \"Patient Info\" tab in inbasket, or \"Choose Columns\" in Meds & Orders section of the refill encounter.            simvastatin (ZOCOR) 20 MG tablet [Pharmacy Med Name: SIMVASTATIN 20MG TABS]  Last Written Prescription Date:  11/29/17  Last Fill Quantity: 90,  # refills: 1   Last office visit: 4/6/2018 with prescribing provider:  Victoriano Herman Office Visit:     90 " "tablet 1     Sig: TAKE ONE TABLET BY MOUTH AT BEDTIME    Statins Protocol Failed    7/25/2018  5:51 PM       Failed - LDL on file in past 12 months    Recent Labs   Lab Test  04/18/17   1033   LDL  48          Passed - No abnormal creatine kinase in past 12 months    No lab results found.        Passed - Recent (12 mo) or future (30 days) visit within the authorizing provider's specialty    Patient had office visit in the last 12 months or has a visit in the next 30 days with authorizing provider or within the authorizing provider's specialty.  See \"Patient Info\" tab in inbasket, or \"Choose Columns\" in Meds & Orders section of the refill encounter.           Passed - Patient is age 18 or older          "

## 2018-07-26 NOTE — TELEPHONE ENCOUNTER
Prescription approved per [facility] Refill Protocol.  #30 only. Due for diabetes follow up office visit.    Zuly Monterroso, AUGUST, RN, PHN  [facility] [phone]

## 2018-08-27 DIAGNOSIS — E11.42 TYPE 2 DIABETES MELLITUS WITH DIABETIC POLYNEUROPATHY, WITHOUT LONG-TERM CURRENT USE OF INSULIN (H): ICD-10-CM

## 2018-08-27 DIAGNOSIS — G62.9 PERIPHERAL POLYNEUROPATHY: ICD-10-CM

## 2018-08-27 DIAGNOSIS — E78.5 HYPERLIPIDEMIA LDL GOAL <70: ICD-10-CM

## 2018-08-27 NOTE — TELEPHONE ENCOUNTER
Pt due for labs and phx.    Routing refill request to provider for review/approval because:  Tash given x1 and patient did not follow up, please advise  Ina Pelletier RN  [place] [facility]

## 2018-08-27 NOTE — TELEPHONE ENCOUNTER
"Requested Prescriptions   Pending Prescriptions Disp Refills     simvastatin (ZOCOR) 20 MG tablet [Pharmacy Med Name: SIMVASTATIN 20MG TABS] 30 tablet 0     Sig: TAKE ONE TABLET BY MOUTH AT BEDTIME (NEED TO BE SEEN IN CLINIC FOR FURTHER REFILLS)    Statins Protocol Failed    8/27/2018 11:13 AM       Failed - LDL on file in past 12 months    Recent Labs   Lab Test  04/18/17   1033   LDL  48            Passed - No abnormal creatine kinase in past 12 months    No lab results found.            Passed - Recent (12 mo) or future (30 days) visit within the authorizing provider's specialty    Patient had office visit in the last 12 months or has a visit in the next 30 days with authorizing provider or within the authorizing provider's specialty.  See \"Patient Info\" tab in inbasket, or \"Choose Columns\" in Meds & Orders section of the refill encounter.           Passed - Patient is age 18 or older        glimepiride (AMARYL) 4 MG tablet [Pharmacy Med Name: GLIMEPIRIDE 4MG TABS] 30 tablet 0        Last Written Prescription Date:    Last Fill Quantity: ,  # refills:    Last Office Visit: 4/6/2018   Future Office Visit:      Sig: TAKE ONE TABLET BY MOUTH EVERY MORNING BEFORE BREAKFAST (NEED TO BE SEEN IN CLINIC FOR FURTHER REFILLS)    Sulfonylurea Agents Failed    8/27/2018 11:13 AM       Failed - Patient has documented LDL within the past 12 mos.    Recent Labs   Lab Test  04/18/17   1033   LDL  48            Failed - Patient has documented A1c within the specified period of time.    If HgbA1C is 8 or greater, it needs to be on file within the past 3 months.  If less than 8, must be on file within the past 6 months.     Recent Labs   Lab Test  01/22/18   1117   A1C  6.9*            Passed - Blood pressure less than 140/90 in past 6 months    BP Readings from Last 3 Encounters:   04/17/18 127/78   04/06/18 110/72   01/22/18 118/78                Passed - Patient has had a Microalbumin in the past 12 mos.    Recent Labs   Lab " "Test  01/22/18   1122   MICROL  6   UMALCR  3.03            Passed - Patient is age 18 or older       Passed - Patient has a recent creatinine (normal) within the past 12 mos.    Recent Labs   Lab Test  01/22/18   1117   CR  1.02            Passed - Recent (6 mo) or future (30 days) visit within the authorizing provider's specialty    Patient had office visit in the last 6 months or has a visit in the next 30 days with authorizing provider or within the authorizing provider's specialty.  See \"Patient Info\" tab in inbasket, or \"Choose Columns\" in Meds & Orders section of the refill encounter.              "

## 2018-08-28 RX ORDER — SIMVASTATIN 20 MG
TABLET ORAL
Qty: 30 TABLET | Refills: 0 | Status: SHIPPED | OUTPATIENT
Start: 2018-08-28 | End: 2018-10-23

## 2018-08-28 RX ORDER — GLIMEPIRIDE 4 MG/1
TABLET ORAL
Qty: 30 TABLET | Refills: 0 | Status: SHIPPED | OUTPATIENT
Start: 2018-08-28 | End: 2018-10-23

## 2018-09-08 DIAGNOSIS — E11.42 TYPE 2 DIABETES MELLITUS WITH DIABETIC POLYNEUROPATHY, WITHOUT LONG-TERM CURRENT USE OF INSULIN (H): ICD-10-CM

## 2018-09-10 NOTE — TELEPHONE ENCOUNTER
"Requested Prescriptions   Pending Prescriptions Disp Refills     metFORMIN (GLUCOPHAGE) 500 MG tablet [Pharmacy Med Name: METFORMIN HCL 500MG TABS] 180 tablet 1    Last Written Prescription Date:  1.22.18  Last Fill Quantity: 180,  # refills: 1   Last Office Visit: 4/6/2018   Future Office Visit:      Sig: TAKE ONE TABLET BY MOUTH TWICE A DAY WITH MEALS    Biguanide Agents Failed    9/8/2018 12:36 PM       Failed - Patient has documented LDL within the past 12 mos.    Recent Labs   Lab Test  04/18/17   1033   LDL  48            Failed - Patient has documented A1c within the specified period of time.    If HgbA1C is 8 or greater, it needs to be on file within the past 3 months.  If less than 8, must be on file within the past 6 months.     Recent Labs   Lab Test  01/22/18   1117   A1C  6.9*            Passed - Blood pressure less than 140/90 in past 6 months    BP Readings from Last 3 Encounters:   04/17/18 127/78   04/06/18 110/72   01/22/18 118/78                Passed - Patient has had a Microalbumin in the past 15 mos.    Recent Labs   Lab Test  01/22/18   1122   MICROL  6   UMALCR  3.03            Passed - Patient is age 10 or older       Passed - Patient's CR is NOT>1.4 OR Patient's EGFR is NOT<45 within past 12 mos.    Recent Labs   Lab Test  01/22/18   1117   GFRESTIMATED  79   GFRESTBLACK  >90       Recent Labs   Lab Test  01/22/18   1117   CR  1.02            Passed - Patient does NOT have a diagnosis of CHF.       Passed - Recent (6 mo) or future (30 days) visit within the authorizing provider's specialty    Patient had office visit in the last 6 months or has a visit in the next 30 days with authorizing provider or within the authorizing provider's specialty.  See \"Patient Info\" tab in inbasket, or \"Choose Columns\" in Meds & Orders section of the refill encounter.              "

## 2018-09-17 DIAGNOSIS — F33.0 MAJOR DEPRESSIVE DISORDER, RECURRENT EPISODE, MILD (H): ICD-10-CM

## 2018-09-18 DIAGNOSIS — G62.9 PERIPHERAL POLYNEUROPATHY: ICD-10-CM

## 2018-09-18 DIAGNOSIS — G89.29 CHRONIC LOW BACK PAIN, UNSPECIFIED BACK PAIN LATERALITY, WITH SCIATICA PRESENCE UNSPECIFIED: ICD-10-CM

## 2018-09-18 DIAGNOSIS — M54.5 CHRONIC LOW BACK PAIN, UNSPECIFIED BACK PAIN LATERALITY, WITH SCIATICA PRESENCE UNSPECIFIED: ICD-10-CM

## 2018-09-18 RX ORDER — VENLAFAXINE HYDROCHLORIDE 150 MG/1
CAPSULE, EXTENDED RELEASE ORAL
Qty: 30 CAPSULE | Refills: 0 | Status: SHIPPED | OUTPATIENT
Start: 2018-09-18 | End: 2018-10-17

## 2018-09-18 NOTE — TELEPHONE ENCOUNTER
Due for an Office visit for further refills, only fill for 30 days     Mariely Lou RN, BSN  [facility]

## 2018-09-18 NOTE — TELEPHONE ENCOUNTER
"Requested Prescriptions   Pending Prescriptions Disp Refills     venlafaxine (EFFEXOR-XR) 150 MG 24 hr capsule [Pharmacy Med Name: VENLAFAXINE HCL ER 150MG CP24] 90 capsule 0    Last Written Prescription Date:  6.22.18  Last Fill Quantity: 90,  # refills: 0   Last Office Visit: 4/6/2018   Future Office Visit:     PHQ-9 SCORE 1/22/2018 4/6/2018 6/19/2018   Total Score - - -   Total Score MyChart - - -   Total Score 19 22 9     BUSHRA-7 SCORE 1/22/2018 4/6/2018 6/19/2018   Total Score 18 19 3          Sig: TAKE ONE CAPSULE BY MOUTH DAILY IN THE MORNING    Serotonin-Norepinephrine Reuptake Inhibitors  Failed    9/17/2018 10:16 PM       Failed - PHQ-9 score of less than 5 in past 6 months    Please review last PHQ-9 score.          Passed - Blood pressure under 140/90 in past 12 months    BP Readings from Last 3 Encounters:   04/17/18 127/78   04/06/18 110/72   01/22/18 118/78                Passed - Patient is age 18 or older       Passed - Normal serum creatinine on file in past 12 months    Recent Labs   Lab Test  01/22/18   1117   CR  1.02            Passed - Recent (6 mo) or future (30 days) visit within the authorizing provider's specialty    Patient had office visit in the last 6 months or has a visit in the next 30 days with authorizing provider or within the authorizing provider's specialty.  See \"Patient Info\" tab in inbasket, or \"Choose Columns\" in Meds & Orders section of the refill encounter.              "

## 2018-09-18 NOTE — TELEPHONE ENCOUNTER
Controlled Substance Refill Request for lyrica  Problem List Complete:  No     PROVIDER TO CONSIDER COMPLETION OF PROBLEM LIST AND OVERVIEW/CONTROLLED SUBSTANCE AGREEMENT    Last Written Prescription Date:  6/26/18  Last Fill Quantity: 90,   # refills: 1    Last Office Visit with [facility] primary care provider: 4/6/18    Future Office visit:     Controlled substance agreement on file: Yes:  Date 11/30/15.     Processing:  Staff will hand deliver Rx to on-site pharmacy    Vianey Coronel

## 2018-09-19 RX ORDER — PREGABALIN 50 MG/1
50 CAPSULE ORAL 3 TIMES DAILY
Qty: 90 CAPSULE | Refills: 0 | Status: SHIPPED | OUTPATIENT
Start: 2018-09-26 | End: 2018-10-23

## 2018-10-17 DIAGNOSIS — F33.0 MAJOR DEPRESSIVE DISORDER, RECURRENT EPISODE, MILD (H): ICD-10-CM

## 2018-10-18 RX ORDER — VENLAFAXINE HYDROCHLORIDE 150 MG/1
CAPSULE, EXTENDED RELEASE ORAL
Qty: 30 CAPSULE | Refills: 0 | Status: SHIPPED | OUTPATIENT
Start: 2018-10-18 | End: 2018-10-23

## 2018-10-18 NOTE — TELEPHONE ENCOUNTER
"Requested Prescriptions   Pending Prescriptions Disp Refills     venlafaxine (EFFEXOR-XR) 150 MG 24 hr capsule [Pharmacy Med Name: VENLAFAXINE HCL ER 150MG CP24] 30 capsule 0     Sig: TAKE ONE CAPSULE BY MOUTH EVERY MORNING    Serotonin-Norepinephrine Reuptake Inhibitors  Failed    10/17/2018  4:49 PM       Failed - PHQ-9 score of less than 5 in past 6 months    Please review last PHQ-9 score.          Passed - Blood pressure under 140/90 in past 12 months    BP Readings from Last 3 Encounters:   04/17/18 127/78   04/06/18 110/72   01/22/18 118/78                Passed - Patient is age 18 or older       Passed - Normal serum creatinine on file in past 12 months    Recent Labs   Lab Test  01/22/18   1117   CR  1.02            Passed - Recent (6 mo) or future (30 days) visit within the authorizing provider's specialty    Patient had office visit in the last 6 months or has a visit in the next 30 days with authorizing provider or within the authorizing provider's specialty.  See \"Patient Info\" tab in inbasket, or \"Choose Columns\" in Meds & Orders section of the refill encounter.            PHQ-9 SCORE 1/22/2018 4/6/2018 6/19/2018   Total Score - - -   Total Score MyChart - - -   Total Score 19 22 9     BUSHRA-7 SCORE 1/22/2018 4/6/2018 6/19/2018   Total Score 18 19 3       LOV 4/6/2018    Routing refill request to provider for review/approval because:  Elevated recent score.  Ina Pelletier RN  [facility] [facility]            "

## 2018-10-18 NOTE — TELEPHONE ENCOUNTER
Called [phone]    Advised of MD RAMOS message below - OV scheduled for 10/23/2018  Patient stated an understanding and agreed with plan.    Heidi James RN  [facility]

## 2018-10-18 NOTE — TELEPHONE ENCOUNTER
Per his last refill he is due for a recheck appointment - 1 mor month sent.  Please call and schedule a f/u appointment.

## 2018-10-23 ENCOUNTER — TELEPHONE (OUTPATIENT)
Dept: FAMILY MEDICINE | Facility: CLINIC | Age: 48
End: 2018-10-23

## 2018-10-23 ENCOUNTER — OFFICE VISIT (OUTPATIENT)
Dept: FAMILY MEDICINE | Facility: CLINIC | Age: 48
End: 2018-10-23
Payer: COMMERCIAL

## 2018-10-23 VITALS
TEMPERATURE: 98.7 F | OXYGEN SATURATION: 96 % | DIASTOLIC BLOOD PRESSURE: 80 MMHG | HEIGHT: 71 IN | HEART RATE: 102 BPM | SYSTOLIC BLOOD PRESSURE: 110 MMHG | WEIGHT: 265 LBS | BODY MASS INDEX: 37.1 KG/M2

## 2018-10-23 DIAGNOSIS — F33.0 MAJOR DEPRESSIVE DISORDER, RECURRENT EPISODE, MILD (H): ICD-10-CM

## 2018-10-23 DIAGNOSIS — F41.8 SITUATIONAL ANXIETY: ICD-10-CM

## 2018-10-23 DIAGNOSIS — R10.84 ABDOMINAL PAIN, GENERALIZED: Primary | ICD-10-CM

## 2018-10-23 DIAGNOSIS — T40.2X5A CONSTIPATION DUE TO OPIOID THERAPY: ICD-10-CM

## 2018-10-23 DIAGNOSIS — G89.29 CHRONIC LOW BACK PAIN, UNSPECIFIED BACK PAIN LATERALITY, WITH SCIATICA PRESENCE UNSPECIFIED: ICD-10-CM

## 2018-10-23 DIAGNOSIS — G62.9 PERIPHERAL POLYNEUROPATHY: ICD-10-CM

## 2018-10-23 DIAGNOSIS — R10.13 EPIGASTRIC PAIN: ICD-10-CM

## 2018-10-23 DIAGNOSIS — G47.00 INSOMNIA, UNSPECIFIED TYPE: ICD-10-CM

## 2018-10-23 DIAGNOSIS — E78.5 HYPERLIPIDEMIA LDL GOAL <70: ICD-10-CM

## 2018-10-23 DIAGNOSIS — E11.42 TYPE 2 DIABETES MELLITUS WITH DIABETIC POLYNEUROPATHY, WITHOUT LONG-TERM CURRENT USE OF INSULIN (H): ICD-10-CM

## 2018-10-23 DIAGNOSIS — N52.9 ERECTILE DYSFUNCTION, UNSPECIFIED ERECTILE DYSFUNCTION TYPE: ICD-10-CM

## 2018-10-23 DIAGNOSIS — K59.03 CONSTIPATION DUE TO OPIOID THERAPY: ICD-10-CM

## 2018-10-23 DIAGNOSIS — M54.5 CHRONIC LOW BACK PAIN, UNSPECIFIED BACK PAIN LATERALITY, WITH SCIATICA PRESENCE UNSPECIFIED: ICD-10-CM

## 2018-10-23 LAB — HBA1C MFR BLD: 6.9 % (ref 0–5.6)

## 2018-10-23 PROCEDURE — 83036 HEMOGLOBIN GLYCOSYLATED A1C: CPT | Performed by: FAMILY MEDICINE

## 2018-10-23 PROCEDURE — 36415 COLL VENOUS BLD VENIPUNCTURE: CPT | Performed by: FAMILY MEDICINE

## 2018-10-23 PROCEDURE — 99214 OFFICE O/P EST MOD 30 MIN: CPT | Performed by: FAMILY MEDICINE

## 2018-10-23 PROCEDURE — 80061 LIPID PANEL: CPT | Performed by: FAMILY MEDICINE

## 2018-10-23 RX ORDER — NALOXEGOL OXALATE 25 MG/1
25 TABLET, FILM COATED ORAL DAILY
Refills: 1 | Status: CANCELLED | OUTPATIENT
Start: 2018-10-23

## 2018-10-23 RX ORDER — PREGABALIN 50 MG/1
50 CAPSULE ORAL 3 TIMES DAILY
Qty: 90 CAPSULE | Refills: 0 | Status: SHIPPED | OUTPATIENT
Start: 2018-10-23 | End: 2019-02-01

## 2018-10-23 RX ORDER — VENLAFAXINE HYDROCHLORIDE 75 MG/1
75 CAPSULE, EXTENDED RELEASE ORAL DAILY
Qty: 90 CAPSULE | Refills: 1 | Status: SHIPPED | OUTPATIENT
Start: 2018-10-23 | End: 2019-04-22

## 2018-10-23 RX ORDER — AMOXICILLIN 250 MG
3 CAPSULE ORAL 2 TIMES DAILY
Qty: 100 TABLET | Refills: 0 | Status: CANCELLED | OUTPATIENT
Start: 2018-10-23

## 2018-10-23 RX ORDER — SILDENAFIL 100 MG/1
100 TABLET, FILM COATED ORAL DAILY PRN
Qty: 30 TABLET | Refills: 3 | Status: SHIPPED | OUTPATIENT
Start: 2018-10-23 | End: 2019-06-03

## 2018-10-23 RX ORDER — GLIMEPIRIDE 4 MG/1
4 TABLET ORAL
Qty: 90 TABLET | Refills: 1 | Status: SHIPPED | OUTPATIENT
Start: 2018-10-23 | End: 2019-04-22

## 2018-10-23 RX ORDER — VENLAFAXINE HYDROCHLORIDE 75 MG/1
75 CAPSULE, EXTENDED RELEASE ORAL DAILY
Qty: 90 CAPSULE | Refills: 1 | Status: SHIPPED | OUTPATIENT
Start: 2018-10-23 | End: 2018-10-23

## 2018-10-23 RX ORDER — VENLAFAXINE HYDROCHLORIDE 150 MG/1
150 CAPSULE, EXTENDED RELEASE ORAL DAILY
Qty: 90 CAPSULE | Refills: 1 | Status: SHIPPED | OUTPATIENT
Start: 2018-10-23 | End: 2019-05-22

## 2018-10-23 RX ORDER — NALOXEGOL OXALATE 25 MG/1
25 TABLET, FILM COATED ORAL
Qty: 90 TABLET | Refills: 1 | Status: SHIPPED | OUTPATIENT
Start: 2018-10-23 | End: 2019-06-03

## 2018-10-23 RX ORDER — TRAZODONE HYDROCHLORIDE 50 MG/1
50-150 TABLET, FILM COATED ORAL
Qty: 90 TABLET | Refills: 3 | Status: SHIPPED | OUTPATIENT
Start: 2018-10-23 | End: 2019-06-03

## 2018-10-23 RX ORDER — SIMVASTATIN 20 MG
20 TABLET ORAL AT BEDTIME
Qty: 90 TABLET | Refills: 3 | Status: SHIPPED | OUTPATIENT
Start: 2018-10-23 | End: 2019-06-03

## 2018-10-23 RX ORDER — PROCHLORPERAZINE 25 MG/1
1 SUPPOSITORY RECTAL
Qty: 1 EACH | Refills: 11 | Status: SHIPPED | OUTPATIENT
Start: 2018-10-23 | End: 2019-06-03

## 2018-10-23 RX ORDER — PROCHLORPERAZINE 25 MG/1
1 SUPPOSITORY RECTAL PRN
Qty: 9 EACH | Refills: 3 | Status: SHIPPED | OUTPATIENT
Start: 2018-10-23 | End: 2019-06-03

## 2018-10-23 RX ORDER — VENLAFAXINE HYDROCHLORIDE 150 MG/1
150 CAPSULE, EXTENDED RELEASE ORAL DAILY
Qty: 9 CAPSULE | Refills: 1 | Status: SHIPPED | OUTPATIENT
Start: 2018-10-23 | End: 2018-10-23

## 2018-10-23 ASSESSMENT — ANXIETY QUESTIONNAIRES
5. BEING SO RESTLESS THAT IT IS HARD TO SIT STILL: NOT AT ALL
2. NOT BEING ABLE TO STOP OR CONTROL WORRYING: SEVERAL DAYS
GAD7 TOTAL SCORE: 7
6. BECOMING EASILY ANNOYED OR IRRITABLE: SEVERAL DAYS
3. WORRYING TOO MUCH ABOUT DIFFERENT THINGS: SEVERAL DAYS
7. FEELING AFRAID AS IF SOMETHING AWFUL MIGHT HAPPEN: MORE THAN HALF THE DAYS
IF YOU CHECKED OFF ANY PROBLEMS ON THIS QUESTIONNAIRE, HOW DIFFICULT HAVE THESE PROBLEMS MADE IT FOR YOU TO DO YOUR WORK, TAKE CARE OF THINGS AT HOME, OR GET ALONG WITH OTHER PEOPLE: SOMEWHAT DIFFICULT
1. FEELING NERVOUS, ANXIOUS, OR ON EDGE: SEVERAL DAYS

## 2018-10-23 ASSESSMENT — PATIENT HEALTH QUESTIONNAIRE - PHQ9: 5. POOR APPETITE OR OVEREATING: SEVERAL DAYS

## 2018-10-23 NOTE — MR AVS SNAPSHOT
After Visit Summary   10/23/2018    Ryland Gee    MRN: [id number]           Patient Information     Date Of Birth          1970        Visit Information        Provider Department      10/23/2018 10:40 AM Todd Pastrana MD [facility]        Today's Diagnoses     Abdominal pain, generalized    -  1    Constipation due to opioid therapy        Hyperlipidemia LDL goal <70        Type 2 diabetes mellitus with diabetic polyneuropathy, without long-term current use of insulin (H)        Peripheral polyneuropathy        Epigastric pain        Insomnia, unspecified type        Situational anxiety        Major depressive disorder, recurrent episode, mild (H)        Chronic low back pain, unspecified back pain laterality, with sciatica presence unspecified        Erectile dysfunction, unspecified erectile dysfunction type          Care Instructions    Diabetic supplies pharmacy:    [id number] - [place], [place] - [facility]  Phone [phone]            Follow-ups after your visit        Who to contact     If you have questions or need follow up information about today's clinic visit or your schedule please contact [facility] directly at [phone].  Normal or non-critical lab and imaging results will be communicated to you by MyChart, letter or phone within 4 business days after the clinic has received the results. If you do not hear from us within 7 days, please contact the clinic through MyChart or phone. If you have a critical or abnormal lab result, we will notify you by phone as soon as possible.  Submit refill requests through [employer] or call your pharmacy and they will forward the refill request to us. Please allow 3 business days for your refill to be completed.          Additional Information About Your Visit        MyChart Information     [employer] gives you secure access to your electronic health record. If you see a primary care  "provider, you can also send messages to your care team and make appointments. If you have questions, please call your primary care clinic.  If you do not have a primary care provider, please call [phone] and they will assist you.        Care EveryWhere ID     This is your Care EveryWhere ID. This could be used by other organizations to access your [place] medical records  [id number]        Your Vitals Were     Pulse Temperature Height Pulse Oximetry BMI (Body Mass Index)       102 98.7  F (37.1  C) (Oral) 5' 11\" (1.803 m) 96% 36.96 kg/m2        Blood Pressure from Last 3 Encounters:   10/23/18 110/80   04/17/18 127/78   04/06/18 110/72    Weight from Last 3 Encounters:   10/23/18 265 lb (120.2 kg)   04/17/18 271 lb (122.9 kg)   04/06/18 271 lb (122.9 kg)              We Performed the Following     HEMOGLOBIN A1C     JUST IN CASE     Lipid panel reflex to direct LDL Fasting          Today's Medication Changes          These changes are accurate as of 10/23/18 11:39 AM.  If you have any questions, ask your nurse or doctor.               Start taking these medicines.        Dose/Directions    DEXCOM G6 SENSOR Misc   Used for:  Type 2 diabetes mellitus with diabetic polyneuropathy, without long-term current use of insulin (H)   Started by:  Todd Pastrana MD        Dose:  1 Device   1 Device as needed   Quantity:  9 each   Refills:  3       DEXCOM G6 TRANSMITTER Misc   Used for:  Type 2 diabetes mellitus with diabetic polyneuropathy, without long-term current use of insulin (H)   Started by:  Todd Pastrana MD        Dose:  1 Device   1 Device once as needed   Quantity:  1 each   Refills:  11       Naldemedine Tosylate 0.2 MG Tabs   Commonly known as:  SYMPROIC   Used for:  Constipation due to opioid therapy   Started by:  Todd Pastrana MD        Dose:  0.2 mg   Take 0.2 mg by mouth daily   Quantity:  30 tablet   Refills:  1       sildenafil 100 MG tablet   Commonly known as:  VIAGRA   Used for:  " Erectile dysfunction, unspecified erectile dysfunction type   Started by:  Todd Pastrana MD        Dose:  100 mg   Take 1 tablet (100 mg) by mouth daily as needed (erectile dysfunction) 30 min to 4 hrs before sex. Do not use with nitroglycerin, terazosin or doxazosin.   Quantity:  30 tablet   Refills:  3       traZODone 50 MG tablet   Commonly known as:  DESYREL   Used for:  Insomnia, unspecified type   Started by:  Todd Pastrana MD        Dose:   mg   Take 1-3 tablets ( mg) by mouth nightly as needed for sleep   Quantity:  90 tablet   Refills:  3         These medicines have changed or have updated prescriptions.        Dose/Directions    glimepiride 4 MG tablet   Commonly known as:  AMARYL   This may have changed:  See the new instructions.   Used for:  Type 2 diabetes mellitus with diabetic polyneuropathy, without long-term current use of insulin (H), Peripheral polyneuropathy   Changed by:  Todd Pastrana MD        Dose:  4 mg   Take 1 tablet (4 mg) by mouth every morning (before breakfast)   Quantity:  90 tablet   Refills:  1       simvastatin 20 MG tablet   Commonly known as:  ZOCOR   This may have changed:  See the new instructions.   Used for:  Hyperlipidemia LDL goal <70   Changed by:  Todd Pastrana MD        Dose:  20 mg   Take 1 tablet (20 mg) by mouth At Bedtime   Quantity:  90 tablet   Refills:  3       * venlafaxine 150 MG 24 hr capsule   Commonly known as:  EFFEXOR-XR   This may have changed:  See the new instructions.   Used for:  Major depressive disorder, recurrent episode, mild (H), Situational anxiety   Changed by:  Todd Pastrana MD        Dose:  150 mg   Take 1 capsule (150 mg) by mouth daily   Quantity:  9 capsule   Refills:  1       * venlafaxine 75 MG 24 hr capsule   Commonly known as:  EFFEXOR XR   This may have changed:  You were already taking a medication with the same name, and this prescription was added. Make sure you understand how and when to take each.    Used for:  Situational anxiety, Major depressive disorder, recurrent episode, mild (H)   Changed by:  Todd Pastrana MD        Dose:  75 mg   Take 1 capsule (75 mg) by mouth daily   Quantity:  90 capsule   Refills:  1       * Notice:  This list has 2 medication(s) that are the same as other medications prescribed for you. Read the directions carefully, and ask your doctor or other care provider to review them with you.      Stop taking these medicines if you haven't already. Please contact your care team if you have questions.     senna-docusate 8.6-50 MG per tablet   Commonly known as:  SENOKOT-S;PERICOLACE   Stopped by:  Todd Pastrana MD           sildenafil 20 MG tablet   Commonly known as:  REVATIO   Stopped by:  Todd Pastrana MD                Where to get your medicines      These medications were sent to [id number] - [place], [place] - [address], [place] [address]     Phone:  [phone]     DEXCOM G6 SENSOR Misc    DEXCOM G6 TRANSMITTER Misc         These medications were sent to [id number]     Phone:  [phone]     glimepiride 4 MG tablet    Naldemedine Tosylate 0.2 MG Tabs    omeprazole 20 MG CR capsule    simvastatin 20 MG tablet    traZODone 50 MG tablet    venlafaxine 150 MG 24 hr capsule    venlafaxine 75 MG 24 hr capsule         Some of these will need a paper prescription and others can be bought over the counter.  Ask your nurse if you have questions.     Bring a paper prescription for each of these medications     pregabalin 50 MG capsule    sildenafil 100 MG tablet                Primary Care Provider Office Phone # Fax #    Todd Pastrana [phone]       [address] [place] [id number]        Equal Access to Services     MIKAL VEGA : Deana Mireles, anna stephenson, augusto layne  brea snideredinson gilbertvarsha jovelaan ah. [facility] [phone].    ATENCIÓN: Si habla latanya, tiene a cortez disposición servicios gratuitos de asistencia lingüística. Vu al [phone].    We comply with applicable federal civil rights laws and [place] laws. We do not discriminate on the basis of race, color, national origin, age, disability, sex, sexual orientation, or gender identity.            Thank you!     Thank you for choosing [facility]  for your care. Our goal is always to provide you with excellent care. Hearing back from our patients is one way we can continue to improve our services. Please take a few minutes to complete the written survey that you may receive in the mail after your visit with us. Thank you!             Your Updated Medication List - Protect others around you: Learn how to safely use, store and throw away your medicines at www.disposemymeds.org.          This list is accurate as of 10/23/18 11:39 AM.  Always use your most recent med list.                   Brand Name Dispense Instructions for use Diagnosis    blood glucose calibration Normal solution     1 Bottle    Use to calibrate blood glucose monitor as needed as directed.    Type 2 diabetes mellitus with diabetic polyneuropathy, without long-term current use of insulin (H)       blood glucose monitoring lancets     100 Box    Use to test blood sugar 3 times daily or as directed.    Type 2 diabetes mellitus with diabetic polyneuropathy (H)       blood glucose monitoring meter device kit     1 kit    Use to test blood sugar 3 times daily or as directed.    Type 2 diabetes mellitus with diabetic polyneuropathy (H)       CONTOUR NEXT TEST test strip   Generic drug:  blood glucose monitoring     100 each    USE TO TEST BLOOD SUGAR THREE TIMES A DAY OR AS DIRECTED    Type 2 diabetes mellitus with diabetic polyneuropathy, without long-term current use of insulin (H)       DEXCOM G6 SENSOR Misc     9 each     1 Device as needed    Type 2 diabetes mellitus with diabetic polyneuropathy, without long-term current use of insulin (H)       DEXCOM G6 TRANSMITTER Misc     1 each    1 Device once as needed    Type 2 diabetes mellitus with diabetic polyneuropathy, without long-term current use of insulin (H)       econazole nitrate 1 % cream     30 g    Apply topically daily Apply to affected nail daily    Foot pain, bilateral, Numbness in feet, Edema of both legs, Metatarsalgia of both feet, Sciatica of left side, Sciatica of right side, Onychomycosis of toenail, Varicosities       glimepiride 4 MG tablet    AMARYL    90 tablet    Take 1 tablet (4 mg) by mouth every morning (before breakfast)    Type 2 diabetes mellitus with diabetic polyneuropathy, without long-term current use of insulin (H), Peripheral polyneuropathy       IBUPROFEN PO      Take 200-400 mg by mouth every 8 hours as needed for moderate pain        medical cannabis inhalation (Patient's own supply.  Not a prescription)     0 Information only    (This is NOT a prescription, and does not certify that the patient has a qualifying medical condition for medical cannabis.  The purpose of this order is  to document that the patient reports taking medical cannabis.)    Chronic pain syndrome       metFORMIN 500 MG tablet    GLUCOPHAGE    180 tablet    TAKE ONE TABLET BY MOUTH TWICE A DAY WITH MEALS    Type 2 diabetes mellitus with diabetic polyneuropathy, without long-term current use of insulin (H)       MOVANTIK 25 MG Tabs tablet   Generic drug:  naloxegol      Take 25 mg by mouth daily    Constipation due to opioid therapy       Naldemedine Tosylate 0.2 MG Tabs    SYMPROIC    30 tablet    Take 0.2 mg by mouth daily    Constipation due to opioid therapy       omeprazole 20 MG CR capsule    priLOSEC    90 capsule    Take 1 capsule (20 mg) by mouth daily    Epigastric pain       * oxyCODONE IR 15 MG tablet    ROXICODONE    4 tablet    Take 2 tablets (30 mg) by mouth  daily as needed    Peripheral polyneuropathy, Chronic pain syndrome       * oxyCODONE 30 MG 12 hr tablet    OXYCONTIN    4 tablet    Take 1 tablet (30 mg) by mouth every 12 hours    Chronic pain syndrome, Peripheral polyneuropathy       pregabalin 50 MG capsule    LYRICA    90 capsule    Take 1 capsule (50 mg) by mouth 3 times daily    Peripheral polyneuropathy, Chronic low back pain, unspecified back pain laterality, with sciatica presence unspecified       sildenafil 100 MG tablet    VIAGRA    30 tablet    Take 1 tablet (100 mg) by mouth daily as needed (erectile dysfunction) 30 min to 4 hrs before sex. Do not use with nitroglycerin, terazosin or doxazosin.    Erectile dysfunction, unspecified erectile dysfunction type       simvastatin 20 MG tablet    ZOCOR    90 tablet    Take 1 tablet (20 mg) by mouth At Bedtime    Hyperlipidemia LDL goal <70       traZODone 50 MG tablet    DESYREL    90 tablet    Take 1-3 tablets ( mg) by mouth nightly as needed for sleep    Insomnia, unspecified type       * venlafaxine 150 MG 24 hr capsule    EFFEXOR-XR    9 capsule    Take 1 capsule (150 mg) by mouth daily    Major depressive disorder, recurrent episode, mild (H), Situational anxiety       * venlafaxine 75 MG 24 hr capsule    EFFEXOR XR    90 capsule    Take 1 capsule (75 mg) by mouth daily    Situational anxiety, Major depressive disorder, recurrent episode, mild (H)       * Notice:  This list has 4 medication(s) that are the same as other medications prescribed for you. Read the directions carefully, and ask your doctor or other care provider to review them with you.

## 2018-10-23 NOTE — PATIENT INSTRUCTIONS
Diabetic supplies pharmacy:    [id number] - [place], [place] - [address] [place] [address]  Phone [phone]

## 2018-10-23 NOTE — PROGRESS NOTES
SUBJECTIVE:                                                      Ryland Gee is a 48 year old male who presents to clinic today for the following health issues:    Diabetes Follow-up    Patient is checking blood sugars: pt lost meter x 3 weeks ago needs new one- numbers are mostly low 130-140's    Diabetic concerns: None     Symptoms of hypoglycemia (low blood sugar): shaky low as 50's     Paresthesias (numbness or burning in feet) or sores: Yes - feet go to sleep when just sitting- pt does not think it is all neuropathy     Date of last diabetic eye exam: within last year      BP Readings from Last 2 Encounters:   10/23/18 110/80   04/17/18 127/78     Hemoglobin A1C (%)   Date Value   10/23/2018 6.9 (H)   01/22/2018 6.9 (H)     LDL Cholesterol Calculated (mg/dL)   Date Value   04/18/2017 48   05/12/2016 66   Diabetes Management Resources    Hyperlipidemia Follow-Up    Rate your low fat/cholesterol diet?: good    Taking statin?  Yes, no muscle aches from statin    Other lipid medications/supplements?:  None    Ryland is doing well on simvastatin daily without complication.     Depression Followup    Status since last visit: Stable -some good days and bad days- may need a higher dose    See PHQ-9 for current symptoms.  Other associated symptoms: None    Complicating factors:   Significant life event:  No   Current substance abuse:  None  Anxiety or Panic symptoms:  No    Ryland notes being fatigue and tends to fall asleep all the time, no matter what time of day. He states that he can fall asleep during the day but has an inverse effect at night where he can't seem to fall asleep. He takes his Effexor in the morning. his anxiety feels stable, however feel he might need a higher dosage.     PHQ-9  English  PHQ 1/22/2018 4/6/2018 6/19/2018   PHQ-9 Total Score 19 22 9   Q9: Suicide Ideation Several days Not at all Not at all   Suicide Assessment Five-step Evaluation and Treatment (SAFE-T)    Upper Left Chest Pain  Ryland  "complains of a sharp pain that originates at the middle of chest with pointing radiation toward his left side. He feels the pain is erratic and hard to predict and occasionally feels it in his back. He's unsure how much oxycodone he's taking and is concerned he may become to tolerant to it.    Erectile Dysfunction  Ryland comments that with the combination of all the medications he's currently taking has had an effect for ED and inquiries about starting a medication to assist his symptoms.     Sore Throat Frequency  Harm reports having an overly recurrent sore throat and questions his susceptibility. With symptoms with raspy voice and perhaps association with acid reflux. Drinks coffee occasionally.       Problem list and histories reviewed & adjusted, as indicated.  Additional history: as documented    ROS:  Constitutional, HEENT, cardiovascular, pulmonary, GI, , musculoskeletal, neuro, skin, endocrine and psych systems are negative, except as otherwise noted.    This document serves as a record of the services and decisions personally performed and made by Todd Pastrana MD. It was created on his behalf by Don Canada, a trained medical scribe. The creation of this document is based the provider's statements to the medical scribe.  Scribe Don Canada 11:13 AM, October 23, 2018    OBJECTIVE:                                                    /80  Pulse 102  Temp 98.7  F (37.1  C) (Oral)  Ht 1.803 m (5' 11\")  Wt 120.2 kg (265 lb)  SpO2 96%  BMI 36.96 kg/m2 Body mass index is 36.96 kg/(m^2).   GENERAL: healthy, alert, well nourished, well hydrated, no distress  HENT: ear canals- normal; TMs- normal; Nose- normal; Mouth- no ulcers, no lesions  NECK: mild bilateral tenderness behind ears, no adenopathy, no asymmetry, no masses, no stiffness; thyroid- normal to palpation  RESP: lungs clear to auscultation - no rales, no rhonchi, no wheezes  CV: regular rates and rhythm, normal S1 S2, no S3 or S4 and no murmur, no " click or rub -  ABDOMEN: soft, no tenderness, no  hepatosplenomegaly, no masses, normal bowel sounds  MS: extremities- no gross deformities noted, no edema,   SKIN: no suspicious lesions, no rashes  Diabetic foot exam: normal DP and PT pulses,  decreased sensation in his lower extremities from the knee down and no wounds      ASSESSMENT/PLAN:                                                    Ryland was seen today for recheck medication.    Diagnoses and all orders for this visit:      Constipation due to opioid therapy - Stable today, refilled   -     Naldemedine Tosylate (SYMPROIC) 0.2 MG TABS; Take 0.2 mg by mouth daily  -     MOVANTIK 25 MG TABS tablet; Take 1 tablet (25 mg) by mouth every morning (before breakfast)    Hyperlipidemia LDL goal <70 - Stable  -     Lipid panel reflex to direct LDL Fasting  -     simvastatin (ZOCOR) 20 MG tablet; Take 1 tablet (20 mg) by mouth At Bedtime    Type 2 diabetes mellitus with diabetic polyneuropathy, without long-term current use of insulin (H) - Refilled, begin use of Dexcom if covered - otherwise okay to refill other meter strips.   -     HEMOGLOBIN A1C  -     JUST IN CASE  -     glimepiride (AMARYL) 4 MG tablet; Take 1 tablet (4 mg) by mouth every morning (before breakfast)  -     Continuous Blood Gluc Transmit (DEXCOM G6 TRANSMITTER) MISC; 1 Device once as needed  -     Continuous Blood Gluc Sensor (DEXCOM G6 SENSOR) MISC; 1 Device as needed    Peripheral polyneuropathy - Ongoing, refilled  -     glimepiride (AMARYL) 4 MG tablet; Take 1 tablet (4 mg) by mouth every morning (before breakfast)  -     pregabalin (LYRICA) 50 MG capsule; Take 1 capsule (50 mg) by mouth 3 times daily    Epigastric pain - Ongoing, refilled  -     omeprazole (PRILOSEC) 20 MG CR capsule; Take 1 capsule (20 mg) by mouth daily    Insomnia, unspecified type - Begin medication prn  -     traZODone (DESYREL) 50 MG tablet; Take 1-3 tablets ( mg) by mouth nightly as needed for  "sleep    Situational anxiety / Major depressive disorder, recurrent episode, mild (H) - Stable, take medication together  -     venlafaxine (EFFEXOR XR) 75 MG 24 hr capsule; Take 1 capsule (75 mg) by mouth daily - take with 150 mg Effexor XR  -     venlafaxine (EFFEXOR-XR) 150 MG 24 hr capsule; Take 1 capsule (150 mg) by mouth daily - with effexor XR 75 mg    Chronic low back pain, unspecified back pain laterality, with sciatica presence unspecified - Ongoing, continue medication  -     pregabalin (LYRICA) 50 MG capsule; Take 1 capsule (50 mg) by mouth 3 times daily    Erectile dysfunction, unspecified erectile dysfunction type - Begin medication  -     sildenafil (VIAGRA) 100 MG tablet; Take 1 tablet (100 mg) by mouth daily as needed (erectile dysfunction) 30 min to 4 hrs before sex. Do not use with nitroglycerin, terazosin or doxazosin.        Risks, benefits and alternatives of treatments discussed. Plan agreed on.      Followup: Data Unavailable    See patient instructions.       BMI:   Estimated body mass index is 36.96 kg/(m^2) as calculated from the following:    Height as of this encounter: 1.803 m (5' 11\").    Weight as of this encounter: 120.2 kg (265 lb).   Weight management plan: Discussed healthy diet and exercise guidelines and patient will follow up in 12 months in clinic to re-evaluate.    The information in this document, created by the medical scribe for me, accurately reflects the services I personally performed and the decisions made by me. I have reviewed and approved this document for accuracy prior to leaving the patient care area.  11:44 AM, 10/23/18        Dannie Pastrana MD   Pager: [phone]    "

## 2018-10-24 LAB
CHOLEST SERPL-MCNC: 145 MG/DL
HDLC SERPL-MCNC: 43 MG/DL
LDLC SERPL CALC-MCNC: 75 MG/DL
NONHDLC SERPL-MCNC: 102 MG/DL
TRIGL SERPL-MCNC: 134 MG/DL

## 2018-10-24 ASSESSMENT — PATIENT HEALTH QUESTIONNAIRE - PHQ9: SUM OF ALL RESPONSES TO PHQ QUESTIONS 1-9: 11

## 2018-10-24 ASSESSMENT — ANXIETY QUESTIONNAIRES: GAD7 TOTAL SCORE: 7

## 2018-10-24 NOTE — PROGRESS NOTES
Dear Ryland,    Here is a summary of your recent test results:  -Cholesterol levels are at your goal levels.  ADVISE: Continuing your medication, a regular exercise program with at least 30 minutes of aerobic exercise 3-4 days/week ( 45 minutes 4-6 days/week if weight loss needed), and a low saturated fat,/low carbohydrate diet are helpful to maintain this.  Rechecking your fasting cholesterol panel in 12 months is recommended (Lipid w/ LDL reflex).  -A1C (test of diabetes control the last 2-3 months) is at your goal. Please continue with your current plan. Also, you should make an appointment to see me and recheck your A1C test in 6 months.     For additional lab test information, [email or web address] is an excellent reference.    In addition, here is a list of due or overdue Health Maintenance reminders:    Flu Vaccine(1) due on 09/01/2018    Please call us at [phone] (or use [facility]) to address the above recommendations if needed.           Thank you very much for trusting me and [facility].     Healthy regards,  Dannie Pastrana MD

## 2019-01-02 ENCOUNTER — TELEPHONE (OUTPATIENT)
Dept: FAMILY MEDICINE | Facility: CLINIC | Age: 49
End: 2019-01-02

## 2019-01-02 NOTE — TELEPHONE ENCOUNTER
"Patient calling stating he is having heart palpitations for past month.  Patient will have  CP that is sharp and lasts only a couple seconds that has been going on for many years.  Patient will feel weakness with palpitations and will have these throughout the day.  Yesterday patient had his eyes closed and opened his left eye and describes it as seeing \"veins\".    Denies SOB, CP w/palpitations, nausea, vomiting, cool or moist skin, difficulty breathing, chest tightness or pressure.    Advised patient to seek emergency medical attention if any of above symptoms occur.  Patient stated understanding and was agreeable with plan.  An appointment was scheduled with PCP for tomorrow 1/3/2018.    REGGIE Coyle, RN  [employer] [facility]    "

## 2019-01-03 ENCOUNTER — OFFICE VISIT (OUTPATIENT)
Dept: FAMILY MEDICINE | Facility: CLINIC | Age: 49
End: 2019-01-03
Payer: COMMERCIAL

## 2019-01-03 VITALS
HEART RATE: 89 BPM | OXYGEN SATURATION: 99 % | BODY MASS INDEX: 37.24 KG/M2 | WEIGHT: 266 LBS | DIASTOLIC BLOOD PRESSURE: 80 MMHG | TEMPERATURE: 98.8 F | HEIGHT: 71 IN | SYSTOLIC BLOOD PRESSURE: 120 MMHG

## 2019-01-03 DIAGNOSIS — K51.919 ULCERATIVE COLITIS WITH COMPLICATION, UNSPECIFIED LOCATION (H): ICD-10-CM

## 2019-01-03 DIAGNOSIS — E66.01 MORBID OBESITY DUE TO EXCESS CALORIES (H): ICD-10-CM

## 2019-01-03 DIAGNOSIS — Z12.5 SCREENING FOR PROSTATE CANCER: ICD-10-CM

## 2019-01-03 DIAGNOSIS — F33.0 MAJOR DEPRESSIVE DISORDER, RECURRENT EPISODE, MILD (H): ICD-10-CM

## 2019-01-03 DIAGNOSIS — E11.42 TYPE 2 DIABETES MELLITUS WITH DIABETIC POLYNEUROPATHY, WITHOUT LONG-TERM CURRENT USE OF INSULIN (H): ICD-10-CM

## 2019-01-03 DIAGNOSIS — R00.2 PALPITATIONS: Primary | ICD-10-CM

## 2019-01-03 PROBLEM — E11.9 DIABETES MELLITUS, TYPE 2 (H): Status: ACTIVE | Noted: 2019-01-03

## 2019-01-03 PROCEDURE — 99214 OFFICE O/P EST MOD 30 MIN: CPT | Performed by: FAMILY MEDICINE

## 2019-01-03 PROCEDURE — 93000 ELECTROCARDIOGRAM COMPLETE: CPT | Performed by: FAMILY MEDICINE

## 2019-01-03 ASSESSMENT — MIFFLIN-ST. JEOR: SCORE: 2108.7

## 2019-01-03 NOTE — PROGRESS NOTES
"  SUBJECTIVE:                                                      Ryland Gee is a 48 year old male who presents to clinic today for the following health issues:    Palpitations  Patient calling stating he is having heart palpitations for past month -- last episode was one day ago that lasted all day.  Patient will have CP that is sharp and lasts only a couple seconds that has been going on for many years.  Patient will feel weakness with palpitations and will have these throughout the day. Notes that when he's leaned over and straightens up, that he does feel a temporary dizziness. Uses Effexor for mood which may have associated side effects.     Denies SOB, CP w/palpitations, nausea, vomiting, cool or moist skin, difficulty breathing, chest tightness or pressure, lightheadedness.     Left Eye Pain   Two days ago, patient had his eyes closed and opened his left eye and describes it as seeing \"veins\" and \"vivid\" -- also notes that there was needle stabbing pain surrounding the eye. He has tried using Zzzquil which made his affliction worse. The following day he felt his voice was changed, cold sore, and nasal congestion with difficulty swallowing.    Mouth Blisters  Ryland reports that he occasionally has blisters in his mouth which become aggravated -- he ends up draining said blisters with his fingernails or tweezers.     Fatty Liver  Ryland inquiries if there are any lab tests and treatments that could help with his fatty liver. He's cut down on his alcohol consumption over the last five years.        Problem list and histories reviewed & adjusted, as indicated.  Additional history: as documented    ROS:  Constitutional, HEENT, cardiovascular, pulmonary, GI, , musculoskeletal, neuro, skin, endocrine and psych systems are negative, except as otherwise noted.  This document serves as a record of the services and decisions personally performed and made by Todd Pastrana MD. It was created on his behalf by manda Martinez" "trained medical scribe. The creation of this document is based the provider's statements to the medical scribe.  Nichole Canada 4:53 PM, January 3, 2019    OBJECTIVE:                                                    /80   Pulse 89   Temp 98.8  F (37.1  C) (Oral)   Ht 1.803 m (5' 11\")   Wt 120.7 kg (266 lb)   SpO2 99%   BMI 37.10 kg/m   Body mass index is 37.1 kg/m .   GENERAL: healthy, alert, well nourished, well hydrated, no distress  HENT: ear canals- normal; TMs- normal; Nose- normal; Mouth- no ulcers, no lesions  NECK: no tenderness, no adenopathy, no asymmetry, no masses, no stiffness; thyroid- normal to palpation, no bruits   RESP: lungs clear to auscultation - no rales, no rhonchi, no wheezes  CV: regular rates and rhythm, normal S1 S2, no S3 or S4 and no murmur, no click or rub -  ABDOMEN: soft, no tenderness, no  hepatosplenomegaly, no masses, normal bowel sounds  MS: extremities- no gross deformities noted, no edema  NEURO: strength and tone- normal, sensory exam- grossly normal, mentation- intact, speech- normal, reflexes- symmetric  PSYCH: Alert and oriented times 3; speech- coherent , normal rate and volume; able to articulate logical thoughts, able to abstract reason, no tangential thoughts, no hallucinations or delusions, affect- normal    ASSESSMENT/PLAN:                                                    Harm was seen today for palpitations.    Diagnoses and all orders for this visit:    Palpitations - Possible side effect of Effexor, looking into using a Holter monitor, no symptoms today  -     EKG 12-lead complete w/read - Clinics  -     Zio Patch Holter Adult Pediatric Greater than 48 hrs; Future    Type 2 diabetes mellitus with diabetic polyneuropathy, without long-term current use of insulin (H) - Stable, future labs  -     BASIC METABOLIC PANEL; Future  -     Albumin Random Urine Quantitative with Creat Ratio; Future  -     TSH with free T4 reflex; Future  -     **A1C FUTURE 3mo; " "Future    Major depressive disorder, recurrent episode, mild (H) - stable with current therapy, continue medications    Morbid obesity due to excess calories (H) - Ongoing, recommended well balanced diet and regular activity routine.    Ulcerative colitis with complication, unspecified location (H)    Screening for prostate cancer  -     Prostate spec antigen screen; Future      Risks, benefits and alternatives of treatments discussed. Plan agreed on.      Followup: Data Unavailable    See patient instructions.       BMI:   Estimated body mass index is 37.1 kg/m  as calculated from the following:    Height as of this encounter: 1.803 m (5' 11\").    Weight as of this encounter: 120.7 kg (266 lb).   Weight management plan: Discussed healthy diet and exercise guidelines    The information in this document, created by the medical scribe for me, accurately reflects the services I personally performed and the decisions made by me. I have reviewed and approved this document for accuracy prior to leaving the patient care area.  5:27 PM, 01/03/19        Dannie Pastrana MD   Pager: [phone]    "

## 2019-01-03 NOTE — PATIENT INSTRUCTIONS
Diabetic supplies pharmacy:    [id number] - [place], [place] - [address] [place] [address]  Phone [phone]

## 2019-02-01 DIAGNOSIS — G62.9 PERIPHERAL POLYNEUROPATHY: ICD-10-CM

## 2019-02-01 DIAGNOSIS — G89.29 CHRONIC LOW BACK PAIN, UNSPECIFIED BACK PAIN LATERALITY, WITH SCIATICA PRESENCE UNSPECIFIED: ICD-10-CM

## 2019-02-01 DIAGNOSIS — M54.5 CHRONIC LOW BACK PAIN, UNSPECIFIED BACK PAIN LATERALITY, WITH SCIATICA PRESENCE UNSPECIFIED: ICD-10-CM

## 2019-02-01 RX ORDER — PREGABALIN 50 MG/1
50 CAPSULE ORAL 3 TIMES DAILY
Qty: 90 CAPSULE | Refills: 0 | Status: SHIPPED | OUTPATIENT
Start: 2019-02-01 | End: 2019-04-26

## 2019-02-01 NOTE — TELEPHONE ENCOUNTER
Controlled Substance Refill Request for pregabalin (LYRICA) 50 MG capsule  Problem List Complete:    Yes    Last Written Prescription Date:  10.23.18  Last Fill Quantity: 90,   # refills: 0        Last Office Visit with [facility] primary care provider: 1.3.19    Future Office visit:     Controlled substance agreement:   Encounter-Level CSA - 11/30/2015:    Controlled Substance Agreement - Scan on 12/1/2015  2:00 PM: CONTROLLED SUBSTANCE AGREEMENT 11/30/15 (below)       Patient-Level CSA:    There are no patient-level csa.         Last Urine Drug Screen:   Pain Drug SCR UR W RPTD Meds   Date Value Ref Range Status   04/17/2018 FINAL  Final     Comment:     (Note)  ====================================================================  [facility] COMP DRUG ANALYSIS,UR  ====================================================================  Test                             Result       Flag       Units        Drug Present and Declared for Prescription Verification   Carboxy-THC                    119          EXPECTED   ng/mg creat    Carboxy-THC is a metabolite of tetrahydrocannabinol  (THC).    Source of THC is most commonly illicit, but THC is also present    in a scheduled prescription medication.   Oxycodone                      1531         EXPECTED   ng/mg creat   Oxymorphone                    501          EXPECTED   ng/mg creat   Noroxycodone                   >5208        EXPECTED   ng/mg creat   Noroxymorphone                 124          EXPECTED   ng/mg creat    Sources of oxycodone are scheduled prescription medications.    Oxymorphone, noroxycodone, and noroxymorphone are expected    metabolites of oxycodone. Oxymorphone is also available as a    scheduled prescription medication.   Gabapentin                     PRESENT      EXPECTED                 Venlafaxine                    PRESENT      EXPECTED                 Desmethylvenlafaxine           PRESENT      EXPECTED                  Desmethylvenlafaxine is an  expected metabolite of venlafaxine.  ====================================================================  Test                      Result    Flag   Units      Ref Range        Creatinine              192              mg/dL      >=20            ====================================================================  Declared Medications:  The flagging and interpretation on this report are based on the  following declared medications.  Unexpected results may arise from  inaccuracies in the declared medications.  **Note: The testing scope of this panel includes these medications:  Cannabis  Gabapentin  Oxycodone  Oxycodone (OxyContin)  Venlafaxine (Effexor)  ====================================================================  For clinical consultation, please call [phone].  ====================================================================  Analysis performed by [facility] [place] [address]     , No results found for: COMDAT, No results found for: THC13, PCP13, COC13, MAMP13, OPI13, AMP13, BZO13, TCA13, MTD13, BAR13, OXY13, PPX13, BUP13     Processing:  Fax Rx to LISTED pharmacy    [email or web address]   checked in past 3 months?  Yes 1.3.19

## 2019-03-11 DIAGNOSIS — E11.42 TYPE 2 DIABETES MELLITUS WITH DIABETIC POLYNEUROPATHY, WITHOUT LONG-TERM CURRENT USE OF INSULIN (H): ICD-10-CM

## 2019-03-12 NOTE — TELEPHONE ENCOUNTER
"Requested Prescriptions   Pending Prescriptions Disp Refills     metFORMIN (GLUCOPHAGE) 500 MG tablet [Pharmacy Med Name: METFORMIN HCL 500MG TABS] 180 tablet 1    Last Written Prescription Date:  9.10.18  Last Fill Quantity: 180 tablet,  # refills: 1   Last office visit: 1/3/2019 with prescribing provider:  Todd Pastrana MD       Future Office Visit:       Sig: TAKE ONE TABLET BY MOUTH TWICE A DAY WITH MEALS    Biguanide Agents Passed - 3/11/2019  8:01 PM       Passed - Blood pressure less than 140/90 in past 6 months    BP Readings from Last 3 Encounters:   01/03/19 120/80   10/23/18 110/80   04/17/18 127/78                Passed - Patient has documented LDL within the past 12 mos.    Recent Labs   Lab Test 10/23/18  1101   LDL 75            Passed - Patient has had a Microalbumin in the past 15 mos.    Recent Labs   Lab Test 01/22/18  1122   MICROL 6   UMALCR 3.03            Passed - Patient is age 10 or older       Passed - Patient has documented A1c within the specified period of time.    If HgbA1C is 8 or greater, it needs to be on file within the past 3 months.  If less than 8, must be on file within the past 6 months.     Recent Labs   Lab Test 10/23/18  1101   A1C 6.9*            Passed - Patient's CR is NOT>1.4 OR Patient's EGFR is NOT<45 within past 12 mos.    Recent Labs   Lab Test 01/22/18  1117   GFRESTIMATED 79   GFRESTBLACK >90       Recent Labs   Lab Test 01/22/18  1117   CR 1.02            Passed - Patient does NOT have a diagnosis of CHF.       Passed - Medication is active on med list       Passed - Recent (6 mo) or future (30 days) visit within the authorizing provider's specialty    Patient had office visit in the last 6 months or has a visit in the next 30 days with authorizing provider or within the authorizing provider's specialty.  See \"Patient Info\" tab in inbasket, or \"Choose Columns\" in Meds & Orders section of the refill encounter.            "

## 2019-03-13 NOTE — TELEPHONE ENCOUNTER
Medication is being filled for 1 time refill only due to:  Patient needs labs per below.   Ina Pelletier RN  [facility]

## 2019-03-15 DIAGNOSIS — E11.42 TYPE 2 DIABETES MELLITUS WITH DIABETIC POLYNEUROPATHY, WITHOUT LONG-TERM CURRENT USE OF INSULIN (H): ICD-10-CM

## 2019-03-16 NOTE — TELEPHONE ENCOUNTER
"Requested Prescriptions   Pending Prescriptions Disp Refills     metFORMIN (GLUCOPHAGE) 500 MG tablet [Pharmacy Med Name: METFORMIN HCL 500MG TABS]  Last Written Prescription Date:  3/13/19  Last Fill Quantity: 60,  # refills: 0   Last office visit: 1/3/2019 with prescribing provider:  Victoriano   Future Office Visit:     180 tablet 1     Sig: TAKE ONE TABLET BY MOUTH TWICE A DAY WITH MEALS    Biguanide Agents Passed - 3/15/2019  1:25 PM       Passed - Blood pressure less than 140/90 in past 6 months    BP Readings from Last 3 Encounters:   01/03/19 120/80   10/23/18 110/80   04/17/18 127/78          Passed - Patient has documented LDL within the past 12 mos.    Recent Labs   Lab Test 10/23/18  1101   LDL 75            Passed - Patient has had a Microalbumin in the past 15 mos.    Recent Labs   Lab Test 01/22/18  1122   MICROL 6   UMALCR 3.03          Passed - Patient is age 10 or older       Passed - Patient has documented A1c within the specified period of time.    If HgbA1C is 8 or greater, it needs to be on file within the past 3 months.  If less than 8, must be on file within the past 6 months.     Recent Labs   Lab Test 10/23/18  1101   A1C 6.9*          Passed - Patient's CR is NOT>1.4 OR Patient's EGFR is NOT<45 within past 12 mos.    Recent Labs   Lab Test 01/22/18  1117   GFRESTIMATED 79   GFRESTBLACK >90       Recent Labs   Lab Test 01/22/18  1117   CR 1.02          Passed - Patient does NOT have a diagnosis of CHF.       Passed - Medication is active on med list       Passed - Recent (6 mo) or future (30 days) visit within the authorizing provider's specialty    Patient had office visit in the last 6 months or has a visit in the next 30 days with authorizing provider or within the authorizing provider's specialty.  See \"Patient Info\" tab in inbasket, or \"Choose Columns\" in Meds & Orders section of the refill encounter.                "

## 2019-04-08 DIAGNOSIS — E11.42 TYPE 2 DIABETES MELLITUS WITH DIABETIC POLYNEUROPATHY, WITHOUT LONG-TERM CURRENT USE OF INSULIN (H): ICD-10-CM

## 2019-04-09 NOTE — TELEPHONE ENCOUNTER
"metFORMIN (GLUCOPHAGE) 500 MG tablet    Last Written Prescription Date:  3/13/19  Last Fill Quantity: 60,  # refills: 0   Last office visit: 1/3/2019 with prescribing provider:  yes   Future Office Visit:      Requested Prescriptions   Pending Prescriptions Disp Refills     metFORMIN (GLUCOPHAGE) 500 MG tablet [Pharmacy Med Name: METFORMIN HCL 500MG TABS] 60 tablet 0     Sig: TAKE ONE TABLET BY MOUTH TWO TIMES A DAY WITH MEALS       Biguanide Agents Failed - 4/8/2019  6:30 PM        Failed - Patient's CR is NOT>1.4 OR Patient's EGFR is NOT<45 within past 12 mos.     Recent Labs   Lab Test 01/22/18  1117   GFRESTIMATED 79   GFRESTBLACK >90       Recent Labs   Lab Test 01/22/18  1117   CR 1.02             Passed - Blood pressure less than 140/90 in past 6 months     BP Readings from Last 3 Encounters:   01/03/19 120/80   10/23/18 110/80   04/17/18 127/78                 Passed - Patient has documented LDL within the past 12 mos.     Recent Labs   Lab Test 10/23/18  1101   LDL 75             Passed - Patient has had a Microalbumin in the past 15 mos.     Recent Labs   Lab Test 01/22/18  1122   MICROL 6   UMALCR 3.03             Passed - Patient is age 10 or older        Passed - Patient has documented A1c within the specified period of time.     If HgbA1C is 8 or greater, it needs to be on file within the past 3 months.  If less than 8, must be on file within the past 6 months.     Recent Labs   Lab Test 10/23/18  1101   A1C 6.9*             Passed - Patient does NOT have a diagnosis of CHF.        Passed - Medication is active on med list        Passed - Recent (6 mo) or future (30 days) visit within the authorizing provider's specialty     Patient had office visit in the last 6 months or has a visit in the next 30 days with authorizing provider or within the authorizing provider's specialty.  See \"Patient Info\" tab in inbasket, or \"Choose Columns\" in Meds & Orders section of the refill encounter.            Routing " refill request to provider for review/approval because:  Tash given x1 and patient did not follow up, please advise  Labs not current:  A1C. ROSANNEE    Maame KOCHN, RN   [facility]

## 2019-04-22 DIAGNOSIS — E11.42 TYPE 2 DIABETES MELLITUS WITH DIABETIC POLYNEUROPATHY, WITHOUT LONG-TERM CURRENT USE OF INSULIN (H): ICD-10-CM

## 2019-04-22 DIAGNOSIS — G62.9 PERIPHERAL POLYNEUROPATHY: ICD-10-CM

## 2019-04-22 DIAGNOSIS — F33.0 MAJOR DEPRESSIVE DISORDER, RECURRENT EPISODE, MILD (H): ICD-10-CM

## 2019-04-22 DIAGNOSIS — F41.8 SITUATIONAL ANXIETY: ICD-10-CM

## 2019-04-22 DIAGNOSIS — M54.5 CHRONIC LOW BACK PAIN, UNSPECIFIED BACK PAIN LATERALITY, WITH SCIATICA PRESENCE UNSPECIFIED: ICD-10-CM

## 2019-04-22 DIAGNOSIS — G89.29 CHRONIC LOW BACK PAIN, UNSPECIFIED BACK PAIN LATERALITY, WITH SCIATICA PRESENCE UNSPECIFIED: ICD-10-CM

## 2019-04-22 NOTE — TELEPHONE ENCOUNTER
"Requested Prescriptions   Pending Prescriptions Disp Refills     pregabalin (LYRICA) 50 MG capsule        Last Written Prescription Date:  2.1.19  Last Fill Quantity: 90 capsule,  # refills: 0   Last office visit: 1/3/2019 with prescribing provider:  Todd Pastrana MD     Future Office Visit:        Routing refill request to provider for review/approval because:  Drug not on the [facility] refill protocol or controlled substance          venlafaxine (EFFEXOR-XR) 75 MG 24 hr capsule [Pharmacy Med Name: VENLAFAXINE HCL ER 75MG CP24]    Last Written Prescription Date:  10.23.18  Last Fill Quantity: 90 capsule,  # refills: 1   Last office visit: 1/3/2019 with prescribing provider:  Todd Pastrana MD       Future Office Visit:       90 capsule 1     Sig: TAKE ONE CAPSULE BY MOUTH EVERY DAY ALONG WITH 150MG CAPSULE       Serotonin-Norepinephrine Reuptake Inhibitors  Failed - 4/22/2019  3:46 PM        Failed - PHQ-9 score of less than 5 in past 6 months     Please review last PHQ-9 score.     PHQ-9 SCORE 4/6/2018 6/19/2018 10/23/2018   PHQ-9 Total Score - - -   PHQ-9 Total Score MyChart - - -   PHQ-9 Total Score 22 9 11     BUSHRA-7 SCORE 4/6/2018 6/19/2018 10/23/2018   Total Score 19 3 7                   Failed - Normal serum creatinine on file in past 12 months     Recent Labs   Lab Test 01/22/18  1117   CR 1.02             Passed - Blood pressure under 140/90 in past 12 months     BP Readings from Last 3 Encounters:   01/03/19 120/80   10/23/18 110/80   04/17/18 127/78                 Passed - Medication is active on med list        Passed - Patient is age 18 or older        Passed - Recent (6 mo) or future (30 days) visit within the authorizing provider's specialty     Patient had office visit in the last 6 months or has a visit in the next 30 days with authorizing provider or within the authorizing provider's specialty.  See \"Patient Info\" tab in inbasket, or \"Choose Columns\" in Meds & Orders section of the " "refill encounter.                  glimepiride (AMARYL) 4 MG tablet [Pharmacy Med Name: GLIMEPIRIDE 4MG TABS]      Last Written Prescription Date:  10.23.18  Last Fill Quantity: 90 tablet,  # refills: 1   Last office visit: 1/3/2019 with prescribing provider:  Todd Pastrana MD       Future Office Visit:       90 tablet 1     Sig: TAKE ONE TABLET BY MOUTH EVERY MORNING BEFORE BREAKFAST       Sulfonylurea Agents Failed - 4/22/2019  3:46 PM        Failed - Patient has had a Microalbumin in the past 12 mos.     Recent Labs   Lab Test 01/22/18  1122   MICROL 6   UMALCR 3.03             Failed - Patient has documented A1c within the specified period of time.     If HgbA1C is 8 or greater, it needs to be on file within the past 3 months.  If less than 8, must be on file within the past 6 months.     Recent Labs   Lab Test 10/23/18  1101   A1C 6.9*             Failed - Patient has a recent creatinine (normal) within the past 12 mos.     Recent Labs   Lab Test 01/22/18  1117   CR 1.02             Passed - Blood pressure less than 140/90 in past 6 months     BP Readings from Last 3 Encounters:   01/03/19 120/80   10/23/18 110/80   04/17/18 127/78                 Passed - Patient has documented LDL within the past 12 mos.     Recent Labs   Lab Test 10/23/18  1101   LDL 75             Passed - Medication is active on med list        Passed - Patient is age 18 or older        Passed - Recent (6 mo) or future (30 days) visit within the authorizing provider's specialty     Patient had office visit in the last 6 months or has a visit in the next 30 days with authorizing provider or within the authorizing provider's specialty.  See \"Patient Info\" tab in inbasket, or \"Choose Columns\" in Meds & Orders section of the refill encounter.            pregabalin (LYRICA) 50 MG capsule 90 capsule 0     Sig: Take 1 capsule (50 mg) by mouth 3 times daily       There is no refill protocol information for this order        "

## 2019-04-22 NOTE — TELEPHONE ENCOUNTER
mychart sent to pt.  Pt due for surveys and lab appt.    Ina Pelletier RN  [facility]

## 2019-04-24 NOTE — TELEPHONE ENCOUNTER
Patient has not responded to 3LM Messages    Attempt #3  Called patient @ [phone] - Left a non-detailed message to call back and speak with any triage nurse.      Routing refill request to provider for review/approval because:  Lyrica - Drug not on the [facility] refill protocol   Labs not current:  Creatinine, Microalbumin, A1C  PHQ9/GAD7 not UTD        Heidi James RN  [facility]

## 2019-04-25 ENCOUNTER — TELEPHONE (OUTPATIENT)
Dept: FAMILY MEDICINE | Facility: CLINIC | Age: 49
End: 2019-04-25

## 2019-04-25 DIAGNOSIS — G62.9 PERIPHERAL POLYNEUROPATHY: Primary | ICD-10-CM

## 2019-04-25 DIAGNOSIS — M54.5 CHRONIC LOW BACK PAIN, UNSPECIFIED BACK PAIN LATERALITY, WITH SCIATICA PRESENCE UNSPECIFIED: ICD-10-CM

## 2019-04-25 DIAGNOSIS — G89.29 CHRONIC LOW BACK PAIN, UNSPECIFIED BACK PAIN LATERALITY, WITH SCIATICA PRESENCE UNSPECIFIED: ICD-10-CM

## 2019-04-25 NOTE — TELEPHONE ENCOUNTER
Prior Authorization Retail Medication Request    Medication/Dose: Lyrica 50mg  ICD code (if different than what is on RX):    Previously Tried and Failed:   Rationale:      Insurance Name:  [employer]  Insurance ID:  [id number]    [id number]      Pharmacy Information (if different than what is on RX)  Name:  [facility]   Phone:  [phone]

## 2019-04-25 NOTE — LETTER
2019    INSURER: Payor: [employer] / Plan: [employer] [place] / Product Type: Indemnity /   Re: Prior Authorization Request  Patient: Ryland Gee  Policy ID#:  [id number]  : 1970      To Whom it May Concern:    I am writing to formally request a prior authorization of coverage for my patient,  Ryland Gee, for treatment using Lyrica.      I have treated Ryland Gee since May of 2015 and I have determined that it is medically appropriate for  this patient to use Lyrica for the reason(s) stated below:      Lower leg neuropathy from the midshin distally.  He has chronic low back pain too.    He has tried: Gapapentin 900 mfg TID (this was not tolerated - caused significant stomach aches); narcotic pain medication (curretnly on this)    The prior authorization recommended ed he take Cymbalta but he is already on Effexor  mg and therefore cannot take the Cymbalta.     Other therapies for the back pain and neuropathy include physical therapy.    He unable to afford the Lyrica without insurance coverage and his symptoms have worsened Lyrica    I have included medical records pertaining to the patient s medical history, current condition and treatment plan.  In addition, the following billing codes will be used for therapy and follow-up:       ICD-10-CM    1. Peripheral polyneuropathy G62.9    2. Chronic low back pain M54.5     G89.29     .      I firmly believe that this therapy is clinically appropriate and that Ryland Gee would benefit from improved ability to do his job and take care of himself if allowed the opportunity to receive this treatment.  Please contact me at Dept: [phone] if you require additional information to ensure the prompt approval for coverage.    Please send your written decision to me at this address:  [address] [id number] [phone]  Dept: [phone]    Sincerely,      Todd Pastrana MD        Enclosures

## 2019-04-26 RX ORDER — GLIMEPIRIDE 4 MG/1
TABLET ORAL
Qty: 90 TABLET | Refills: 1 | Status: SHIPPED | OUTPATIENT
Start: 2019-04-26 | End: 2019-06-03

## 2019-04-26 RX ORDER — VENLAFAXINE HYDROCHLORIDE 75 MG/1
CAPSULE, EXTENDED RELEASE ORAL
Qty: 90 CAPSULE | Refills: 1 | Status: SHIPPED | OUTPATIENT
Start: 2019-04-26 | End: 2019-06-03

## 2019-04-26 RX ORDER — PREGABALIN 50 MG/1
50 CAPSULE ORAL 3 TIMES DAILY
Qty: 90 CAPSULE | Refills: 0 | Status: SHIPPED | OUTPATIENT
Start: 2019-04-26 | End: 2019-06-03

## 2019-04-29 DIAGNOSIS — R10.13 EPIGASTRIC PAIN: ICD-10-CM

## 2019-04-29 NOTE — TELEPHONE ENCOUNTER
CENTRAL PRIOR AUTHORIZATION  [phone]    PA Initiation    Medication: Lyrica 50mg  Insurance Company: [employer] [place] - Phone [phone] Fax [phone]  Pharmacy Filling the Rx: [facility] - [place], [place] - [address] [place] [address]  Filling Pharmacy Phone: [phone]  Filling Pharmacy Fax:    Start Date: 4/29/2019

## 2019-04-29 NOTE — TELEPHONE ENCOUNTER
"Requested Prescriptions   Pending Prescriptions Disp Refills     omeprazole (PRILOSEC) 20 MG DR capsule [Pharmacy Med Name: OMEPRAZOLE 20MG CPDR]      Last Written Prescription Date:  10.23.18  Last Fill Quantity: 90 capsule,  # refills: 1   Last office visit: 1/3/2019 with prescribing provider:  Todd Pastrana MD       Future Office Visit:       90 capsule 1     Sig: TAKE ONE CAPSULE BY MOUTH EVERY DAY       PPI Protocol Passed - 4/29/2019 12:20 PM        Passed - Not on Clopidogrel (unless Pantoprazole ordered)        Passed - No diagnosis of osteoporosis on record        Passed - Recent (12 mo) or future (30 days) visit within the authorizing provider's specialty     Patient had office visit in the last 12 months or has a visit in the next 30 days with authorizing provider or within the authorizing provider's specialty.  See \"Patient Info\" tab in inbasket, or \"Choose Columns\" in Meds & Orders section of the refill encounter.              Passed - Medication is active on med list        Passed - Patient is age 18 or older        "

## 2019-05-01 NOTE — TELEPHONE ENCOUNTER
Received a phone call from a Pharmacy Technician will [employer] asking for more information yesterday around 6:10pm. He said that they would send a fax asking for additional information.  I did call to see if I could verbally give the information and it had to be faxed in.  I did fax the request with the requested medication along with several chart notes.

## 2019-05-02 NOTE — TELEPHONE ENCOUNTER
He is on venlafaxine - with is in the same class as duloxetine.    Please call the patient and see if he has ever used duloextine (Cymbalta) from a different provider.

## 2019-05-02 NOTE — TELEPHONE ENCOUNTER
PRIOR AUTHORIZATION DENIED    Medication: Lyrica 50mg - DENIED 05/01/2019    Denial Date: 5/1/2019    Denial Rational: YOU HAVE NOT FAILED ONE FORMULARY ALTERNATIVE DRUG. YOU ALSO DO NOT HAVE A CONTRAINDICATION TO THE FORMULARY OPTION.  THE FORMULARY ALTERNATIVE DRUG IS: DULOXETINE.         Appeal Information: IF THE PROVIDER WOULD LIKE APPEAL THIS DENIAL, PLEASE HAVE THEM PROVIDE A LETTER OF MEDICAL NECESSITY ALONG WITH ANY DOCUMENTATION THAT STATES THERAPIES TRIED/OUTCOMES. ONCE IT HAS BEEN PLACED IN THE PATIENT'S CHART, PLEASE NOTIFY THE PA TEAM ONCE IT HAS BEEN COMPLETED AND WE CAN INITIATE THE APPEAL ON BEHALF OF THE PROVIDER AND PATIENT.

## 2019-05-03 NOTE — TELEPHONE ENCOUNTER
Patient returning call    Advised of notes below -   Patient stated that he has tried Cymbalta in the past - stated it was not effective for him and gave him stomach aches.     Patient wondering if there is another alternative (besides Lyrica and Cymbalta) that he can try?    Routing to PCP for further review/recommendations/orders.    Heidi James RN  [facility]

## 2019-05-03 NOTE — TELEPHONE ENCOUNTER
Attempt #2  Called patient @ # below - Left a non-detailed message to call back and speak with any triage nurse.    Heidi James RN  [place] [facility]

## 2019-05-07 NOTE — TELEPHONE ENCOUNTER
Forwarding back to PA pool.      Zuly Monterroso, BS, RN, PHN  [facility]) [phone]

## 2019-05-07 NOTE — TELEPHONE ENCOUNTER
Since the request has been denied and already closed.  Would it be possible for the provider to write a letter of medical necessity and place it in the patient's chart and that way I can initiate an appeal on behalf of the provider and patient?    Thank you,  Audra Jarquin, [facility]  Central PA Team

## 2019-05-09 NOTE — TELEPHONE ENCOUNTER
Letter of medical necessity pending in letters.  Please review and fill out as I was not sure what to write.      Zuly Monterroso, AUGUST, RN, PHN  [facility]) [phone]

## 2019-05-13 DIAGNOSIS — F33.0 MAJOR DEPRESSIVE DISORDER, RECURRENT EPISODE, MILD (H): ICD-10-CM

## 2019-05-13 DIAGNOSIS — Z51.81 ENCOUNTER FOR THERAPEUTIC DRUG MONITORING: Primary | ICD-10-CM

## 2019-05-13 DIAGNOSIS — F41.8 SITUATIONAL ANXIETY: ICD-10-CM

## 2019-05-13 NOTE — LETTER
[facility] - [address] [address] [phone]    May 22, 2019    Harm J Mónicaen  [address]  [facility] [address]      To Whom it May Concern:    My staff have been attempting to reach you in regards to a recent refill request for: venlafaxine (EFFEXOR-XR) 150 MG 24 hr capsule.  After reviewing your chart, you are due for an updated PHQ-9 and BUSHRA-7, which are questionnaires regarding your mood over the last 2 weeks.   Please fill them out and send it back to me.     Also, you are due for labs - please schedule a lab only appointment either via [facility] or by contacting the clinic at [phone].           Thank you for your time.          Sincerely,          Dannie Pastrana M.D./RONALDO RN

## 2019-05-14 NOTE — TELEPHONE ENCOUNTER
"Requested Prescriptions   Pending Prescriptions Disp Refills     venlafaxine (EFFEXOR-XR) 150 MG 24 hr capsule [Pharmacy Med Name: VENLAFAXINE HCL ER 150MG CP24] 90 capsule 1     Sig: TAKE ONE CAPSULE BY MOUTH EVERY DAY WITH 75MG CAPSULE       Last Written Prescription Date:  10/23/2018  Last Fill Quantity: 90,  # refills: 1   Last office visit: 1/3/2019 with prescribing provider:     Future Office Visit:          Serotonin-Norepinephrine Reuptake Inhibitors  Failed - 5/13/2019  2:35 PM        Failed - PHQ-9 score of less than 5 in past 6 months     Please review last PHQ-9 score.           Failed - Normal serum creatinine on file in past 12 months     Recent Labs   Lab Test 01/22/18  1117   CR 1.02             Passed - Blood pressure under 140/90 in past 12 months     BP Readings from Last 3 Encounters:   01/03/19 120/80   10/23/18 110/80   04/17/18 127/78                 Passed - Medication is active on med list        Passed - Patient is age 18 or older        Passed - Recent (6 mo) or future (30 days) visit within the authorizing provider's specialty     Patient had office visit in the last 6 months or has a visit in the next 30 days with authorizing provider or within the authorizing provider's specialty.  See \"Patient Info\" tab in inbasket, or \"Choose Columns\" in Meds & Orders section of the refill encounter.            "

## 2019-05-15 RX ORDER — VENLAFAXINE HYDROCHLORIDE 150 MG/1
CAPSULE, EXTENDED RELEASE ORAL
Qty: 90 CAPSULE | Refills: 1 | OUTPATIENT
Start: 2019-05-15

## 2019-05-15 NOTE — TELEPHONE ENCOUNTER
PHQ-9 SCORE 4/6/2018 6/19/2018 10/23/2018   PHQ-9 Total Score - - -   PHQ-9 Total Score MyChart - - -   PHQ-9 Total Score 22 9 11     BUSHRA-7 SCORE 4/6/2018 6/19/2018 10/23/2018   Total Score 19 3 7       Patient due for updated PHQ9/GAD7 and labs - was told on last fill and did not Follow-up.     Rx denied. Pharmacy notified.     Routing to Rx auth for calling.       Heidi James RN  [facility]

## 2019-05-22 RX ORDER — VENLAFAXINE HYDROCHLORIDE 150 MG/1
150 CAPSULE, EXTENDED RELEASE ORAL DAILY
Qty: 30 CAPSULE | Refills: 0 | Status: SHIPPED | OUTPATIENT
Start: 2019-05-22 | End: 2019-06-03

## 2019-05-22 NOTE — TELEPHONE ENCOUNTER
Patient has not responded to last 2 [employer] Messages    Attempt #3  Called patient @ [phone] - Left a non-detailed message to call back and speak with any triage nurse.      Patient does have OV scheduled for 05/31/2019 - noted added to update PHQ9 and labs.     30 day supply sent      Heidi James RN  [place] [facility]

## 2019-05-31 NOTE — PROGRESS NOTES
Subjective     Harm MADINA Gee is a 49 year old male who presents to clinic today for the following health issues:    HPI   Diabetes Follow-up    How often are you checking your blood sugar? Pt has not been checking out of supplies would like the dexcom    What time of day are you checking your blood sugars (select all that apply)?      Have you had any blood sugars above 200?  Some times    Have you had any blood sugars below 70?  No    What symptoms do you notice when your blood sugar is low?  Shaky and Weak    What concerns do you have today about your diabetes? None and Other: needs the Dexcom reodered     Do you have any of these symptoms? (Select all that apply)  Numbness in feet and Burning in feet     Have you had a diabetic eye exam in the last 12 months? Needs appt    Hemoglobin A1C (%)   Date Value   10/23/2018 6.9 (H)   01/22/2018 6.9 (H)     Diabetes Management Resources    Hyperlipidemia Follow-Up    Are you having any of the following symptoms? (Select all that apply)  No complaints of shortness of breath, chest pain or pressure.  No increased sweating or nausea with activity.  No left-sided neck or arm pain.  No complaints of pain in calves when walking 1-2 blocks.    Are you regularly taking any medication or supplement to lower your cholesterol?   Yes- simvastatin    Are you having muscle aches or other side effects that you think could be caused by your cholesterol lowering medication?  No    LDL Cholesterol Calculated (mg/dL)   Date Value   10/23/2018 75   04/18/2017 48     Depression and Anxiety Follow-Up    How are you doing with your depression since your last visit? No change    How are you doing with your anxiety since your last visit?  No change    Are you having other symptoms that might be associated with depression or anxiety? No    Have you had a significant life event? No     Do you have any concerns with your use of alcohol or other drugs? No    Social History     Tobacco Use     Smoking  "status: Never Smoker     Smokeless tobacco: Never Used   Substance Use Topics     Alcohol use: No     Comment: Does not drink - last was 11/2015     Drug use: No     PHQ 6/19/2018 10/23/2018 6/3/2019   PHQ-9 Total Score 9 11 8   Q9: Thoughts of better off dead/self-harm past 2 weeks Not at all Not at all Not at all     BUSHRA-7 SCORE 6/19/2018 10/23/2018 6/3/2019   Total Score 3 7 3   Suicide Assessment Five-step Evaluation and Treatment (SAFE-T)    Intermittent Palpitations  Ryland reports having a left side chest pain which is moderately-severely sharp. Previously noted from 1/3/2019 LOV. Continues to use Effexor -- he inquires for more less costly medication alternatives or future assessments.     Chronic Low Back Pain  Ryland would like to also discuss if dosage adjustments are needed for his low back pain and bilateral shoulders. He currently uses Lyrica. He reports that he doesn't have tolerance to gabapentin due to side effects such as stomach aches. He does exercise as a relief therapy for symptoms.     Polyneuropathy  Ryland states that the sensations in his legs have occasional tingling sensations however today he reports having normal, decent feeling.     Reviewed and updated as needed this visit by provider:  Tobacco  Allergies  Meds  Problems  Med Hx  Surg Hx  Fam Hx         Review of Systems   Constitutional, HEENT, cardiovascular, pulmonary, GI, , musculoskeletal, neuro, skin, endocrine and psych systems are negative, except as otherwise noted.  This document serves as a record of the services and decisions personally performed and made by Todd Pastrana MD. It was created on his behalf by Don Canada, a trained medical scribe. The creation of this document is based the provider's statements to the medical scribe.  Scribe Don Canada 10:51 AM, Josseline 3, 2019    Objective   /70   Pulse 84   Temp 98.4  F (36.9  C) (Oral)   Ht 1.803 m (5' 11\")   Wt 122.9 kg (271 lb)   SpO2 97%   BMI 37.80 kg/m   Body " mass index is 37.8 kg/m .  Physical Exam   GENERAL: healthy, alert, well nourished, well hydrated, no distress  HENT: ear canals- normal; TMs- normal; Nose- normal; Mouth- no ulcers, no lesions  NECK: no tenderness, no adenopathy, no asymmetry, no masses, no stiffness; thyroid- normal to palpation  RESP: lungs clear to auscultation - no rales, no rhonchi, no wheezes  CV: regular rates and rhythm, normal S1 S2, no S3 or S4 and no murmur, no click or rub -  ABDOMEN: soft, no tenderness, no  hepatosplenomegaly, no masses, normal bowel sounds  MS: extremities- no gross deformities noted, no edema  SKIN: no suspicious lesions, no rashes  NEURO: strength and tone- normal, sensory exam- grossly normal, mentation- intact, speech- normal, reflexes- symmetric  Diabetic foot exam: normal DP and PT pulses, no trophic changes or ulcerative lesions and decreased sensory exam         Assessment & Plan     Harm was seen today for recheck medication.    Diagnoses and all orders for this visit:    Type 2 diabetes mellitus with diabetic polyneuropathy, without long-term current use of insulin (H) - Equipment ordered, recommended diet and exercise. Refilled, continue:   -     BASIC METABOLIC PANEL  -     Albumin Random Urine Quantitative with Creat Ratio  -     HEMOGLOBIN A1C  -     metFORMIN (GLUCOPHAGE) 500 MG tablet; Take 1 tablet (500 mg) by mouth 2 times daily (with meals)  -     glimepiride (AMARYL) 4 MG tablet; Take 1 tablet (4 mg) by mouth every morning (before breakfast)  -     Continuous Blood Gluc Transmit (DEXCOM G6 TRANSMITTER) MISC; 1 Device once as needed  -     Continuous Blood Gluc Sensor (DEXCOM G6 SENSOR) MISC; 1 Device as needed    Situational anxiety / Major depressive disorder, recurrent episode, mild (H) -  Stable, refilled, continue:   -     venlafaxine (EFFEXOR-XR) 75 MG 24 hr capsule; Take 1 capsule (75 mg) by mouth daily - along with 150 mg tab  -     venlafaxine (EFFEXOR-XR) 150 MG 24 hr capsule; Take 1  "capsule (150 mg) by mouth daily - with effexor XR 75 mg    Insomnia, unspecified type - Stable, refilled, continue:   -     traZODone (DESYREL) 50 MG tablet; Take 1-3 tablets ( mg) by mouth nightly as needed for sleep    Hyperlipidemia LDL goal <70 - Stable, refilled, continue:   -     simvastatin (ZOCOR) 20 MG tablet; Take 1 tablet (20 mg) by mouth At Bedtime    Erectile dysfunction, unspecified erectile dysfunction type  -     sildenafil (VIAGRA) 100 MG tablet; Take 1 tablet (100 mg) by mouth daily as needed (erectile dysfunction) 30 min to 4 hrs before sex. Do not use with nitroglycerin, terazosin or doxazosin.    Peripheral polyneuropathy - ongoing  Continue  meds   -     pregabalin (LYRICA) 50 MG capsule; Take 1 capsule (50 mg) by mouth 3 times daily      Chronic low back pain, unspecified back pain laterality, with sciatica presence unspecified - Persistent condition, refilled, continue:   -     pregabalin (LYRICA) 50 MG capsule; Take 1 capsule (50 mg) by mouth 3 times daily    Epigastric pain - Stable, refilled, continue:   -     omeprazole (PRILOSEC) 20 MG DR capsule; Take 1 capsule (20 mg) by mouth daily    Constipation due to opioid therapy - Stable, refilled, continue:   -     MOVANTIK 25 MG TABS tablet; Take 1 tablet (25 mg) by mouth every morning (before breakfast)  -     Naldemedine Tosylate (SYMPROIC) 0.2 MG TABS; Take 0.2 mg by mouth daily    Palpitations  -     EKG 12-lead complete w/read - Clinics  -     Zio Patch Holter Adult Pediatric Greater than 48 hrs; Future     BMI:   Estimated body mass index is 37.1 kg/m  as calculated from the following:    Height as of 1/3/19: 1.803 m (5' 11\").    Weight as of 1/3/19: 120.7 kg (266 lb).   Weight management plan: Discussed healthy diet and exercise guidelines    See Patient Instructions    Return in about 6 months (around 12/3/2019) for recheck.     The information in this document, created by the medical scribe for me, accurately reflects the " services I personally performed and the decisions made by me. I have reviewed and approved this document for accuracy prior to leaving the patient care area.  11:40 AM, 06/03/19        Dannie Pastrana MD   Pager - [phone]  [facility] [place]

## 2019-06-03 ENCOUNTER — OFFICE VISIT (OUTPATIENT)
Dept: FAMILY MEDICINE | Facility: CLINIC | Age: 49
End: 2019-06-03
Payer: COMMERCIAL

## 2019-06-03 VITALS
WEIGHT: 271 LBS | HEART RATE: 84 BPM | DIASTOLIC BLOOD PRESSURE: 70 MMHG | SYSTOLIC BLOOD PRESSURE: 118 MMHG | OXYGEN SATURATION: 97 % | BODY MASS INDEX: 37.94 KG/M2 | HEIGHT: 71 IN | TEMPERATURE: 98.4 F

## 2019-06-03 DIAGNOSIS — G47.00 INSOMNIA, UNSPECIFIED TYPE: ICD-10-CM

## 2019-06-03 DIAGNOSIS — F33.0 MAJOR DEPRESSIVE DISORDER, RECURRENT EPISODE, MILD (H): ICD-10-CM

## 2019-06-03 DIAGNOSIS — E11.42 TYPE 2 DIABETES MELLITUS WITH DIABETIC POLYNEUROPATHY, WITHOUT LONG-TERM CURRENT USE OF INSULIN (H): Primary | ICD-10-CM

## 2019-06-03 DIAGNOSIS — M54.5 CHRONIC LOW BACK PAIN, UNSPECIFIED BACK PAIN LATERALITY, WITH SCIATICA PRESENCE UNSPECIFIED: ICD-10-CM

## 2019-06-03 DIAGNOSIS — G89.29 CHRONIC LOW BACK PAIN, UNSPECIFIED BACK PAIN LATERALITY, WITH SCIATICA PRESENCE UNSPECIFIED: ICD-10-CM

## 2019-06-03 DIAGNOSIS — G62.9 PERIPHERAL POLYNEUROPATHY: ICD-10-CM

## 2019-06-03 DIAGNOSIS — R00.2 HEART PALPITATIONS: ICD-10-CM

## 2019-06-03 DIAGNOSIS — F41.8 SITUATIONAL ANXIETY: ICD-10-CM

## 2019-06-03 DIAGNOSIS — R00.2 PALPITATIONS: ICD-10-CM

## 2019-06-03 DIAGNOSIS — K59.03 CONSTIPATION DUE TO OPIOID THERAPY: ICD-10-CM

## 2019-06-03 DIAGNOSIS — T40.2X5A CONSTIPATION DUE TO OPIOID THERAPY: ICD-10-CM

## 2019-06-03 DIAGNOSIS — N52.9 ERECTILE DYSFUNCTION, UNSPECIFIED ERECTILE DYSFUNCTION TYPE: ICD-10-CM

## 2019-06-03 DIAGNOSIS — R10.13 EPIGASTRIC PAIN: ICD-10-CM

## 2019-06-03 DIAGNOSIS — E78.5 HYPERLIPIDEMIA LDL GOAL <70: ICD-10-CM

## 2019-06-03 LAB — HBA1C MFR BLD: 7 % (ref 0–5.6)

## 2019-06-03 PROCEDURE — 36415 COLL VENOUS BLD VENIPUNCTURE: CPT | Performed by: FAMILY MEDICINE

## 2019-06-03 PROCEDURE — 83036 HEMOGLOBIN GLYCOSYLATED A1C: CPT | Performed by: FAMILY MEDICINE

## 2019-06-03 PROCEDURE — 93000 ELECTROCARDIOGRAM COMPLETE: CPT | Performed by: FAMILY MEDICINE

## 2019-06-03 PROCEDURE — 80048 BASIC METABOLIC PNL TOTAL CA: CPT | Performed by: FAMILY MEDICINE

## 2019-06-03 PROCEDURE — 82043 UR ALBUMIN QUANTITATIVE: CPT | Performed by: FAMILY MEDICINE

## 2019-06-03 PROCEDURE — 99214 OFFICE O/P EST MOD 30 MIN: CPT | Performed by: FAMILY MEDICINE

## 2019-06-03 RX ORDER — PROCHLORPERAZINE 25 MG/1
1 SUPPOSITORY RECTAL
Qty: 1 EACH | Refills: 11 | Status: SHIPPED | OUTPATIENT
Start: 2019-06-03 | End: 2020-01-16

## 2019-06-03 RX ORDER — PROCHLORPERAZINE 25 MG/1
1 SUPPOSITORY RECTAL PRN
Qty: 9 EACH | Refills: 3 | Status: SHIPPED | OUTPATIENT
Start: 2019-06-03 | End: 2020-01-16

## 2019-06-03 RX ORDER — NALOXEGOL OXALATE 25 MG/1
25 TABLET, FILM COATED ORAL
Qty: 90 TABLET | Refills: 1 | Status: SHIPPED | OUTPATIENT
Start: 2019-06-03 | End: 2020-01-16

## 2019-06-03 RX ORDER — VENLAFAXINE HYDROCHLORIDE 75 MG/1
75 CAPSULE, EXTENDED RELEASE ORAL DAILY
Qty: 90 CAPSULE | Refills: 1 | Status: SHIPPED | OUTPATIENT
Start: 2019-06-03 | End: 2020-01-16

## 2019-06-03 RX ORDER — PREGABALIN 50 MG/1
50 CAPSULE ORAL 3 TIMES DAILY
Qty: 90 CAPSULE | Refills: 0 | Status: SHIPPED | OUTPATIENT
Start: 2019-06-03 | End: 2019-08-01

## 2019-06-03 RX ORDER — TRAZODONE HYDROCHLORIDE 50 MG/1
50-150 TABLET, FILM COATED ORAL
Qty: 90 TABLET | Refills: 3 | Status: SHIPPED | OUTPATIENT
Start: 2019-06-03 | End: 2020-01-16

## 2019-06-03 RX ORDER — SILDENAFIL 100 MG/1
100 TABLET, FILM COATED ORAL DAILY PRN
Qty: 30 TABLET | Refills: 5 | Status: SHIPPED | OUTPATIENT
Start: 2019-06-03 | End: 2020-01-16

## 2019-06-03 RX ORDER — VENLAFAXINE HYDROCHLORIDE 150 MG/1
150 CAPSULE, EXTENDED RELEASE ORAL DAILY
Qty: 90 CAPSULE | Refills: 1 | Status: SHIPPED | OUTPATIENT
Start: 2019-06-03 | End: 2020-01-16

## 2019-06-03 RX ORDER — SIMVASTATIN 20 MG
20 TABLET ORAL AT BEDTIME
Qty: 90 TABLET | Refills: 3 | Status: SHIPPED | OUTPATIENT
Start: 2019-06-03 | End: 2020-01-16

## 2019-06-03 RX ORDER — GLIMEPIRIDE 4 MG/1
4 TABLET ORAL
Qty: 90 TABLET | Refills: 1 | Status: SHIPPED | OUTPATIENT
Start: 2019-06-03 | End: 2020-01-16

## 2019-06-03 ASSESSMENT — ANXIETY QUESTIONNAIRES
5. BEING SO RESTLESS THAT IT IS HARD TO SIT STILL: NOT AT ALL
3. WORRYING TOO MUCH ABOUT DIFFERENT THINGS: NOT AT ALL
2. NOT BEING ABLE TO STOP OR CONTROL WORRYING: NOT AT ALL
GAD7 TOTAL SCORE: 3
IF YOU CHECKED OFF ANY PROBLEMS ON THIS QUESTIONNAIRE, HOW DIFFICULT HAVE THESE PROBLEMS MADE IT FOR YOU TO DO YOUR WORK, TAKE CARE OF THINGS AT HOME, OR GET ALONG WITH OTHER PEOPLE: NOT DIFFICULT AT ALL
6. BECOMING EASILY ANNOYED OR IRRITABLE: MORE THAN HALF THE DAYS
1. FEELING NERVOUS, ANXIOUS, OR ON EDGE: SEVERAL DAYS
7. FEELING AFRAID AS IF SOMETHING AWFUL MIGHT HAPPEN: NOT AT ALL

## 2019-06-03 ASSESSMENT — PATIENT HEALTH QUESTIONNAIRE - PHQ9
5. POOR APPETITE OR OVEREATING: NOT AT ALL
SUM OF ALL RESPONSES TO PHQ QUESTIONS 1-9: 8

## 2019-06-03 ASSESSMENT — MIFFLIN-ST. JEOR: SCORE: 2126.38

## 2019-06-03 NOTE — TELEPHONE ENCOUNTER
CENTRAL PRIOR AUTHORIZATION  [phone]    Medication Appeal Initiation    We have initiated an appeal for the requested medication:  Medication: Lyrica 50mg - DENIED 05/01/2019  Appeal Start Date:  6/3/2019  Insurance Company: [employer] [place] - Phone [phone] Fax [phone]  Comments:  Letter of medical necessity along with the original denial letter has been faxed to [employer] [phone] according to what I was told at Provider Services.

## 2019-06-04 ENCOUNTER — TELEPHONE (OUTPATIENT)
Dept: FAMILY MEDICINE | Facility: CLINIC | Age: 49
End: 2019-06-04

## 2019-06-04 LAB
ANION GAP SERPL CALCULATED.3IONS-SCNC: 5 MMOL/L (ref 3–14)
BUN SERPL-MCNC: 14 MG/DL (ref 7–30)
CALCIUM SERPL-MCNC: 8.7 MG/DL (ref 8.5–10.1)
CHLORIDE SERPL-SCNC: 107 MMOL/L (ref 94–109)
CO2 SERPL-SCNC: 28 MMOL/L (ref 20–32)
CREAT SERPL-MCNC: 1.29 MG/DL (ref 0.66–1.25)
CREAT UR-MCNC: 212 MG/DL
GFR SERPL CREATININE-BSD FRML MDRD: 65 ML/MIN/{1.73_M2}
GLUCOSE SERPL-MCNC: 104 MG/DL (ref 70–99)
MICROALBUMIN UR-MCNC: 7 MG/L
MICROALBUMIN/CREAT UR: 3.22 MG/G CR (ref 0–17)
POTASSIUM SERPL-SCNC: 4.4 MMOL/L (ref 3.4–5.3)
SODIUM SERPL-SCNC: 140 MMOL/L (ref 133–144)

## 2019-06-04 ASSESSMENT — ANXIETY QUESTIONNAIRES: GAD7 TOTAL SCORE: 3

## 2019-06-04 NOTE — TELEPHONE ENCOUNTER
I received a phone call from Brianna (her direct line is [phone], she won't have access once it is out of her hands, but she stated she would do her best to help figure out any questions in regards to the request) and the appeal case does not meet the FDA guidelines for an urgent case, but she will do it as a hast basis.     The reference number is [id number] and I was instructed to call provider services at [phone].

## 2019-06-04 NOTE — TELEPHONE ENCOUNTER
Prior Authorization Retail Medication Request    Medication/Dose: Symproic 0.2   ICD code (if different than what is on RX):    Previously Tried and Failed:    Rationale:     Insurance Name: [employer] [facility]  Insurance ID:  [id number]  [phone]      Pharmacy Information (if different than what is on RX)  [facility]   Phone:  [phone]

## 2019-06-05 NOTE — TELEPHONE ENCOUNTER
MEDICATION APPEAL APPROVED    Medication: Lyrica 50mg - DENIED 05/01/2019 APPEAL APPROVED  Authorization Effective Date: 6/5/2019  Authorization Expiration Date: 6/5/2020  Approved Dose/Quantity:   Reference #:     Insurance Company: [employer] [place] - Phone [phone] Fax [phone]  Expected CoPay:       CoPay Card Available:      Foundation Assistance Needed:    Which Pharmacy is filling the prescription (Not needed for infusion/clinic administered): [facility] [place] - [address]    I received a phone a phone from [email or web address] and notified that the appeal was approved and good for one year effective as of today 06/05/2019    The dr's office should be receiving an approval letter along with the patient.  The women did try to call the patient but was unable to reach him.  I have called the pharmacy and did process.

## 2019-06-05 NOTE — TELEPHONE ENCOUNTER
PA Initiation    Medication: Symproic 0.2- INITIATED  Insurance Company: [employer] [place] - Phone [phone] Fax [phone]  Pharmacy Filling the Rx: [facility] - [place], [place] - [address] [place] [address]  Filling Pharmacy Phone: [phone]  Filling Pharmacy Fax:    Start Date: 6/5/2019

## 2019-06-06 ENCOUNTER — HOSPITAL ENCOUNTER (OUTPATIENT)
Dept: CARDIOLOGY | Facility: CLINIC | Age: 49
Discharge: HOME | End: 2019-06-06
Attending: FAMILY MEDICINE | Admitting: FAMILY MEDICINE
Payer: COMMERCIAL

## 2019-06-06 DIAGNOSIS — R00.2 HEART PALPITATIONS: ICD-10-CM

## 2019-06-06 PROCEDURE — 0298T ZIO PATCH HOLTER ADULT PEDIATRIC GREATER THAN 48 HRS: CPT | Performed by: INTERNAL MEDICINE

## 2019-06-06 PROCEDURE — 0296T ZIO PATCH HOLTER ADULT PEDIATRIC GREATER THAN 48 HRS: CPT

## 2019-06-06 NOTE — TELEPHONE ENCOUNTER
Prior Authorization Approval    Authorization Effective Date: 6/3/2019  Authorization Expiration Date: 6/3/2020  Medication: Symproic 0.2- APPROVED  Approved Dose/Quantity: 30 per 30 days  Reference #: [id number]   Insurance Company: [employer] [place] - Phone [phone] Fax [phone]  Expected CoPay:       CoPay Card Available:      Foundation Assistance Needed:    Which Pharmacy is filling the prescription (Not needed for infusion/clinic administered): [facility] [place] - [place], [place] - [address] [place] [address]  Pharmacy Notified: Yes  Patient Notified: Yes

## 2019-06-07 NOTE — RESULT ENCOUNTER NOTE
Dear Harm,    Here is a summary of your recent test results:  -Kidney function (creatinine) is slight decreased.   -Sodium is normal.  -Potassium is normal.  -Calcium is normal.  -Glucose is elevated due to your diabetes.  -A1C (test of diabetes control the last 2-3 months) is at your goal. Please continue with your current plan. Also, you should make an appointment to see me and recheck your A1C test in 6 months.   -Microalbumin (urine protein) test is normal.  ADVISE: rechecking this annually.    For additional lab test information, labtestsonline.org is an excellent reference.    In addition, here is a list of due or overdue Health Maintenance reminders:  Eye Exam due on 01/19/2019    Please call us at [phone] (or use [facility]) to address the above recommendations if needed.           Thank you very much for trusting me and [facility].     Healthy regards,  Dannie Pastrana MD

## 2019-07-11 NOTE — RESULT ENCOUNTER NOTE
Note to Staff: please call the patient to explain results. and call the patient to check on current symptoms.    -heart monitor showed some premature heartbeats but overall most of the triggered events did not correspond with an abnormal heart beat.  If ongoing concerns or symptoms then a office visit is recommended.

## 2019-07-12 DIAGNOSIS — E11.42 TYPE 2 DIABETES MELLITUS WITH DIABETIC POLYNEUROPATHY, WITHOUT LONG-TERM CURRENT USE OF INSULIN (H): ICD-10-CM

## 2019-07-15 NOTE — TELEPHONE ENCOUNTER
Requested Prescriptions   Pending Prescriptions Disp Refills     metFORMIN (GLUCOPHAGE) 500 MG tablet [Pharmacy Med Name: METFORMIN HCL 500MG TABS] 180 tablet 0     Sig: TAKE ONE TABLET BY MOUTH TWICE A DAY WITH MEALS       Last Written Prescription Date:  6/3/2019  Last Fill Quantity: 180,  # refills: 1   Last office visit: 6/3/2019 with prescribing provider:     Future Office Visit:   Next 5 appointments (look out 90 days)    Jul 16, 2019  9:00 AM CDT  SHORT with Todd Pastrana MD  [facility] [address] [place] [id number] [phone]               Biguanide Agents Passed - 7/12/2019  9:11 AM        Passed - Blood pressure less than 140/90 in past 6 months     BP Readings from Last 3 Encounters:   06/03/19 118/70   01/03/19 120/80   10/23/18 110/80                 Passed - Patient has documented LDL within the past 12 mos.     Recent Labs   Lab Test 10/23/18  1101   LDL 75             Passed - Patient has had a Microalbumin in the past 15 mos.     Recent Labs   Lab Test 06/03/19  1130   MICROL 7   UMALCR 3.22             Passed - Patient is age 10 or older        Passed - Patient has documented A1c within the specified period of time.     If HgbA1C is 8 or greater, it needs to be on file within the past 3 months.  If less than 8, must be on file within the past 6 months.     Recent Labs   Lab Test 06/03/19  1130   A1C 7.0*             Passed - Patient's CR is NOT>1.4 OR Patient's EGFR is NOT<45 within past 12 mos.     Recent Labs   Lab Test 06/03/19  1130   GFRESTIMATED 65   GFRESTBLACK 76       Recent Labs   Lab Test 06/03/19  1130   CR 1.29*             Passed - Patient does NOT have a diagnosis of CHF.        Passed - Medication is active on med list        Passed - Recent (6 mo) or future (30 days) visit within the authorizing provider's specialty     Patient had office visit in the last 6 months or has a visit in the next 30 days with  "authorizing provider or within the authorizing provider's specialty.  See \"Patient Info\" tab in inbasket, or \"Choose Columns\" in Meds & Orders section of the refill encounter.            "

## 2019-07-15 NOTE — TELEPHONE ENCOUNTER
Order was sent 6/13/2019, #180 with 3 refills.  Refill denied with note to pharmacy to check profile.      AUGUST Cowan, RN, N  [facility]) [phone]

## 2019-08-01 DIAGNOSIS — G89.29 CHRONIC LOW BACK PAIN, UNSPECIFIED BACK PAIN LATERALITY, WITH SCIATICA PRESENCE UNSPECIFIED: ICD-10-CM

## 2019-08-01 DIAGNOSIS — G62.9 PERIPHERAL POLYNEUROPATHY: ICD-10-CM

## 2019-08-01 DIAGNOSIS — M54.5 CHRONIC LOW BACK PAIN, UNSPECIFIED BACK PAIN LATERALITY, WITH SCIATICA PRESENCE UNSPECIFIED: ICD-10-CM

## 2019-08-01 RX ORDER — PREGABALIN 50 MG/1
50 CAPSULE ORAL 3 TIMES DAILY
Qty: 90 CAPSULE | Refills: 0 | Status: SHIPPED | OUTPATIENT
Start: 2019-08-01 | End: 2019-09-10

## 2019-08-01 NOTE — TELEPHONE ENCOUNTER
Prescription(s) signed and in the [facility].  Please process and notify the patient, if needed.

## 2019-08-01 NOTE — TELEPHONE ENCOUNTER
Disp Refills Start End ISAÍAS   pregabalin (LYRICA) 50 MG capsule 90 capsule 0 6/3/2019  No   Sig - Route: Take 1 capsule (50 mg) by mouth 3 times daily - Oral     Problem List Complete:    Yes    Last Office Visit with [facility] primary care provider: 06/03/2019    Future Office visit:     Controlled substance agreement:   Encounter-Level CSA - 11/30/2015:    Controlled Substance Agreement - Scan on 12/1/2015  2:00 PM: CONTROLLED SUBSTANCE AGREEMENT 11/30/15 (below)       Patient-Level CSA:    There are no patient-level csa.         Last Urine Drug Screen:   Pain Drug SCR UR W RPTD Meds   Date Value Ref Range Status   04/17/2018 FINAL  Final     Comment:     (Note)  ====================================================================  TOXAS[facility]RE COMP DRUG ANALYSIS,UR  ====================================================================  Test                             Result       Flag       Units        Drug Present and Declared for Prescription Verification   Carboxy-THC                    119          EXPECTED   ng/mg creat    Carboxy-THC is a metabolite of tetrahydrocannabinol  (THC).    Source of THC is most commonly illicit, but THC is also present    in a scheduled prescription medication.   Oxycodone                      1531         EXPECTED   ng/mg creat   Oxymorphone                    501          EXPECTED   ng/mg creat   Noroxycodone                   >5208        EXPECTED   ng/mg creat   Noroxymorphone                 124          EXPECTED   ng/mg creat    Sources of oxycodone are scheduled prescription medications.    Oxymorphone, noroxycodone, and noroxymorphone are expected    metabolites of oxycodone. Oxymorphone is also available as a    scheduled prescription medication.   Gabapentin                     PRESENT      EXPECTED                 Venlafaxine                    PRESENT      EXPECTED                 Desmethylvenlafaxine           PRESENT      EXPECTED                  Desmethylvenlafaxine is an  expected metabolite of venlafaxine.  ====================================================================  Test                      Result    Flag   Units      Ref Range        Creatinine              192              mg/dL      >=20            ====================================================================  Declared Medications:  The flagging and interpretation on this report are based on the  following declared medications.  Unexpected results may arise from  inaccuracies in the declared medications.  **Note: The testing scope of this panel includes these medications:  Cannabis  Gabapentin  Oxycodone  Oxycodone (OxyContin)  Venlafaxine (Effexor)  ====================================================================  For clinical consultation, please call [phone].  ====================================================================  Analysis performed by [employer] [place] [address]     , No results found for: COMDAT, No results found for: THC13, PCP13, COC13, MAMP13, OPI13, AMP13, BZO13, TCA13, MTD13, BAR13, OXY13, PPX13, BUP13     Processing:  Staff will hand deliver Rx to on-site pharmacy    [email or web address]      Routing refill request to provider for review/approval because:  Drug not on the [facility] refill protocol         Heidi James RN  [place] [facility]

## 2019-08-02 ENCOUNTER — TELEPHONE (OUTPATIENT)
Dept: FAMILY MEDICINE | Facility: CLINIC | Age: 49
End: 2019-08-02

## 2019-08-02 NOTE — TELEPHONE ENCOUNTER
Prior Authorization Retail Medication Request    Medication/Dose: lyrica generic  ICD code (if different than what is on RX):    Peripheral polyneuropathy [G62.9]       Chronic low back pain, unspecified back pain laterality, with sciatica presence unspecified [M54.5, G89.29]           Previously Tried and Failed:  unknown  Rationale:  unknown    Insurance Name:  [facility] Commercial  Insurance ID:  [id number]    -Kelsy Castorena,Certified Pharmacy Technician, [facility],       [facility], Ph. [phone]

## 2019-08-12 NOTE — TELEPHONE ENCOUNTER
Central Prior Authorization Team   Phone: [phone]      PA NOT NEEDED  Medication: lyrica-PA NOT NEEDED  Insurance Company:    Pharmacy Filling the Rx: [facility], [place] - [place], [place] - [address] [place] [address]  Filling Pharmacy Phone: [phone]  Filling Pharmacy Fax:    Start Date: 8/12/2019    Looks like the pharmacy is now requesting a PA for the generic Lyrica since that is now on the market.  The PA team has completed a PA and appeal for the brand lyrica, it is good until 6/2020.  If the provider is ok with the patient staying on the brand lyrica please send a new prescription over to the pharmacy with DAW1 so the existing PA can be used otherwise another PA will have to be completed for the generic and it could potential be denied again and have to go through the appeal process and delay the patient getting medication.

## 2019-09-10 DIAGNOSIS — M54.5 CHRONIC LOW BACK PAIN, UNSPECIFIED BACK PAIN LATERALITY, WITH SCIATICA PRESENCE UNSPECIFIED: ICD-10-CM

## 2019-09-10 DIAGNOSIS — G62.9 PERIPHERAL POLYNEUROPATHY: ICD-10-CM

## 2019-09-10 DIAGNOSIS — G89.29 CHRONIC LOW BACK PAIN, UNSPECIFIED BACK PAIN LATERALITY, WITH SCIATICA PRESENCE UNSPECIFIED: ICD-10-CM

## 2019-09-10 RX ORDER — PREGABALIN 50 MG
CAPSULE ORAL
Qty: 90 CAPSULE | Refills: 0 | Status: SHIPPED | OUTPATIENT
Start: 2019-09-10 | End: 2019-09-20

## 2019-09-10 NOTE — TELEPHONE ENCOUNTER
Routing refill request to provider for review/approval because:  Drug not on the [facility] refill protocol     Mariely Lou RN, BSN  [place] [facility]

## 2019-09-10 NOTE — TELEPHONE ENCOUNTER
Requested Prescriptions   Pending Prescriptions Disp Refills     LYRICA 50 MG capsule [Pharmacy Med Name: LYRICA 50MG CAPS]  Last Written Prescription Date:  08/01/2019  Last Fill Quantity: 90 capsule,  # refills: 0   Last Office Visit: 6/3/2019 Todd Pastrana MD   Future Office Visit:      90 capsule 0     Sig: TAKE ONE CAPSULE BY MOUTH THREE TIMES A DAY       There is no refill protocol information for this order

## 2019-09-12 ENCOUNTER — TELEPHONE (OUTPATIENT)
Dept: FAMILY MEDICINE | Facility: CLINIC | Age: 49
End: 2019-09-12

## 2019-09-12 NOTE — TELEPHONE ENCOUNTER
Prior Authorization Retail Medication Request    Medication/Dose: Lyrica 50 mg GENERIC   ICD code (if different than what is on RX):  G62.9  Previously Tried and Failed:    Rationale:  Pt needs pa for the generic Lyrica please do not put through as brand it is too expensive    Insurance Name:  [employer] commercial  Insurance ID:  [id number]      Pharmacy Information (if different than what is on RX)  Name:  [facility]   Phone:  [phone]

## 2019-09-18 NOTE — TELEPHONE ENCOUNTER
Spoke to pharmacy about brand vs generic. Brand Lyrica has high copay of $300 and previously pharmacy lost $700 due to running medication for brand. Pharmacy would like pa to be done for generic because this will be a zero copay for the patient.

## 2019-09-18 NOTE — TELEPHONE ENCOUNTER
Central Prior Authorization Team  Phone: [phone]    PA Initiation    Medication: Pregabalin   Insurance Company: [employer] - Phone [phone] Fax [phone]  Pharmacy [place] the Rx[place] [facility] [place] [address] [facility]  Filling Pharmacy Phone: [phone]  Filling Pharmacy Fax:    Start Date: 9/18/2019

## 2019-09-20 DIAGNOSIS — G89.29 CHRONIC LOW BACK PAIN, UNSPECIFIED BACK PAIN LATERALITY, WITH SCIATICA PRESENCE UNSPECIFIED: ICD-10-CM

## 2019-09-20 DIAGNOSIS — M54.5 CHRONIC LOW BACK PAIN, UNSPECIFIED BACK PAIN LATERALITY, WITH SCIATICA PRESENCE UNSPECIFIED: ICD-10-CM

## 2019-09-20 DIAGNOSIS — G62.9 PERIPHERAL POLYNEUROPATHY: ICD-10-CM

## 2019-09-20 NOTE — TELEPHONE ENCOUNTER
Prior Authorization Approval    Authorization Effective Date: 9/11/2019  Authorization Expiration Date: 9/11/2020  Medication: Pregabalin- APPROVED   Approved Dose/Quantity:   Reference #:     Insurance Company: [employer] [place] - Phone [phone] Fax [phone]  Expected CoPay:       CoPay Card Available:      Foundation Assistance Needed:    Which Pharmacy is filling the prescription (Not needed for infusion/clinic administered): [facility] [place] - [address] [place] [address]  Pharmacy Notified: Yes  Patient Notified: Comment:  **Instructed pharmacy to notify patient when script is ready to /ship.**

## 2019-09-21 NOTE — TELEPHONE ENCOUNTER
Requested Prescriptions   Pending Prescriptions Disp Refills     LYRICA 50 MG capsule  Last Written Prescription Date:  9/10/19  Last Fill Quantity: 90,  # refills: 0   Last Office Visit: 6/3/2019   Future Office Visit:          [Pharmacy Med Name: LYRICA 50MG CAPS] 90 capsule 0     Sig: TAKE ONE CAPSULE BY MOUTH THREE TIMES A DAY       There is no refill protocol information for this order

## 2019-09-23 RX ORDER — PREGABALIN 50 MG
CAPSULE ORAL
Qty: 90 CAPSULE | Refills: 0 | Status: SHIPPED | OUTPATIENT
Start: 2019-10-10 | End: 2020-01-16

## 2019-09-23 NOTE — TELEPHONE ENCOUNTER
Problem List Complete:    Yes    Controlled substance agreement:   Encounter-Level CSA - 11/30/2015:    Controlled Substance Agreement - Scan on 12/1/2015  2:00 PM: CONTROLLED SUBSTANCE AGREEMENT 11/30/15     Patient-Level CSA:    There are no patient-level csa.         Last Urine Drug Screen:   Pain Drug SCR UR W RPTD Meds   Date Value Ref Range Status   04/17/2018 FINAL  Final     Comment:     (Note)  ====================================================================  TOXASSURE COMP DRUG ANALYSIS,UR  ====================================================================  Test                             Result       Flag       Units        Drug Present and Declared for Prescription Verification   Carboxy-THC                    119          EXPECTED   ng/mg creat    Carboxy-THC is a metabolite of tetrahydrocannabinol  (THC).    Source of THC is most commonly illicit, but THC is also present    in a scheduled prescription medication.   Oxycodone                      1531         EXPECTED   ng/mg creat   Oxymorphone                    501          EXPECTED   ng/mg creat   Noroxycodone                   >5208        EXPECTED   ng/mg creat   Noroxymorphone                 124          EXPECTED   ng/mg creat    Sources of oxycodone are scheduled prescription medications.    Oxymorphone, noroxycodone, and noroxymorphone are expected    metabolites of oxycodone. Oxymorphone is also available as a    scheduled prescription medication.   Gabapentin                     PRESENT      EXPECTED                 Venlafaxine                    PRESENT      EXPECTED                 Desmethylvenlafaxine           PRESENT      EXPECTED                  Desmethylvenlafaxine is an expected metabolite of venlafaxine.  ====================================================================  Test                      Result    Flag   Units      Ref Range        Creatinine              192              mg/dL      >=20             ====================================================================  Declared Medications:  The flagging and interpretation on this report are based on the  following declared medications.  Unexpected results may arise from  inaccuracies in the declared medications.  **Note: The testing scope of this panel includes these medications:  Cannabis  Gabapentin  Oxycodone  Oxycodone (OxyContin)  Venlafaxine (Effexor)  ====================================================================  For clinical consultation, please call [phone].  ====================================================================  Analysis performed by [employer] [place] [address]     , No results found for: COMDAT, No results found for: THC13, PCP13, COC13, MAMP13, OPI13, AMP13, BZO13, TCA13, MTD13, BAR13, OXY13, PPX13, BUP13     [email or web address]      Routing refill request to provider for review/approval because:  Drug not on the [facility] refill protocol     Due: 10/10/2019    Heidi James RN  [place] [facility]

## 2019-09-24 NOTE — TELEPHONE ENCOUNTER
A postdated prescription has been sent in.    Also please check if he has had a recent eye appointment and if not then see if he needs a referral to [facility] or other eye providers for a diabetic eye exam

## 2019-10-05 DIAGNOSIS — F41.8 SITUATIONAL ANXIETY: ICD-10-CM

## 2019-10-05 DIAGNOSIS — F33.0 MAJOR DEPRESSIVE DISORDER, RECURRENT EPISODE, MILD (H): ICD-10-CM

## 2019-10-05 NOTE — LETTER
[address] [address]                                                                                                       [phone]    October 11, 2019    Harm J Alken  [address]  [facility] [address]      To Whom it May Concern:    Thank you for your refill request for your venlafaxine (EFFEXOR-XR) 75 MG 24 hr capsule.   After reviewing your chart, you are due for an updated PHQ-9 and BUSHRA-7, which are questionnaires regarding your mood over the last 2 weeks.     Please fill them out and send it back to me.     Thank you for your time.        Sincerely,          Dannie Pastrana M.D./RONALDO, RN

## 2019-10-07 NOTE — TELEPHONE ENCOUNTER
"Requested Prescriptions   Pending Prescriptions Disp Refills     venlafaxine (EFFEXOR-XR) 75 MG 24 hr capsule [Pharmacy Med Name: VENLAFAXINE HCL ER 75MG CP24]          Last Written Prescription Date:  6.3.19  Last Fill Quantity: 90 capsule,  # refills: 1   Last office visit: 6/3/2019 with prescribing provider:  Todd Pastrana MD             Future Office Visit:       90 capsule 1     Sig: TAKE ONE CAPSULE BY MOUTH ONCE DAILY ALONG WITH 150MG CAPSULE       Serotonin-Norepinephrine Reuptake Inhibitors  Failed - 10/5/2019  8:42 PM        Failed - PHQ-9 score of less than 5 in past 6 months     Please review last PHQ-9 score.           Failed - Normal serum creatinine on file in past 12 months     Recent Labs   Lab Test 06/03/19  1130   CR 1.29*             Passed - Blood pressure under 140/90 in past 12 months     BP Readings from Last 3 Encounters:   06/03/19 118/70   01/03/19 120/80   10/23/18 110/80                 Passed - Medication is active on med list        Passed - Patient is age 18 or older        Passed - Recent (6 mo) or future (30 days) visit within the authorizing provider's specialty     Patient had office visit in the last 6 months or has a visit in the next 30 days with authorizing provider or within the authorizing provider's specialty.  See \"Patient Info\" tab in inbasket, or \"Choose Columns\" in Meds & Orders section of the refill encounter.            "

## 2019-10-08 NOTE — TELEPHONE ENCOUNTER
"[employer] TECHNOLOG[employer] message sent to update PHQ-9        Requested Prescriptions   Pending Prescriptions Disp Refills     venlafaxine (EFFEXOR-XR) 75 MG 24 hr capsule [Pharmacy Med Name: VENLAFAXINE HCL ER 75MG CP24] 90 capsule 1     Sig: TAKE ONE CAPSULE BY MOUTH ONCE DAILY ALONG WITH 150MG CAPSULE       Serotonin-Norepinephrine Reuptake Inhibitors  Failed - 10/7/2019  9:09 AM        Failed - PHQ-9 score of less than 5 in past 6 months     Please review last PHQ-9 score.   PHQ-9 SCORE 6/19/2018 10/23/2018 6/3/2019   PHQ-9 Total Score - - -   PHQ-9 Total Score MyChart - - -   PHQ-9 Total Score 9 11 8           Failed - Normal serum creatinine on file in past 12 months     Recent Labs   Lab Test 06/03/19  1130   CR 1.29*             Passed - Blood pressure under 140/90 in past 12 months     BP Readings from Last 3 Encounters:   06/03/19 118/70   01/03/19 120/80   10/23/18 110/80                 Passed - Medication is active on med list        Passed - Patient is age 18 or older        Passed - Recent (6 mo) or future (30 days) visit within the authorizing provider's specialty     Patient had office visit in the last 6 months or has a visit in the next 30 days with authorizing provider or within the authorizing provider's specialty.  See \"Patient Info\" tab in inbasket, or \"Choose Columns\" in Meds & Orders section of the refill encounter.              Noam Sun RN   [place] [facility]    "

## 2019-10-11 RX ORDER — VENLAFAXINE HYDROCHLORIDE 75 MG/1
75 CAPSULE, EXTENDED RELEASE ORAL DAILY
Qty: 90 CAPSULE | Refills: 1 | Status: SHIPPED | OUTPATIENT
Start: 2019-10-11 | End: 2020-01-16

## 2019-10-11 NOTE — TELEPHONE ENCOUNTER
Patient has not responded to last 2 sabio labs Messages    Attempt #3  Called patient @ [phone] - Left a non-detailed message to call back and speak with any triage nurse.    Letter sent with PHQ9/GAD7 and self-addressed, stamped, return envelope    Routing refill request to provider for review/approval because:  Labs out of range:  Creatinine  PHQ9 Score          Heidi James RN  [place] T[facility]

## 2019-10-15 DIAGNOSIS — F33.0 MAJOR DEPRESSIVE DISORDER, RECURRENT EPISODE, MILD (H): ICD-10-CM

## 2019-10-15 DIAGNOSIS — F41.8 SITUATIONAL ANXIETY: ICD-10-CM

## 2019-10-16 RX ORDER — VENLAFAXINE HYDROCHLORIDE 75 MG/1
CAPSULE, EXTENDED RELEASE ORAL
Qty: 90 CAPSULE | Refills: 1 | OUTPATIENT
Start: 2019-10-16

## 2019-10-16 NOTE — TELEPHONE ENCOUNTER
"Requested Prescriptions   Pending Prescriptions Disp Refills     venlafaxine (EFFEXOR-XR) 75 MG 24 hr capsule [Pharmacy Med Name: VENLAFAXINE HCL ER 75MG CP24]  This may be a duplicate refill request.  Last Written Prescription Date:  10/11/2019  Last Fill Quantity: 90 capsule,  # refills: 1   Last office visit: 6/3/2019 with prescribing provider:  Victoriano     Future Office Visit:       90 capsule 1     Sig: TAKE ONE CAPSULE BY MOUTH ONCE DAILY ALONG WITH 150MG CAPSULE       Serotonin-Norepinephrine Reuptake Inhibitors  Failed - 10/15/2019  5:40 PM        Failed - PHQ-9 score of less than 5 in past 6 months     Please review last PHQ-9 score.     PHQ-9 SCORE 6/19/2018 10/23/2018 6/3/2019   PHQ-9 Total Score - - -   PHQ-9 Total Score MyChart - - -   PHQ-9 Total Score 9 11 8     BUSHRA-7 SCORE 6/19/2018 10/23/2018 6/3/2019   Total Score 3 7 3           Failed - Normal serum creatinine on file in past 12 months     Recent Labs   Lab Test 06/03/19  1130   CR 1.29*             Passed - Blood pressure under 140/90 in past 12 months     BP Readings from Last 3 Encounters:   06/03/19 118/70   01/03/19 120/80   10/23/18 110/80             Passed - Medication is active on med list        Passed - Patient is age 18 or older        Passed - Recent (6 mo) or future (30 days) visit within the authorizing provider's specialty     Patient had office visit in the last 6 months or has a visit in the next 30 days with authorizing provider or within the authorizing provider's specialty.  See \"Patient Info\" tab in inbasket, or \"Choose Columns\" in Meds & Orders section of the refill encounter.            "

## 2019-10-24 DIAGNOSIS — F33.0 MAJOR DEPRESSIVE DISORDER, RECURRENT EPISODE, MILD (H): ICD-10-CM

## 2019-10-24 DIAGNOSIS — F41.8 SITUATIONAL ANXIETY: ICD-10-CM

## 2019-10-24 RX ORDER — VENLAFAXINE HYDROCHLORIDE 75 MG/1
75 CAPSULE, EXTENDED RELEASE ORAL DAILY
Qty: 90 CAPSULE | Refills: 1 | Status: SHIPPED | OUTPATIENT
Start: 2019-10-24 | End: 2020-01-16

## 2019-10-24 NOTE — TELEPHONE ENCOUNTER
Routing refill request to provider for review/approval because:  Labs out of range:  Creatinine  PHQ9 - 3 attempts were made this month to contact patient with no response    Patient also No-Showed 07/16/2019 OV        Heidi James RN  [facility]

## 2019-10-24 NOTE — TELEPHONE ENCOUNTER
"VENLAFAXINE HCL ER 75MG CP24  Last Written Prescription Date:  10/11/2019  Last Fill Quantity: 90,  # refills: 1   Last office visit: 6/3/2019 with prescribing provider:  Todd Pastrana MD   Future Office Visit: NA     Requested Prescriptions   Pending Prescriptions Disp Refills     venlafaxine (EFFEXOR-XR) 75 MG 24 hr capsule [Pharmacy Med Name: VENLAFAXINE HCL ER 75MG CP24] 90 capsule 1     Sig: TAKE ONE CAPSULE BY MOUTH ONCE DAILY ALONG WITH 150MG CAPSULE       Serotonin-Norepinephrine Reuptake Inhibitors  Failed - 10/24/2019  7:57 AM        Failed - PHQ-9 score of less than 5 in past 6 months     Please review last PHQ-9 score.           Failed - Normal serum creatinine on file in past 12 months     Recent Labs   Lab Test 06/03/19  1130   CR 1.29*             Passed - Blood pressure under 140/90 in past 12 months     BP Readings from Last 3 Encounters:   06/03/19 118/70   01/03/19 120/80   10/23/18 110/80                 Passed - Medication is active on med list        Passed - Patient is age 18 or older        Passed - Recent (6 mo) or future (30 days) visit within the authorizing provider's specialty     Patient had office visit in the last 6 months or has a visit in the next 30 days with authorizing provider or within the authorizing provider's specialty.  See \"Patient Info\" tab in inbasket, or \"Choose Columns\" in Meds & Orders section of the refill encounter.              "

## 2019-11-20 DIAGNOSIS — M54.50 CHRONIC LOW BACK PAIN, UNSPECIFIED BACK PAIN LATERALITY, UNSPECIFIED WHETHER SCIATICA PRESENT: ICD-10-CM

## 2019-11-20 DIAGNOSIS — G89.29 CHRONIC LOW BACK PAIN, UNSPECIFIED BACK PAIN LATERALITY, UNSPECIFIED WHETHER SCIATICA PRESENT: ICD-10-CM

## 2019-11-20 DIAGNOSIS — G62.9 PERIPHERAL POLYNEUROPATHY: ICD-10-CM

## 2019-11-20 RX ORDER — PREGABALIN 50 MG/1
CAPSULE ORAL
Qty: 90 CAPSULE | Refills: 0 | Status: SHIPPED | OUTPATIENT
Start: 2019-11-20 | End: 2019-12-31

## 2019-11-20 NOTE — TELEPHONE ENCOUNTER
Requested Prescriptions   Pending Prescriptions Disp Refills     pregabalin (LYRICA) 50 MG capsule [Pharmacy Med Name: PREGABALIN 50MG CAPS]        Last Written Prescription Date:  10.10.19  Last Fill Quantity: 90 capsule,  # refills: 0   Last office visit: 6/3/2019 with prescribing provider:  Todd Pastrana MD           Future Office Visit:       90 capsule 0     Sig: TAKE ONE CAPSULE BY MOUTH THREE TIMES A DAY       There is no refill protocol information for this order

## 2019-11-20 NOTE — TELEPHONE ENCOUNTER
Left non-detailed message for patient to call back.  Please schedule follow up when patient calls back.  (see previous notes for details)    Thanks Judy

## 2019-11-20 NOTE — LETTER
[facility] - [address] [address] [phone]  November 22, 2019    Ryland Gee  [address]  [facility] [address]    Dear Ryland,    We have been calling you regarding a recent refill request we received for Lyrica.  Unfortunately, we were unable to reach you.  We are notifying you that you are due for your annual fasting physical prior to your next refill.  You can schedule this appointment via [employer] or by calling the clinic at [phone].        Healthy Regards,            Dannie Pastrana M.D.

## 2019-11-22 NOTE — TELEPHONE ENCOUNTER
Second attempt - Left non-detailed message for patient to call back.  Please schedule follow up when patient calls back.  (see previous notes for details)  Letter sent.  Closing encounter.    Vianey Coronel

## 2019-12-08 ENCOUNTER — HEALTH MAINTENANCE LETTER (OUTPATIENT)
Age: 49
End: 2019-12-08

## 2019-12-27 DIAGNOSIS — G62.9 PERIPHERAL POLYNEUROPATHY: ICD-10-CM

## 2019-12-27 DIAGNOSIS — M54.50 CHRONIC LOW BACK PAIN, UNSPECIFIED BACK PAIN LATERALITY, UNSPECIFIED WHETHER SCIATICA PRESENT: ICD-10-CM

## 2019-12-27 DIAGNOSIS — G89.29 CHRONIC LOW BACK PAIN, UNSPECIFIED BACK PAIN LATERALITY, UNSPECIFIED WHETHER SCIATICA PRESENT: ICD-10-CM

## 2019-12-27 DIAGNOSIS — F41.8 SITUATIONAL ANXIETY: ICD-10-CM

## 2019-12-27 DIAGNOSIS — F33.0 MAJOR DEPRESSIVE DISORDER, RECURRENT EPISODE, MILD (H): ICD-10-CM

## 2019-12-27 NOTE — LETTER
[address] [place]  [facility] [address]  [phone]       December 31, 2019    Ryland Gee  [address]  [facility] [address]    Dear Ryland,    This questionnaire is about depression for your upcoming visit or contact, and your care team may not see this information before then.  We care about you.  If at any time you feel unsafe or have concerns about the safety of others please take immediate action by calling [phone], for mental health crisis phone support 24 hours a day, 365 days per year.  As always, you can also go to your local ER, or call 911 if you have immediate safety concerns.    Please complete the enclosed questionnaire and return to us at the address above.    Thank you for trusting [facility] and we appreciate the opportunity to serve you.  We look forward to supporting your healthcare needs in the future.    Healthy Regards,    Your [facility] Team

## 2019-12-30 NOTE — TELEPHONE ENCOUNTER
Requested Prescriptions   Pending Prescriptions Disp Refills     pregabalin (LYRICA) 50 MG capsule [Pharmacy Med Name: PREGABALIN 50MG CAPS]        Last Written Prescription Date:  11.20.19  Last Fill Quantity: 90 capsule,  # refills: 0   Last office visit: 6/3/2019 with prescribing provider:  Star Pena MD               Future Office Visit:       90 capsule 0     Sig: TAKE ONE CAPSULE BY MOUTH THREE TIMES A DAY       There is no refill protocol information for this order

## 2019-12-30 NOTE — TELEPHONE ENCOUNTER
"Requested Prescriptions   Pending Prescriptions Disp Refills     venlafaxine (EFFEXOR-XR) 150 MG 24 hr capsule [Pharmacy Med Name: VENLAFAXINE HCL ER 150MG CP24]          Last Written Prescription Date:  10.24.19  Last Fill Quantity: 90 capsule,  # refills: 1   Last office visit: 6/3/2019 with prescribing provider:  Todd Pastrana MD             Future Office Visit:       90 capsule 1     Sig: TAKE ONE CAPSULE BY MOUTH ONCE DAILY WITH THE 75MG       Serotonin-Norepinephrine Reuptake Inhibitors  Failed - 12/27/2019  8:54 PM        Failed - PHQ-9 score of less than 5 in past 6 months     Please review last PHQ-9 score.     PHQ-9 SCORE 6/19/2018 10/23/2018 6/3/2019   PHQ-9 Total Score - - -   PHQ-9 Total Score MyChart - - -   PHQ-9 Total Score 9 11 8     BUSHRA-7 SCORE 6/19/2018 10/23/2018 6/3/2019   Total Score 3 7 3                   Failed - Normal serum creatinine on file in past 12 months     Recent Labs   Lab Test 06/03/19  1130   CR 1.29*             Failed - Recent (6 mo) or future (30 days) visit within the authorizing provider's specialty     Patient had office visit in the last 6 months or has a visit in the next 30 days with authorizing provider or within the authorizing provider's specialty.  See \"Patient Info\" tab in inbasket, or \"Choose Columns\" in Meds & Orders section of the refill encounter.            Passed - Blood pressure under 140/90 in past 12 months     BP Readings from Last 3 Encounters:   06/03/19 118/70   01/03/19 120/80   10/23/18 110/80                 Passed - Medication is active on med list        Passed - Patient is age 18 or older        "

## 2019-12-31 RX ORDER — PREGABALIN 50 MG/1
CAPSULE ORAL
Qty: 90 CAPSULE | Refills: 0 | Status: SHIPPED | OUTPATIENT
Start: 2019-12-31 | End: 2020-01-10

## 2019-12-31 NOTE — TELEPHONE ENCOUNTER
Letter sent with PHQ9/GAD7 and self-addressed, stamped, return envelope    Postponing medication for 1 week to return forms to clinic.    Noam Sun RN   [facility]

## 2020-01-07 RX ORDER — VENLAFAXINE HYDROCHLORIDE 150 MG/1
CAPSULE, EXTENDED RELEASE ORAL
Qty: 90 CAPSULE | Refills: 1 | OUTPATIENT
Start: 2020-01-07

## 2020-01-08 DIAGNOSIS — G89.29 CHRONIC LOW BACK PAIN, UNSPECIFIED BACK PAIN LATERALITY, UNSPECIFIED WHETHER SCIATICA PRESENT: ICD-10-CM

## 2020-01-08 DIAGNOSIS — G62.9 PERIPHERAL POLYNEUROPATHY: ICD-10-CM

## 2020-01-08 DIAGNOSIS — M54.50 CHRONIC LOW BACK PAIN, UNSPECIFIED BACK PAIN LATERALITY, UNSPECIFIED WHETHER SCIATICA PRESENT: ICD-10-CM

## 2020-01-08 NOTE — TELEPHONE ENCOUNTER
pregabalin (LYRICA) 50 MG capsule      Last Written Prescription Date:  12.31.19  Last Fill Quantity: 90 capsule,  # refills: 0   Last office visit: 6/3/2019 with prescribing provider:  Todd Pastrana MD         Future Office Visit:        Routing refill request to provider for review/approval because:  Drug not on the [facility], [facility] or [facility] refill protocol or controlled substance

## 2020-01-08 NOTE — TELEPHONE ENCOUNTER
Routing refill request to provider for review/approval because:  Drug not on the [facility] refill protocol     Mariely Lou RN, BSN  [place] [facility]

## 2020-01-10 RX ORDER — PREGABALIN 50 MG/1
CAPSULE ORAL
Qty: 90 CAPSULE | Refills: 0 | Status: SHIPPED | OUTPATIENT
Start: 2020-01-10 | End: 2020-01-16

## 2020-01-10 NOTE — TELEPHONE ENCOUNTER
Pulled  Rx was last filled on 12/31/2019 for #90 (30 day supply).     Routing to PCP for further review/recommendations/orders.    Heidi James RN  [facility]

## 2020-01-10 NOTE — TELEPHONE ENCOUNTER
Was the 12/31/19 filled - please check the  - also he is due for a 6 month medication check appointment.  Please schedule this.   1 prescription sent

## 2020-01-16 ENCOUNTER — TELEPHONE (OUTPATIENT)
Dept: FAMILY MEDICINE | Facility: CLINIC | Age: 50
End: 2020-01-16

## 2020-01-16 ENCOUNTER — OFFICE VISIT (OUTPATIENT)
Dept: FAMILY MEDICINE | Facility: CLINIC | Age: 50
End: 2020-01-16
Payer: COMMERCIAL

## 2020-01-16 VITALS
HEIGHT: 71 IN | WEIGHT: 271 LBS | SYSTOLIC BLOOD PRESSURE: 110 MMHG | BODY MASS INDEX: 37.94 KG/M2 | DIASTOLIC BLOOD PRESSURE: 80 MMHG | TEMPERATURE: 98.9 F | OXYGEN SATURATION: 96 % | HEART RATE: 89 BPM

## 2020-01-16 DIAGNOSIS — E66.01 MORBID OBESITY DUE TO EXCESS CALORIES (H): ICD-10-CM

## 2020-01-16 DIAGNOSIS — E11.42 TYPE 2 DIABETES MELLITUS WITH DIABETIC POLYNEUROPATHY, WITHOUT LONG-TERM CURRENT USE OF INSULIN (H): ICD-10-CM

## 2020-01-16 DIAGNOSIS — M72.0 CONTRACTURE OF PALMAR FASCIA (DUPUYTREN'S): ICD-10-CM

## 2020-01-16 DIAGNOSIS — F41.8 SITUATIONAL ANXIETY: ICD-10-CM

## 2020-01-16 DIAGNOSIS — Z12.5 SCREENING FOR PROSTATE CANCER: ICD-10-CM

## 2020-01-16 DIAGNOSIS — T40.2X5A CONSTIPATION DUE TO OPIOID THERAPY: ICD-10-CM

## 2020-01-16 DIAGNOSIS — K51.919 ULCERATIVE COLITIS WITH COMPLICATION, UNSPECIFIED LOCATION (H): ICD-10-CM

## 2020-01-16 DIAGNOSIS — G47.00 INSOMNIA, UNSPECIFIED TYPE: ICD-10-CM

## 2020-01-16 DIAGNOSIS — R07.9 CHEST PAIN, UNSPECIFIED TYPE: Primary | ICD-10-CM

## 2020-01-16 DIAGNOSIS — N52.9 ERECTILE DYSFUNCTION, UNSPECIFIED ERECTILE DYSFUNCTION TYPE: ICD-10-CM

## 2020-01-16 DIAGNOSIS — L84 CORN OR CALLUS: ICD-10-CM

## 2020-01-16 DIAGNOSIS — R60.0 BILATERAL LEG EDEMA: ICD-10-CM

## 2020-01-16 DIAGNOSIS — R10.13 EPIGASTRIC PAIN: ICD-10-CM

## 2020-01-16 DIAGNOSIS — E78.5 HYPERLIPIDEMIA LDL GOAL <70: ICD-10-CM

## 2020-01-16 DIAGNOSIS — L30.9 DERMATITIS: ICD-10-CM

## 2020-01-16 DIAGNOSIS — R10.13 ABDOMINAL PAIN, EPIGASTRIC: ICD-10-CM

## 2020-01-16 DIAGNOSIS — M54.50 CHRONIC LOW BACK PAIN, UNSPECIFIED BACK PAIN LATERALITY, UNSPECIFIED WHETHER SCIATICA PRESENT: ICD-10-CM

## 2020-01-16 DIAGNOSIS — E11.42 TYPE 2 DIABETES MELLITUS WITH DIABETIC POLYNEUROPATHY, WITHOUT LONG-TERM CURRENT USE OF INSULIN (H): Primary | ICD-10-CM

## 2020-01-16 DIAGNOSIS — G89.29 CHRONIC LOW BACK PAIN, UNSPECIFIED BACK PAIN LATERALITY, UNSPECIFIED WHETHER SCIATICA PRESENT: ICD-10-CM

## 2020-01-16 DIAGNOSIS — K59.03 CONSTIPATION DUE TO OPIOID THERAPY: ICD-10-CM

## 2020-01-16 DIAGNOSIS — G62.9 PERIPHERAL POLYNEUROPATHY: ICD-10-CM

## 2020-01-16 DIAGNOSIS — F33.0 MAJOR DEPRESSIVE DISORDER, RECURRENT EPISODE, MILD (H): ICD-10-CM

## 2020-01-16 DIAGNOSIS — M77.11 RIGHT LATERAL EPICONDYLITIS: ICD-10-CM

## 2020-01-16 PROCEDURE — 99215 OFFICE O/P EST HI 40 MIN: CPT | Performed by: FAMILY MEDICINE

## 2020-01-16 PROCEDURE — 80306 DRUG TEST PRSMV INSTRMNT: CPT | Performed by: FAMILY MEDICINE

## 2020-01-16 PROCEDURE — 93000 ELECTROCARDIOGRAM COMPLETE: CPT | Performed by: FAMILY MEDICINE

## 2020-01-16 RX ORDER — OXYCODONE HYDROCHLORIDE 15 MG/1
15 TABLET ORAL 2 TIMES DAILY PRN
Qty: 60 TABLET | Refills: 0 | Status: SHIPPED | OUTPATIENT
Start: 2020-01-16 | End: 2020-02-28

## 2020-01-16 RX ORDER — VENLAFAXINE HYDROCHLORIDE 75 MG/1
75 CAPSULE, EXTENDED RELEASE ORAL DAILY
Qty: 90 CAPSULE | Refills: 1 | Status: SHIPPED | OUTPATIENT
Start: 2020-01-16 | End: 2020-04-21

## 2020-01-16 RX ORDER — PROCHLORPERAZINE 25 MG/1
1 SUPPOSITORY RECTAL DAILY
Qty: 9 EACH | Refills: 1 | Status: SHIPPED | OUTPATIENT
Start: 2020-01-16 | End: 2020-04-21

## 2020-01-16 RX ORDER — TRAZODONE HYDROCHLORIDE 50 MG/1
50-150 TABLET, FILM COATED ORAL
Qty: 90 TABLET | Refills: 3 | Status: SHIPPED | OUTPATIENT
Start: 2020-01-16 | End: 2020-08-24

## 2020-01-16 RX ORDER — SILDENAFIL 100 MG/1
100 TABLET, FILM COATED ORAL DAILY PRN
Qty: 30 TABLET | Refills: 5 | Status: ON HOLD | OUTPATIENT
Start: 2020-01-16 | End: 2020-11-21

## 2020-01-16 RX ORDER — OXYCODONE HYDROCHLORIDE 15 MG/1
15 TABLET, FILM COATED, EXTENDED RELEASE ORAL EVERY 12 HOURS
Qty: 60 TABLET | Refills: 0 | Status: SHIPPED | OUTPATIENT
Start: 2020-01-23 | End: 2020-02-28

## 2020-01-16 RX ORDER — PREGABALIN 50 MG/1
50 CAPSULE ORAL 3 TIMES DAILY
Qty: 90 CAPSULE | Refills: 2 | Status: SHIPPED | OUTPATIENT
Start: 2020-01-16 | End: 2020-04-21

## 2020-01-16 RX ORDER — PROCHLORPERAZINE 25 MG/1
1 SUPPOSITORY RECTAL
Qty: 1 EACH | Refills: 1 | Status: SHIPPED | OUTPATIENT
Start: 2020-01-16 | End: 2020-04-21

## 2020-01-16 RX ORDER — GLIMEPIRIDE 4 MG/1
4 TABLET ORAL
Qty: 90 TABLET | Refills: 1 | Status: SHIPPED | OUTPATIENT
Start: 2020-01-16 | End: 2020-08-06

## 2020-01-16 RX ORDER — BENZOCAINE/MENTHOL 6 MG-10 MG
LOZENGE MUCOUS MEMBRANE 2 TIMES DAILY
Status: ON HOLD | COMMUNITY
Start: 2020-01-16 | End: 2020-11-21

## 2020-01-16 RX ORDER — NALOXEGOL OXALATE 25 MG/1
25 TABLET, FILM COATED ORAL
Qty: 90 TABLET | Refills: 1 | Status: SHIPPED | OUTPATIENT
Start: 2020-01-16 | End: 2020-08-24

## 2020-01-16 RX ORDER — SIMVASTATIN 20 MG
20 TABLET ORAL AT BEDTIME
Qty: 90 TABLET | Refills: 3 | Status: SHIPPED | OUTPATIENT
Start: 2020-01-16 | End: 2020-08-24

## 2020-01-16 RX ORDER — VENLAFAXINE HYDROCHLORIDE 150 MG/1
150 CAPSULE, EXTENDED RELEASE ORAL DAILY
Qty: 90 CAPSULE | Refills: 1 | Status: CANCELLED | OUTPATIENT
Start: 2020-01-16

## 2020-01-16 ASSESSMENT — PATIENT HEALTH QUESTIONNAIRE - PHQ9
5. POOR APPETITE OR OVEREATING: MORE THAN HALF THE DAYS
SUM OF ALL RESPONSES TO PHQ QUESTIONS 1-9: 13

## 2020-01-16 ASSESSMENT — ANXIETY QUESTIONNAIRES
1. FEELING NERVOUS, ANXIOUS, OR ON EDGE: NOT AT ALL
5. BEING SO RESTLESS THAT IT IS HARD TO SIT STILL: NOT AT ALL
2. NOT BEING ABLE TO STOP OR CONTROL WORRYING: NOT AT ALL
IF YOU CHECKED OFF ANY PROBLEMS ON THIS QUESTIONNAIRE, HOW DIFFICULT HAVE THESE PROBLEMS MADE IT FOR YOU TO DO YOUR WORK, TAKE CARE OF THINGS AT HOME, OR GET ALONG WITH OTHER PEOPLE: SOMEWHAT DIFFICULT
6. BECOMING EASILY ANNOYED OR IRRITABLE: NOT AT ALL
7. FEELING AFRAID AS IF SOMETHING AWFUL MIGHT HAPPEN: NOT AT ALL
GAD7 TOTAL SCORE: 2
3. WORRYING TOO MUCH ABOUT DIFFERENT THINGS: NOT AT ALL

## 2020-01-16 ASSESSMENT — MIFFLIN-ST. JEOR: SCORE: 2121.38

## 2020-01-16 NOTE — LETTER
[facility] ([facility] [place]  01/16/20    Patient: Ryland Gee  YOB: 1970  Medical Record Number: [id number]                                                                  Opioid / Opioid Plus Controlled Substance Agreement    I understand that my care provider has prescribed an opioid (narcotic) controlled substance to help manage my condition(s). I am taking this medicine to help me function or work. I know this is strong medicine, and that it can cause serious side effects. Opioid medicine can be sedating, addicting and may cause a dependency on the drug. They can affect my ability to drive or think, and cause depression. They need to be taken exactly as prescribed. Combining opioids with certain medicines or chemicals (such as cocaine, sedatives and tranquilizers, sleeping pills, meth) can be dangerous or even fatal. Also, if I stop opioids suddenly, I may have severe withdrawal symptoms. Last, I understand that opioids do not work for all types of pain nor for all patients. If not helpful, I may be asked to stop them.        The risks, benefits, and side effects of these medicine(s) were explained to me. I agree that:    1. I will take part in other treatments as advised by my care team. This may be psychiatry or counseling, physical therapy, behavioral therapy, group treatment or a referral to a pain clinic. I will reduce or stop my medicine when my care team tells me to do so.  2. I will take my medicines as prescribed. I will not change the dose or schedule unless my care team tells me to. There will be no refills if I  run out early.   I may be contactedwithout warning and asked to complete a urine drug test or pill count at any time.   3. I will keep all my appointments, and understand this is part of the monitoring of opioids. My care team may require an office visit for EVERY opioid/controlled substance refill. If I miss appointments or don t follow instructions, my care team may stop my  medicine.  4. I will not ask other providers to prescribe controlled substances, and I will not accept controlled substances from other people. If I need another prescribed controlled substance for a new reason, I will tell my care team within 1 business day.  5. I will use one pharmacy to fill all of my controlled substance prescriptions, and it is up to me to make sure that I do not run out of my medicines on weekends or holidays. If my care team is willing to refill my opioid prescription without a visit, I must request refills only during office hours, refills may take up to 3 days to process, and it may take up to 5 to 7 days for my medicine to be mailed and ready at my pharmacy. Prescriptions will not be mailed anywhere except my pharmacy.        [id number]  Rev 12/18         Registration to scan to EHR                             Page 1 of 2               Controlled Substance Agreement Opioid        [facility] [place]  01/16/20  Patient: Ryland Gee  YOB: 1970  Medical Record Number: [id number]                                                                  6. I am responsible for my prescriptions. If the medicine/prescription is lost or stolen, it will not be replaced. I also agree not to share controlled substance medicines with anyone.  7. I agree to not use ANY illegal or recreational drugs. This includes marijuana, cocaine, bath salts or other drugs. I agree not to use alcohol unless my care team says I may.          I agree to give urine samples whenever asked. If I don t give a urine sample, the care team may stop my medicine.    8. If I enroll in the [place] Medical Marijuana program, I will tell my care team. I will also sign an agreement to share my medical records with my care team.   9. I will bring in my list of medicines (or my medicine bottles) each time I come to the clinic.   10. I will tell my care team right away if I become pregnant or have a new medical problem treated  outside of my regular clinic.  11. I understand that this medicine can affect my thinking and judgment. It may be unsafe for me to drive, use machinery and do dangerous tasks. I will not do any of these things until I know how the medicine affects me. If my dose changes, I will wait to see how it affects me. I will contact my care team if I have concerns about medicine side effects.    I understand that if I do not follow any of the conditions above, my prescriptions or treatment may be stopped.      I agree that my provider, clinic care team, and pharmacy may work with any city, state or federal law enforcement agency that investigates the misuse, sale, or other diversion of my controlled medicine. I will allow my provider to discuss my care with or share a copy of this agreement with any other treating provider, pharmacy or emergency room where I receive care. I agree to give up (waive) any right of privacy or confidentiality with respect to these consents.     I have read this agreement and have asked questions about anything I did not understand.      ________________________________________________________________________  Patient signature - Date/Time -  Harm MADINA Gee                                      ________________________________________________________________________  Witness signature                                                            ________________________________________________________________________  Provider signature - Todd Pastrana MD      [id number]  Rev 12/18         Registration to scan to EHR                         Page 2 of 2                   Controlled Substance Agreement Opioid           Page 1 of 2  Opioid Pain Medicines (also known as Narcotics)  What You Need to Know    What are opioids?   Opioids are pain medicines that must be prescribed by a doctor.  They are also known as narcotics.    Examples are:     morphine (MS Contin, Sara)    oxycodone  (Oxycontin)    oxycodone and acetaminophen (Percocet)    hydrocodone and acetaminophen (Vicodin, Norco)     fentanyl patch (Duragesic)     hydromorphone (Dilaudid)     methadone     What do opioids do well?   Opioids are best for short-term pain after a surgery or injury. They also work well for cancer pain. Unlike other pain medicines, they do not cause liver or kidney failure or ulcers. They may help some people with long-lasting (chronic) pain.     What do opioids NOT do well?   Opioids never get rid of pain entirely, and they do not work well for most patients with chronic pain. Opioids do not reduce swelling, one of the causes of pain. They also don t work well for nerve pain.                           For informational purposes only.  Not to replace the advice of your care provider.  Copyright [facility]. All right reserved. [employer] 857107-Qcu 02/18.      Page 2 of 2    Risks and side effects   Talk to your doctor before you start or decide to keep taking one of these medicines. Side effects include:    Lowering your breathing rate enough to cause death    Overdose, including death, especially if taking higher than prescribed doses    Long-term opioid use    Worse depression symptoms; less pleasure in things you usually enjoy    Feeling tired or sluggish    Slower thoughts or cloudy thinking    Being more sensitive to pain over time; pain is harder to control    Trouble sleeping or restless sleep    Changes in hormone levels (for example, less testosterone)    Changes in sex drive or ability to have sex    Constipation    Unsafe driving    Itching and sweating    Feeling dizzy    Nausea, vomiting and dry mouth    What else should I know about opioids?  When someone takes opioids for too long or too often, they become dependent. This means that if you stop or reduce the medicine too quickly, you will have withdrawal symptoms.    Dependence is not the same as addiction. Addiction is when  people keep using a substance that harms their body, their mind or their relations with others. If you have a history of drug or alcohol abuse, taking opioids can cause a relapse.    Over time, opioids don t work as well. Most people will need higher and higher doses. The higher the dose, the more serious the side effects. We don t know the long-term effects of opioids.      Prescribed opioids aren't the best way to manage chronic pain    Other ways to manage pain include:      Ibuprofen or acetaminophen.  You should always try this first.      Treat health problems that may be causing pain.      acupuncture or massage, deep breathing, meditation, visual imagery, aromatherapy.      Use heat or ice at the pain site      Physical therapy and exercise      Stop smoking      See a counselor or therapist                                                  People who have used opioids for a long time may have a lower quality of life, worse depression, higher levels of pain and more visits to doctors.    Never share your opioids with others. Be sure to store opioids in a secure place, locked if possible.Young children can easily swallow them and overdose.     You can overdose on opioids.  Signs of overdose include decrease or loss of consciousness, slowed breathing, trouble waking and blue lips.  If someone is worried about overdose, they should call 911.    If you are at risk for overdose, you may get naloxone (Narcan, a medicine that reverses the effects of opioids.  If you overdose, a friend or family member can give you Narcan while waiting for the ambulance.  They need to know the signs of overdose and how to give Narcan.    While you're taking opioids:    Don't use alcohol or street drugs. Taking them together can cause death.    Don't take any of these medicines unless your doctor says its okay.  Taking these with opioids can cause death.    Benzodiazepines (such as lorazepam         or diazepam)    Muscle relaxers  (such as cyclobenzaprine)    sleeping pills    other opioids    Safe disposal of opioids  Find your area drug take-back program, your pharmacy mail-back program, buy a special disposal bag (such as Deterra) from your pharmacy or flush them down the toilet.  Use the guidelines at:  www.fda.gov/drugs/resourcesforyou

## 2020-01-16 NOTE — TELEPHONE ENCOUNTER
Prior Authorization Retail Medication Request    Medication/Dose: Dexcom G6 Sensors not covered by pt insurance  ICD code (if different than what is on RX):  E11.42  Previously Tried and Failed:  Unknown  Rationale: Unknown    Insurance Name:  [facility]   Insurance ID:  [id number]      Thank You,  Ava Jaimes [facility]  [phone]

## 2020-01-16 NOTE — PROGRESS NOTES
"Subjective   Harm MADINA Gee is a 50 year old male who presents to clinic today for the following health issues:    HPI   Diabetes Follow-up    How often are you checking your blood sugar? Not at all    What concerns do you have today about your diabetes? Pt has not had anything to check sugars with     Do you have any of these symptoms? (Select all that apply)  No numbness or tingling in feet.  No redness, sores or blisters on feet.  No complaints of excessive thirst.  No reports of blurry vision.  No significant changes to weight.    Have you had a diabetic eye exam in the last 12 months? No  He gets shaky occasionally.     Leg Edema  Legs are swollen the last few months.  No orthopnea or chest pains. They hurt from about proximal shin down.     Wrist Numbness  He wakes up and has a sensation of wristbands. He has numbness in the fingers. They have been going numb for years of the first 3 fingers if he sleeps with his arms in the wrong position and will wake and then shake them out.     Palpitations  This morning he felt his heart skip a beat. He feels it every day. Holter monitor used in the past.     Left Ear  Left ear itches.    Abdominal Pain/Cramps    Shoulder Problems  They sound like \"pulling velcro\" bilaterally. They are tender to the touch in certain locations.     Elbow Pain  He has pain when lifting things with his arms bilaterally - right > left. It has been about 6-8 months and is getting worse.     Dupuytren's Contracture  Palm of left hand.      Fatigue  He does not have a lot of energy.     BP Readings from Last 2 Encounters:   01/16/20 110/80   06/03/19 118/70     Hemoglobin A1C (%)   Date Value   06/03/2019 7.0 (H)   10/23/2018 6.9 (H)     LDL Cholesterol Calculated (mg/dL)   Date Value   10/23/2018 75   04/18/2017 48               Hyperlipidemia Follow-Up    Are you regularly taking any medication or supplement to lower your cholesterol?   Yes- Simvastatin    Are you having muscle aches or other " "side effects that you think could be caused by your cholesterol lowering medication?  No    Depression and Anxiety Follow-Up    How are you doing with your depression since your last visit? Pt has been off the 150 mg of effexor x 1.5 weeks    How are you doing with your anxiety since your last visit?  none    Are you having other symptoms that might be associated with depression or anxiety? No    Have you had a significant life event? No     Do you have any concerns with your use of alcohol or other drugs? No  He thinks dose is too much. He has been taking 75 mg.     Social History     Tobacco Use     Smoking status: Never Smoker     Smokeless tobacco: Never Used   Substance Use Topics     Alcohol use: No     Comment: Does not drink - last was 11/2015     Drug use: No     PHQ 10/23/2018 6/3/2019 1/16/2020   PHQ-9 Total Score 11 8 13   Q9: Thoughts of better off dead/self-harm past 2 weeks Not at all Not at all Not at all     BUSHRA-7 SCORE 10/23/2018 6/3/2019 1/16/2020   Total Score 7 3 2     Suicide Assessment Five-step Evaluation and Treatment (SAFE-T)      Reviewed and updated as needed this visit by provider:  Tobacco  Allergies  Meds  Problems  Med Hx  Surg Hx  Fam Hx       He declines the flu shot today.     Review of Systems   Constitutional, HEENT, cardiovascular, pulmonary, GI, , musculoskeletal, neuro, skin, endocrine and psych systems are negative, except as otherwise noted.    This document serves as a record of the services and decisions personally performed and made by Todd Pastrana MD. It was created on his behalf by Kleber Tabares, a trained medical scribe. The creation of this document is based the provider's statements to the medical scribe.  Scribnathaly Tabares 3:41 PM, January 16, 2020        Objective   /80   Pulse 89   Temp 98.9  F (37.2  C) (Oral)   Ht 1.803 m (5' 11\")   Wt 122.9 kg (271 lb)   SpO2 96%   BMI 37.80 kg/m   Body mass index is 37.8 kg/m .  Physical Exam   GENERAL: " healthy, alert, well nourished, well hydrated, no distress  EYES: Eyes grossly normal to inspection, extraocular movements - intact, and PERRL  HENT: ear canals- normal; TMs- normal; Nose- normal; Mouth- no ulcers, no lesions  NECK: no tenderness, no adenopathy, no asymmetry, no masses, no stiffness; thyroid- normal to palpation  RESP: lungs clear to auscultation - no rales, no rhonchi, no wheezes  CV: regular rates and rhythm, normal S1 S2, no S3 or S4 and no murmur, no click or rub -  ABDOMEN: epigastric tenderness, otherwise, soft, no tenderness, no  hepatosplenomegaly, no masses, normal bowel sounds  MS: mildly tender over the right lateral epicondyle, bilateral 1+ leg edema, otherwise, extremities- no gross deformities noted  SKIN: bottom of right foot 2 mm corn - removed today with sterile scalpel, otherwise, no suspicious lesions, no rashes  NEURO: strength and tone- normal, sensory exam- grossly normal, mentation- intact, speech- normal, reflexes- symmetric  Diabetic foot exam: normal DP and PT pulses, no trophic changes or ulcerative lesions and reduced sensation bilaterally  BACK: paralumbar tenderness left > right, otherwise, no CVA tenderness  PSYCH: Alert and oriented times 3; speech- coherent , normal rate and volume; able to articulate logical thoughts, able to abstract reason, no tangential thoughts, no hallucinations or delusions, affect- normal  LYMPHATICS: ant. cervical- normal, post. cervical- normal, axillary- normal, supraclavicular- normal, inguinal- normal    Diagnostic Test Results  EKG - Normal Sinus Rhythm, Low voltage in limb leads.       Assessment & Plan   Harm was seen today for recheck medication.    Diagnoses and all orders for this visit:    Chest pain, unspecified type - EKG unremarkable. Possible costochondritis. Reassurance given.   -     EKG 12-lead complete w/read - Clinics    Major depressive disorder, recurrent episode, mild (H) - controlled - continue medication.  -      venlafaxine (EFFEXOR-XR) 75 MG 24 hr capsule; Take 1 capsule (75 mg) by mouth daily ALONG WITH 150MG CAPSULE    Type 2 diabetes mellitus with diabetic polyneuropathy, without long-term current use of insulin (H) - last A1C controlled. Future labs ordered today.   -     HEMOGLOBIN A1C; Future  -     TSH WITH FREE T4 REFLEX; Future  -     metFORMIN (GLUCOPHAGE) 500 MG tablet; Take 1 tablet (500 mg) by mouth 2 times daily (with meals)  -     blood glucose (CONTOUR NEXT TEST) test strip; 1 strip by In Vitro route 2 times daily  -     glimepiride (AMARYL) 4 MG tablet; Take 1 tablet (4 mg) by mouth every morning (before breakfast)  -     Continuous Blood Gluc Sensor (DEXCOM G6 SENSOR) MISC; 1 Device daily  -     Continuous Blood Gluc Transmit (DEXCOM G6 TRANSMITTER) MISC; 1 Device every 3 months  -     OPHTHALMOLOGY ADULT REFERRAL  -     Comprehensive metabolic panel; Future    Ulcerative colitis with complication, unspecified location (H) - controlled.     Constipation due to opioid therapy - controlled - continue medication.  -     MOVANTIK 25 MG TABS tablet; Take 1 tablet (25 mg) by mouth every morning (before breakfast)  -     Naldemedine Tosylate (SYMPROIC) 0.2 MG TABS; Take 0.2 mg by mouth daily as needed (loose stools)    Epigastric pain / Abdominal pain, epigastric - worse lately. Start:   -     omeprazole (PRILOSEC) 20 MG DR capsule; Take 1 capsule (20 mg) by mouth daily    Peripheral polyneuropathy - CSA completed today. Controlled - continue medication.  -     pregabalin (LYRICA) 50 MG capsule; Take 1 capsule (50 mg) by mouth 3 times daily  -     naloxone (NARCAN) 4 MG/0.1ML nasal spray; Spray 1 spray (4 mg) into one nostril alternating nostrils once as needed for opioid reversal every 2-3 minutes until assistance arrives  -     oxyCODONE (OXYCONTIN) 15 MG 12 hr tablet; Take 1 tablet (15 mg) by mouth every 12 hours  -     oxyCODONE IR (ROXICODONE) 15 MG tablet; Take 1 tablet (15 mg) by mouth 2 times daily as  needed for moderate to severe pain    Chronic low back pain, unspecified back pain laterality, unspecified whether sciatica present - CSA on 1/16/2020 - every 3 month med checks - CSA completed today - controlled - continue medication.  -     pregabalin (LYRICA) 50 MG capsule; Take 1 capsule (50 mg) by mouth 3 times daily  -     naloxone (NARCAN) 4 MG/0.1ML nasal spray; Spray 1 spray (4 mg) into one nostril alternating nostrils once as needed for opioid reversal every 2-3 minutes until assistance arrives  -     Drug Abuse Screen Panel 13, Urine (Pain Care Package)  -     oxyCODONE (OXYCONTIN) 15 MG 12 hr tablet; Take 1 tablet (15 mg) by mouth every 12 hours        -     oxyCODONE IR (ROXICODONE) 15 MG tablet; Take 1 tablet (15 mg) by mouth 2 times             daily as needed for moderate to severe pain    Hyperlipidemia LDL goal <70 - last LDL fair control. Future labs ordered.   -     Lipid panel reflex to direct LDL Fasting; Future  -     simvastatin (ZOCOR) 20 MG tablet; Take 1 tablet (20 mg) by mouth At Bedtime    Situational anxiety - controlled - continue medication.  -     venlafaxine (EFFEXOR-XR) 75 MG 24 hr capsule; Take 1 capsule (75 mg) by mouth daily ALONG WITH 150MG CAPSULE    Insomnia, unspecified type - controlled - continue medication.  -     traZODone (DESYREL) 50 MG tablet; Take 1-3 tablets ( mg) by mouth nightly as needed for sleep    Erectile dysfunction, unspecified erectile dysfunction type - controlled - continue medication.  -     sildenafil (VIAGRA) 100 MG tablet; Take 1 tablet (100 mg) by mouth daily as needed (erectile dysfunction) 30 min to 4 hrs before sex. Do not use with nitroglycerin, terazosin or doxazosin.    Bilateral leg edema - 1+ edema bilaterally. Recommend low salt diet. Compression socks - if not improving then a diuretic can be added -  Will get labs too.     Morbid obesity due to excess calories (H) - recommend diet and exercise.     Right > left lateral epicondylitis  "- worse lately. Recommend stretching, massage, heat, ice, and exercises to improve symptoms.     Contracture of palmar fascia (Dupuytren's) - initial encounter found on left hand. Avoid pressure when possible- to ortho hand if worsens.     Dermatitis - controlled - continue medication.  -     hydrocortisone (CORTAID) 1 % external cream; Apply topically 2 times daily    Chronic pain - seen previously at the pain clinic but requesting that I take over prescribing as he has come down a lot on his doses.  - controlled - continue medication.  -     oxyCODONE IR (ROXICODONE) 15 MG tablet; Take 1 tablet (15 mg) by mouth 2 times daily as needed for moderate to severe pain    Corn or callus - corn removed from right forefoot today with sterile scalpel.     Screening for prostate cancer  -     PSA, screen; Future    Total visit time was at least 50 minutes, at least half of which was spent counseling the patient and coordinating care.     BMI:   Estimated body mass index is 37.8 kg/m  as calculated from the following:    Height as of 6/3/19: 1.803 m (5' 11\").    Weight as of 6/3/19: 122.9 kg (271 lb).   Weight management plan: Discussed healthy diet and exercise guidelines    See Patient Instructions    Return in about 3 months (around 4/16/2020), or if symptoms worsen or fail to improve, for Medication Check.    The information in this document, created by the medical scribe for me, accurately reflects the services I personally performed and the decisions made by me. I have reviewed and approved this document for accuracy prior to leaving the patient care area.  4:30 PM, 01/16/20        Dannie Pastrana MD      [address] [address]  [email or web address]   Office: [phone]  Pager: [phone]         "

## 2020-01-17 LAB

## 2020-01-17 RX ORDER — FLASH GLUCOSE SENSOR
1 KIT MISCELLANEOUS
Qty: 6 EACH | Refills: 1 | Status: SHIPPED | OUTPATIENT
Start: 2020-01-17 | End: 2020-04-21

## 2020-01-17 RX ORDER — FLASH GLUCOSE SCANNING READER
1 EACH MISCELLANEOUS
Qty: 1 DEVICE | Refills: 0 | Status: SHIPPED | OUTPATIENT
Start: 2020-01-17 | End: 2020-04-21

## 2020-01-17 ASSESSMENT — ANXIETY QUESTIONNAIRES: GAD7 TOTAL SCORE: 2

## 2020-01-17 NOTE — TELEPHONE ENCOUNTER
Can they check if the Freestyle gerald is covered - I will send a prescription.     otherwise do PA too.

## 2020-01-17 NOTE — TELEPHONE ENCOUNTER
Do you want to proceed with PA?    AUGUST Cowan, RN, PHN  [facility] [place]  Office: [phone]  Fax: [phone]

## 2020-01-17 NOTE — TELEPHONE ENCOUNTER
Pharmacy will check and let us know.    Zuly Monterroso, AUGUST, RN, PHN  [facility]  Office: [phone]  Fax: [phone]

## 2020-01-17 NOTE — TELEPHONE ENCOUNTER
Freestyle Gal is not covered. It also requires a prior authorization.     Thank you!  Marina Fulton PhT  [facility] Float [facility]  On behalf of [facility]

## 2020-01-18 ENCOUNTER — TELEPHONE (OUTPATIENT)
Dept: FAMILY MEDICINE | Facility: CLINIC | Age: 50
End: 2020-01-18

## 2020-01-18 DIAGNOSIS — E11.42 TYPE 2 DIABETES MELLITUS WITH DIABETIC POLYNEUROPATHY, WITHOUT LONG-TERM CURRENT USE OF INSULIN (H): ICD-10-CM

## 2020-01-18 DIAGNOSIS — Z51.81 ENCOUNTER FOR THERAPEUTIC DRUG MONITORING: ICD-10-CM

## 2020-01-18 DIAGNOSIS — Z12.5 SCREENING FOR PROSTATE CANCER: ICD-10-CM

## 2020-01-18 DIAGNOSIS — E78.5 HYPERLIPIDEMIA LDL GOAL <70: ICD-10-CM

## 2020-01-18 LAB — HBA1C MFR BLD: 7.9 % (ref 0–5.6)

## 2020-01-18 PROCEDURE — 80053 COMPREHEN METABOLIC PANEL: CPT | Performed by: FAMILY MEDICINE

## 2020-01-18 PROCEDURE — 83036 HEMOGLOBIN GLYCOSYLATED A1C: CPT | Performed by: FAMILY MEDICINE

## 2020-01-18 PROCEDURE — G0103 PSA SCREENING: HCPCS | Performed by: FAMILY MEDICINE

## 2020-01-18 PROCEDURE — 36415 COLL VENOUS BLD VENIPUNCTURE: CPT | Performed by: FAMILY MEDICINE

## 2020-01-18 PROCEDURE — 84443 ASSAY THYROID STIM HORMONE: CPT | Performed by: FAMILY MEDICINE

## 2020-01-18 PROCEDURE — 80061 LIPID PANEL: CPT | Performed by: FAMILY MEDICINE

## 2020-01-18 NOTE — TELEPHONE ENCOUNTER
Prior Authorization Retail Medication Request    Medication/Dose: dexcom transmitter-   ICD code (if different than what is on RX):    Previously Tried and Failed:    Rationale:      Insurance Name:    Insurance ID:        Pharmacy Information (if different than what is on RX)  Name:    Phone:

## 2020-01-20 LAB
ALBUMIN SERPL-MCNC: 3.6 G/DL (ref 3.4–5)
ALP SERPL-CCNC: 79 U/L (ref 40–150)
ALT SERPL W P-5'-P-CCNC: 37 U/L (ref 0–70)
ANION GAP SERPL CALCULATED.3IONS-SCNC: 6 MMOL/L (ref 3–14)
AST SERPL W P-5'-P-CCNC: 21 U/L (ref 0–45)
BILIRUB SERPL-MCNC: 0.4 MG/DL (ref 0.2–1.3)
BUN SERPL-MCNC: 12 MG/DL (ref 7–30)
CALCIUM SERPL-MCNC: 9.2 MG/DL (ref 8.5–10.1)
CHLORIDE SERPL-SCNC: 106 MMOL/L (ref 94–109)
CHOLEST SERPL-MCNC: 117 MG/DL
CO2 SERPL-SCNC: 28 MMOL/L (ref 20–32)
CREAT SERPL-MCNC: 1.01 MG/DL (ref 0.66–1.25)
GFR SERPL CREATININE-BSD FRML MDRD: 88 ML/MIN/{1.73_M2}
GLUCOSE SERPL-MCNC: 150 MG/DL (ref 70–99)
HDLC SERPL-MCNC: 39 MG/DL
LDLC SERPL CALC-MCNC: 54 MG/DL
NONHDLC SERPL-MCNC: 78 MG/DL
POTASSIUM SERPL-SCNC: 4.4 MMOL/L (ref 3.4–5.3)
PROT SERPL-MCNC: 7.7 G/DL (ref 6.8–8.8)
PSA SERPL-ACNC: 0.72 UG/L (ref 0–4)
SODIUM SERPL-SCNC: 140 MMOL/L (ref 133–144)
TRIGL SERPL-MCNC: 121 MG/DL
TSH SERPL DL<=0.005 MIU/L-ACNC: 1.53 MU/L (ref 0.4–4)

## 2020-01-20 NOTE — TELEPHONE ENCOUNTER
PA Initiation    Medication: Dexcom G6 Sensors - INITIATED  Insurance Company: [employer] [place] - Phone [phone] Fax [phone]  Pharmacy Filling the Rx: [facility] - [place], [place] - [address] [place] [address]  Filling Pharmacy Phone: [phone]  Filling Pharmacy Fax:    Start Date: 1/20/2020

## 2020-01-21 ENCOUNTER — TELEPHONE (OUTPATIENT)
Dept: FAMILY MEDICINE | Facility: CLINIC | Age: 50
End: 2020-01-21

## 2020-01-21 NOTE — TELEPHONE ENCOUNTER
SEE ENCOUNTER FROM 1/16/20  DME products are a plan exclusion - not covered by INS  May look into medical covering items?

## 2020-01-21 NOTE — RESULT ENCOUNTER NOTE
Dear Ryland,    Here is a summary of your recent test results:  -PSA (prostate specific antigen) test is normal.  This indicates a low likelihood of prostate cancer.  ADVISE: rechecking this in 1 year.  -Cholesterol levels are at your goal levels.  ADVISE: continuing your medication, a regular exercise program with at least 150 minutes of aerobic exercise per week, and eating a low saturated fat/low carbohydrate diet.  Also, you should recheck this fasting cholesterol panel in 12 months.  -Liver and gallbladder tests (ALT,AST, Alk phos,bilirubin) are normal.  -Kidney function (GFR) is normal.  -Sodium is normal.  -Potassium is normal.  -Calcium is normal.  -Glucose is elevated due to your diabetes.  -A1C (test of diabetes control the last 2-3 months) is above your goal. Please recheck your A1C test in 3 months.   -TSH (thyroid stimulating hormone) level is normal which indicates normal thyroid function.    For additional lab test information, [email or web address] is an excellent reference.    In addition, here is a list of due or overdue Health Maintenance reminders:  Eye Exam due !    Please call us at [phone] (or use [employer]) to address the above recommendations if needed.           Thank you very much for trusting me and [facility].     Healthy regards,  Dannie Pastrana MD

## 2020-01-21 NOTE — TELEPHONE ENCOUNTER
Prior Authorization Retail Medication Request    Medication/Dose: Freestyle Gal  ICD code (if different than what is on RX):    Previously Tried and Failed:    Rationale:      Insurance Name:    Insurance ID:        Pharmacy Information (if different than what is on RX)  Name:  [facility]  Phone:

## 2020-01-21 NOTE — TELEPHONE ENCOUNTER
PRIOR AUTHORIZATION DENIED    Medication: Dexcom G6 Sensors - denied    Denial Date: 1/20/2020    Denial Rational: medication is not covered by INS plan - med is exclusion      Appeal Information:

## 2020-01-23 NOTE — TELEPHONE ENCOUNTER
PRIOR AUTHORIZATION DENIED    Medication: Freestyle Gal    Denial Date: 1/23/2020    Denial Rational:   medication is not covered by INS plan - med is exclusion          Appeal Information:

## 2020-01-23 NOTE — TELEPHONE ENCOUNTER
Central Prior Authorization Team   Phone: [phone]      PA Initiation    Medication: Freestyle Gal  Insurance Company: [employer] [place] - Phone [phone] Fax [phone]  Pharmacy Filling the Rx: [facility] -  [place], [place] - [address] [place] [address]  Filling Pharmacy Phone: [phone]  Filling Pharmacy Fax:    Start Date: 1/23/2020

## 2020-02-27 DIAGNOSIS — G89.29 CHRONIC LOW BACK PAIN, UNSPECIFIED BACK PAIN LATERALITY, UNSPECIFIED WHETHER SCIATICA PRESENT: ICD-10-CM

## 2020-02-27 DIAGNOSIS — G62.9 PERIPHERAL POLYNEUROPATHY: ICD-10-CM

## 2020-02-27 DIAGNOSIS — M54.50 CHRONIC LOW BACK PAIN, UNSPECIFIED BACK PAIN LATERALITY, UNSPECIFIED WHETHER SCIATICA PRESENT: ICD-10-CM

## 2020-02-27 NOTE — TELEPHONE ENCOUNTER
Controlled Substance Refill Request for oxyCODONE (OXYCONTIN) 15 MG 12 hr tablet  Problem List Complete:    Yes    Last Written Prescription Date:  1.23.20  Last Fill Quantity: 60 tablet,   # refills: 0      Last Office Visit with [facility] primary care provider: 1.16.20    Future Office visit:   Next 5 appointments (look out 90 days)    Apr 21, 2020 10:00 AM CDT  Office Visit with Todd Pastrana MD  [facility] [address] [place] [id number]  [phone]          Controlled substance agreement:   Encounter-Level CSA - 11/30/2015:    Controlled Substance Agreement - Scan on 12/1/2015  2:00 PM: CONTROLLED SUBSTANCE AGREEMENT 11/30/15     Patient-Level CSA:    Controlled Substance Agreement - Opioid - Scan on 1/16/2020  4:43 PM         Last Urine Drug Screen:   Pain Drug SCR UR W RPTD Meds   Date Value Ref Range Status   04/17/2018 FINAL  Final     Comment:     (Note)  ====================================================================  [facility] COMP DRUG ANALYSIS,UR  ====================================================================  Test                             Result       Flag       Units        Drug Present and Declared for Prescription Verification   Carboxy-THC                    119          EXPECTED   ng/mg creat    Carboxy-THC is a metabolite of tetrahydrocannabinol  (THC).    Source of THC is most commonly illicit, but THC is also present    in a scheduled prescription medication.   Oxycodone                      1531         EXPECTED   ng/mg creat   Oxymorphone                    501          EXPECTED   ng/mg creat   Noroxycodone                   >5208        EXPECTED   ng/mg creat   Noroxymorphone                 124          EXPECTED   ng/mg creat    Sources of oxycodone are scheduled prescription medications.    Oxymorphone, noroxycodone, and noroxymorphone are expected    metabolites of oxycodone. Oxymorphone is also available  as a    scheduled prescription medication.   Gabapentin                     PRESENT      EXPECTED                 Venlafaxine                    PRESENT      EXPECTED                 Desmethylvenlafaxine           PRESENT      EXPECTED                  Desmethylvenlafaxine is an expected metabolite of venlafaxine.  ====================================================================  Test                      Result    Flag   Units      Ref Range        Creatinine              192              mg/dL      >=20            ====================================================================  Declared Medications:  The flagging and interpretation on this report are based on the  following declared medications.  Unexpected results may arise from  inaccuracies in the declared medications.  **Note: The testing scope of this panel includes these medications:  Cannabis  Gabapentin  Oxycodone  Oxycodone (OxyContin)  Venlafaxine (Effexor)  ====================================================================  For clinical consultation, please call [phone].  ====================================================================  Analysis performed by [employer] [place] [address]     , No results found for: COMDAT,   Cannabinoids (28-luo-6-carboxy-9-THC)   Date Value Ref Range Status   01/16/2020 Not Detected NDET^Not Detected ng/mL Final     Comment:     Cutoff for a negative cannabinoid is 50 ng/mL or less.     Phencyclidine (Phencyclidine)   Date Value Ref Range Status   01/16/2020 Not Detected NDET^Not Detected ng/mL Final     Comment:     Cutoff for a negative PCP is 25 ng/mL or less.     Cocaine (Benzoylecgonine)   Date Value Ref Range Status   01/16/2020 Not Detected NDET^Not Detected ng/mL Final     Comment:     Cutoff for a negative cocaine is 150 ng/ml or less.     Methamphetamine (d-Methamphetamine)   Date Value Ref Range Status   01/16/2020 Not Detected NDET^Not Detected ng/mL Final     Comment:      Cutoff for a negative methamphetamine is 500 ng/ml or less.     Opiates (Morphine)   Date Value Ref Range Status   01/16/2020 Not Detected NDET^Not Detected ng/mL Final     Comment:     Cutoff for a negative opiate is 100 ng/ml or less.     Amphetamine (d-Amphetamine)   Date Value Ref Range Status   01/16/2020 Not Detected NDET^Not Detected ng/mL Final     Comment:     Cutoff for a negative amphetamine is 500 ng/mL or less.     Benzodiazepines (Nordiazepam)   Date Value Ref Range Status   01/16/2020 Not Detected NDET^Not Detected ng/mL Final     Comment:     Cutoff for a negative benzodiazepine is 150 ng/ml or less.     Tricyclic Antidepressants (Desipramine)   Date Value Ref Range Status   01/16/2020 Not Detected NDET^Not Detected ng/mL Final     Comment:     Cutoff for a negative tricyclic antidepressant is 300 ng/ml or less.     Methadone (Methadone)   Date Value Ref Range Status   01/16/2020 Not Detected NDET^Not Detected ng/mL Final     Comment:     Cutoff for a negative methadone is 200 ng/ml or less.     Barbiturates (Butalbital)   Date Value Ref Range Status   01/16/2020 Not Detected NDET^Not Detected ng/mL Final     Comment:     Cutoff for a negative barbituate is 200 ng/ml or less.     Oxycodone (Oxycodone)   Date Value Ref Range Status   01/16/2020 Detected, Abnormal Result (A) NDET^Not Detected ng/mL Final     Comment:     Cutoff for a positive oxycodone is greater than 100 ng/ml.  This is an unconfirmed screening result to be used for medical purposes only.   Order [id number] for confirmation or individual confirmation tests to [employer].       Propoxyphene (Norpropoxyphene)   Date Value Ref Range Status   01/16/2020 Not Detected NDET^Not Detected ng/mL Final     Comment:     Cutoff for a negative propoxyphene is 300 ng/ml or less     Buprenorphine (Buprenorphine)   Date Value Ref Range Status   01/16/2020 Not Detected NDET^Not Detected ng/mL Final     Comment:     Cutoff for a negative buprenorphine is  10 ng/ml or less        Processing:  Fax Rx to listed pharmacy    [email or web address]   checked in past 3 months?  No, route to RN                Controlled Substance Refill Request for oxyCODONE IR (ROXICODONE) 15 MG tablet  Problem List Complete:    Yes    Last Written Prescription Date:  1.16.20  Last Fill Quantity: 60 tablet,   # refills: 0        Last Office Visit with [facility] primary care provider: 1.16.20    Future Office visit:   Next 5 appointments (look out 90 days)    Apr 21, 2020 10:00 AM CDT  [facility] [address] [place] [phone]          Controlled substance agreement:   Encounter-Level CSA - 11/30/2015:    Controlled Substance Agreement - Scan on 12/1/2015  2:00 PM: CONTROLLED SUBSTANCE AGREEMENT 11/30/15     Patient-Level CSA:    Controlled Substance Agreement - Opioid - Scan on 1/16/2020  4:43 PM         Last Urine Drug Screen:   Pain Drug SCR UR W RPTD Meds   Date Value Ref Range Status   04/17/2018 FINAL  Final     Comment:     (Note)  ====================================================================  [facility] COMP DRUG ANALYSIS,UR  ====================================================================  Test                             Result       Flag       Units        Drug Present and Declared for Prescription Verification   Carboxy-THC                    119          EXPECTED   ng/mg creat    Carboxy-THC is a metabolite of tetrahydrocannabinol  (THC).    Source of THC is most commonly illicit, but THC is also present    in a scheduled prescription medication.   Oxycodone                      1531         EXPECTED   ng/mg creat   Oxymorphone                    501          EXPECTED   ng/mg creat   Noroxycodone                   >5208        EXPECTED   ng/mg creat   Noroxymorphone                 124          EXPECTED   ng/mg creat    Sources of  oxycodone are scheduled prescription medications.    Oxymorphone, noroxycodone, and noroxymorphone are expected    metabolites of oxycodone. Oxymorphone is also available as a    scheduled prescription medication.   Gabapentin                     PRESENT      EXPECTED                 Venlafaxine                    PRESENT      EXPECTED                 Desmethylvenlafaxine           PRESENT      EXPECTED                  Desmethylvenlafaxine is an expected metabolite of venlafaxine.  ====================================================================  Test                      Result    Flag   Units      Ref Range        Creatinine              192              mg/dL      >=20            ====================================================================  Declared Medications:  The flagging and interpretation on this report are based on the  following declared medications.  Unexpected results may arise from  inaccuracies in the declared medications.  **Note: The testing scope of this panel includes these medications:  Cannabis  Gabapentin  Oxycodone  Oxycodone (OxyContin)  Venlafaxine (Effexor)  ====================================================================  For clinical consultation, please call [phone].  ====================================================================  Analysis performed by [employer] [place] [address]     , No results found for: COMDAT,   Cannabinoids (25-ivi-9-carboxy-9-THC)   Date Value Ref Range Status   01/16/2020 Not Detected NDET^Not Detected ng/mL Final     Comment:     Cutoff for a negative cannabinoid is 50 ng/mL or less.     Phencyclidine (Phencyclidine)   Date Value Ref Range Status   01/16/2020 Not Detected NDET^Not Detected ng/mL Final     Comment:     Cutoff for a negative PCP is 25 ng/mL or less.     Cocaine (Benzoylecgonine)   Date Value Ref Range Status   01/16/2020 Not Detected NDET^Not Detected ng/mL Final     Comment:     Cutoff for a  negative cocaine is 150 ng/ml or less.     Methamphetamine (d-Methamphetamine)   Date Value Ref Range Status   01/16/2020 Not Detected NDET^Not Detected ng/mL Final     Comment:     Cutoff for a negative methamphetamine is 500 ng/ml or less.     Opiates (Morphine)   Date Value Ref Range Status   01/16/2020 Not Detected NDET^Not Detected ng/mL Final     Comment:     Cutoff for a negative opiate is 100 ng/ml or less.     Amphetamine (d-Amphetamine)   Date Value Ref Range Status   01/16/2020 Not Detected NDET^Not Detected ng/mL Final     Comment:     Cutoff for a negative amphetamine is 500 ng/mL or less.     Benzodiazepines (Nordiazepam)   Date Value Ref Range Status   01/16/2020 Not Detected NDET^Not Detected ng/mL Final     Comment:     Cutoff for a negative benzodiazepine is 150 ng/ml or less.     Tricyclic Antidepressants (Desipramine)   Date Value Ref Range Status   01/16/2020 Not Detected NDET^Not Detected ng/mL Final     Comment:     Cutoff for a negative tricyclic antidepressant is 300 ng/ml or less.     Methadone (Methadone)   Date Value Ref Range Status   01/16/2020 Not Detected NDET^Not Detected ng/mL Final     Comment:     Cutoff for a negative methadone is 200 ng/ml or less.     Barbiturates (Butalbital)   Date Value Ref Range Status   01/16/2020 Not Detected NDET^Not Detected ng/mL Final     Comment:     Cutoff for a negative barbituate is 200 ng/ml or less.     Oxycodone (Oxycodone)   Date Value Ref Range Status   01/16/2020 Detected, Abnormal Result (A) NDET^Not Detected ng/mL Final     Comment:     Cutoff for a positive oxycodone is greater than 100 ng/ml.  This is an unconfirmed screening result to be used for medical purposes only.   Order [id number] for confirmation or individual confirmation tests to [employer].       Propoxyphene (Norpropoxyphene)   Date Value Ref Range Status   01/16/2020 Not Detected NDET^Not Detected ng/mL Final     Comment:     Cutoff for a negative propoxyphene is 300 ng/ml or less      Buprenorphine (Buprenorphine)   Date Value Ref Range Status   01/16/2020 Not Detected NDET^Not Detected ng/mL Final     Comment:     Cutoff for a negative buprenorphine is 10 ng/ml or less        Processing:  Fax Rx to listed pharmacy    [email or web address]   checked in past 3 months?  No, route to RN

## 2020-02-28 RX ORDER — OXYCODONE HYDROCHLORIDE 15 MG/1
TABLET ORAL
Qty: 60 TABLET | Refills: 0 | Status: SHIPPED | OUTPATIENT
Start: 2020-02-28 | End: 2020-03-30

## 2020-02-28 RX ORDER — OXYCODONE HYDROCHLORIDE 15 MG/1
TABLET, FILM COATED, EXTENDED RELEASE ORAL
Qty: 60 TABLET | Refills: 0 | Status: SHIPPED | OUTPATIENT
Start: 2020-02-28 | End: 2020-03-30

## 2020-03-30 DIAGNOSIS — G62.9 PERIPHERAL POLYNEUROPATHY: ICD-10-CM

## 2020-03-30 DIAGNOSIS — M54.50 CHRONIC LOW BACK PAIN, UNSPECIFIED BACK PAIN LATERALITY, UNSPECIFIED WHETHER SCIATICA PRESENT: ICD-10-CM

## 2020-03-30 DIAGNOSIS — G89.29 CHRONIC LOW BACK PAIN, UNSPECIFIED BACK PAIN LATERALITY, UNSPECIFIED WHETHER SCIATICA PRESENT: ICD-10-CM

## 2020-03-30 RX ORDER — OXYCODONE HYDROCHLORIDE 15 MG/1
TABLET ORAL
Qty: 60 TABLET | Refills: 0 | Status: SHIPPED | OUTPATIENT
Start: 2020-03-30 | End: 2020-04-21

## 2020-03-30 RX ORDER — OXYCODONE HYDROCHLORIDE 15 MG/1
TABLET, FILM COATED, EXTENDED RELEASE ORAL
Qty: 60 TABLET | Refills: 0 | Status: SHIPPED | OUTPATIENT
Start: 2020-03-30 | End: 2020-04-21

## 2020-03-30 NOTE — TELEPHONE ENCOUNTER
Controlled Substance Refill Request for Oxycodone IR 15mg  Problem List Complete:    Yes    Last Written Prescription Date:  02/28/2020  Last Fill Quantity: 60,   # refills: 0    THE MOST RECENT OFFICE VISIT MUST BE WITHIN THE PAST 3 MONTHS. AT LEAST ONE FACE TO FACE VISIT MUST OCCUR EVERY 6 MONTHS. ADDITIONAL VISITS CAN BE VIRTUAL.  (THIS STATEMENT SHOULD BE DELETED.)    Last Office Visit with [facility] care provider: 01/16/2020    Future Office visit:   Next 5 appointments (look out 90 days)    Apr 21, 2020 10:00 AM CDT  Office Visit with Todd Pastrana MD  [facility] [address] [place] [address] [id number] [phone]          Controlled substance agreement:   Encounter-Level CSA - 11/30/2015:    Controlled Substance Agreement - Scan on 12/1/2015  2:00 PM: CONTROLLED SUBSTANCE AGREEMENT 11/30/15     Patient-Level CSA:    Controlled Substance Agreement - Opioid - Scan on 1/16/2020  4:43 PM         Last Urine Drug Screen:   Pain Drug SCR UR W RPTD Meds   Date Value Ref Range Status   04/17/2018 FINAL  Final     Comment:     (Note)  ====================================================================  [facility] COMP DRUG ANALYSIS,UR  ====================================================================  Test                             Result       Flag       Units        Drug Present and Declared for Prescription Verification   Carboxy-THC                    119          EXPECTED   ng/mg creat    Carboxy-THC is a metabolite of tetrahydrocannabinol  (THC).    Source of THC is most commonly illicit, but THC is also present    in a scheduled prescription medication.   Oxycodone                      1531         EXPECTED   ng/mg creat   Oxymorphone                    501          EXPECTED   ng/mg creat   Noroxycodone                   >5208        EXPECTED   ng/mg creat   Noroxymorphone                 124          EXPECTED   ng/mg creat    Sources of  oxycodone are scheduled prescription medications.    Oxymorphone, noroxycodone, and noroxymorphone are expected    metabolites of oxycodone. Oxymorphone is also available as a    scheduled prescription medication.   Gabapentin                     PRESENT      EXPECTED                 Venlafaxine                    PRESENT      EXPECTED                 Desmethylvenlafaxine           PRESENT      EXPECTED                  Desmethylvenlafaxine is an expected metabolite of venlafaxine.  ====================================================================  Test                      Result    Flag   Units      Ref Range        Creatinine              192              mg/dL      >=20            ====================================================================  Declared Medications:  The flagging and interpretation on this report are based on the  following declared medications.  Unexpected results may arise from  inaccuracies in the declared medications.  **Note: The testing scope of this panel includes these medications:  Cannabis  Gabapentin  Oxycodone  Oxycodone (OxyContin)  Venlafaxine (Effexor)  ====================================================================  For clinical consultation, please call [phone].  ====================================================================  Analysis performed by [facility] [place] [address]     , No results found for: COMDAT,   Cannabinoids (00-xem-6-carboxy-9-THC)   Date Value Ref Range Status   01/16/2020 Not Detected NDET^Not Detected ng/mL Final     Comment:     Cutoff for a negative cannabinoid is 50 ng/mL or less.     Phencyclidine (Phencyclidine)   Date Value Ref Range Status   01/16/2020 Not Detected NDET^Not Detected ng/mL Final     Comment:     Cutoff for a negative PCP is 25 ng/mL or less.     Cocaine (Benzoylecgonine)   Date Value Ref Range Status   01/16/2020 Not Detected NDET^Not Detected ng/mL Final     Comment:     Cutoff for a  negative cocaine is 150 ng/ml or less.     Methamphetamine (d-Methamphetamine)   Date Value Ref Range Status   01/16/2020 Not Detected NDET^Not Detected ng/mL Final     Comment:     Cutoff for a negative methamphetamine is 500 ng/ml or less.     Opiates (Morphine)   Date Value Ref Range Status   01/16/2020 Not Detected NDET^Not Detected ng/mL Final     Comment:     Cutoff for a negative opiate is 100 ng/ml or less.     Amphetamine (d-Amphetamine)   Date Value Ref Range Status   01/16/2020 Not Detected NDET^Not Detected ng/mL Final     Comment:     Cutoff for a negative amphetamine is 500 ng/mL or less.     Benzodiazepines (Nordiazepam)   Date Value Ref Range Status   01/16/2020 Not Detected NDET^Not Detected ng/mL Final     Comment:     Cutoff for a negative benzodiazepine is 150 ng/ml or less.     Tricyclic Antidepressants (Desipramine)   Date Value Ref Range Status   01/16/2020 Not Detected NDET^Not Detected ng/mL Final     Comment:     Cutoff for a negative tricyclic antidepressant is 300 ng/ml or less.     Methadone (Methadone)   Date Value Ref Range Status   01/16/2020 Not Detected NDET^Not Detected ng/mL Final     Comment:     Cutoff for a negative methadone is 200 ng/ml or less.     Barbiturates (Butalbital)   Date Value Ref Range Status   01/16/2020 Not Detected NDET^Not Detected ng/mL Final     Comment:     Cutoff for a negative barbituate is 200 ng/ml or less.     Oxycodone (Oxycodone)   Date Value Ref Range Status   01/16/2020 Detected, Abnormal Result (A) NDET^Not Detected ng/mL Final     Comment:     Cutoff for a positive oxycodone is greater than 100 ng/ml.  This is an unconfirmed screening result to be used for medical purposes only.   Order [id number] for confirmation or individual confirmation tests to [facility]       Propoxyphene (Norpropoxyphene)   Date Value Ref Range Status   01/16/2020 Not Detected NDET^Not Detected ng/mL Final     Comment:     Cutoff for a negative propoxyphene is 300 ng/ml or less      Buprenorphine (Buprenorphine)   Date Value Ref Range Status   01/16/2020 Not Detected NDET^Not Detected ng/mL Final     Comment:     Cutoff for a negative buprenorphine is 10 ng/ml or less        Processing:  Staff will hand deliver Rx to on-site pharmacy    [email or web address]   checked in past 3 months?  Yes 01/16/2020      Controlled Substance Refill Request for Oxycontin 15mg  Problem List Complete:    Yes    Last Written Prescription Date:  02/28/2020  Last Fill Quantity: 60,   # refills: 0    THE MOST RECENT OFFICE VISIT MUST BE WITHIN THE PAST 3 MONTHS. AT LEAST ONE FACE TO FACE VISIT MUST OCCUR EVERY 6 MONTHS. ADDITIONAL VISITS CAN BE VIRTUAL.  (THIS STATEMENT SHOULD BE DELETED.)    Last Office Visit with [facility] primary care provider: 01/16/2020    Future Office visit:   Next 5 appointments (look out 90 days)    Apr 21, 2020 10:00 AM CDT  Office Visit with [facility]ana MD  [facility] [address] [place] [id number]  [phone]          Controlled substance agreement:   Encounter-Level CSA - 11/30/2015:    Controlled Substance Agreement - Scan on 12/1/2015  2:00 PM: CONTROLLED SUBSTANCE AGREEMENT 11/30/15     Patient-Level CSA:    Controlled Substance Agreement - Opioid - Scan on 1/16/2020  4:43 PM         Last Urine Drug Screen:   Pain Drug SCR UR W RPTD Meds   Date Value Ref Range Status   04/17/2018 FINAL  Final     Comment:     (Note)  ====================================================================  [facility] COMP DRUG ANALYSIS,UR  ====================================================================  Test                             Result       Flag       Units        Drug Present and Declared for Prescription Verification   Carboxy-THC                    119          EXPECTED   ng/mg creat    Carboxy-THC is a metabolite of tetrahydrocannabinol  (THC).    Source of THC is most commonly illicit, but THC  is also present    in a scheduled prescription medication.   Oxycodone                      1531         EXPECTED   ng/mg creat   Oxymorphone                    501          EXPECTED   ng/mg creat   Noroxycodone                   >5208        EXPECTED   ng/mg creat   Noroxymorphone                 124          EXPECTED   ng/mg creat    Sources of oxycodone are scheduled prescription medications.    Oxymorphone, noroxycodone, and noroxymorphone are expected    metabolites of oxycodone. Oxymorphone is also available as a    scheduled prescription medication.   Gabapentin                     PRESENT      EXPECTED                 Venlafaxine                    PRESENT      EXPECTED                 Desmethylvenlafaxine           PRESENT      EXPECTED                  Desmethylvenlafaxine is an expected metabolite of venlafaxine.  ====================================================================  Test                      Result    Flag   Units      Ref Range        Creatinine              192              mg/dL      >=20            ====================================================================  Declared Medications:  The flagging and interpretation on this report are based on the  following declared medications.  Unexpected results may arise from  inaccuracies in the declared medications.  **Note: The testing scope of this panel includes these medications:  Cannabis  Gabapentin  Oxycodone  Oxycodone (OxyContin)  Venlafaxine (Effexor)  ====================================================================  For clinical consultation, please call [phone].  ====================================================================  Analysis performed by [facility] [place] [address]     , No results found for: COMDAT,   Cannabinoids (35-tvx-3-carboxy-9-THC)   Date Value Ref Range Status   01/16/2020 Not Detected NDET^Not Detected ng/mL Final     Comment:     Cutoff for a negative cannabinoid is 50  ng/mL or less.     Phencyclidine (Phencyclidine)   Date Value Ref Range Status   01/16/2020 Not Detected NDET^Not Detected ng/mL Final     Comment:     Cutoff for a negative PCP is 25 ng/mL or less.     Cocaine (Benzoylecgonine)   Date Value Ref Range Status   01/16/2020 Not Detected NDET^Not Detected ng/mL Final     Comment:     Cutoff for a negative cocaine is 150 ng/ml or less.     Methamphetamine (d-Methamphetamine)   Date Value Ref Range Status   01/16/2020 Not Detected NDET^Not Detected ng/mL Final     Comment:     Cutoff for a negative methamphetamine is 500 ng/ml or less.     Opiates (Morphine)   Date Value Ref Range Status   01/16/2020 Not Detected NDET^Not Detected ng/mL Final     Comment:     Cutoff for a negative opiate is 100 ng/ml or less.     Amphetamine (d-Amphetamine)   Date Value Ref Range Status   01/16/2020 Not Detected NDET^Not Detected ng/mL Final     Comment:     Cutoff for a negative amphetamine is 500 ng/mL or less.     Benzodiazepines (Nordiazepam)   Date Value Ref Range Status   01/16/2020 Not Detected NDET^Not Detected ng/mL Final     Comment:     Cutoff for a negative benzodiazepine is 150 ng/ml or less.     Tricyclic Antidepressants (Desipramine)   Date Value Ref Range Status   01/16/2020 Not Detected NDET^Not Detected ng/mL Final     Comment:     Cutoff for a negative tricyclic antidepressant is 300 ng/ml or less.     Methadone (Methadone)   Date Value Ref Range Status   01/16/2020 Not Detected NDET^Not Detected ng/mL Final     Comment:     Cutoff for a negative methadone is 200 ng/ml or less.     Barbiturates (Butalbital)   Date Value Ref Range Status   01/16/2020 Not Detected NDET^Not Detected ng/mL Final     Comment:     Cutoff for a negative barbituate is 200 ng/ml or less.     Oxycodone (Oxycodone)   Date Value Ref Range Status   01/16/2020 Detected, Abnormal Result (A) NDET^Not Detected ng/mL Final     Comment:     Cutoff for a positive oxycodone is greater than 100 ng/ml.  This  is an unconfirmed screening result to be used for medical purposes only.   Order [id number] for confirmation or individual confirmation tests to [facility].       Propoxyphene (Norpropoxyphene)   Date Value Ref Range Status   01/16/2020 Not Detected NDET^Not Detected ng/mL Final     Comment:     Cutoff for a negative propoxyphene is 300 ng/ml or less     Buprenorphine (Buprenorphine)   Date Value Ref Range Status   01/16/2020 Not Detected NDET^Not Detected ng/mL Final     Comment:     Cutoff for a negative buprenorphine is 10 ng/ml or less        Processing:  Staff will hand deliver Rx to on-site pharmacy    [email or web address]   checked in past 3 months?  YES 01/16/2020

## 2020-04-20 NOTE — PROGRESS NOTES
"Subjective   Ryland Gee is a 50 year old male who is being evaluated via a billable video visit.      The patient has been notified of following:     \"This video visit will be conducted via a call between you and your physician/provider. We have found that certain health care needs can be provided without the need for an in-person physical exam.  This service lets us provide the care you need with a video conversation.  If a prescription is necessary we can send it directly to your pharmacy.  If lab work is needed we can place an order for that and you can then stop by our lab to have the test done at a later time.    Video visits are billed at different rates depending on your insurance coverage.  Please reach out to your insurance provider with any questions.    If during the course of the call the physician/provider feels a video visit is not appropriate, you will not be charged for this service.\"     Patient has given verbal consent for Video visit? Yes    How would you like to obtain your AVS? E-Mail (inform patient AVS not encrypted)    Patient would like the video invitation sent by: Text to mobile phone:   Telephone Information:   Mobile [phone]         Video Start Time: 10:27 AM    Ryland Gee is a 50 year old male who presents today for the following health issues:    Chief Complaint  Patient presents with:  medication check       Diabetes Follow-up      How often are you checking your blood sugar? Morning higher 130-140 and evening     What concerns do you have today about your diabetes? None     Do you have any of these symptoms? (Select all that apply)  Numbness in feet and Burning in feet    Have you had a diabetic eye exam in the last 12 months? No    Pt does not have the dexcom or gerald insurance will not pay for it he is going to call ins and see what has to be done to get this approved    BP Readings from Last 2 Encounters:   01/16/20 110/80   06/03/19 118/70     Hemoglobin A1C (%) "   Date Value   01/18/2020 7.9 (H)   06/03/2019 7.0 (H)     LDL Cholesterol Calculated (mg/dL)   Date Value   01/18/2020 54   10/23/2018 75         Depression and Anxiety Follow-Up    How are you doing with your depression since your last visit? improved    How are you doing with your anxiety since your last visit?  Improved     Are you having other symptoms that might be associated with depression or anxiety? Yes:  low energy     Have you had a significant life event? No     Do you have any concerns with your use of alcohol or other drugs? No     Low in energy did not want to go to work - pt would like to go lower in dose of the effexor - on 75 mg XR now    Pt would like a referral for his back pain is starting to radiate down the legs.  Chronic pain follow up / Lyrica/ oxycodone / OxyContin     Social History     Tobacco Use     Smoking status: Never Smoker     Smokeless tobacco: Never Used   Substance Use Topics     Alcohol use: No     Comment: Does not drink - last was 11/2015     Drug use: No     PHQ 10/23/2018 6/3/2019 1/16/2020   PHQ-9 Total Score 11 8 13   Q9: Thoughts of better off dead/self-harm past 2 weeks Not at all Not at all Not at all     BUSHRA-7 SCORE 10/23/2018 6/3/2019 1/16/2020   Total Score 7 3 2     Last PHQ-9 1/16/2020   1.  Little interest or pleasure in doing things 2   2.  Feeling down, depressed, or hopeless 1   3.  Trouble falling or staying asleep, or sleeping too much 3   4.  Feeling tired or having little energy 3   5.  Poor appetite or overeating 2   6.  Feeling bad about yourself 1   7.  Trouble concentrating 1   8.  Moving slowly or restless 0   Q9: Thoughts of better off dead/self-harm past 2 weeks 0   PHQ-9 Total Score 13   Difficulty at work, home, or with people Somewhat difficult     BUSHRA-7  1/16/2020   1. Feeling nervous, anxious, or on edge 0   2. Not being able to stop or control worrying 0   3. Worrying too much about different things 0   4. Trouble relaxing 2   5. Being so  restless that it is hard to sit still 0   6. Becoming easily annoyed or irritable 0   7. Feeling afraid, as if something awful might happen 0   BUSHRA-7 Total Score 2   If you checked any problems, how difficult have they made it for you to do your work, take care of things at home, or get along with other people? Somewhat difficult         Suicide Assessment Five-step Evaluation and Treatment (SAFE-T)      Reviewed and updated as needed this visit by Provider  Tobacco  Allergies  Meds  Problems  Med Hx  Surg Hx  Fam Hx         ROS: Constitutional, HEENT, cardiovascular, pulmonary, GI, , musculoskeletal, neuro, skin, endocrine and psych systems are negative, except as otherwise noted.       Objective   Reported vitals:  There were no vitals taken for this visit.   Gen: healthy, alert, and no distress  Psych: Alert and oriented times 3; coherent speech, normal   rate and volume, able to articulate logical thoughts, able   to abstract reason, no tangential thoughts, no hallucinations   or delusions.  His affect is normal.    Diagnostic Test Results:  Labs reviewed in Epic    Lab Results   Component Value Date    A1C 7.9 01/18/2020    A1C 7.0 06/03/2019    A1C 6.9 10/23/2018    A1C 6.9 01/22/2018    A1C 7.5 04/18/2017             Assessment/Plan:  Harm was seen today for medication check.    Diagnoses and all orders for this visit:    Situational anxiety/Major depressive disorder, recurrent episode, mild (H) - feels slight better since stopping extra supplements and lowering his venlefexine to 75mg (was on 225mg) - will continue :   -     venlafaxine (EFFEXOR-XR) 75 MG 24 hr capsule; Take 1 capsule (75 mg) by mouth daily      Chronic low back pain / Peripheral polyneuropathy - CSA on 1/16/2020 - every 3 month med checks -  He would like to review his options with a spine doctor for his upper and lower extremity  neuropathies and chronic low back;  otherwise will continue pain medications which do help him  continue with his job and ADLs   -     Orthopedic & Spine  Referral; Future  -     oxyCODONE IR (ROXICODONE) 15 MG tablet; Take 1 tablet (15 mg) by mouth 2 times daily as needed for severe pain  -     oxyCODONE (OXYCONTIN) 15 MG 12 hr tablet; Take 1 tablet (15 mg) by mouth every 12 hours  -     pregabalin (LYRICA) 50 MG capsule; Take 1 capsule (50 mg) by mouth 3 times daily    DM -  Unable to get continuous glucose monitor due to lack of ins coverage - continue with regular monitor and continue medications and activity as tolerated.  Will recheck a1c when able coming up    Return in about 3 months (around 7/21/2020) for Medication Recheck.    Video-Visit Details    Type of service:  Video Visit    Video End Time (time video stopped): 10:52 AM    Originating Location (pt. Location): Home    Distant Location (provider location):  [facility]     Mode of Communication:  Video Conference via [facility]          Dannie Pastrana MD     [address] [address]  [email or web address]   Office: [phone]  Pager: [phone]

## 2020-04-21 ENCOUNTER — VIRTUAL VISIT (OUTPATIENT)
Dept: FAMILY MEDICINE | Facility: CLINIC | Age: 50
End: 2020-04-21
Payer: COMMERCIAL

## 2020-04-21 DIAGNOSIS — M54.50 CHRONIC LOW BACK PAIN, UNSPECIFIED BACK PAIN LATERALITY, UNSPECIFIED WHETHER SCIATICA PRESENT: ICD-10-CM

## 2020-04-21 DIAGNOSIS — E11.42 TYPE 2 DIABETES MELLITUS WITH DIABETIC POLYNEUROPATHY, WITHOUT LONG-TERM CURRENT USE OF INSULIN (H): ICD-10-CM

## 2020-04-21 DIAGNOSIS — G62.9 PERIPHERAL POLYNEUROPATHY: ICD-10-CM

## 2020-04-21 DIAGNOSIS — G89.29 CHRONIC LOW BACK PAIN, UNSPECIFIED BACK PAIN LATERALITY, UNSPECIFIED WHETHER SCIATICA PRESENT: ICD-10-CM

## 2020-04-21 DIAGNOSIS — F33.0 MAJOR DEPRESSIVE DISORDER, RECURRENT EPISODE, MILD (H): ICD-10-CM

## 2020-04-21 DIAGNOSIS — F41.8 SITUATIONAL ANXIETY: Primary | ICD-10-CM

## 2020-04-21 PROCEDURE — 99214 OFFICE O/P EST MOD 30 MIN: CPT | Mod: 95 | Performed by: FAMILY MEDICINE

## 2020-04-21 RX ORDER — PREGABALIN 50 MG/1
50 CAPSULE ORAL 3 TIMES DAILY
Qty: 90 CAPSULE | Refills: 2 | Status: SHIPPED | OUTPATIENT
Start: 2020-04-21 | End: 2020-06-09

## 2020-04-21 RX ORDER — VENLAFAXINE HYDROCHLORIDE 75 MG/1
75 CAPSULE, EXTENDED RELEASE ORAL DAILY
Qty: 90 CAPSULE | Refills: 1 | Status: SHIPPED | OUTPATIENT
Start: 2020-04-21 | End: 2020-08-24

## 2020-04-21 RX ORDER — OXYCODONE HYDROCHLORIDE 15 MG/1
15 TABLET ORAL 2 TIMES DAILY PRN
Qty: 60 TABLET | Refills: 0 | Status: SHIPPED | OUTPATIENT
Start: 2020-04-29 | End: 2020-06-02

## 2020-04-21 RX ORDER — OXYCODONE HYDROCHLORIDE 15 MG/1
15 TABLET, FILM COATED, EXTENDED RELEASE ORAL EVERY 12 HOURS
Qty: 60 TABLET | Refills: 0 | Status: SHIPPED | OUTPATIENT
Start: 2020-04-29 | End: 2020-08-04

## 2020-04-29 ENCOUNTER — TELEPHONE (OUTPATIENT)
Dept: FAMILY MEDICINE | Facility: CLINIC | Age: 50
End: 2020-04-29

## 2020-04-29 DIAGNOSIS — M54.50 CHRONIC LOW BACK PAIN, UNSPECIFIED BACK PAIN LATERALITY, UNSPECIFIED WHETHER SCIATICA PRESENT: ICD-10-CM

## 2020-04-29 DIAGNOSIS — G62.9 PERIPHERAL POLYNEUROPATHY: ICD-10-CM

## 2020-04-29 DIAGNOSIS — G89.29 CHRONIC LOW BACK PAIN, UNSPECIFIED BACK PAIN LATERALITY, UNSPECIFIED WHETHER SCIATICA PRESENT: ICD-10-CM

## 2020-04-29 NOTE — TELEPHONE ENCOUNTER
Prior Authorization Retail Medication Request    Medication/Dose: Oxycontin 15  ICD code (if different than what is on RX):  G629  Previously Tried and Failed:  unknown  Rationale:  Unknown    Insurance Name:  [employer]  Insurance ID:  [id number]      Thank You,  Ava Jaimes [facility]  [phone]

## 2020-04-29 NOTE — TELEPHONE ENCOUNTER
PA Initiation    Medication: Oxycontin 15  Insurance Company: [employer] [place] - Phone [phone] Fax [phone]  Pharmacy Filling the Rx: [facility] - [place], [place] - [address] [place] [address]  Filling Pharmacy Phone: [phone]  Filling Pharmacy Fax: [phone]  Start Date: 4/29/2020

## 2020-04-29 NOTE — TELEPHONE ENCOUNTER
PRIOR AUTHORIZATION DENIED    Medication: Oxycontin 15--DENIED    Denial Date: 4/29/2020    Denial Rational: Patient needs to try and fail 3 preferred alternatives.  Preferred medications include xtampza ER caps, morphine sulfate er, tramadol er    Appeal Information:

## 2020-04-30 NOTE — TELEPHONE ENCOUNTER
Angelia,  Patient has already been on this medication looks like down from 30 to 15 mg. Can you tell me why it is failing prior auth now? Does it need additional ICD-10 coding? Let me know.    Thanks,      Angelia Gillespie, FNP-BC

## 2020-04-30 NOTE — TELEPHONE ENCOUNTER
Team, please inquire on why this now needs a PA, new insurance?      Raegan Davalos MBA, MS, PA-C

## 2020-05-04 NOTE — TELEPHONE ENCOUNTER
Left VM for pt to call clinic back - PA was denied for Oyxcontin 15mg - need the following information    If this is new insurance since last refill on 01/23/2020    If same insurance - need to know the side effects or why pt stopped taking the following meds.    Morphine:  Tramadol:   Xtampza (Oxycodone): Pt is still taking - 15mg BID PRN      Csaba Neris CMA

## 2020-05-11 ENCOUNTER — OFFICE VISIT (OUTPATIENT)
Dept: NEUROSURGERY | Facility: CLINIC | Age: 50
End: 2020-05-11
Attending: NEUROLOGICAL SURGERY
Payer: COMMERCIAL

## 2020-05-11 VITALS
DIASTOLIC BLOOD PRESSURE: 82 MMHG | TEMPERATURE: 98.4 F | HEART RATE: 61 BPM | OXYGEN SATURATION: 97 % | SYSTOLIC BLOOD PRESSURE: 128 MMHG

## 2020-05-11 DIAGNOSIS — G62.9 PERIPHERAL POLYNEUROPATHY: ICD-10-CM

## 2020-05-11 DIAGNOSIS — M54.50 CHRONIC LOW BACK PAIN, UNSPECIFIED BACK PAIN LATERALITY, UNSPECIFIED WHETHER SCIATICA PRESENT: ICD-10-CM

## 2020-05-11 DIAGNOSIS — G89.29 CHRONIC LOW BACK PAIN, UNSPECIFIED BACK PAIN LATERALITY, UNSPECIFIED WHETHER SCIATICA PRESENT: ICD-10-CM

## 2020-05-11 PROCEDURE — 99203 OFFICE O/P NEW LOW 30 MIN: CPT | Performed by: NEUROLOGICAL SURGERY

## 2020-05-11 PROCEDURE — G0463 HOSPITAL OUTPT CLINIC VISIT: HCPCS

## 2020-05-11 ASSESSMENT — PAIN SCALES - GENERAL: PAINLEVEL: SEVERE PAIN (6)

## 2020-05-11 NOTE — LETTER
5/11/2020         RE: Ryland Gee  [address]  [facility] [address]        Dear Colleague,    Thank you for referring your patient, Ryland Gee, to the [facility]. Please see a copy of my visit note below.    It was a pleasure to see Ryland Gee today in Neurosurgery Clinic. He is a 50 year old male who is here for evaluation of back and potentially some neck problems.  He states that he has had long standing back problems that began about 5 years ago when he was bending over.  This is been the same since that time.  He also has some numbness in his legs which has been attributed to neuropathy.  He finds that his back pain is worse when he sits too long and that it radiates from the back towards the hips.  He denies any radicular type symptoms.  He also has symptoms in the upper extremities    With some numbness in the bilateral forearms particularly on the median nerve side of the hand which tend to split the ring finger.  This seems to bother him at night and seem consistent with carpal tunnel syndrome.  He is previously tried wrist splints at night which he thinks have helped somewhat.  He has never had a EMG nerve conduction study in the past.    Past Medical History:   Diagnosis Date     Diabetes (H)      IBD (inflammatory bowel disease) 12/2008    ulcerative colitis     Past Surgical History:   Procedure Laterality Date     C APPENDECTOMY       ESOPHAGOSCOPY, GASTROSCOPY, DUODENOSCOPY (EGD), COMBINED N/A 5/26/2015    Procedure: COMBINED ESOPHAGOSCOPY, GASTROSCOPY, DUODENOSCOPY (EGD), BIOPSY SINGLE OR MULTIPLE;  Surgeon: Mario Patterson MD;  Location:  GI     VASECTOMY  2002        Allergies   Allergen Reactions     Latex      Flu like symptoms with Azacol         Current Outpatient Medications:      blood glucose (CONTOUR NEXT TEST) test strip, 1 strip by In Vitro route 2 times daily, Disp: 100 each, Rfl: 1     blood glucose calibration (CONTOUR NEXT CONTROL LEVEL  2) NORMAL solution, Use to calibrate blood glucose monitor as needed as directed., Disp: 1 Bottle, Rfl: 11     blood glucose monitoring (ARTURO CONTOUR NEXT MONITOR) meter device kit, Use to test blood sugar 3 times daily or as directed., Disp: 1 kit, Rfl: 0     blood glucose monitoring (ARTURO MICROLET) lancets, Use to test blood sugar 3 times daily or as directed., Disp: 100 Box, Rfl: 11     econazole nitrate 1 % cream, Apply topically daily Apply to affected nail daily, Disp: 30 g, Rfl: 3     glimepiride (AMARYL) 4 MG tablet, Take 1 tablet (4 mg) by mouth every morning (before breakfast), Disp: 90 tablet, Rfl: 1     hydrocortisone (CORTAID) 1 % external cream, Apply topically 2 times daily, Disp: , Rfl:      IBUPROFEN PO, Take 200-400 mg by mouth every 8 hours as needed for moderate pain, Disp: , Rfl:      medical cannabis inhalation (Patient's own supply.  Not a prescription), (This is NOT a prescription, and does not certify that the patient has a qualifying medical condition for medical cannabis.  The purpose of this order is  to document that the patient reports taking medical cannabis.), Disp: 0 Information only, Rfl: 0     metFORMIN (GLUCOPHAGE) 500 MG tablet, Take 1 tablet (500 mg) by mouth 2 times daily (with meals), Disp: 180 tablet, Rfl: 1     MOVANTIK 25 MG TABS tablet, Take 1 tablet (25 mg) by mouth every morning (before breakfast), Disp: 90 tablet, Rfl: 1     Naldemedine Tosylate (SYMPROIC) 0.2 MG TABS, Take 0.2 mg by mouth daily as needed (loose stools), Disp: 30 tablet, Rfl: 1     naloxone (NARCAN) 4 MG/0.1ML nasal spray, Spray 1 spray (4 mg) into one nostril alternating nostrils once as needed for opioid reversal every 2-3 minutes until assistance arrives, Disp: 0.2 mL, Rfl: 0     omeprazole (PRILOSEC) 20 MG DR capsule, Take 1 capsule (20 mg) by mouth daily, Disp: 90 capsule, Rfl: 1     oxyCODONE (OXYCONTIN) 15 MG 12 hr tablet, Take 1 tablet (15 mg) by mouth every 12 hours, Disp: 60 tablet, Rfl:  0     oxyCODONE IR (ROXICODONE) 15 MG tablet, Take 1 tablet (15 mg) by mouth 2 times daily as needed for severe pain, Disp: 60 tablet, Rfl: 0     pregabalin (LYRICA) 50 MG capsule, Take 1 capsule (50 mg) by mouth 3 times daily, Disp: 90 capsule, Rfl: 2     sildenafil (VIAGRA) 100 MG tablet, Take 1 tablet (100 mg) by mouth daily as needed (erectile dysfunction) 30 min to 4 hrs before sex. Do not use with nitroglycerin, terazosin or doxazosin., Disp: 30 tablet, Rfl: 5     simvastatin (ZOCOR) 20 MG tablet, Take 1 tablet (20 mg) by mouth At Bedtime, Disp: 90 tablet, Rfl: 3     traZODone (DESYREL) 50 MG tablet, Take 1-3 tablets ( mg) by mouth nightly as needed for sleep, Disp: 90 tablet, Rfl: 3     venlafaxine (EFFEXOR-XR) 75 MG 24 hr capsule, Take 1 capsule (75 mg) by mouth daily, Disp: 90 capsule, Rfl: 1  Social History     Socioeconomic History     Marital status:      Spouse name: None     Number of children: None     Years of education: None     Highest education level: None   Occupational History     None   Social Needs     Financial resource strain: None     Food insecurity     Worry: None     Inability: None     Transportation needs     Medical: None     Non-medical: None   Tobacco Use     Smoking status: Never Smoker     Smokeless tobacco: Never Used   Substance and Sexual Activity     Alcohol use: No     Comment: Does not drink - last was 11/2015     Drug use: No     Sexual activity: Yes     Partners: Female     Birth control/protection: Surgical   Lifestyle     Physical activity     Days per week: None     Minutes per session: None     Stress: None   Relationships     Social connections     Talks on phone: None     Gets together: None     Attends [employer] service: None     Active member of club or organization: None     Attends meetings of clubs or organizations: None     Relationship status: None     Intimate partner violence     Fear of current or ex partner: None     Emotionally abused: None      Physically abused: None     Forced sexual activity: None   Other Topics Concern     Parent/sibling w/ CABG, MI or angioplasty before 65F 55M? Not Asked   Social History Narrative     None      Problem (# of Occurrences) Relation (Name,Age of Onset)    Diabetes (1) Father           ROS: 10 point ROS neg other than the symptoms noted above in the HPI.    Vitals:    05/11/20 1133   BP: 128/82   Pulse: 61   Temp: 98.4  F (36.9  C)   TempSrc: Oral   SpO2: 97%     There is no height or weight on file to calculate BMI.  Severe Pain (6)    Oswestry (NAUN) Questionnaire    OSWESTRY DISABILITY INDEX 3/31/2015   Count 10   Sum 21   Oswestry Score (%) 42   Some recent data might be hidden       Visual Analog Scale (VAS) Questionnaire    No flowsheet data found.       Awake alert and oriented.  Bilateral upper and lower extremity strength 5 out of 5 in all muscle groups.  Reflexes symmetric and normal.  Positive Phalen sign bilaterally upper extremities     Posture erect without deformity.    Imaging: MRI of the lumbar spine from August 2019 demonstrates degenerative changes particularly from L3-S1.  There is mild to moderate stenosis but nothing that seems to correlate significantly with his neuropathic symptoms.    Assessment: 1.  Axial low back pain.  2.  Possible bilateral carpal tunnel syndrome.    Plan: I have recommended physical therapy for his low back symptoms.  I have recommended a bilateral upper extremity EMG nerve conduction study for his upper extremity symptoms.  We will get him started in physical therapy and he is going to consider whether he wants to do the EMG or not which I think is reasonable at this time given his mild to moderate symptoms.        Again, thank you for allowing me to participate in the care of your patient.        Sincerely,        William Randhawa MD

## 2020-05-11 NOTE — PROGRESS NOTES
It was a pleasure to see Ryland Gee today in Neurosurgery Clinic. He is a 50 year old male who is here for evaluation of back and potentially some neck problems.  He states that he has had long standing back problems that began about 5 years ago when he was bending over.  This is been the same since that time.  He also has some numbness in his legs which has been attributed to neuropathy.  He finds that his back pain is worse when he sits too long and that it radiates from the back towards the hips.  He denies any radicular type symptoms.  He also has symptoms in the upper extremities    With some numbness in the bilateral forearms particularly on the median nerve side of the hand which tend to split the ring finger.  This seems to bother him at night and seem consistent with carpal tunnel syndrome.  He is previously tried wrist splints at night which he thinks have helped somewhat.  He has never had a EMG nerve conduction study in the past.    Past Medical History:   Diagnosis Date     Diabetes (H)      IBD (inflammatory bowel disease) 12/2008    ulcerative colitis     Past Surgical History:   Procedure Laterality Date     C APPENDECTOMY       ESOPHAGOSCOPY, GASTROSCOPY, DUODENOSCOPY (EGD), COMBINED N/A 5/26/2015    Procedure: COMBINED ESOPHAGOSCOPY, GASTROSCOPY, DUODENOSCOPY (EGD), BIOPSY SINGLE OR MULTIPLE;  Surgeon: Mario Patterson MD;  Location:  GI     VASECTOMY  2002        Allergies   Allergen Reactions     Latex      Flu like symptoms with Azacol         Current Outpatient Medications:      blood glucose (CONTOUR NEXT TEST) test strip, 1 strip by In Vitro route 2 times daily, Disp: 100 each, Rfl: 1     blood glucose calibration (CONTOUR NEXT CONTROL LEVEL 2) NORMAL solution, Use to calibrate blood glucose monitor as needed as directed., Disp: 1 Bottle, Rfl: 11     blood glucose monitoring (ARTURO CONTOUR NEXT MONITOR) meter device kit, Use to test blood sugar 3 times daily or as directed., Disp: 1  kit, Rfl: 0     blood glucose monitoring (ARTURO MICROLET) lancets, Use to test blood sugar 3 times daily or as directed., Disp: 100 Box, Rfl: 11     econazole nitrate 1 % cream, Apply topically daily Apply to affected nail daily, Disp: 30 g, Rfl: 3     glimepiride (AMARYL) 4 MG tablet, Take 1 tablet (4 mg) by mouth every morning (before breakfast), Disp: 90 tablet, Rfl: 1     hydrocortisone (CORTAID) 1 % external cream, Apply topically 2 times daily, Disp: , Rfl:      IBUPROFEN PO, Take 200-400 mg by mouth every 8 hours as needed for moderate pain, Disp: , Rfl:      medical cannabis inhalation (Patient's own supply.  Not a prescription), (This is NOT a prescription, and does not certify that the patient has a qualifying medical condition for medical cannabis.  The purpose of this order is  to document that the patient reports taking medical cannabis.), Disp: 0 Information only, Rfl: 0     metFORMIN (GLUCOPHAGE) 500 MG tablet, Take 1 tablet (500 mg) by mouth 2 times daily (with meals), Disp: 180 tablet, Rfl: 1     MOVANTIK 25 MG TABS tablet, Take 1 tablet (25 mg) by mouth every morning (before breakfast), Disp: 90 tablet, Rfl: 1     Naldemedine Tosylate (SYMPROIC) 0.2 MG TABS, Take 0.2 mg by mouth daily as needed (loose stools), Disp: 30 tablet, Rfl: 1     naloxone (NARCAN) 4 MG/0.1ML nasal spray, Spray 1 spray (4 mg) into one nostril alternating nostrils once as needed for opioid reversal every 2-3 minutes until assistance arrives, Disp: 0.2 mL, Rfl: 0     omeprazole (PRILOSEC) 20 MG DR capsule, Take 1 capsule (20 mg) by mouth daily, Disp: 90 capsule, Rfl: 1     oxyCODONE (OXYCONTIN) 15 MG 12 hr tablet, Take 1 tablet (15 mg) by mouth every 12 hours, Disp: 60 tablet, Rfl: 0     oxyCODONE IR (ROXICODONE) 15 MG tablet, Take 1 tablet (15 mg) by mouth 2 times daily as needed for severe pain, Disp: 60 tablet, Rfl: 0     pregabalin (LYRICA) 50 MG capsule, Take 1 capsule (50 mg) by mouth 3 times daily, Disp: 90 capsule,  Rfl: 2     sildenafil (VIAGRA) 100 MG tablet, Take 1 tablet (100 mg) by mouth daily as needed (erectile dysfunction) 30 min to 4 hrs before sex. Do not use with nitroglycerin, terazosin or doxazosin., Disp: 30 tablet, Rfl: 5     simvastatin (ZOCOR) 20 MG tablet, Take 1 tablet (20 mg) by mouth At Bedtime, Disp: 90 tablet, Rfl: 3     traZODone (DESYREL) 50 MG tablet, Take 1-3 tablets ( mg) by mouth nightly as needed for sleep, Disp: 90 tablet, Rfl: 3     venlafaxine (EFFEXOR-XR) 75 MG 24 hr capsule, Take 1 capsule (75 mg) by mouth daily, Disp: 90 capsule, Rfl: 1  Social History     Socioeconomic History     Marital status:      Spouse name: None     Number of children: None     Years of education: None     Highest education level: None   Occupational History     None   Social Needs     Financial resource strain: None     Food insecurity     Worry: None     Inability: None     Transportation needs     Medical: None     Non-medical: None   Tobacco Use     Smoking status: Never Smoker     Smokeless tobacco: Never Used   Substance and Sexual Activity     Alcohol use: No     Comment: Does not drink - last was 11/2015     Drug use: No     Sexual activity: Yes     Partners: Female     Birth control/protection: Surgical   Lifestyle     Physical activity     Days per week: None     Minutes per session: None     Stress: None   Relationships     Social connections     Talks on phone: None     Gets together: None     Attends Rastafari service: None     Active member of club or organization: None     Attends meetings of clubs or organizations: None     Relationship status: None     Intimate partner violence     Fear of current or ex partner: None     Emotionally abused: None     Physically abused: None     Forced sexual activity: None   Other Topics Concern     Parent/sibling w/ CABG, MI or angioplasty before 65F 55M? Not Asked   Social History Narrative     None      Problem (# of Occurrences) Relation (Name,Age of  Onset)    Diabetes (1) Father           ROS: 10 point ROS neg other than the symptoms noted above in the HPI.    Vitals:    05/11/20 1133   BP: 128/82   Pulse: 61   Temp: 98.4  F (36.9  C)   TempSrc: Oral   SpO2: 97%     There is no height or weight on file to calculate BMI.  Severe Pain (6)    Oswestry (NAUN) Questionnaire    OSWESTRY DISABILITY INDEX 3/31/2015   Count 10   Sum 21   Oswestry Score (%) 42   Some recent data might be hidden       Visual Analog Scale (VAS) Questionnaire    No flowsheet data found.       Awake alert and oriented.  Bilateral upper and lower extremity strength 5 out of 5 in all muscle groups.  Reflexes symmetric and normal.  Positive Phalen sign bilaterally upper extremities     Posture erect without deformity.    Imaging: MRI of the lumbar spine from August 2019 demonstrates degenerative changes particularly from L3-S1.  There is mild to moderate stenosis but nothing that seems to correlate significantly with his neuropathic symptoms.    Assessment: 1.  Axial low back pain.  2.  Possible bilateral carpal tunnel syndrome.    Plan: I have recommended physical therapy for his low back symptoms.  I have recommended a bilateral upper extremity EMG nerve conduction study for his upper extremity symptoms.  We will get him started in physical therapy and he is going to consider whether he wants to do the EMG or not which I think is reasonable at this time given his mild to moderate symptoms.

## 2020-05-11 NOTE — PATIENT INSTRUCTIONS
- Order placed for physical therapy. You can call the phone number highlighted in the order to schedule your appointment. Please call our clinic if symptoms persist after your course of physical therapy.        Please call us if you have any further questions or concerns.      [facility]   Phone: [phone]  Fax: [phone]

## 2020-05-12 NOTE — NURSING NOTE
"Ryland Gee is a 50 year old male who presents for:  Chief Complaint   Patient presents with     Neurologic Problem     chronic low back pain with paresthesias         Initial Vitals:  /82   Pulse 61   Temp 98.4  F (36.9  C) (Oral)   SpO2 97%  Estimated body mass index is 37.8 kg/m  as calculated from the following:    Height as of 1/16/20: 5' 11\" (1.803 m).    Weight as of 1/16/20: 271 lb (122.9 kg).. There is no height or weight on file to calculate BSA. BP completed using cuff size: large  Severe Pain (6)            Charlotte Malave, RN    "

## 2020-05-13 RX ORDER — OXYCODONE 9 MG/1
9 CAPSULE, EXTENDED RELEASE ORAL EVERY 12 HOURS
Refills: 0 | Status: CANCELLED | OUTPATIENT
Start: 2020-05-13

## 2020-05-13 NOTE — TELEPHONE ENCOUNTER
Called patient @ [phone] -     Patient noted he did NOT get new insurance.     Has tried:   -morphine - doesn't remember why he stopped  -Tramadol - upset stomach  -Xtampza (Oxycodone) - Patient noted he is on oxycodone currently    Routing to [place] [facility]      Patient noted he is out of the Oxycontin - is there something else he can try in the meantime?    Pharmacy: [facility]    Routing to PCP for further review/recommendations/orders.        Heidi James RN  [facility]

## 2020-05-14 RX ORDER — OXYCODONE 13.5 MG/1
13.5 CAPSULE, EXTENDED RELEASE ORAL EVERY 12 HOURS
Qty: 60 EACH | Refills: 0 | Status: SHIPPED | OUTPATIENT
Start: 2020-05-14 | End: 2020-08-04

## 2020-05-14 NOTE — TELEPHONE ENCOUNTER
Called # [phone]     Pt advised of the MD note below. Patient stated an understanding and agreed with plan.      Noam Sun RN   [facility] [place]

## 2020-05-14 NOTE — TELEPHONE ENCOUNTER
Xtampza (Oxycodone) is an extended release oxycodone and similar to the oxycontin (13.5 mg xtampza is similar to oxycontin 15 mg) and should be tried as it is on his formulary.  Please check if he is okay with this and I have sent in a prescription.

## 2020-05-26 ENCOUNTER — TELEPHONE (OUTPATIENT)
Dept: FAMILY MEDICINE | Facility: CLINIC | Age: 50
End: 2020-05-26

## 2020-05-26 DIAGNOSIS — G62.9 PERIPHERAL POLYNEUROPATHY: ICD-10-CM

## 2020-05-26 DIAGNOSIS — M54.50 CHRONIC LOW BACK PAIN, UNSPECIFIED BACK PAIN LATERALITY, UNSPECIFIED WHETHER SCIATICA PRESENT: ICD-10-CM

## 2020-05-26 DIAGNOSIS — G89.29 CHRONIC LOW BACK PAIN, UNSPECIFIED BACK PAIN LATERALITY, UNSPECIFIED WHETHER SCIATICA PRESENT: ICD-10-CM

## 2020-05-26 NOTE — TELEPHONE ENCOUNTER
Reason for Call:  call back    Detailed comments: dosage was adjusted last week and it is not work for him     Phone Number Patient can be reached at: Work number on file:  [phone] (work)    Best Time: any  Can we leave a detailed message on this number? YES    Call taken on 5/26/2020 at 2:22 PM by Jacklyn Bowers

## 2020-05-27 RX ORDER — OXYCODONE HYDROCHLORIDE 15 MG/1
15 TABLET ORAL 2 TIMES DAILY PRN
Qty: 60 TABLET | Refills: 0 | Status: CANCELLED | OUTPATIENT
Start: 2020-05-27

## 2020-05-27 NOTE — TELEPHONE ENCOUNTER
Called # [phone]     Pt stated this is not working as well as the OxyCONTIN    oxyCODONE (XTAMPZA ER) 13.5 MG 12 hr tablet  60 each  0  5/14/2020   --    Sig - Route: Take 1 tablet (13.5 mg) by mouth every 12 hours - Oral      Pt stated pain in low back and legs. Pt noted that pain is causing legs to kick and jump.     Pt is out of medication and requesting refill. Pt stated that he had used extra d/t not being able to take the OxyContin    oxyCODONE IR (ROXICODONE) 15 MG tablet  60 tablet  0  4/29/2020   No    Sig - Route: Take 1 tablet (15 mg) by mouth 2 times daily as needed for severe pain - Oral      Routing to PCP to review Oxycodone refill and advise if needing change in Xtampza ER.      Noam Sun RN   [facility] [place]

## 2020-05-27 NOTE — TELEPHONE ENCOUNTER
"Please set up office visit, virtual visit is fine, with Dr. Pastrana next week to discuss and make any changes.   Will not refill medication due to him taking extra. It is part of his opioid contract-item #2 to take as prescribed and there will be no refills if \"run out early\". He can discuss this with Dr. Pastrana.       Angelia Gillespie, [facility]  "

## 2020-05-28 NOTE — TELEPHONE ENCOUNTER
Left non-detailed message for patient to call back.  Please schedule virtual visit for next wee with Dr Pastrana.  (see previous notes for details)    Thanks Judy

## 2020-06-01 NOTE — PROGRESS NOTES
"Subjective   Ryland Gee is a 50 year old male who is being evaluated via a billable telephone visit.      The patient has been notified of following:     \"This telephone visit will be conducted via a call between you and your physician/provider. We have found that certain health care needs can be provided without the need for a physical exam.  This service lets us provide the care you need with a short phone conversation.  If a prescription is necessary we can send it directly to your pharmacy.  If lab work is needed we can place an order for that and you can then stop by our lab to have the test done at a later time.    Telephone visits are billed at different rates depending on your insurance coverage. During this emergency period, for some insurers they may be billed the same as an in-person visit.  Please reach out to your insurance provider with any questions.    If during the course of the call the physician/provider feels a telephone visit is not appropriate, you will not be charged for this service.\"     Patient has given verbal consent for Telephone visit?  Yes    How would you like to obtain your AVS? [place]    Telephone visit Start Time: 12:12 PM    Ryland Gee is a 50 year old male who presents today for the following health issues:    Chief Complaint  Patient presents with:  Medication Problem       Medication Followup of chronic pain- Xtampza    Taking Medication as prescribed: yes    Side Effects:  None    Medication Helping Symptoms:  Does not work as well as the oxycontin- maybe a higher dose- pt was taking  More of the roxicodone          Reviewed and updated as needed this visit by Provider  Tobacco  Allergies  Meds  Problems  Med Hx  Surg Hx  Fam Hx         ROS: Constitutional, HEENT, cardiovascular, pulmonary, GI, , musculoskeletal, neuro, skin, endocrine and psych systems are negative, except as otherwise noted.       Objective   Reported vitals:  There were no vitals taken for " this visit.   Gen: healthy, alert, and no distress  Psych: Alert and oriented times 3; coherent speech, normal   rate and volume, able to articulate logical thoughts, able   to abstract reason, no tangential thoughts, no hallucinations   or delusions.  His affect is normal.    Diagnostic Test Results:  Labs reviewed in Epic        Assessment/Plan:  Ryland was seen today for medication problem.    Diagnoses and all orders for this visit:    Peripheral polyneuropathy / Chronic low back pain, unspecified back pain laterality, unspecified whether sciatica present - CSA on 1/16/2020 - every 3 month med checks- worsened neuropathy pain due to less effectiveness of the Xtampza 13.5 compared to his OxyContin 15 mg- will try higher dose for better coverage and is due for refill of immediate release will recheck in 3 months at wellness visit  -     oxyCODONE IR (ROXICODONE) 15 MG tablet; Take 1 tablet (15 mg) by mouth 2 times daily as needed for severe pain  -     oxyCODONE (XTAMPZA) 18 MG 12 hr tablet; Take 1 tablet (18 mg) by mouth every 12 hours        Return in about 3 months (around 9/2/2020) for Wellness Exam and fasting labs.    Telephone visit Stop Time: 12:22 PM  Phone call duration:  9+ minutes        Dannie Pastrana MD     [address] [address]  [email or web address]   Office: [phone]  Pager: [phone]

## 2020-06-02 ENCOUNTER — VIRTUAL VISIT (OUTPATIENT)
Dept: FAMILY MEDICINE | Facility: CLINIC | Age: 50
End: 2020-06-02
Payer: COMMERCIAL

## 2020-06-02 DIAGNOSIS — G62.9 PERIPHERAL POLYNEUROPATHY: ICD-10-CM

## 2020-06-02 DIAGNOSIS — G89.29 CHRONIC LOW BACK PAIN, UNSPECIFIED BACK PAIN LATERALITY, UNSPECIFIED WHETHER SCIATICA PRESENT: ICD-10-CM

## 2020-06-02 DIAGNOSIS — M54.50 CHRONIC LOW BACK PAIN, UNSPECIFIED BACK PAIN LATERALITY, UNSPECIFIED WHETHER SCIATICA PRESENT: ICD-10-CM

## 2020-06-02 PROCEDURE — 99214 OFFICE O/P EST MOD 30 MIN: CPT | Mod: 95 | Performed by: FAMILY MEDICINE

## 2020-06-02 RX ORDER — OXYCODONE HYDROCHLORIDE 15 MG/1
15 TABLET ORAL 2 TIMES DAILY PRN
Qty: 60 TABLET | Refills: 0 | Status: SHIPPED | OUTPATIENT
Start: 2020-06-02 | End: 2020-07-01

## 2020-06-08 DIAGNOSIS — G62.9 PERIPHERAL POLYNEUROPATHY: ICD-10-CM

## 2020-06-08 DIAGNOSIS — M54.50 CHRONIC LOW BACK PAIN, UNSPECIFIED BACK PAIN LATERALITY, UNSPECIFIED WHETHER SCIATICA PRESENT: ICD-10-CM

## 2020-06-08 DIAGNOSIS — G89.29 CHRONIC LOW BACK PAIN, UNSPECIFIED BACK PAIN LATERALITY, UNSPECIFIED WHETHER SCIATICA PRESENT: ICD-10-CM

## 2020-06-08 NOTE — TELEPHONE ENCOUNTER
Outpatient Medication Detail      Disp  Refills  Start  End  ISAÍAS    pregabalin (LYRICA) 50 MG capsule  90 capsule  2  4/21/2020   No    Sig - Route: Take 1 capsule (50 mg) by mouth 3 times daily - Oral      Problem List Complete:    Yes  Last Office Visit with [facility] primary care provider: 6/2/20 VV    Future Office visit:   Next 5 appointments (look out 90 days)    Jul 30, 2020 10:00 AM CDT  PHYSICAL with [facility]ana [facility]  [facility] [address] [place] [id number]  [phone]        Controlled substance agreement:   Encounter-Level CSA - 11/30/2015:    Controlled Substance Agreement - Scan on 12/1/2015  2:00 PM: CONTROLLED SUBSTANCE AGREEMENT 11/30/15     Patient-Level CSA:    Controlled Substance Agreement - Opioid - Scan on 1/16/2020  4:43 PM       Last Urine Drug Screen:   Pain Drug SCR UR W RPTD Meds   Date Value Ref Range Status   04/17/2018 FINAL  Final     Comment:     (Note)  ====================================================================  TOXASSURE COMP DRUG ANALYSIS,UR  ====================================================================  Test                             Result       Flag       Units        Drug Present and Declared for Prescription Verification   Carboxy-THC                    119          EXPECTED   ng/mg creat    Carboxy-THC is a metabolite of tetrahydrocannabinol  (THC).    Source of THC is most commonly illicit, but THC is also present    in a scheduled prescription medication.   Oxycodone                      1531         EXPECTED   ng/mg creat   Oxymorphone                    501          EXPECTED   ng/mg creat   Noroxycodone                   >5208        EXPECTED   ng/mg creat   Noroxymorphone                 124          EXPECTED   ng/mg creat    Sources of oxycodone are scheduled prescription medications.    Oxymorphone, noroxycodone, and noroxymorphone are expected    metabolites of oxycodone.  Oxymorphone is also available as a    scheduled prescription medication.   Gabapentin                     PRESENT      EXPECTED                 Venlafaxine                    PRESENT      EXPECTED                 Desmethylvenlafaxine           PRESENT      EXPECTED                  Desmethylvenlafaxine is an expected metabolite of venlafaxine.  ====================================================================  Test                      Result    Flag   Units      Ref Range        Creatinine              192              mg/dL      >=20            ====================================================================  Declared Medications:  The flagging and interpretation on this report are based on the  following declared medications.  Unexpected results may arise from  inaccuracies in the declared medications.  **Note: The testing scope of this panel includes these medications:  Cannabis  Gabapentin  Oxycodone  Oxycodone (OxyContin)  Venlafaxine (Effexor)  ====================================================================  For clinical consultation, please call [phone].  ====================================================================  Analysis performed by [employer] [place] [address]     , No results found for: COMDAT,   Cannabinoids (81-oeq-8-carboxy-9-THC)   Date Value Ref Range Status   01/16/2020 Not Detected NDET^Not Detected ng/mL Final     Comment:     Cutoff for a negative cannabinoid is 50 ng/mL or less.     Phencyclidine (Phencyclidine)   Date Value Ref Range Status   01/16/2020 Not Detected NDET^Not Detected ng/mL Final     Comment:     Cutoff for a negative PCP is 25 ng/mL or less.     Cocaine (Benzoylecgonine)   Date Value Ref Range Status   01/16/2020 Not Detected NDET^Not Detected ng/mL Final     Comment:     Cutoff for a negative cocaine is 150 ng/ml or less.     Methamphetamine (d-Methamphetamine)   Date Value Ref Range Status   01/16/2020 Not Detected NDET^Not  Detected ng/mL Final     Comment:     Cutoff for a negative methamphetamine is 500 ng/ml or less.     Opiates (Morphine)   Date Value Ref Range Status   01/16/2020 Not Detected NDET^Not Detected ng/mL Final     Comment:     Cutoff for a negative opiate is 100 ng/ml or less.     Amphetamine (d-Amphetamine)   Date Value Ref Range Status   01/16/2020 Not Detected NDET^Not Detected ng/mL Final     Comment:     Cutoff for a negative amphetamine is 500 ng/mL or less.     Benzodiazepines (Nordiazepam)   Date Value Ref Range Status   01/16/2020 Not Detected NDET^Not Detected ng/mL Final     Comment:     Cutoff for a negative benzodiazepine is 150 ng/ml or less.     Tricyclic Antidepressants (Desipramine)   Date Value Ref Range Status   01/16/2020 Not Detected NDET^Not Detected ng/mL Final     Comment:     Cutoff for a negative tricyclic antidepressant is 300 ng/ml or less.     Methadone (Methadone)   Date Value Ref Range Status   01/16/2020 Not Detected NDET^Not Detected ng/mL Final     Comment:     Cutoff for a negative methadone is 200 ng/ml or less.     Barbiturates (Butalbital)   Date Value Ref Range Status   01/16/2020 Not Detected NDET^Not Detected ng/mL Final     Comment:     Cutoff for a negative barbituate is 200 ng/ml or less.     Oxycodone (Oxycodone)   Date Value Ref Range Status   01/16/2020 Detected, Abnormal Result (A) NDET^Not Detected ng/mL Final     Comment:     Cutoff for a positive oxycodone is greater than 100 ng/ml.  This is an unconfirmed screening result to be used for medical purposes only.   Order [id number] for confirmation or individual confirmation tests to [employer].       Propoxyphene (Norpropoxyphene)   Date Value Ref Range Status   01/16/2020 Not Detected NDET^Not Detected ng/mL Final     Comment:     Cutoff for a negative propoxyphene is 300 ng/ml or less     Buprenorphine (Buprenorphine)   Date Value Ref Range Status   01/16/2020 Not Detected NDET^Not Detected ng/mL Final     Comment:     Cutoff  for a negative buprenorphine is 10 ng/ml or less      [email or web address]      Routing refill request to provider for review/approval because:  Drug not on the [facility] refill protocol         Hedii James RN  [facility]

## 2020-06-09 RX ORDER — PREGABALIN 50 MG/1
CAPSULE ORAL
Qty: 90 CAPSULE | Refills: 2 | Status: SHIPPED | OUTPATIENT
Start: 2020-06-09 | End: 2020-08-24

## 2020-06-29 ENCOUNTER — TELEPHONE (OUTPATIENT)
Dept: FAMILY MEDICINE | Facility: CLINIC | Age: 50
End: 2020-06-29

## 2020-06-29 DIAGNOSIS — K59.03 CONSTIPATION DUE TO OPIOID THERAPY: Primary | ICD-10-CM

## 2020-06-29 DIAGNOSIS — T40.2X5A CONSTIPATION DUE TO OPIOID THERAPY: Primary | ICD-10-CM

## 2020-06-29 DIAGNOSIS — E11.42 TYPE 2 DIABETES MELLITUS WITH DIABETIC POLYNEUROPATHY, WITHOUT LONG-TERM CURRENT USE OF INSULIN (H): ICD-10-CM

## 2020-06-29 NOTE — TELEPHONE ENCOUNTER
Prior Authorization Retail Medication Request    Medication/Dose: movantik  ICD code (if different than what is on RX):  Constipation due to opioid therapy [K59.03, T40.2X5A]   Previously Tried and Failed:  unknown  Rationale:  unknown    Insurance Name:  [facility] [employer]  Insurance ID:  [id number]    -Kelsy Castorena,Certified Pharmacy Technician, EDD,       [facility], Ph. [phone]

## 2020-06-29 NOTE — TELEPHONE ENCOUNTER
PA Initiation    Medication: movantik  Insurance Company: [employer] [place] - Phone [phone] Fax [phone]  Pharmacy Filling the Rx: [facility] - [place], [place] - [address] [place] [address]  Filling Pharmacy Phone: [phone]  Filling Pharmacy Fax: [phone]  Start Date: 6/29/2020

## 2020-06-30 NOTE — TELEPHONE ENCOUNTER
Prescription approved per [facility] Refill Protocol.    Janis BAILEY RN  EP Triage

## 2020-06-30 NOTE — TELEPHONE ENCOUNTER
PRIOR AUTHORIZATION DENIED    Medication: movantik--DENIED    Denial Date: 6/30/2020    Denial Rational: Patient needs to try and fail lactulose.    Appeal Information:

## 2020-07-01 RX ORDER — LACTULOSE 10 G/15ML
10 SOLUTION ORAL 2 TIMES DAILY PRN
Qty: 236 ML | Refills: 5 | Status: ON HOLD | OUTPATIENT
Start: 2020-07-01 | End: 2020-11-21

## 2020-07-01 NOTE — TELEPHONE ENCOUNTER
Called # [phone]     PT has not tried Lactulose, is willing to try. Pt stated he has tried other medications but Pt noted the Movantik is a medication that does work very well for him. Pt will do what ever insurance is requesting.     Routing to PCP to review and advise medication pended.      Noam Sun RN   [facility]

## 2020-07-01 NOTE — TELEPHONE ENCOUNTER
I think he has two medications for opioid related constipation so maybe he can use the other medication  - please let him know that his insurance requires that he fail lactulose to have this medication covered.  Has he tried lactulose?  If yes, what happened

## 2020-07-31 DIAGNOSIS — M54.50 CHRONIC LOW BACK PAIN, UNSPECIFIED BACK PAIN LATERALITY, UNSPECIFIED WHETHER SCIATICA PRESENT: ICD-10-CM

## 2020-07-31 DIAGNOSIS — G89.29 CHRONIC LOW BACK PAIN, UNSPECIFIED BACK PAIN LATERALITY, UNSPECIFIED WHETHER SCIATICA PRESENT: ICD-10-CM

## 2020-07-31 DIAGNOSIS — G62.9 PERIPHERAL POLYNEUROPATHY: ICD-10-CM

## 2020-07-31 RX ORDER — OXYCODONE HYDROCHLORIDE 15 MG/1
TABLET ORAL
Qty: 60 TABLET | Refills: 0 | Status: SHIPPED | OUTPATIENT
Start: 2020-08-01 | End: 2020-08-24

## 2020-07-31 RX ORDER — OXYCODONE 18 MG/1
18 CAPSULE, EXTENDED RELEASE ORAL EVERY 12 HOURS
Qty: 180 TABLET | Refills: 0 | Status: SHIPPED | OUTPATIENT
Start: 2020-07-31 | End: 2020-08-04

## 2020-07-31 NOTE — TELEPHONE ENCOUNTER
Controlled Substance Refill Request for Oxycodone  Problem List Complete:    Yes    Last Written Prescription Date:  7/2/2020  Last Fill Quantity: 60,   # refills: 0    THE MOST RECENT OFFICE VISIT MUST BE WITHIN THE PAST 3 MONTHS. AT LEAST ONE FACE TO FACE VISIT MUST OCCUR EVERY 6 MONTHS. ADDITIONAL VISITS CAN BE VIRTUAL.  (THIS STATEMENT SHOULD BE DELETED.)    Last Office Visit with [facility] primary care provider: [place] office visit 7/30/2020    Future Office visit:   Next 5 appointments (look out 90 days)    Aug 24, 2020  2:40 PM CDT  PHYSICAL with [facility]ana MD  [facility] [address] [place] [id number] [phone]          Controlled substance agreement:   Encounter-Level CSA - 11/30/2015:    Controlled Substance Agreement - Scan on 12/1/2015  2:00 PM: CONTROLLED SUBSTANCE AGREEMENT 11/30/15     Patient-Level CSA:    Controlled Substance Agreement - Opioid - Scan on 1/16/2020  4:43 PM         Last Urine Drug Screen:   Pain Drug SCR UR W RPTD Meds   Date Value Ref Range Status   04/17/2018 FINAL  Final     Comment:     (Note)  ====================================================================  TOXASSURE COMP DRUG ANALYSIS,UR  ====================================================================  Test                             Result       Flag       Units        Drug Present and Declared for Prescription Verification   Carboxy-THC                    119          EXPECTED   ng/mg creat    Carboxy-THC is a metabolite of tetrahydrocannabinol  (THC).    Source of THC is most commonly illicit, but THC is also present    in a scheduled prescription medication.   Oxycodone                      1531         EXPECTED   ng/mg creat   Oxymorphone                    501          EXPECTED   ng/mg creat   Noroxycodone                   >5208        EXPECTED   ng/mg creat   Noroxymorphone                 124          EXPECTED   ng/mg creat    Sources  of oxycodone are scheduled prescription medications.    Oxymorphone, noroxycodone, and noroxymorphone are expected    metabolites of oxycodone. Oxymorphone is also available as a    scheduled prescription medication.   Gabapentin                     PRESENT      EXPECTED                 Venlafaxine                    PRESENT      EXPECTED                 Desmethylvenlafaxine           PRESENT      EXPECTED                  Desmethylvenlafaxine is an expected metabolite of venlafaxine.  ====================================================================  Test                      Result    Flag   Units      Ref Range        Creatinine              192              mg/dL      >=20            ====================================================================  Declared Medications:  The flagging and interpretation on this report are based on the  following declared medications.  Unexpected results may arise from  inaccuracies in the declared medications.  **Note: The testing scope of this panel includes these medications:  Cannabis  Gabapentin  Oxycodone  Oxycodone (OxyContin)  Venlafaxine (Effexor)  ====================================================================  For clinical consultation, please call [phone].  ====================================================================  Analysis performed by [employer] [place] [address]     , No results found for: COMDAT,   Cannabinoids (04-wih-6-carboxy-9-THC)   Date Value Ref Range Status   01/16/2020 Not Detected NDET^Not Detected ng/mL Final     Comment:     Cutoff for a negative cannabinoid is 50 ng/mL or less.     Phencyclidine (Phencyclidine)   Date Value Ref Range Status   01/16/2020 Not Detected NDET^Not Detected ng/mL Final     Comment:     Cutoff for a negative PCP is 25 ng/mL or less.     Cocaine (Benzoylecgonine)   Date Value Ref Range Status   01/16/2020 Not Detected NDET^Not Detected ng/mL Final     Comment:     Cutoff for a  negative cocaine is 150 ng/ml or less.     Methamphetamine (d-Methamphetamine)   Date Value Ref Range Status   01/16/2020 Not Detected NDET^Not Detected ng/mL Final     Comment:     Cutoff for a negative methamphetamine is 500 ng/ml or less.     Opiates (Morphine)   Date Value Ref Range Status   01/16/2020 Not Detected NDET^Not Detected ng/mL Final     Comment:     Cutoff for a negative opiate is 100 ng/ml or less.     Amphetamine (d-Amphetamine)   Date Value Ref Range Status   01/16/2020 Not Detected NDET^Not Detected ng/mL Final     Comment:     Cutoff for a negative amphetamine is 500 ng/mL or less.     Benzodiazepines (Nordiazepam)   Date Value Ref Range Status   01/16/2020 Not Detected NDET^Not Detected ng/mL Final     Comment:     Cutoff for a negative benzodiazepine is 150 ng/ml or less.     Tricyclic Antidepressants (Desipramine)   Date Value Ref Range Status   01/16/2020 Not Detected NDET^Not Detected ng/mL Final     Comment:     Cutoff for a negative tricyclic antidepressant is 300 ng/ml or less.     Methadone (Methadone)   Date Value Ref Range Status   01/16/2020 Not Detected NDET^Not Detected ng/mL Final     Comment:     Cutoff for a negative methadone is 200 ng/ml or less.     Barbiturates (Butalbital)   Date Value Ref Range Status   01/16/2020 Not Detected NDET^Not Detected ng/mL Final     Comment:     Cutoff for a negative barbituate is 200 ng/ml or less.     Oxycodone (Oxycodone)   Date Value Ref Range Status   01/16/2020 Detected, Abnormal Result (A) NDET^Not Detected ng/mL Final     Comment:     Cutoff for a positive oxycodone is greater than 100 ng/ml.  This is an unconfirmed screening result to be used for medical purposes only.   Order [id number] for confirmation or individual confirmation tests to [employer].       Propoxyphene (Norpropoxyphene)   Date Value Ref Range Status   01/16/2020 Not Detected NDET^Not Detected ng/mL Final     Comment:     Cutoff for a negative propoxyphene is 300 ng/ml or less      Buprenorphine (Buprenorphine)   Date Value Ref Range Status   01/16/2020 Not Detected NDET^Not Detected ng/mL Final     Comment:     Cutoff for a negative buprenorphine is 10 ng/ml or less      Routing refill request to provider for review/approval because:  Drug not on the [facility] refill protocol for both medications.     [place] reviewed:  reviewed- recommend provider review    [place]:  [email or web address]    Mar yJo Duke RN, BSN  [facility]

## 2020-08-03 ENCOUNTER — TELEPHONE (OUTPATIENT)
Dept: FAMILY MEDICINE | Facility: CLINIC | Age: 50
End: 2020-08-03

## 2020-08-03 DIAGNOSIS — G62.9 PERIPHERAL POLYNEUROPATHY: ICD-10-CM

## 2020-08-03 DIAGNOSIS — M54.50 CHRONIC LOW BACK PAIN, UNSPECIFIED BACK PAIN LATERALITY, UNSPECIFIED WHETHER SCIATICA PRESENT: ICD-10-CM

## 2020-08-03 DIAGNOSIS — G89.29 CHRONIC LOW BACK PAIN, UNSPECIFIED BACK PAIN LATERALITY, UNSPECIFIED WHETHER SCIATICA PRESENT: ICD-10-CM

## 2020-08-03 NOTE — TELEPHONE ENCOUNTER
Just FYI- rx for Xtampza written for 8/1 was for qty 180 (90 days supply).  Pt picked up only 30 days supply (needed it before larger qty could be ordered.)  The remainder of the Rx is voided   Thank you,  Angelia Rubi [facility], [facility] [place] [phone]

## 2020-08-04 DIAGNOSIS — E11.42 TYPE 2 DIABETES MELLITUS WITH DIABETIC POLYNEUROPATHY, WITHOUT LONG-TERM CURRENT USE OF INSULIN (H): ICD-10-CM

## 2020-08-04 RX ORDER — OXYCODONE 18 MG/1
18 CAPSULE, EXTENDED RELEASE ORAL EVERY 12 HOURS
Qty: 60 TABLET | Refills: 0 | Status: SHIPPED | OUTPATIENT
Start: 2020-08-30 | End: 2020-09-30

## 2020-08-04 NOTE — TELEPHONE ENCOUNTER
Routing refill request to provider for review/approval because:  Labs not current:  Over due for A1C 1/18/20 7.9.  Mena Oconnor RN

## 2020-08-06 RX ORDER — GLIMEPIRIDE 4 MG/1
TABLET ORAL
Qty: 90 TABLET | Refills: 1 | Status: SHIPPED | OUTPATIENT
Start: 2020-08-06 | End: 2021-01-04

## 2020-08-24 ENCOUNTER — TELEPHONE (OUTPATIENT)
Dept: FAMILY MEDICINE | Facility: CLINIC | Age: 50
End: 2020-08-24

## 2020-08-24 ENCOUNTER — OFFICE VISIT (OUTPATIENT)
Dept: FAMILY MEDICINE | Facility: CLINIC | Age: 50
End: 2020-08-24
Payer: COMMERCIAL

## 2020-08-24 VITALS
DIASTOLIC BLOOD PRESSURE: 70 MMHG | BODY MASS INDEX: 39.2 KG/M2 | SYSTOLIC BLOOD PRESSURE: 108 MMHG | HEIGHT: 71 IN | WEIGHT: 280 LBS | HEART RATE: 79 BPM | TEMPERATURE: 97 F | OXYGEN SATURATION: 97 %

## 2020-08-24 DIAGNOSIS — L84 CORN OR CALLUS: ICD-10-CM

## 2020-08-24 DIAGNOSIS — K51.919 ULCERATIVE COLITIS WITH COMPLICATION, UNSPECIFIED LOCATION (H): ICD-10-CM

## 2020-08-24 DIAGNOSIS — Z00.00 ROUTINE GENERAL MEDICAL EXAMINATION AT A HEALTH CARE FACILITY: Primary | ICD-10-CM

## 2020-08-24 DIAGNOSIS — Z12.5 SCREENING FOR PROSTATE CANCER: ICD-10-CM

## 2020-08-24 DIAGNOSIS — G89.29 CHRONIC LOW BACK PAIN, UNSPECIFIED BACK PAIN LATERALITY, UNSPECIFIED WHETHER SCIATICA PRESENT: ICD-10-CM

## 2020-08-24 DIAGNOSIS — G47.00 INSOMNIA, UNSPECIFIED TYPE: ICD-10-CM

## 2020-08-24 DIAGNOSIS — T40.2X5A CONSTIPATION DUE TO OPIOID THERAPY: ICD-10-CM

## 2020-08-24 DIAGNOSIS — M54.50 CHRONIC LOW BACK PAIN, UNSPECIFIED BACK PAIN LATERALITY, UNSPECIFIED WHETHER SCIATICA PRESENT: ICD-10-CM

## 2020-08-24 DIAGNOSIS — E78.5 HYPERLIPIDEMIA LDL GOAL <70: ICD-10-CM

## 2020-08-24 DIAGNOSIS — N52.9 ERECTILE DYSFUNCTION, UNSPECIFIED ERECTILE DYSFUNCTION TYPE: ICD-10-CM

## 2020-08-24 DIAGNOSIS — F41.8 SITUATIONAL ANXIETY: ICD-10-CM

## 2020-08-24 DIAGNOSIS — E66.01 MORBID OBESITY DUE TO EXCESS CALORIES (H): ICD-10-CM

## 2020-08-24 DIAGNOSIS — M72.0 CONTRACTURE OF PALMAR FASCIA (DUPUYTREN'S): ICD-10-CM

## 2020-08-24 DIAGNOSIS — R10.13 EPIGASTRIC PAIN: ICD-10-CM

## 2020-08-24 DIAGNOSIS — E11.42 TYPE 2 DIABETES MELLITUS WITH DIABETIC POLYNEUROPATHY, WITHOUT LONG-TERM CURRENT USE OF INSULIN (H): ICD-10-CM

## 2020-08-24 DIAGNOSIS — F33.0 MAJOR DEPRESSIVE DISORDER, RECURRENT EPISODE, MILD (H): ICD-10-CM

## 2020-08-24 DIAGNOSIS — G62.9 PERIPHERAL POLYNEUROPATHY: ICD-10-CM

## 2020-08-24 DIAGNOSIS — K59.03 CONSTIPATION DUE TO OPIOID THERAPY: ICD-10-CM

## 2020-08-24 PROCEDURE — 99396 PREV VISIT EST AGE 40-64: CPT | Performed by: FAMILY MEDICINE

## 2020-08-24 PROCEDURE — 82043 UR ALBUMIN QUANTITATIVE: CPT | Performed by: FAMILY MEDICINE

## 2020-08-24 RX ORDER — PREGABALIN 75 MG/1
75 CAPSULE ORAL 3 TIMES DAILY
Qty: 90 CAPSULE | Refills: 2 | Status: SHIPPED | OUTPATIENT
Start: 2020-08-24 | End: 2020-12-06

## 2020-08-24 RX ORDER — OXYCODONE HYDROCHLORIDE 15 MG/1
15 TABLET ORAL 2 TIMES DAILY PRN
Qty: 60 TABLET | Refills: 0 | Status: SHIPPED | OUTPATIENT
Start: 2020-08-31 | End: 2020-09-30

## 2020-08-24 RX ORDER — SIMVASTATIN 20 MG
20 TABLET ORAL AT BEDTIME
Qty: 90 TABLET | Refills: 3 | Status: SHIPPED | OUTPATIENT
Start: 2020-08-24 | End: 2021-04-15

## 2020-08-24 RX ORDER — TRAZODONE HYDROCHLORIDE 50 MG/1
50-150 TABLET, FILM COATED ORAL
Qty: 90 TABLET | Refills: 3 | Status: SHIPPED | OUTPATIENT
Start: 2020-08-24 | End: 2021-04-15

## 2020-08-24 RX ORDER — NALOXEGOL OXALATE 25 MG/1
25 TABLET, FILM COATED ORAL
Qty: 90 TABLET | Refills: 1 | Status: SHIPPED | OUTPATIENT
Start: 2020-08-24 | End: 2021-04-15

## 2020-08-24 RX ORDER — VENLAFAXINE HYDROCHLORIDE 75 MG/1
75 CAPSULE, EXTENDED RELEASE ORAL DAILY
Qty: 90 CAPSULE | Refills: 1 | Status: SHIPPED | OUTPATIENT
Start: 2020-08-24 | End: 2020-12-24

## 2020-08-24 ASSESSMENT — ANXIETY QUESTIONNAIRES
2. NOT BEING ABLE TO STOP OR CONTROL WORRYING: NOT AT ALL
7. FEELING AFRAID AS IF SOMETHING AWFUL MIGHT HAPPEN: NOT AT ALL
1. FEELING NERVOUS, ANXIOUS, OR ON EDGE: NOT AT ALL
GAD7 TOTAL SCORE: 1
5. BEING SO RESTLESS THAT IT IS HARD TO SIT STILL: SEVERAL DAYS
3. WORRYING TOO MUCH ABOUT DIFFERENT THINGS: NOT AT ALL
IF YOU CHECKED OFF ANY PROBLEMS ON THIS QUESTIONNAIRE, HOW DIFFICULT HAVE THESE PROBLEMS MADE IT FOR YOU TO DO YOUR WORK, TAKE CARE OF THINGS AT HOME, OR GET ALONG WITH OTHER PEOPLE: NOT DIFFICULT AT ALL
6. BECOMING EASILY ANNOYED OR IRRITABLE: NOT AT ALL

## 2020-08-24 ASSESSMENT — PATIENT HEALTH QUESTIONNAIRE - PHQ9
SUM OF ALL RESPONSES TO PHQ QUESTIONS 1-9: 8
5. POOR APPETITE OR OVEREATING: NOT AT ALL

## 2020-08-24 ASSESSMENT — MIFFLIN-ST. JEOR: SCORE: 2162.2

## 2020-08-24 NOTE — TELEPHONE ENCOUNTER
Prior Authorization Retail Medication Request    Medication/Dose: movantik  ICD code (if different than what is on RX):  Constipation due to opioid therapy [K59.03, T40.2X5A]   Previously Tried and Failed:  Unknown  (has tried lactulose- not sure if he failed the therapy or not)  Rationale:  unknown    Insurance Name:  [facility] [employer]  Insurance ID:  [id number]    -Kelsy Castorena,Certified Pharmacy Technician, CP,       [facility], Ph. [phone]

## 2020-08-25 ASSESSMENT — ANXIETY QUESTIONNAIRES: GAD7 TOTAL SCORE: 1

## 2020-08-25 NOTE — TELEPHONE ENCOUNTER
Routing to PA team to help initiate    Noam ALSTON RN   [facility] Triage

## 2020-08-25 NOTE — TELEPHONE ENCOUNTER
Central Prior Authorization Team   Phone: [phone]      PA Initiation    Medication: movantik  Insurance Company: [employer] [place] - Phone [phone] Fax [phone]  Pharmacy Filling the Rx: [facility] [place] [address] [place] [address]  Filling Pharmacy Phone: [phone]  Filling Pharmacy Fax:    Start Date: 8/25/2020

## 2020-08-26 LAB
CREAT UR-MCNC: 235 MG/DL
MICROALBUMIN UR-MCNC: 8 MG/L
MICROALBUMIN/CREAT UR: 3.39 MG/G CR (ref 0–17)

## 2020-08-26 NOTE — TELEPHONE ENCOUNTER
Prior Authorization Approval    Authorization Effective Date: 8/24/2020  Authorization Expiration Date: 8/24/2021  Medication: movantik  Approved Dose/Quantity:    Reference #:     Insurance Company: [employer] [place] - Phone [phone] Fax [phone]  Expected CoPay:       CoPay Card Available:      Foundation Assistance Needed:    Which Pharmacy is filling the prescription (Not needed for infusion/clinic administered): [facility] - [place], [place] - [address] [place] [address]  Pharmacy Notified: Yes  Patient Notified: Yes  **Instructed pharmacy to notify patient when script is ready to /ship.**

## 2020-08-29 NOTE — RESULT ENCOUNTER NOTE
Dear Ryland,    Here is a summary of your recent test results:  -Microalbumin (urine protein) test is normal.  ADVISE: rechecking this annually.    For additional lab test information, labtestsonline.org is an excellent reference.    In addition, here is a list of due or overdue Health Maintenance reminders:  Eye Exam due   A1C Lab due       Please call us at [phone] (or use [employer]) to address the above recommendations if needed.           Thank you very much for trusting me and [facility].     Have a peaceful day.    Healthy regards,  Dannie Pastrana MD

## 2020-09-29 DIAGNOSIS — G62.9 PERIPHERAL POLYNEUROPATHY: ICD-10-CM

## 2020-09-29 DIAGNOSIS — M54.50 CHRONIC LOW BACK PAIN, UNSPECIFIED BACK PAIN LATERALITY, UNSPECIFIED WHETHER SCIATICA PRESENT: ICD-10-CM

## 2020-09-29 DIAGNOSIS — G89.29 CHRONIC LOW BACK PAIN, UNSPECIFIED BACK PAIN LATERALITY, UNSPECIFIED WHETHER SCIATICA PRESENT: ICD-10-CM

## 2020-09-30 RX ORDER — OXYCODONE HYDROCHLORIDE 15 MG/1
15 TABLET ORAL 2 TIMES DAILY PRN
Qty: 60 TABLET | Refills: 0 | Status: SHIPPED | OUTPATIENT
Start: 2020-09-30 | End: 2020-10-30

## 2020-09-30 RX ORDER — OXYCODONE 18 MG/1
18 CAPSULE, EXTENDED RELEASE ORAL EVERY 12 HOURS
Qty: 60 TABLET | Refills: 0 | Status: SHIPPED | OUTPATIENT
Start: 2020-09-30 | End: 2020-10-30

## 2020-09-30 NOTE — TELEPHONE ENCOUNTER
Controlled Substance Refill Request for oxyCODONE (XTAMPZA ER) 18 MG 12 hr tablet     oxyCODONE IR (ROXICODONE) 15 MG tablet     Problem List Complete:  Yes    Peripheral polyneuropathy   5/12/2016      Chronic low back pain - CSA on 1/16/2020 - every 3 month med checks   5/12/2016    Overview    Patient is followed by Todd Pastrana MD for ongoing prescription of pain medication.  All refills should only be approved by this provider, or covering partner.     Medication(s): Pregablin 50 mg capsule. Oxycodone 15 mg tablet. Oxycodone IR 15 mg tablet.   Maximum quantity per month: 90 , 60 , 60   Clinic visit frequency required: Q 3 months      Controlled substance agreement:  Encounter-Level CSA - 11/30/2015:    Controlled Substance Agreement - Scan on 12/1/2015  2:00 PM: CONTROLLED SUBSTANCE AGREEMENT 11/30/15      Patient-Level CSA:    Controlled Substance Agreement - Opioid - Scan on 1/16/2020  4:43 PM         Pain Clinic evaluation in the past: Yes       Date/Location:  4/24/2018 at [facility]     DIRE Total Score(s):    1/16/2020   Total Score 19         Last [facility] website verification:  done on 1/16/20   [email or web address]        checked in past 3 months?  No, route to RN

## 2020-09-30 NOTE — TELEPHONE ENCOUNTER
Patient called to let us know that he is completely out of this medication and would like us to rush the refill if at all possible.

## 2020-10-06 DIAGNOSIS — E11.42 TYPE 2 DIABETES MELLITUS WITH DIABETIC POLYNEUROPATHY, WITHOUT LONG-TERM CURRENT USE OF INSULIN (H): ICD-10-CM

## 2020-10-07 NOTE — TELEPHONE ENCOUNTER
Routing refill request to provider for review/approval because:  Labs not current:  Hemoglobin A1C

## 2020-11-20 ENCOUNTER — HOSPITAL ENCOUNTER (OUTPATIENT)
Facility: CLINIC | Age: 50
Setting detail: OBSERVATION
Discharge: HOME | End: 2020-11-21
Attending: EMERGENCY MEDICINE | Admitting: INTERNAL MEDICINE
Payer: COMMERCIAL

## 2020-11-20 ENCOUNTER — APPOINTMENT (OUTPATIENT)
Dept: CT IMAGING | Facility: CLINIC | Age: 50
End: 2020-11-20
Attending: EMERGENCY MEDICINE
Payer: COMMERCIAL

## 2020-11-20 DIAGNOSIS — J96.01 ACUTE RESPIRATORY FAILURE WITH HYPOXIA (H): Primary | ICD-10-CM

## 2020-11-20 DIAGNOSIS — R42 LIGHTHEADEDNESS: ICD-10-CM

## 2020-11-20 LAB
ALBUMIN UR-MCNC: NEGATIVE MG/DL
AMPHETAMINES UR QL SCN: NEGATIVE
ANION GAP SERPL CALCULATED.3IONS-SCNC: 5 MMOL/L (ref 3–14)
APPEARANCE UR: CLEAR
BARBITURATES UR QL: NEGATIVE
BASE EXCESS BLDV CALC-SCNC: 3.5 MMOL/L
BASOPHILS # BLD AUTO: 0 10E9/L (ref 0–0.2)
BASOPHILS NFR BLD AUTO: 0.4 %
BENZODIAZ UR QL: NEGATIVE
BILIRUB UR QL STRIP: NEGATIVE
BUN SERPL-MCNC: 11 MG/DL (ref 7–30)
CALCIUM SERPL-MCNC: 8.7 MG/DL (ref 8.5–10.1)
CANNABINOIDS UR QL SCN: POSITIVE
CHLORIDE SERPL-SCNC: 108 MMOL/L (ref 94–109)
CO2 SERPL-SCNC: 26 MMOL/L (ref 20–32)
COCAINE UR QL: NEGATIVE
COLOR UR AUTO: ABNORMAL
CREAT SERPL-MCNC: 1.19 MG/DL (ref 0.66–1.25)
D DIMER PPP FEU-MCNC: 0.6 UG/ML FEU (ref 0–0.5)
DIFFERENTIAL METHOD BLD: NORMAL
EOSINOPHIL # BLD AUTO: 0.1 10E9/L (ref 0–0.7)
EOSINOPHIL NFR BLD AUTO: 2 %
ERYTHROCYTE [DISTWIDTH] IN BLOOD BY AUTOMATED COUNT: 13.1 % (ref 10–15)
GFR SERPL CREATININE-BSD FRML MDRD: 71 ML/MIN/{1.73_M2}
GLUCOSE SERPL-MCNC: 196 MG/DL (ref 70–99)
GLUCOSE UR STRIP-MCNC: 70 MG/DL
HCO3 BLDV-SCNC: 31 MMOL/L (ref 21–28)
HCT VFR BLD AUTO: 43 % (ref 40–53)
HGB BLD-MCNC: 14.3 G/DL (ref 13.3–17.7)
HGB UR QL STRIP: NEGATIVE
HYALINE CASTS #/AREA URNS LPF: 1 /LPF (ref 0–2)
IMM GRANULOCYTES # BLD: 0 10E9/L (ref 0–0.4)
IMM GRANULOCYTES NFR BLD: 0 %
INTERPRETATION ECG - MUSE: NORMAL
KETONES UR STRIP-MCNC: NEGATIVE MG/DL
LEUKOCYTE ESTERASE UR QL STRIP: NEGATIVE
LYMPHOCYTES # BLD AUTO: 3 10E9/L (ref 0.8–5.3)
LYMPHOCYTES NFR BLD AUTO: 42.8 %
MCH RBC QN AUTO: 29.5 PG (ref 26.5–33)
MCHC RBC AUTO-ENTMCNC: 33.3 G/DL (ref 31.5–36.5)
MCV RBC AUTO: 89 FL (ref 78–100)
MONOCYTES # BLD AUTO: 0.6 10E9/L (ref 0–1.3)
MONOCYTES NFR BLD AUTO: 8.8 %
NEUTROPHILS # BLD AUTO: 3.2 10E9/L (ref 1.6–8.3)
NEUTROPHILS NFR BLD AUTO: 46 %
NITRATE UR QL: NEGATIVE
NRBC # BLD AUTO: 0 10*3/UL
NRBC BLD AUTO-RTO: 0 /100
NT-PROBNP SERPL-MCNC: 30 PG/ML (ref 0–450)
O2/TOTAL GAS SETTING VFR VENT: 2 %
OPIATES UR QL SCN: POSITIVE
OXYHGB MFR BLDV: 86 %
PCO2 BLDV: 62 MM HG (ref 40–50)
PCP UR QL SCN: NEGATIVE
PH BLDV: 7.31 PH (ref 7.32–7.43)
PH UR STRIP: 5.5 PH (ref 5–7)
PLATELET # BLD AUTO: 213 10E9/L (ref 150–450)
PO2 BLDV: 54 MM HG (ref 25–47)
POTASSIUM SERPL-SCNC: 4.4 MMOL/L (ref 3.4–5.3)
RBC # BLD AUTO: 4.84 10E12/L (ref 4.4–5.9)
RBC #/AREA URNS AUTO: 0 /HPF (ref 0–2)
SARS-COV-2 RNA SPEC QL NAA+PROBE: NORMAL
SODIUM SERPL-SCNC: 139 MMOL/L (ref 133–144)
SOURCE: ABNORMAL
SP GR UR STRIP: 1.03 (ref 1–1.03)
SPECIMEN SOURCE: NORMAL
SQUAMOUS #/AREA URNS AUTO: <1 /HPF (ref 0–1)
TROPONIN I SERPL-MCNC: <0.015 UG/L (ref 0–0.04)
TSH SERPL DL<=0.005 MIU/L-ACNC: 0.54 MU/L (ref 0.4–4)
UROBILINOGEN UR STRIP-MCNC: NORMAL MG/DL (ref 0–2)
WBC # BLD AUTO: 6.9 10E9/L (ref 4–11)
WBC #/AREA URNS AUTO: 2 /HPF (ref 0–5)

## 2020-11-20 PROCEDURE — 36415 COLL VENOUS BLD VENIPUNCTURE: CPT | Performed by: EMERGENCY MEDICINE

## 2020-11-20 PROCEDURE — 99285 EMERGENCY DEPT VISIT HI MDM: CPT | Mod: 25

## 2020-11-20 PROCEDURE — 84484 ASSAY OF TROPONIN QUANT: CPT | Performed by: EMERGENCY MEDICINE

## 2020-11-20 PROCEDURE — 250N000009 HC RX 250: Performed by: EMERGENCY MEDICINE

## 2020-11-20 PROCEDURE — 84443 ASSAY THYROID STIM HORMONE: CPT | Performed by: EMERGENCY MEDICINE

## 2020-11-20 PROCEDURE — 85379 FIBRIN DEGRADATION QUANT: CPT | Performed by: EMERGENCY MEDICINE

## 2020-11-20 PROCEDURE — C9803 HOPD COVID-19 SPEC COLLECT: HCPCS

## 2020-11-20 PROCEDURE — U0003 INFECTIOUS AGENT DETECTION BY NUCLEIC ACID (DNA OR RNA); SEVERE ACUTE RESPIRATORY SYNDROME CORONAVIRUS 2 (SARS-COV-2) (CORONAVIRUS DISEASE [COVID-19]), AMPLIFIED PROBE TECHNIQUE, MAKING USE OF HIGH THROUGHPUT TECHNOLOGIES AS DESCRIBED BY CMS-2020-01-R: HCPCS | Performed by: EMERGENCY MEDICINE

## 2020-11-20 PROCEDURE — 85025 COMPLETE CBC W/AUTO DIFF WBC: CPT | Performed by: EMERGENCY MEDICINE

## 2020-11-20 PROCEDURE — 99220 PR INITIAL OBSERVATION CARE,LEVEL III: CPT | Performed by: INTERNAL MEDICINE

## 2020-11-20 PROCEDURE — 80307 DRUG TEST PRSMV CHEM ANLYZR: CPT | Performed by: EMERGENCY MEDICINE

## 2020-11-20 PROCEDURE — 82375 ASSAY CARBOXYHB QUANT: CPT | Performed by: EMERGENCY MEDICINE

## 2020-11-20 PROCEDURE — G0378 HOSPITAL OBSERVATION PER HR: HCPCS

## 2020-11-20 PROCEDURE — 82805 BLOOD GASES W/O2 SATURATION: CPT | Performed by: EMERGENCY MEDICINE

## 2020-11-20 PROCEDURE — 71275 CT ANGIOGRAPHY CHEST: CPT

## 2020-11-20 PROCEDURE — 83880 ASSAY OF NATRIURETIC PEPTIDE: CPT | Performed by: EMERGENCY MEDICINE

## 2020-11-20 PROCEDURE — 80048 BASIC METABOLIC PNL TOTAL CA: CPT | Performed by: EMERGENCY MEDICINE

## 2020-11-20 PROCEDURE — 93005 ELECTROCARDIOGRAM TRACING: CPT

## 2020-11-20 PROCEDURE — 250N000011 HC RX IP 250 OP 636: Performed by: EMERGENCY MEDICINE

## 2020-11-20 PROCEDURE — 81001 URINALYSIS AUTO W/SCOPE: CPT | Performed by: EMERGENCY MEDICINE

## 2020-11-20 RX ORDER — IOPAMIDOL 755 MG/ML
83 INJECTION, SOLUTION INTRAVASCULAR ONCE
Status: COMPLETED | OUTPATIENT
Start: 2020-11-20 | End: 2020-11-20

## 2020-11-20 RX ADMIN — SODIUM CHLORIDE 100 ML: 9 INJECTION, SOLUTION INTRAVENOUS at 21:14

## 2020-11-20 RX ADMIN — IOPAMIDOL 83 ML: 755 INJECTION, SOLUTION INTRAVENOUS at 21:14

## 2020-11-20 ASSESSMENT — ENCOUNTER SYMPTOMS
FEVER: 0
NERVOUS/ANXIOUS: 1
ABDOMINAL PAIN: 0
HEADACHES: 0
NAUSEA: 1
SHORTNESS OF BREATH: 1
PALPITATIONS: 1
DIZZINESS: 1

## 2020-11-20 ASSESSMENT — MIFFLIN-ST. JEOR: SCORE: 2130.45

## 2020-11-21 VITALS
SYSTOLIC BLOOD PRESSURE: 135 MMHG | RESPIRATION RATE: 18 BRPM | BODY MASS INDEX: 42.27 KG/M2 | OXYGEN SATURATION: 93 % | TEMPERATURE: 97.3 F | HEART RATE: 62 BPM | HEIGHT: 69 IN | WEIGHT: 285.4 LBS | DIASTOLIC BLOOD PRESSURE: 81 MMHG

## 2020-11-21 PROBLEM — R00.2 PALPITATIONS: Status: ACTIVE | Noted: 2020-11-21

## 2020-11-21 LAB
COHGB MFR BLD: 2.2 % (ref 0–2)
CRP SERPL-MCNC: 5 MG/L (ref 0–8)
GLUCOSE BLDC GLUCOMTR-MCNC: 183 MG/DL (ref 70–99)
GLUCOSE BLDC GLUCOMTR-MCNC: 190 MG/DL (ref 70–99)
LABORATORY COMMENT REPORT: NORMAL
SARS-COV-2 RNA SPEC QL NAA+PROBE: NEGATIVE
SPECIMEN SOURCE: NORMAL

## 2020-11-21 PROCEDURE — 999N001017 HC STATISTIC GLUCOSE BY METER IP

## 2020-11-21 PROCEDURE — G0378 HOSPITAL OBSERVATION PER HR: HCPCS

## 2020-11-21 PROCEDURE — 86140 C-REACTIVE PROTEIN: CPT | Performed by: INTERNAL MEDICINE

## 2020-11-21 PROCEDURE — 250N000013 HC RX MED GY IP 250 OP 250 PS 637: Performed by: INTERNAL MEDICINE

## 2020-11-21 PROCEDURE — 99217 PR OBSERVATION CARE DISCHARGE: CPT | Performed by: INTERNAL MEDICINE

## 2020-11-21 RX ORDER — AMOXICILLIN 250 MG
1 CAPSULE ORAL 2 TIMES DAILY PRN
Status: DISCONTINUED | OUTPATIENT
Start: 2020-11-21 | End: 2020-11-21 | Stop reason: HOSPADM

## 2020-11-21 RX ORDER — LANOLIN ALCOHOL/MO/W.PET/CERES
3 CREAM (GRAM) TOPICAL
Status: DISCONTINUED | OUTPATIENT
Start: 2020-11-21 | End: 2020-11-21 | Stop reason: HOSPADM

## 2020-11-21 RX ORDER — AMOXICILLIN 250 MG
2 CAPSULE ORAL 2 TIMES DAILY
Status: DISCONTINUED | OUTPATIENT
Start: 2020-11-21 | End: 2020-11-21 | Stop reason: HOSPADM

## 2020-11-21 RX ORDER — AMOXICILLIN 250 MG
2 CAPSULE ORAL 2 TIMES DAILY PRN
Status: DISCONTINUED | OUTPATIENT
Start: 2020-11-21 | End: 2020-11-21 | Stop reason: HOSPADM

## 2020-11-21 RX ORDER — ONDANSETRON 2 MG/ML
4 INJECTION INTRAMUSCULAR; INTRAVENOUS EVERY 6 HOURS PRN
Status: DISCONTINUED | OUTPATIENT
Start: 2020-11-21 | End: 2020-11-21 | Stop reason: HOSPADM

## 2020-11-21 RX ORDER — ONDANSETRON 4 MG/1
4 TABLET, ORALLY DISINTEGRATING ORAL EVERY 6 HOURS PRN
Status: DISCONTINUED | OUTPATIENT
Start: 2020-11-21 | End: 2020-11-21 | Stop reason: HOSPADM

## 2020-11-21 RX ORDER — AMOXICILLIN 250 MG
1 CAPSULE ORAL 2 TIMES DAILY
Status: DISCONTINUED | OUTPATIENT
Start: 2020-11-21 | End: 2020-11-21 | Stop reason: HOSPADM

## 2020-11-21 RX ORDER — ACETAMINOPHEN 650 MG/1
650 SUPPOSITORY RECTAL EVERY 4 HOURS PRN
Status: DISCONTINUED | OUTPATIENT
Start: 2020-11-21 | End: 2020-11-21 | Stop reason: HOSPADM

## 2020-11-21 RX ORDER — POLYETHYLENE GLYCOL 3350 17 G/17G
17 POWDER, FOR SOLUTION ORAL DAILY PRN
Status: DISCONTINUED | OUTPATIENT
Start: 2020-11-21 | End: 2020-11-21 | Stop reason: HOSPADM

## 2020-11-21 RX ORDER — ACETAMINOPHEN 325 MG/1
650 TABLET ORAL EVERY 4 HOURS PRN
Status: DISCONTINUED | OUTPATIENT
Start: 2020-11-21 | End: 2020-11-21 | Stop reason: HOSPADM

## 2020-11-21 RX ORDER — PROCHLORPERAZINE MALEATE 5 MG
10 TABLET ORAL EVERY 6 HOURS PRN
Status: DISCONTINUED | OUTPATIENT
Start: 2020-11-21 | End: 2020-11-21 | Stop reason: HOSPADM

## 2020-11-21 RX ORDER — POLYETHYLENE GLYCOL 3350 17 G/17G
17 POWDER, FOR SOLUTION ORAL DAILY
Status: DISCONTINUED | OUTPATIENT
Start: 2020-11-21 | End: 2020-11-21 | Stop reason: HOSPADM

## 2020-11-21 RX ORDER — PROCHLORPERAZINE 25 MG
25 SUPPOSITORY, RECTAL RECTAL EVERY 12 HOURS PRN
Status: DISCONTINUED | OUTPATIENT
Start: 2020-11-21 | End: 2020-11-21 | Stop reason: HOSPADM

## 2020-11-21 RX ADMIN — ACETAMINOPHEN 650 MG: 325 TABLET, FILM COATED ORAL at 01:58

## 2020-11-21 RX ADMIN — DOCUSATE SODIUM 50 MG AND SENNOSIDES 8.6 MG 1 TABLET: 8.6; 5 TABLET, FILM COATED ORAL at 08:36

## 2020-11-21 RX ADMIN — POLYETHYLENE GLYCOL 3350 17 G: 17 POWDER, FOR SOLUTION ORAL at 08:36

## 2020-11-21 ASSESSMENT — MIFFLIN-ST. JEOR: SCORE: 2154.95

## 2020-11-21 NOTE — PLAN OF CARE
RECEIVING UNIT ED HANDOFF REVIEW    ED Nurse Handoff Report was reviewed by: Honorio Franklin RN on November 21, 2020 at 12:02 AM

## 2020-11-21 NOTE — PLAN OF CARE
Admission    Patient arrives to room 618-2 via cart from ED.  Care plan note: A&Ox4. VSS on 2 L. C/o lower back pain, generalized pain;Hot packs and  PRN Tylenol given; helpful    Inpatient nursing criteria listed below were met:    PCD's Documented: NA  Skin issues/needs documented :NA  Isolation education started/completed No  Patient allergies verified with patient: Yes  Verified completion of [place] Risk Assessment Tool:  Yes  Verified completion of Guardianship screening tool: Yes  Fall Prevention: Care plan updated, Education given and documented Yes  Care Plan initiated: Yes  Home medications documented in belongings flowsheet: NA  Patient belongings documented in belongings flowsheet: Yes  Reminder note (belongings/ medications) placed in discharge instructions:Yes  Admission profile/ required documentation complete: Yes  Bedside Report Letter given and explained to patient No  Visitor Designated? NA  If patient is a 72 hour hold/Commitment are belongings removed from room and locked up? NA

## 2020-11-21 NOTE — DISCHARGE SUMMARY
[facility]  Hospitalist Discharge Summary      Date of Admission:  11/20/2020  Date of Discharge:  11/21/2020  1:53 PM  Discharging Provider: Bryce Lechuga DO      Discharge Diagnoses   1. Acute hypoxic respiratory distress, likely related to CO exposure   2. Palpitations, unclear etiology   3. HLP   4. 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 Chronic back pain   6. Anxiety    Follow-ups Needed After Discharge   Follow-up Appointments     Follow-up and recommended labs and tests       Follow up with primary care provider, Todd Pastrana, within 1-2 weeks,   for hospital follow- up. The following labs/tests are recommended:   Consider echocardiogram and further cardiac evaluation of the   palpitations.           Unresulted Labs Ordered in the Past 30 Days of this Admission     No orders found for last 31 day(s).        Discharge Disposition   Discharged to home  Condition at discharge: Stable    Hospital Course   Ryland Gee is a very pleasant 50 year old male with type 2 diabetes with gastroparesis and peripheral neuropathy, dyslipidemia, obesity, ulcerative colitis, chronic back pain, anxiety disorder who was admitted on 11/20/2020 with palpitations and hypoxia.     Palpitations, sinus tachycardia  Cardiomegaly  Dyslipidemia  12 lead EKG with sinus tachycardia, no ischemic changes. Chest CT negative for PE, noted to have cardiomegaly but no ground glass opacities.  It does not appear that he has had an echocardiogram in the past.  -TTE ordered but not done as patient was still a PUI on discharge  -Patient to follow up with PCP and discuss further evaluation      Hypoxia  Initially requiring 4 L by nasal cannula at present.  On room air was 85%.  CO level was elevated at 2.2.  Able to be titrated off by morning.  COVID 19 PCR negative after discharge.  Patient was okay with discharge home       Consultations This Hospital Stay   None    Code Status   Full Code    Time  Spent on this Encounter   I, Bryce Lechuga DO, personally saw the patient today and spent less than or equal to 30 minutes discharging this patient.       Bryce Lechuga DO  [place] [address] [facility]  [address] [place] [address]  Phone: [phone]  ______________________________________________________________________    Physical Exam   Vital Signs: Temp: 97.3  F (36.3  C) Temp src: Oral BP: 135/81 Pulse: 62   Resp: 18 SpO2: 93 % O2 Device: None (Room air) Oxygen Delivery: 2 LPM  Weight: 285 lbs 6.4 oz  General Appearance: Resting comfortably.  NAD   Respiratory: No respiratory distress on RA   Cardiovascular: RRR  GI: Soft.  Non-distended  Skin: No obvious rashes or cyanosis   Other: Moving all extremities.  No edema         Primary Care Physician   Todd Pastrana    Discharge Orders      COVID-19 Get[facility] Loop Referral      Reason for your hospital stay    Palpitations     Follow-up and recommended labs and tests     Follow up with primary care provider, Todd Pastrana, within 1-2 weeks, for hospital follow- up. The following labs/tests are recommended: Consider echocardiogram and further cardiac evaluation of the palpitations.     Activity    Your activity upon discharge: activity as tolerated     Diet    Follow this diet upon discharge: Orders Placed This Encounter      Regular Diet Adult       Significant Results and Procedures   ,   Results for orders placed or perf  Most Recent 3 CBC's:  Recent Labs   Lab Test 11/20/20  1852 04/18/17  1033 05/11/16  1655   WBC 6.9 6.9 9.2   HGB 14.3 14.0 14.4   MCV 89 89 88    242 237     Most Recent 3 BMP's:  Recent Labs   Lab Test 11/20/20  1852 01/18/20  1051 06/03/19  1130    140 140   POTASSIUM 4.4 4.4 4.4   CHLORIDE 108 106 107   CO2 26 28 28   BUN 11 12 14   CR 1.19 1.01 1.29*   ANIONGAP 5 6 5   HEDY 8.7 9.2 8.7   * 150* 104*   ormed during the hospital encounter of 11/20/20   CT Chest Pulmonary  Embolism w Contrast    Narrative    CT CHEST PULMONARY EMBOLISM WITH CONTRAST  11/20/2020 9:15 PM    HISTORY: Palpitations, PE suspected, intermediate probability,  positive D-dimer.    TECHNIQUE: Scans obtained from the apices through the diaphragm with  IV contrast. 83 mL Isovue-370 IV injected. Radiation dose for this  scan was reduced using automated exposure control, adjustment of the  mA and/or kV according to patient size, or iterative reconstruction  technique.    COMPARISON: None available    FINDINGS:  Chest/mediastinum: No evidence of pulmonary embolism. Mild  cardiomegaly. No significant pericardial effusion. No significant  mediastinal, hilar or axillary lymphadenopathy.    Lungs and pleura: No pleural effusion or pneumothorax. Bibasilar  pulmonary opacities, likely atelectatic.    Upper abdomen: Diffuse hypoattenuation of the liver, likely due to  underlying hepatic steatosis.    Bones and soft tissue: No suspicious osseous lesion.      Impression    IMPRESSION:   1. No evidence of pulmonary embolism.  2. Mild cardiomegaly.  3. Significant hepatic steatosis.    DANIELLE AVELAR MD       Discharge Medications   Discharge Medication List as of 11/21/2020 12:50 PM      CONTINUE these medications which have NOT CHANGED    Details   blood glucose (CONTOUR NEXT TEST) test strip 1 strip by In Vitro route 2 times daily, Disp-100 each, R-1, E-Prescribe      blood glucose calibration (CONTOUR NEXT CONTROL LEVEL 2) NORMAL solution Use to calibrate blood glucose monitor as needed as directed., Disp-1 Bottle, R-11, E-Prescribe      blood glucose monitoring ([employer] CONTOUR NEXT MONITOR) meter device kit Use to test blood sugar 3 times daily or as directed.Disp-1 kit, [id number]      blood glucose monitoring (ARTURO MICROLET) lancets Use to test blood sugar 3 times daily or as directed., Disp-100 Box, R-11, E-Prescribe      glimepiride (AMARYL) 4 MG tablet TAKE ONE TABLET BY MOUTH EVERY MORNING BEFORE BREAKFAST,  Disp-90 tablet, R-1, E-Prescribe      medical cannabis inhalation (Patient's own supply.  Not a prescription) (This is NOT a prescription, and does not certify that the patient has a qualifying medical condition for medical cannabis.  The purpose of this order is  to document that the patient reports taking medical cannabis.), Historical      metFORMIN (GLUCOPHAGE) 500 MG tablet Take 1 tablet (500 mg) by mouth 2 times daily (with meals), Disp-180 tablet, R-0, E-Prescribe      MOVANTIK 25 MG TABS tablet Take 1 tablet (25 mg) by mouth every morning (before breakfast), Disp-90 tablet, R-1, ISAÍAS, E-Prescribe      omeprazole (PRILOSEC) 20 MG DR capsule Take 1 capsule (20 mg) by mouth daily, Disp-90 capsule, R-1, E-Prescribe      oxyCODONE IR (ROXICODONE) 15 MG tablet TAKE 1 TABLET (15 MG) BY MOUTH 2 TIMES DAILY AS NEEDED FOR SEVERE PAIN, Disp-60 tablet, R-0, E-Prescribe      pregabalin (LYRICA) 75 MG capsule Take 1 capsule (75 mg) by mouth 3 times daily, Disp-90 capsule, R-2, E-Prescribe      simvastatin (ZOCOR) 20 MG tablet Take 1 tablet (20 mg) by mouth At Bedtime, Disp-90 tablet, R-3, E-Prescribe      traZODone (DESYREL) 50 MG tablet Take 1-3 tablets ( mg) by mouth nightly as needed for sleep, Disp-90 tablet, R-3, E-Prescribe      venlafaxine (EFFEXOR-XR) 75 MG 24 hr capsule Take 1 capsule (75 mg) by mouth daily, Disp-90 capsule, R-1, E-Prescribe      XTAMPZA ER 18 MG 12 hr tablet TAKE 1 TABLET (18 MG) BY MOUTH EVERY 12 HOURS, Disp-60 each, R-0, E-Prescribe           Allergies   Allergies   Allergen Reactions     Latex      Flu like symptoms with Azacol

## 2020-11-21 NOTE — ED NOTES
Bed: ED02  Expected date:   Expected time:   Means of arrival:   Comments:  534  50 m sudden onset/cp/sob/started 45 mins ago/asa and nitroglycerin given  1822

## 2020-11-21 NOTE — PLAN OF CARE
Patient has been discharged on 11/21/2020 at 1330. Discharge instructions and education reviewed, medication schedule and follow up appts discussed. Pt has no questions. All belongings sent with pt. Pt transported home via wife.

## 2020-11-21 NOTE — PROGRESS NOTES
Pharmacy Medication History  Admission medication history interview status for the 11/20/2020  admission is complete. See EPIC admission navigator for prior to admission medications       Medication history sources: Patient and Surescripts  Location of interview: Phone  Medication history source reliability: Good  Adherence assessment: Good    Significant changes made to the medication list:  - Deleted: econazole 1 % cream, hydrocortisone 1% cream, ibuprofen, lactulose solution, naldemedine, naloxone nasal spray, sildenafil (Pt reported he is not on these anymore)  - Changed: pregabalin 75 mg PO TID --> 75 mg PO BID                     Trazodone 50 mg 1-3 tablets PO daily PRN --> 1/2 tablet PO HS PRN    Additional medication history information:   none    Medication reconciliation completed by provider prior to medication history? No    Time spent in this activity: 30 minutes      Prior to Admission medications    Medication Sig Last Dose Taking? Auth Provider   glimepiride (AMARYL) 4 MG tablet TAKE ONE TABLET BY MOUTH EVERY MORNING BEFORE BREAKFAST 11/20/2020 at AM Yes Todd Pastrana MD   medical cannabis inhalation (Patient's own supply.  Not a prescription) (This is NOT a prescription, and does not certify that the patient has a qualifying medical condition for medical cannabis.  The purpose of this order is  to document that the patient reports taking medical cannabis.) 11/20/2020 at AM Yes Todd Pastrana MD   metFORMIN (GLUCOPHAGE) 500 MG tablet Take 1 tablet (500 mg) by mouth 2 times daily (with meals) 11/20/2020 at AM Yes Todd Pastrana MD   MOVANTIK 25 MG TABS tablet Take 1 tablet (25 mg) by mouth every morning (before breakfast) 11/16/2020 at AM Yes Todd Pastrana MD   omeprazole (PRILOSEC) 20 MG DR capsule Take 1 capsule (20 mg) by mouth daily 11/16/2020 at PM Yes Todd Pastrana MD   oxyCODONE IR (ROXICODONE) 15 MG tablet TAKE 1 TABLET (15 MG) BY MOUTH 2 TIMES DAILY AS NEEDED FOR SEVERE PAIN  11/20/2020 at PM Yes Todd Pastrana MD   pregabalin (LYRICA) 75 MG capsule Take 1 capsule (75 mg) by mouth 3 times daily  Patient taking differently: Take 75 mg by mouth 2 times daily  11/20/2020 at PM Yes Todd Pastrana MD   simvastatin (ZOCOR) 20 MG tablet Take 1 tablet (20 mg) by mouth At Bedtime 11/19/2020 at PM Yes Todd Pastrana MD   traZODone (DESYREL) 50 MG tablet Take 1-3 tablets ( mg) by mouth nightly as needed for sleep  Patient taking differently: Take 25 mg by mouth nightly as needed for sleep  PRN Yes Todd Pastrana MD   venlafaxine (EFFEXOR-XR) 75 MG 24 hr capsule Take 1 capsule (75 mg) by mouth daily 11/20/2020 at AM Yes Todd Pastrana MD   XTAMPZA ER 18 MG 12 hr tablet TAKE 1 TABLET (18 MG) BY MOUTH EVERY 12 HOURS 11/20/2020 at AM Yes Todd Pastrana MD   blood glucose (CONTOUR NEXT TEST) test strip 1 strip by In Vitro route 2 times daily   Todd Pastrana MD   blood glucose calibration (CONTOUR NEXT CONTROL LEVEL 2) NORMAL solution Use to calibrate blood glucose monitor as needed as directed.   Todd Pastrana MD   blood glucose monitoring ([employer] CONTOUR NEXT MONITOR) meter device kit Use to test blood sugar 3 times daily or as directed.   Todd Pastrana MD   blood glucose monitoring (ARTURO MICROLET) lancets Use to test blood sugar 3 times daily or as directed.   Todd Pastrana MD

## 2020-11-21 NOTE — PLAN OF CARE
Cognitive Concerns/ Orientation : A&Ox4   BEHAVIOR & AGGRESSION TOOL COLOR: Green  CIWA SCORE: NA   ABNL VS/O2: VSS on 2L(was weaned down from 4L) O2 sats 95-97% on 2L.  MOBILITY: Independent  PAIN MANAGMENT: PRN Tylenol, hot packs  DIET: Regular  BOWEL/BLADDER: Continent  ABNL LAB/BG:   DRAIN/DEVICES: PIV  TELEMETRY RHYTHM: SB  SKIN: Intact  TESTS/PROCEDURES:NA   D/C DAY/GOALS/PLACE: Pending 2-3 days  OTHER IMPORTANT INFO: COVID-19 results pending. Special Precautions for COVID 19 maintained

## 2020-11-21 NOTE — DISCHARGE INSTRUCTIONS
Belongings: cell phone, shoes, clothes, credit cards &cash.        If an appointment was recommended for you please call ASAP to schedule. Be aware, DUE TO COVID-19 PANDEMIC, MANY CLINICS ARE TEMPORARILY NOT TAKING IN-PERSON APPOINTMENTS. The clinic may schedule you for a virtual or phone visit--if this is the case, please answer your phone. If you develop any symptoms or have any followup questions please call your primary care clinic. If it is an emergency, please dial 911.      It is very important to check in with your provider--during a phone or clinic visit, talk about your hospital stay.  Tell the clinic provider how you feel.  Talk about the medications listed on the discharge papers.  Your doctor will update records, make sure you are still doing OK, and decide if any tests or medication changes are needed.      [employer] (virtual appointments)  Please use [employer] for scheduling virtual visits with your primary care provider.  This will also give you access to notes from the doctors who saw you at the hospital.     If you are scheduled for a Video Visit,  -Video visit instructions will be sent to you from your [employer] account to your e-mail address ([email or web address]). If you have a smartphone, we recommend the [employer] Mobile americo which you can download from your amer[employer] store.  -For help, visit the website [email or web address] and/or call the [employer] Help team at [phone] if they would like assistance preparing for their video visit.

## 2020-11-21 NOTE — ED NOTES
"[facility]  ED Nurse Handoff Report    ED Chief complaint: Chest Pain      ED Diagnosis:   Final diagnoses:   Acute respiratory failure with hypoxia (H)   Lightheadedness       Code Status: Full Code    Allergies:   Allergies   Allergen Reactions     Latex      Flu like symptoms with Azacol         Patient Story: Pt arrives via EMS from work where approx 1 hour ago he experienced a sudden onset of chest pain, nausea, dyspnea and lightheadedness. Pt received 324mg of Asprin, 2 doses of SL nitroglycerin, and 4mg of Zofran  Focused Assessment:  Patient is alert and oriented x 4, calm and cooperative. VS are stable, skin is warm and dry, patient complains of some shortness of breath and has persistently low SpO2 on room air requiring 2L supplemental O2 to maintain SpO2>94%.  Patient denied chest pain, dizziness, and nausea.       Treatments and/or interventions provided: None at this time.   Patient's response to treatments and/or interventions: Stable.    To be done/followed up on inpatient unit:  Monitor    Does this patient have any cognitive concerns?: None    Activity level - Baseline/Home:  Independent  Activity Level - Current:   Independent    Patient's Preferred language: English   Needed?: No    Isolation: None and COVID r/o and special precautions  Infection: Not Applicable  COVID r/o and special precautions  Patient tested for COVID 19 prior to admission: YES  Bariatric?: No    Vital Signs:   Vitals:    11/20/20 1853   BP: (!) 138/101   Pulse: 101   Resp: 22   Temp: 98.8  F (37.1  C)   SpO2: 90%   Weight: 127 kg (280 lb)   Height: 1.753 m (5' 9\")       Cardiac Rhythm:     Was the PSS-3 completed:   Yes  What interventions are required if any?               Family Comments: No family present at this time.  OBS brochure/video discussed/provided to patient/family: Yes              Name of person given brochure if not patient: NA              Relationship to patient: NA    For the " majority of the shift this patient's behavior was Green.   Behavioral interventions performed were NA.    ED NURSE PHONE NUMBER: [phone]

## 2020-11-21 NOTE — PROGRESS NOTES
Observation goals PRIOR TO DISCHARGE     Comments: -diagnostic tests and consults completed and resulted: Not met, echo to be completed  -vital signs normal or at patient baseline: Met  -dyspnea improved and O2 sats greater than 88% on room air or prior home oxygen levels : Met  Nurse to notify provider when observation goals have been met and patient is ready for discharge

## 2020-11-21 NOTE — ED TRIAGE NOTES
Pt arrives via EMS from work where approx 1 hour ago he experienced a sudden onset of chest pain, nausea, dyspnea and lightheadedness. Pt received 324mg of Asprin, 2 doses of SL nitroglycerin, and 4mg of Zofran.

## 2020-11-21 NOTE — ED PROVIDER NOTES
History   Chief Complaint:  Palpitations and dizziness    The history is provided by the patient.      Ryland Gee is a 50 year old male with history of type 2 diabetes mellitus and hyperlipidemia who presents with dizziness and palpitations. He states he has been having palpitations intermittently for quite a while. He has been set up with a Holter monitor for this in 2019. Around 1715 today, he noted dizziness followed by a flutter in his chest. No overt chest pain but he has had dyspnea and nausea. He denies fevers, abdominal pain, or headaches. He notes he has mild low back pain, but this is chronic. He also notes chronic lower extremity edema. He does not use oxygen at home. He does feel somewhat anxious currently. He denies alcohol or tobacco use. He denies known exposures to COVID    CARDIAC RISK FACTORS:  Sex:    male  Tobacco:   no  Hypertension:   no  Hyperlipidemia:  Yes  Diabetes:   Yes    Allergies:  Latex    Medications:   Metformin  Movantik  Symproic  Narcan PRN  Omeprazole  Lyrica   Zocor   Trazodone  Effexor   Viagra   Xtampza     Past Medical History:    Diabetes mellitus  IBD  Right lateral epicondylitis   Type 2 diabetes mellitus  hypotestosteronemia   Chronic low back pain   Major depressive disorder   Erectile dysfunction   Hyperlipidemia  Situational anxiety   Ulcerative colitis   Morbid obesity   Osteoarthritis    Gastroparesis     Past Surgical History:    Appendectomy  Vasectomy      Family History:    Diabetes mellitus     Social History:  The patient was unaccompanied to the ED.  Smoking Status: Never Smoker  Smokeless Tobacco: Never Used  Alcohol Use: No  Drug Use: No  PCP: Todd Pastrana     Review of Systems   Constitutional: Negative for fever.   Respiratory: Positive for shortness of breath.    Cardiovascular: Positive for palpitations. Negative for chest pain.   Gastrointestinal: Positive for nausea. Negative for abdominal pain.   Neurological: Positive for dizziness. Negative  "for headaches.   Psychiatric/Behavioral: The patient is nervous/anxious.    All other systems reviewed and are negative.    Physical Exam     Patient Vitals for the past 24 hrs:   BP Temp Pulse Resp SpO2 Height Weight   11/20/20 1853 (!) 138/101 98.8  F (37.1  C) 101 22 90 % 1.753 m (5' 9\") 127 kg (280 lb)     Physical Exam  General: Alert, appears well-developed and well-nourished. Cooperative.     In mild distress  HEENT:  Head:  Atraumatic  Ears:  External ears are normal  Mouth/Throat:  Oropharynx is without erythema or exudate and mucous membranes are moist.   Eyes:   Conjunctivae normal and EOM are normal. No scleral icterus.  CV:  Tachycardic rate, regular rhythm, normal heart sounds and radial pulses are 2+ and symmetric.  No murmur.  Resp:  Breath sounds are clear bilaterally, hypoxic on room air with 85% SpO2 with good waveform.     Non-labored, no retractions or accessory muscle use  GI:  Abdomen is soft, no distension, no tenderness. No rebound or guarding.  No CVA tenderness bilaterally  MS:  Normal range of motion. Trace edema.    Normal strength in all 4 extremities.     Back atraumatic.    No midline cervical, thoracic, or lumbar tenderness  Skin:  Warm and dry.  No rash or lesions noted.  Neuro: Alert. Normal strength.  GCS: 15  Psych:  Normal mood and affect.    Emergency Department Course   ECG:  ECG taken at 1853, ECG read at 1856  Sinus tachycardia  Cannot rule out anterior infarct, age undetermined  Abnormal ECG  No significant change when compared to EKG dated 1/6/2020.   Rate 101 bpm. NY interval 174 ms. QRS duration 96 ms. QT/QTc 366/474 ms. P-R-T axes 32 73 6.    Imaging:  Radiology findings were communicated with the patient who voiced understanding of the findings.    CT Chest pulmonary embolism with Contrast  IMPRESSION:   1. No evidence of pulmonary embolism.   2. Mild cardiomegaly.   3. Significant hepatic steatosis.   Reading per radiology     Laboratory:  Laboratory findings were " communicated with the patient who voiced understanding of the findings.    COVID19 Virus PCR by nasopharyngeal swab pending    CBC: WBC 6.9, HGB 14.3,   BMP: glucose 196(H) o/w WNL (Creatinine 1.19)  Troponin (Collected 1852): <0.015  TSH with free T4 reflex: 0.54  Ddimer: 0.6(H)  BNP: 30  VBG and oxyhgb: pending  Carbon monoxide: pending     UA with microscopic pending  Drug abuse screen 77 urine pending     Emergency Department Course:  Nursing notes and vitals reviewed.    1845 I performed an exam of the patient as documented above.     2150 I rechecked the patient. Explained findings to the Patient.    2230 I spoke with Dr. Parker of the Hospitalist service from [facility] regarding patient's presentation, findings, and plan of care.     Findings and plan explained to the Patient who consents to admission. Discussed the patient with Dr. Parker, who will admit the patient to a observation bed for further monitoring, evaluation, and treatment.     Impression & Plan    Covid-19  Ryland Gee was evaluated during a global COVID-19 pandemic, which necessitated consideration that the patient might be at risk for infection with the SARS-CoV-2 virus that causes COVID-19.   Applicable protocols for evaluation were followed during the patient's care.   COVID-19 was considered as part of the patient's evaluation. The plan for testing is:  a test was obtained during this visit.    Medical Decision Making:  Rlyand Gee is a 50-year-old male who presents with lightheadedness and palpitations.  Initial vital signs concerning for hypoxia with SPO2 of 85% on room air.  Fortunately patient does not appear to be acutely short of breath nor tachypneic, although good waveform on room air does show 85%.  Patient was started on 2 L of oxygen by nasal cannula with appropriate improvement in his oxygenation.  Blood work grossly unremarkable.  D-dimer mildly elevated; we did obtain a CT scan of the chest.  Reassuringly no  signs of pulmonary infiltrates although there was mild cardiomegaly noted.  No signs of heart failure on his physical exam.  ABG concerning for mild respiratory acidosis.  Patient does not recreationally abuse opiate medications.  Low concern for carbon monoxide toxicity, lab result returned at 2.2%.  Certainly this may represent an atypical COVID-19 presentation as patient has no other associated symptoms beyond lightheadedness and palpitations.  With unclear etiology for the patient's acute respiratory failure with hypoxia, patient will be admitted.  I spoke with Dr. SAGE Parker of the hospitalist service who agreed to admission.    Diagnosis:    ICD-10-CM    1. Acute respiratory failure with hypoxia (H)  J96.01 Blood gas venous and oxyhgb   2. Lightheadedness  R42      Disposition:   The patient is admitted into the care of Dr. SAGE Parker.    Scribe Disclosure:  I, Fannystan Pierce, am serving as a scribe at 6:54 PM on 11/20/2020 to document services personally performed by Jean Parker MD based on my observations and the provider's statements to me.   [facility] EMERGENCY DEPARTMENT       Jean Parker MD  11/21/20 2316

## 2020-11-21 NOTE — H&P
[facility]  History and Physical  Hospitalist       Date of Admission:  11/20/2020    Assessment & Plan   Ryland Gee is a very pleasant 50 year old male with type 2 diabetes with gastroparesis and peripheral neuropathy, dyslipidemia, obesity, ulcerative colitis, chronic back pain, anxiety disorder who was admitted on 11/20/2020 with palpitations and hypoxia.    Palpitations, sinus tachycardia  Cardiomegaly  Dyslipidemia  12 lead EKG with sinus tachycardia, no ischemic changes. Chest CT negative for PE, noted to have cardiomegaly but no ground glass opacities.  It does not appear that he has had an echocardiogram in the past.  -admit to observation with telemetry  -follow up carbon monoxide level, urine screen  -TTE ordered  -Consider discharge with Holter monitor or Zio patch    Hypoxia  Symptomatic Rule Out of COVID-19 infection  Requiring 4 L by nasal cannula at present.  On room air was 85%.  This patient was evaluated during a global COVID-19 pandemic, which necessitated consideration that the patient might be at risk for infection with the SARS-CoV-2 virus that causes COVID-19. Applicable protocols for evaluation were followed during the patient's care. High suspicion for infection.   -follow-up COVID-19 PCR test result  -droplet, contact precautions  -Supplemental oxygen as needed, wean as tolerated    Diabetes, type II with polyneuropathy and gastroparesis  Managed in the past with glimepiride and Metformin recent hemoglobin A1c 7.9% from January 2020 indicating suboptimal glycemic control.   -resume PTA regimen of once verified  -glucose checks  -corrective dose aspart    Chronic back pain  Treated in the recent past with ibuprofen, Lyrica, oxycodone, Movantik  -Resume prior to admission pain regimen once verified    Anxiety   Active, does not appear to be well controlled.    -Resume PTA medication regimen once verified including Effexor and trazodone    Hold all nonessential  "medications, vitamins, supplements given observation status. Allow patient to take medications from his home supply if desired. These would need to be reviewed by pharmacy prior to administration.    DVT prophylaxis: Pneumatic Compression Devices and Ambulate every shift  Code Status:  full code    Disposition: Expected discharge in 2-3 days.    Shannon Parker MD  Text Page    ~~~~~~~~~~~~~~~~~~~~~~~~~~~~~~~~~~~~~~~~~~~~~~~~~~~~~  Primary Care Physician   Todd Pastrana    Chief Complaint   Palpitations, dizziness    History is obtained from the patient and medical records.    History of Present Illness   Ryland Gee is a very pleasant 50 year old male with type 2 diabetes with gastroparesis and peripheral neuropathy, dyslipidemia, obesity, ulcerative colitis, chronic back pain, anxiety disorder who presents with dizziness and palpitations.  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  He typically would drive to go  his granddaughter but declined due to how poorly he was feeling.  No chest pain or pressure and denies any cough, sore throat, fever.  He takes CBD oil for treatment of pain and anxiety.  He has had palpitations in the past but \"none this bad\" and was discharged with a Holter monitor for 72 hours which did not reveal any arrhythmias.  No known ill contacts.  He is a non-smoker.  Denies regular alcohol use.  No headache, vision changes, nausea, vomiting, change in bowel or bladder habits, dysuria.    He was sleepy and almost sedated when examined in the emergency department.  Initially I wondered if this was due to the pain medication side effect but when I reviewed the medication record there was none administered in the emergency department.  And wondering if he may be took something from his home supply.    Case reviewed with Dr. Jean Parker from the Emergency Department. Labs and " available imaging reviewed. Pertinent results include: Normal CBC and basic metabolic panel, troponin negative, BN P normal.  Respiratory acidosis apparent on VBG.  12 lead EKG with sinus tachycardia, no ischemic changes. Chest CT negative for PE, noted to have cardiomegaly but no ground glass opacities.     Past Medical History    I have reviewed this patient's medical history and updated it with pertinent information if needed.   Past Medical History:   Diagnosis Date     Diabetes (H)      IBD (inflammatory bowel disease) 2008    ulcerative colitis     Past Surgical History   I have reviewed this patient's surgical history and updated it with pertinent information if needed.  Past Surgical History:   Procedure Laterality Date     C APPENDECTOMY       ESOPHAGOSCOPY, GASTROSCOPY, DUODENOSCOPY (EGD), COMBINED N/A 2015    Procedure: COMBINED ESOPHAGOSCOPY, GASTROSCOPY, DUODENOSCOPY (EGD), BIOPSY SINGLE OR MULTIPLE;  Surgeon: Mario Patterson MD;  Location:  GI     VASECTOMY         Prior to Admission Medications   Prior to Admission Medications   Prescriptions Last Dose Informant Patient Reported? Taking?   IBUPROFEN PO   Yes No   Sig: Take 200-400 mg by mouth every 8 hours as needed for moderate pain   MOVANTIK 25 MG TABS tablet   No Yes   Sig: Take 1 tablet (25 mg) by mouth every morning (before breakfast)   Naldemedine Tosylate (SYMPROIC) 0.2 MG TABS   No No   Sig: Take 0.2 mg by mouth daily as needed (loose stools)   XTAMPZA ER 18 MG 12 hr tablet   No Yes   Sig: TAKE 1 TABLET (18 MG) BY MOUTH EVERY 12 HOURS   blood glucose (CONTOUR NEXT TEST) test strip   No No   Si strip by In Vitro route 2 times daily   blood glucose calibration (CONTOUR NEXT CONTROL LEVEL 2) NORMAL solution   No No   Sig: Use to calibrate blood glucose monitor as needed as directed.   blood glucose monitoring (ARTURO CONTOUR NEXT MONITOR) meter device kit   No No   Sig: Use to test blood sugar 3 times daily or as  directed.   blood glucose monitoring ([employer] MICROLET) lancets   No No   Sig: Use to test blood sugar 3 times daily or as directed.   econazole nitrate 1 % cream   No No   Sig: Apply topically daily Apply to affected nail daily   glimepiride (AMARYL) 4 MG tablet   No Yes   Sig: TAKE ONE TABLET BY MOUTH EVERY MORNING BEFORE BREAKFAST   hydrocortisone (CORTAID) 1 % external cream   No No   Sig: Apply topically 2 times daily   lactulose (CHRONULAC) 10 GM/15ML solution   No No   Sig: Take 15 mLs (10 g) by mouth 2 times daily as needed for constipation   medical cannabis inhalation (Patient's own supply.  Not a prescription)   Yes Yes   Sig: (This is NOT a prescription, and does not certify that the patient has a qualifying medical condition for medical cannabis.  The purpose of this order is  to document that the patient reports taking medical cannabis.)   metFORMIN (GLUCOPHAGE) 500 MG tablet   No Yes   Sig: Take 1 tablet (500 mg) by mouth 2 times daily (with meals)   naloxone (NARCAN) 4 MG/0.1ML nasal spray   No No   Sig: Spray 1 spray (4 mg) into one nostril alternating nostrils once as needed for opioid reversal every 2-3 minutes until assistance arrives   omeprazole (PRILOSEC) 20 MG DR capsule   No Yes   Sig: Take 1 capsule (20 mg) by mouth daily   oxyCODONE IR (ROXICODONE) 15 MG tablet   No Yes   Sig: TAKE 1 TABLET (15 MG) BY MOUTH 2 TIMES DAILY AS NEEDED FOR SEVERE PAIN   pregabalin (LYRICA) 75 MG capsule   No Yes   Sig: Take 1 capsule (75 mg) by mouth 3 times daily   sildenafil (VIAGRA) 100 MG tablet   No No   Sig: Take 1 tablet (100 mg) by mouth daily as needed (erectile dysfunction) 30 min to 4 hrs before sex. Do not use with nitroglycerin, terazosin or doxazosin.   simvastatin (ZOCOR) 20 MG tablet   No Yes   Sig: Take 1 tablet (20 mg) by mouth At Bedtime   traZODone (DESYREL) 50 MG tablet   No Yes   Sig: Take 1-3 tablets ( mg) by mouth nightly as needed for sleep   venlafaxine (EFFEXOR-XR) 75 MG 24 hr  capsule   No Yes   Sig: Take 1 capsule (75 mg) by mouth daily      Facility-Administered Medications: None     Allergies   Allergies   Allergen Reactions     Latex      Flu like symptoms with Azacol         Social History   I have reviewed this patient's social history and updated it with pertinent information if needed. Ryland Gee  reports that he has never smoked. He has never used smokeless tobacco. He reports that he does not drink alcohol or use drugs.    Family History   I have reviewed this patient's family history and updated it with pertinent information if needed.   Family History   Problem Relation Age of Onset     Diabetes Father        Review of Systems   The 10 point Review of Systems is negative other than noted in the HPI or here.    Physical Exam   Temp: 98.8  F (37.1  C)   BP: (!) 138/101 Pulse: 101   Resp: 22 SpO2: 90 % O2 Device: None (Room air)    Vital Signs with Ranges  Temp:  [98.8  F (37.1  C)] 98.8  F (37.1  C)  Pulse:  [101] 101  Resp:  [22] 22  BP: (138)/(101) 138/101  SpO2:  [90 %] 90 %  280 lbs 0 oz    Constitutional: Falling asleep during interview and exam, NAD, pleasant and interactive when awakened  Eyes: PERRL, sclerae anicteric  HEENT: mmm, atraumatic  Supplemental oxygen in place  Respiratory: Lungs CTAB, no wheezes or crackles  Cardiovascular: RRR, no murmurs  no LE edema  GI: Obese, soft, non-tender, nondistended  Skin/Integument: Numerous tattoos covering upper extremities, warm, dry, no acute rashes  Genitourinary: not examined  Musc: BARRETT, normal limb strength x 4  Neuro: AOx3, no focal deficits, no tremors  Psych: Pleasant affect, not anxious, not confused      Data   Data reviewed today:  I personally reviewed: 12 lead EKG with sinus tachycardia, no ischemic changes. Chest CT negative for PE, noted to have cardiomegaly but no ground glass opacities.   Recent Labs   Lab 11/20/20  1852   WBC 6.9   HGB 14.3   MCV 89         POTASSIUM 4.4   CHLORIDE 108   CO2 26    BUN 11   CR 1.19   ANIONGAP 5   HEDY 8.7   *   TROPI <0.015       Imaging:  Recent Results (from the past 24 hour(s))   CT Chest Pulmonary Embolism w Contrast    Narrative    CT CHEST PULMONARY EMBOLISM WITH CONTRAST  11/20/2020 9:15 PM    HISTORY: Palpitations, PE suspected, intermediate probability,  positive D-dimer.    TECHNIQUE: Scans obtained from the apices through the diaphragm with  IV contrast. 83 mL Isovue-370 IV injected. Radiation dose for this  scan was reduced using automated exposure control, adjustment of the  mA and/or kV according to patient size, or iterative reconstruction  technique.    COMPARISON: None available    FINDINGS:  Chest/mediastinum: No evidence of pulmonary embolism. Mild  cardiomegaly. No significant pericardial effusion. No significant  mediastinal, hilar or axillary lymphadenopathy.    Lungs and pleura: No pleural effusion or pneumothorax. Bibasilar  pulmonary opacities, likely atelectatic.    Upper abdomen: Diffuse hypoattenuation of the liver, likely due to  underlying hepatic steatosis.    Bones and soft tissue: No suspicious osseous lesion.      Impression    IMPRESSION:   1. No evidence of pulmonary embolism.  2. Mild cardiomegaly.  3. Significant hepatic steatosis.    DANIELLE AVELAR MD

## 2020-11-23 ENCOUNTER — TELEPHONE (OUTPATIENT)
Dept: FAMILY MEDICINE | Facility: CLINIC | Age: 50
End: 2020-11-23

## 2020-11-23 NOTE — LETTER
[address] [id number] [phone]        November 27, 2020    Ryland Gee                                                                                                                                                        [id number]  [facility] [address]              Dear Ryland,    We have been unsuccessful in our attempts to reach you by phone.  Please call us at the [facility] [phone]   between the hours of 8:00am - 5:00 pm, Monday through Friday.        Sincerely,                  Dannie Pastrana M.D./YRN

## 2020-11-23 NOTE — TELEPHONE ENCOUNTER
Patient discharged from [facility] for inpatient hospital stay on 11/21 for Acute respiratory failure with Hypoxia.    Please contact patient to follow up; no appointment scheduled at this time.    ER / IP:  0/0    Care Coordination:  GIA Churchill

## 2020-11-24 NOTE — TELEPHONE ENCOUNTER
Attempt # 1    Called #   Telephone Information:   Mobile [phone]         Left a non detailed VM     Mariely Lou RN, BSN  [facility]

## 2020-11-27 NOTE — TELEPHONE ENCOUNTER
Attempt # 2  Called # [phone]     Left a non detailed VM to call back at [phone] and ask for any available Triage Nurse.  Follow-ups Needed After Discharge     Follow-up Appointments     Follow-up and recommended labs and tests       Follow up with primary care provider, Todd Pastrana, within 1-2 weeks,   for hospital follow- up. The following labs/tests are recommended:   Consider echocardiogram and further cardiac evaluation of the   palpitations.         Noam Sun RN   [facility] Triage

## 2020-12-02 DIAGNOSIS — G62.9 PERIPHERAL POLYNEUROPATHY: ICD-10-CM

## 2020-12-02 DIAGNOSIS — G89.29 CHRONIC LOW BACK PAIN, UNSPECIFIED BACK PAIN LATERALITY, UNSPECIFIED WHETHER SCIATICA PRESENT: ICD-10-CM

## 2020-12-02 DIAGNOSIS — M54.50 CHRONIC LOW BACK PAIN, UNSPECIFIED BACK PAIN LATERALITY, UNSPECIFIED WHETHER SCIATICA PRESENT: ICD-10-CM

## 2020-12-04 NOTE — TELEPHONE ENCOUNTER
Controlled Substance Refill Request for oxycodone  Problem List Complete:  Yes  Chronic low back pain - CSA on 1/16/2020 - every 3 month med checks  Problem Detail    Noted:  5/12/2016   Priority:  Medium   Overview Addendum 1/17/2020  1:05 AM by Todd Pastrana MD   Patient is followed by Todd Pastrana MD for ongoing prescription of pain medication.  All refills should only be approved by this provider, or covering partner.     Medication(s): Pregablin 50 mg capsule. Oxycodone 15 mg tablet. Oxycodone IR 15 mg tablet.   Maximum quantity per month: 90 , 60 , 60   Clinic visit frequency required: Q 3 months      Controlled substance agreement:  Encounter-Level CSA - 11/30/2015:    Controlled Substance Agreement - Scan on 12/1/2015  2:00 PM: CONTROLLED SUBSTANCE AGREEMENT 11/30/15      Patient-Level CSA:    Controlled Substance Agreement - Opioid - Scan on 1/16/2020  4:43 PM         Pain Clinic evaluation in the past: Yes       Date/Location:  4/24/2018 at [facility]     DIRE Total Score(s):    1/16/2020   Total Score 19         Last [facility] website verification:  done on 1/16/20   [email or web address]      Previous Version   Relevant Medications    pregabalin (LYRICA) 75 MG capsule   oxyCODONE IR (ROXICODONE) 15 MG tablet   XTAMPZA ER 18 MG 12 hr tablet        checked in past 3 months?  No, route to RN   [place] reviewed:  reviewed- no concerns    [place]  [email or web address]    Oxycodone IR 15 mg  Last Written Prescription Date:  10/30/20  Last Fill Quantity: 60,  # refills: 0   Last office visit: 8/24/2020 with prescribing provider:  Dr. Victoriano Herman Office Visit:      xtampza 18 mg  Last Written Prescription Date:  10/30/20  Last Fill Quantity: 60,  # refills: 0     pregabalin  Last Written Prescription Date:  8/24/20  Last Fill Quantity: 90,  # refills: 2   Last office visit: 8/24/2020 with prescribing provider:  Dr. Victoriano Herman Office  Visit:      Routing refill request to provider for review/approval because:  Drugs not on the [facility] refill protocol   Mena Oconnor RN'

## 2020-12-06 RX ORDER — OXYCODONE 18 MG/1
18 CAPSULE, EXTENDED RELEASE ORAL EVERY 12 HOURS
Qty: 60 EACH | Refills: 0 | Status: SHIPPED | OUTPATIENT
Start: 2020-12-06 | End: 2020-12-31

## 2020-12-06 RX ORDER — OXYCODONE HYDROCHLORIDE 15 MG/1
15 TABLET ORAL 2 TIMES DAILY PRN
Qty: 60 TABLET | Refills: 0 | Status: SHIPPED | OUTPATIENT
Start: 2020-12-06 | End: 2020-12-31

## 2020-12-06 RX ORDER — PREGABALIN 75 MG/1
75 CAPSULE ORAL 2 TIMES DAILY
Qty: 180 CAPSULE | Refills: 0 | Status: SHIPPED | OUTPATIENT
Start: 2020-12-06 | End: 2021-02-05

## 2020-12-23 DIAGNOSIS — F33.0 MAJOR DEPRESSIVE DISORDER, RECURRENT EPISODE, MILD (H): ICD-10-CM

## 2020-12-23 DIAGNOSIS — F41.8 SITUATIONAL ANXIETY: ICD-10-CM

## 2020-12-29 RX ORDER — VENLAFAXINE HYDROCHLORIDE 75 MG/1
CAPSULE, EXTENDED RELEASE ORAL
Qty: 90 CAPSULE | Refills: 0 | Status: SHIPPED | OUTPATIENT
Start: 2020-12-29 | End: 2021-01-04

## 2020-12-29 NOTE — TELEPHONE ENCOUNTER
Attempt #3  MyChart sent    Routing refill request to provider for review/approval because:  PHQ9        Heidi James RN  [facility]

## 2020-12-31 DIAGNOSIS — G89.29 CHRONIC LOW BACK PAIN, UNSPECIFIED BACK PAIN LATERALITY, UNSPECIFIED WHETHER SCIATICA PRESENT: ICD-10-CM

## 2020-12-31 DIAGNOSIS — M54.50 CHRONIC LOW BACK PAIN, UNSPECIFIED BACK PAIN LATERALITY, UNSPECIFIED WHETHER SCIATICA PRESENT: ICD-10-CM

## 2020-12-31 DIAGNOSIS — G62.9 PERIPHERAL POLYNEUROPATHY: ICD-10-CM

## 2020-12-31 RX ORDER — OXYCODONE 18 MG/1
CAPSULE, EXTENDED RELEASE ORAL
Qty: 60 EACH | Refills: 0 | Status: SHIPPED | OUTPATIENT
Start: 2021-01-05 | End: 2021-02-05

## 2020-12-31 RX ORDER — OXYCODONE HYDROCHLORIDE 15 MG/1
TABLET ORAL
Qty: 60 TABLET | Refills: 0 | Status: SHIPPED | OUTPATIENT
Start: 2021-01-05 | End: 2021-02-05

## 2020-12-31 NOTE — TELEPHONE ENCOUNTER
A postdated prescription has been sent in.  And due for video or inperson visit early 02/2021- please schedule      Last visit in this dept:    8/24/2020     Next visit in this dept:       Health Maintenance   Topic Date Due     ANNUAL REVIEW OF HM ORDERS  1970     HEPATITIS C SCREENING  01/09/1990     EYE EXAM  01/19/2019     A1C  04/18/2020     INFLUENZA VACCINE (1) 09/01/2020     URINE DRUG SCREEN  01/16/2021     LIPID  01/18/2021     PSA  01/18/2021     PHQ-9  02/24/2021     PREVENTIVE CARE VISIT  08/24/2021     MICROALBUMIN  08/24/2021     DIABETIC FOOT EXAM  08/24/2021     BMP  11/20/2021     DTAP/TDAP/TD IMMUNIZATION (2 - Td) 04/18/2027     Pneumococcal Vaccine: Pediatrics (0 to 5 Years) and At-Risk Patients (6 to 64 Years)  Completed     HIV SCREENING  Addressed     DEPRESSION ACTION PLAN  Addressed     IPV IMMUNIZATION  Aged Out     MENINGITIS IMMUNIZATION  Aged Out     HEPATITIS B IMMUNIZATION  Discontinued

## 2020-12-31 NOTE — TELEPHONE ENCOUNTER
Outpatient Medication Detail   Disp Refills Start End ISAÍAS   oxyCODONE IR (ROXICODONE) 15 MG tablet 60 tablet 0 12/6/2020  No   Sig - Route: TAKE 1 TABLET (15 MG) BY MOUTH 2 TIMES DAILY AS NEEDED FOR SEVERE PAIN - Oral     Outpatient Medication Detail   Disp Refills Start End ISAÍAS   XTAMPZA ER 18 MG 12 hr tablet 60 each 0 12/6/2020  No   Sig - Route: TAKE 1 TABLET (18 MG) BY MOUTH EVERY 12 HOURS - Oral     Problem List Complete:    Yes    Last Office Visit with [facility] primary care provider: 8/24/2020    Future Office visit:     Controlled substance agreement:   Encounter-Level CSA - 11/30/2015:    Controlled Substance Agreement - Scan on 12/1/2015  2:00 PM: CONTROLLED SUBSTANCE AGREEMENT 11/30/15     Patient-Level CSA:    Controlled Substance Agreement - Opioid - Scan on 1/16/2020  4:43 PM       Last Urine Drug Screen:   Pain Drug SCR UR W RPTD Meds   Date Value Ref Range Status   04/17/2018 FINAL  Final     Comment:     (Note)  ====================================================================  [facility] COMP DRUG ANALYSIS,UR  ====================================================================  Test                             Result       Flag       Units        Drug Present and Declared for Prescription Verification   Carboxy-THC                    119          EXPECTED   ng/mg creat    Carboxy-THC is a metabolite of tetrahydrocannabinol  (THC).    Source of THC is most commonly illicit, but THC is also present    in a scheduled prescription medication.   Oxycodone                      1531         EXPECTED   ng/mg creat   Oxymorphone                    501          EXPECTED   ng/mg creat   Noroxycodone                   >5208        EXPECTED   ng/mg creat   Noroxymorphone                 124          EXPECTED   ng/mg creat    Sources of oxycodone are scheduled prescription medications.    Oxymorphone, noroxycodone, and noroxymorphone are expected    metabolites of oxycodone. Oxymorphone is also available as a     scheduled prescription medication.   Gabapentin                     PRESENT      EXPECTED                 Venlafaxine                    PRESENT      EXPECTED                 Desmethylvenlafaxine           PRESENT      EXPECTED                  Desmethylvenlafaxine is an expected metabolite of venlafaxine.  ====================================================================  Test                      Result    Flag   Units      Ref Range        Creatinine              192              mg/dL      >=20            ====================================================================  Declared Medications:  The flagging and interpretation on this report are based on the  following declared medications.  Unexpected results may arise from  inaccuracies in the declared medications.  **Note: The testing scope of this panel includes these medications:  Cannabis  Gabapentin  Oxycodone  Oxycodone (OxyContin)  Venlafaxine (Effexor)  ====================================================================  For clinical consultation, please call [phone].  ====================================================================  Analysis performed by [employer] [place] [address]     , No results found for: COMDAT,   Cannabinoids (04-srm-1-carboxy-9-THC)   Date Value Ref Range Status   01/16/2020 Not Detected NDET^Not Detected ng/mL Final     Comment:     Cutoff for a negative cannabinoid is 50 ng/mL or less.     Phencyclidine (Phencyclidine)   Date Value Ref Range Status   01/16/2020 Not Detected NDET^Not Detected ng/mL Final     Comment:     Cutoff for a negative PCP is 25 ng/mL or less.     Cocaine (Benzoylecgonine)   Date Value Ref Range Status   01/16/2020 Not Detected NDET^Not Detected ng/mL Final     Comment:     Cutoff for a negative cocaine is 150 ng/ml or less.     Methamphetamine (d-Methamphetamine)   Date Value Ref Range Status   01/16/2020 Not Detected NDET^Not Detected ng/mL Final     Comment:      Cutoff for a negative methamphetamine is 500 ng/ml or less.     Opiates (Morphine)   Date Value Ref Range Status   01/16/2020 Not Detected NDET^Not Detected ng/mL Final     Comment:     Cutoff for a negative opiate is 100 ng/ml or less.     Amphetamine (d-Amphetamine)   Date Value Ref Range Status   01/16/2020 Not Detected NDET^Not Detected ng/mL Final     Comment:     Cutoff for a negative amphetamine is 500 ng/mL or less.     Benzodiazepines (Nordiazepam)   Date Value Ref Range Status   01/16/2020 Not Detected NDET^Not Detected ng/mL Final     Comment:     Cutoff for a negative benzodiazepine is 150 ng/ml or less.     Tricyclic Antidepressants (Desipramine)   Date Value Ref Range Status   01/16/2020 Not Detected NDET^Not Detected ng/mL Final     Comment:     Cutoff for a negative tricyclic antidepressant is 300 ng/ml or less.     Methadone (Methadone)   Date Value Ref Range Status   01/16/2020 Not Detected NDET^Not Detected ng/mL Final     Comment:     Cutoff for a negative methadone is 200 ng/ml or less.     Barbiturates (Butalbital)   Date Value Ref Range Status   01/16/2020 Not Detected NDET^Not Detected ng/mL Final     Comment:     Cutoff for a negative barbituate is 200 ng/ml or less.     Oxycodone (Oxycodone)   Date Value Ref Range Status   01/16/2020 Detected, Abnormal Result (A) NDET^Not Detected ng/mL Final     Comment:     Cutoff for a positive oxycodone is greater than 100 ng/ml.  This is an unconfirmed screening result to be used for medical purposes only.   Order [id number] for confirmation or individual confirmation tests to [facility].       Propoxyphene (Norpropoxyphene)   Date Value Ref Range Status   01/16/2020 Not Detected NDET^Not Detected ng/mL Final     Comment:     Cutoff for a negative propoxyphene is 300 ng/ml or less     Buprenorphine (Buprenorphine)   Date Value Ref Range Status   01/16/2020 Not Detected NDET^Not Detected ng/mL Final     Comment:     Cutoff for a negative buprenorphine is 10  ng/ml or less       [email or web address]      Routing refill request to provider for review/approval because:  Drug not on the [facility] refill protocol         Heidi James RN  [facility]

## 2021-01-02 DIAGNOSIS — E11.42 TYPE 2 DIABETES MELLITUS WITH DIABETIC POLYNEUROPATHY, WITHOUT LONG-TERM CURRENT USE OF INSULIN (H): ICD-10-CM

## 2021-01-02 DIAGNOSIS — F41.8 SITUATIONAL ANXIETY: ICD-10-CM

## 2021-01-02 DIAGNOSIS — F33.0 MAJOR DEPRESSIVE DISORDER, RECURRENT EPISODE, MILD (H): ICD-10-CM

## 2021-01-04 RX ORDER — GLIMEPIRIDE 4 MG/1
TABLET ORAL
Qty: 30 TABLET | Refills: 0 | Status: SHIPPED | OUTPATIENT
Start: 2021-01-04 | End: 2021-04-14

## 2021-01-04 RX ORDER — VENLAFAXINE HYDROCHLORIDE 75 MG/1
CAPSULE, EXTENDED RELEASE ORAL
Qty: 30 CAPSULE | Refills: 0 | Status: SHIPPED | OUTPATIENT
Start: 2021-01-04 | End: 2021-08-09

## 2021-01-04 NOTE — TELEPHONE ENCOUNTER
"glimepiride (AMARYL) 4 MG tablet 90 tablet 1 8/6/2020  No   Sig: TAKE ONE TABLET BY MOUTH EVERY MORNING BEFORE BREAKFAST   Sent to pharmacy as: Glimepiride 4 MG Oral Tablet (AMARYL)       venlafaxine (EFFEXOR-XR) 75 MG 24 hr capsule 90 capsule 0 12/29/2020  No   Sig: TAKE 1 CAPSULE BY MOUTH DAILY ALONG WITH 150 MG CAPSULE TO EQUAL TOTAL DAILY DOSE  MG   Sent to pharmacy as: Venlafaxine HCl ER 75 MG Oral Capsule Extended Release 24 Hour (EFFEXOR-XR)         Last office visit: 8/24/2020 with prescribing provider:    Future Office Visit:          Requested Prescriptions   Pending Prescriptions Disp Refills     venlafaxine (EFFEXOR-XR) 75 MG 24 hr capsule [Pharmacy Med Name: VENLAFAXINE HCL ER 75MG CP24] 90 capsule 1     Sig: TAKE 1 CAPSULE BY MOUTH DAILY ALONG WITH 150 MG CAPSULE TO EQUAL TOTAL DAILY DOSE  MG       Serotonin-Norepinephrine Reuptake Inhibitors  Failed - 1/2/2021 10:26 AM        Failed - PHQ-9 score of less than 5 in past 6 months     Please review last PHQ-9 score.   PHQ 6/3/2019 1/16/2020 8/24/2020   PHQ-9 Total Score 8 13 8   Q9: Thoughts of better off dead/self-harm past 2 weeks Not at all Not at all Not at all     BUSHRA-7 SCORE 6/3/2019 1/16/2020 8/24/2020   Total Score 3 2 1                 Passed - Blood pressure under 140/90 in past 12 months     BP Readings from Last 3 Encounters:   11/21/20 135/81   08/24/20 108/70   05/11/20 128/82                 Passed - Medication is active on med list        Passed - Patient is age 18 or older        Passed - Normal serum creatinine on file in past 12 months     Recent Labs   Lab Test 11/20/20  1852   CR 1.19       Ok to refill medication if creatinine is low          Passed - Recent (6 mo) or future (30 days) visit within the authorizing provider's specialty     Patient had office visit in the last 6 months or has a visit in the next 30 days with authorizing provider or within the authorizing provider's specialty.  See \"Patient Info\" tab in " "inbasket, or \"Choose Columns\" in Meds & Orders section of the refill encounter.               glimepiride (AMARYL) 4 MG tablet [Pharmacy Med Name: GLIMEPIRIDE 4MG TABS] 90 tablet 1     Sig: TAKE ONE TABLET BY MOUTH EVERY MORNING BEFORE BREAKFAST       Sulfonylurea Agents Failed - 1/2/2021 10:26 AM        Failed - Patient has documented A1c within the specified period of time.     If HgbA1C is 8 or greater, it needs to be on file within the past 3 months.  If less than 8, must be on file within the past 6 months.     Recent Labs   Lab Test 01/18/20  1051   A1C 7.9*             Passed - Medication is active on med list        Passed - Patient is age 18 or older        Passed - Patient has a recent creatinine (normal) within the past 12 mos.     Recent Labs   Lab Test 11/20/20  1852   CR 1.19       Ok to refill medication if creatinine is low          Passed - Recent (6 mo) or future (30 days) visit within the authorizing provider's specialty     Patient had office visit in the last 6 months or has a visit in the next 30 days with authorizing provider or within the authorizing provider's specialty.  See \"Patient Info\" tab in inbasket, or \"Choose Columns\" in Meds & Orders section of the refill encounter.               Due for an Office visit for further refills, only fill for 30 days     Mariely Lou RN, BSN  [facility]     "

## 2021-01-10 ENCOUNTER — HEALTH MAINTENANCE LETTER (OUTPATIENT)
Age: 51
End: 2021-01-10

## 2021-01-11 DIAGNOSIS — F33.0 MAJOR DEPRESSIVE DISORDER, RECURRENT EPISODE, MILD (H): ICD-10-CM

## 2021-01-11 DIAGNOSIS — F41.8 SITUATIONAL ANXIETY: ICD-10-CM

## 2021-01-11 DIAGNOSIS — E11.42 TYPE 2 DIABETES MELLITUS WITH DIABETIC POLYNEUROPATHY, WITHOUT LONG-TERM CURRENT USE OF INSULIN (H): ICD-10-CM

## 2021-01-12 RX ORDER — VENLAFAXINE HYDROCHLORIDE 75 MG/1
CAPSULE, EXTENDED RELEASE ORAL
Qty: 90 CAPSULE | Refills: 1 | OUTPATIENT
Start: 2021-01-12

## 2021-01-12 NOTE — TELEPHONE ENCOUNTER
Venlafaxine - #30 sent on 1/4/21  Metformin - will send 30 days. Patient overdue for DM check    Heidi James RN  [facility]

## 2021-02-03 DIAGNOSIS — G62.9 PERIPHERAL POLYNEUROPATHY: ICD-10-CM

## 2021-02-03 DIAGNOSIS — M54.50 CHRONIC LOW BACK PAIN, UNSPECIFIED BACK PAIN LATERALITY, UNSPECIFIED WHETHER SCIATICA PRESENT: ICD-10-CM

## 2021-02-03 DIAGNOSIS — G89.29 CHRONIC LOW BACK PAIN, UNSPECIFIED BACK PAIN LATERALITY, UNSPECIFIED WHETHER SCIATICA PRESENT: ICD-10-CM

## 2021-02-04 NOTE — TELEPHONE ENCOUNTER
Outpatient Medication Detail   Disp Refills Start End ISAÍAS   pregabalin (LYRICA) 75 MG capsule 180 capsule 0 12/6/2020  No   Sig - Route: Take 1 capsule (75 mg) by mouth 2 times daily - Oral     Outpatient Medication Detail   Disp Refills Start End ISAÍAS   XTAMPZA ER 18 MG 12 hr tablet 60 each 0 1/5/2021  No   Sig: TAKE ONE CAPSULE BY MOUTH EVERY 12 HOURS     Outpatient Medication Detail   Disp Refills Start End ISAÍAS   oxyCODONE IR (ROXICODONE) 15 MG tablet 60 tablet 0 1/5/2021  No   Sig: TAKE ONE TABLET BY MOUTH TWICE A DAY AS NEEDED FOR SEVERE PAIN     Problem List Complete:    Yes    Last Office Visit with [facility] primary care provider: 8/24/2020    Future Office visit:   Next 5 appointments (look out 90 days)    Feb 08, 2021  3:20 PM  Office Visit with Todd Pastrana MD  [facility] ([facility] ) [address] [place] [id number] [phone]          Controlled substance agreement:   Encounter-Level CSA - 11/30/2015:    Controlled Substance Agreement - Scan on 12/1/2015  2:00 PM: CONTROLLED SUBSTANCE AGREEMENT 11/30/15     Patient-Level CSA:    Controlled Substance Agreement - Opioid - Scan on 1/16/2020  4:43 PM         Last Urine Drug Screen:   Pain Drug SCR UR W RPTD Meds   Date Value Ref Range Status   04/17/2018 FINAL  Final     Comment:     (Note)  ====================================================================  TOX[facility]RE COMP DRUG ANALYSIS,UR  ====================================================================  Test                             Result       Flag       Units        Drug Present and Declared for Prescription Verification   Carboxy-THC                    119          EXPECTED   ng/mg creat    Carboxy-THC is a metabolite of tetrahydrocannabinol  (THC).    Source of THC is most commonly illicit, but THC is also present    in a scheduled prescription medication.   Oxycodone                      1531         EXPECTED   ng/mg  creat   Oxymorphone                    501          EXPECTED   ng/mg creat   Noroxycodone                   >5208        EXPECTED   ng/mg creat   Noroxymorphone                 124          EXPECTED   ng/mg creat    Sources of oxycodone are scheduled prescription medications.    Oxymorphone, noroxycodone, and noroxymorphone are expected    metabolites of oxycodone. Oxymorphone is also available as a    scheduled prescription medication.   Gabapentin                     PRESENT      EXPECTED                 Venlafaxine                    PRESENT      EXPECTED                 Desmethylvenlafaxine           PRESENT      EXPECTED                  Desmethylvenlafaxine is an expected metabolite of venlafaxine.  ====================================================================  Test                      Result    Flag   Units      Ref Range        Creatinine              192              mg/dL      >=20            ====================================================================  Declared Medications:  The flagging and interpretation on this report are based on the  following declared medications.  Unexpected results may arise from  inaccuracies in the declared medications.  **Note: The testing scope of this panel includes these medications:  Cannabis  Gabapentin  Oxycodone  Oxycodone (OxyContin)  Venlafaxine (Effexor)  ====================================================================  For clinical consultation, please call [phone].  ====================================================================  Analysis performed by [employer] [place] [address]     , No results found for: COMDAT,   Cannabinoids (85-lxq-7-carboxy-9-THC)   Date Value Ref Range Status   01/16/2020 Not Detected NDET^Not Detected ng/mL Final     Comment:     Cutoff for a negative cannabinoid is 50 ng/mL or less.     Phencyclidine (Phencyclidine)   Date Value Ref Range Status   01/16/2020 Not Detected NDET^Not Detected  ng/mL Final     Comment:     Cutoff for a negative PCP is 25 ng/mL or less.     Cocaine (Benzoylecgonine)   Date Value Ref Range Status   01/16/2020 Not Detected NDET^Not Detected ng/mL Final     Comment:     Cutoff for a negative cocaine is 150 ng/ml or less.     Methamphetamine (d-Methamphetamine)   Date Value Ref Range Status   01/16/2020 Not Detected NDET^Not Detected ng/mL Final     Comment:     Cutoff for a negative methamphetamine is 500 ng/ml or less.     Opiates (Morphine)   Date Value Ref Range Status   01/16/2020 Not Detected NDET^Not Detected ng/mL Final     Comment:     Cutoff for a negative opiate is 100 ng/ml or less.     Amphetamine (d-Amphetamine)   Date Value Ref Range Status   01/16/2020 Not Detected NDET^Not Detected ng/mL Final     Comment:     Cutoff for a negative amphetamine is 500 ng/mL or less.     Benzodiazepines (Nordiazepam)   Date Value Ref Range Status   01/16/2020 Not Detected NDET^Not Detected ng/mL Final     Comment:     Cutoff for a negative benzodiazepine is 150 ng/ml or less.     Tricyclic Antidepressants (Desipramine)   Date Value Ref Range Status   01/16/2020 Not Detected NDET^Not Detected ng/mL Final     Comment:     Cutoff for a negative tricyclic antidepressant is 300 ng/ml or less.     Methadone (Methadone)   Date Value Ref Range Status   01/16/2020 Not Detected NDET^Not Detected ng/mL Final     Comment:     Cutoff for a negative methadone is 200 ng/ml or less.     Barbiturates (Butalbital)   Date Value Ref Range Status   01/16/2020 Not Detected NDET^Not Detected ng/mL Final     Comment:     Cutoff for a negative barbituate is 200 ng/ml or less.     Oxycodone (Oxycodone)   Date Value Ref Range Status   01/16/2020 Detected, Abnormal Result (A) NDET^Not Detected ng/mL Final     Comment:     Cutoff for a positive oxycodone is greater than 100 ng/ml.  This is an unconfirmed screening result to be used for medical purposes only.   Order [id number] for confirmation or individual  confirmation tests to [employer].       Propoxyphene (Norpropoxyphene)   Date Value Ref Range Status   01/16/2020 Not Detected NDET^Not Detected ng/mL Final     Comment:     Cutoff for a negative propoxyphene is 300 ng/ml or less     Buprenorphine (Buprenorphine)   Date Value Ref Range Status   01/16/2020 Not Detected NDET^Not Detected ng/mL Final     Comment:     Cutoff for a negative buprenorphine is 10 ng/ml or less        Processing:  Rx to be electronically transmitted to pharmacy by provider     [email or web address]      Routing refill request to provider for review/approval because:  Drug not on the [facility] refill protocol         Heidi James RN  [facility]

## 2021-02-05 RX ORDER — OXYCODONE HYDROCHLORIDE 15 MG/1
TABLET ORAL
Qty: 60 TABLET | Refills: 0 | Status: SHIPPED | OUTPATIENT
Start: 2021-02-05 | End: 2021-03-05

## 2021-02-05 RX ORDER — OXYCODONE 18 MG/1
18 CAPSULE, EXTENDED RELEASE ORAL EVERY 12 HOURS
Qty: 60 TABLET | Refills: 0 | Status: SHIPPED | OUTPATIENT
Start: 2021-02-05 | End: 2021-03-05

## 2021-02-05 RX ORDER — PREGABALIN 75 MG/1
CAPSULE ORAL
Qty: 180 CAPSULE | Refills: 0 | Status: SHIPPED | OUTPATIENT
Start: 2021-02-05 | End: 2021-04-15

## 2021-03-05 DIAGNOSIS — G62.9 PERIPHERAL POLYNEUROPATHY: ICD-10-CM

## 2021-03-05 DIAGNOSIS — E11.42 TYPE 2 DIABETES MELLITUS WITH DIABETIC POLYNEUROPATHY, WITHOUT LONG-TERM CURRENT USE OF INSULIN (H): ICD-10-CM

## 2021-03-05 DIAGNOSIS — G89.29 CHRONIC LOW BACK PAIN, UNSPECIFIED BACK PAIN LATERALITY, UNSPECIFIED WHETHER SCIATICA PRESENT: ICD-10-CM

## 2021-03-05 DIAGNOSIS — M54.50 CHRONIC LOW BACK PAIN, UNSPECIFIED BACK PAIN LATERALITY, UNSPECIFIED WHETHER SCIATICA PRESENT: ICD-10-CM

## 2021-03-05 RX ORDER — OXYCODONE 18 MG/1
18 CAPSULE, EXTENDED RELEASE ORAL EVERY 12 HOURS
Qty: 60 TABLET | Refills: 0 | Status: SHIPPED | OUTPATIENT
Start: 2021-03-06 | End: 2021-04-06

## 2021-03-05 RX ORDER — OXYCODONE HYDROCHLORIDE 15 MG/1
15 TABLET ORAL 2 TIMES DAILY PRN
Qty: 60 TABLET | Refills: 0 | Status: SHIPPED | OUTPATIENT
Start: 2021-02-06 | End: 2021-03-08

## 2021-03-08 NOTE — TELEPHONE ENCOUNTER
Patient called and explained note below. Patient will come to the clinic for .    Shireen NASH

## 2021-03-08 NOTE — TELEPHONE ENCOUNTER
RX printed and brought to , tried calling patient but, patients voicemail is full and was unable to leave VM.    Stefanie Reaves, CMA

## 2021-03-30 DIAGNOSIS — M54.50 CHRONIC LOW BACK PAIN, UNSPECIFIED BACK PAIN LATERALITY, UNSPECIFIED WHETHER SCIATICA PRESENT: ICD-10-CM

## 2021-03-30 DIAGNOSIS — G62.9 PERIPHERAL POLYNEUROPATHY: ICD-10-CM

## 2021-03-30 DIAGNOSIS — G89.29 CHRONIC LOW BACK PAIN, UNSPECIFIED BACK PAIN LATERALITY, UNSPECIFIED WHETHER SCIATICA PRESENT: ICD-10-CM

## 2021-03-31 NOTE — TELEPHONE ENCOUNTER
Patient not due for fill until 4/5/2021.  Overdue for med check and no showed last appointment.  Patient must have appointment made and then will fill Rx x1 to get him to that appointment.  Please route back once appointment made      Raegan Davalos MBA, MS, PA-C

## 2021-03-31 NOTE — TELEPHONE ENCOUNTER
Outpatient Medication Detail   Disp Refills Start End ISAÍAS   oxyCODONE (XTAMPZA ER) 18 MG 12 hr tablet 60 tablet 0 3/6/2021  No   Sig - Route: Take 1 tablet (18 mg) by mouth every 12 hours - Oral     Outpatient Medication Detail   Disp Refills Start End ISAÍAS   oxyCODONE IR (ROXICODONE) 15 MG tablet 60 tablet 0 2/6/2021 3/8/2021 No   Sig - Route: Take 1 tablet (15 mg) by mouth 2 times daily as needed for severe pain - Oral     Problem List Complete:    Yes    Last Office Visit with [facility] primary care provider: 8/24/2020    Future Office visit:     Controlled substance agreement:   Encounter-Level CSA - 11/30/2015:    Controlled Substance Agreement - Scan on 12/1/2015  2:00 PM: CONTROLLED SUBSTANCE AGREEMENT 11/30/15     Patient-Level CSA:    Controlled Substance Agreement - Opioid - Scan on 1/16/2020  4:43 PM         Last Urine Drug Screen:   Pain Drug SCR UR W RPTD Meds   Date Value Ref Range Status   04/17/2018 FINAL  Final     Comment:     (Note)  ====================================================================  TOX[facility]RE COMP DRUG ANALYSIS,UR  ====================================================================  Test                             Result       Flag       Units        Drug Present and Declared for Prescription Verification   Carboxy-THC                    119          EXPECTED   ng/mg creat    Carboxy-THC is a metabolite of tetrahydrocannabinol  (THC).    Source of THC is most commonly illicit, but THC is also present    in a scheduled prescription medication.   Oxycodone                      1531         EXPECTED   ng/mg creat   Oxymorphone                    501          EXPECTED   ng/mg creat   Noroxycodone                   >5208        EXPECTED   ng/mg creat   Noroxymorphone                 124          EXPECTED   ng/mg creat    Sources of oxycodone are scheduled prescription medications.    Oxymorphone, noroxycodone, and noroxymorphone are expected    metabolites of oxycodone. Oxymorphone is  also available as a    scheduled prescription medication.   Gabapentin                     PRESENT      EXPECTED                 Venlafaxine                    PRESENT      EXPECTED                 Desmethylvenlafaxine           PRESENT      EXPECTED                  Desmethylvenlafaxine is an expected metabolite of venlafaxine.  ====================================================================  Test                      Result    Flag   Units      Ref Range        Creatinine              192              mg/dL      >=20            ====================================================================  Declared Medications:  The flagging and interpretation on this report are based on the  following declared medications.  Unexpected results may arise from  inaccuracies in the declared medications.  **Note: The testing scope of this panel includes these medications:  Cannabis  Gabapentin  Oxycodone  Oxycodone (OxyContin)  Venlafaxine (Effexor)  ====================================================================  For clinical consultation, please call [phone].  ====================================================================  Analysis performed by [employer] [place] [address]     , No results found for: COMDAT,   Cannabinoids (83-zjb-2-carboxy-9-THC)   Date Value Ref Range Status   01/16/2020 Not Detected NDET^Not Detected ng/mL Final     Comment:     Cutoff for a negative cannabinoid is 50 ng/mL or less.     Phencyclidine (Phencyclidine)   Date Value Ref Range Status   01/16/2020 Not Detected NDET^Not Detected ng/mL Final     Comment:     Cutoff for a negative PCP is 25 ng/mL or less.     Cocaine (Benzoylecgonine)   Date Value Ref Range Status   01/16/2020 Not Detected NDET^Not Detected ng/mL Final     Comment:     Cutoff for a negative cocaine is 150 ng/ml or less.     Methamphetamine (d-Methamphetamine)   Date Value Ref Range Status   01/16/2020 Not Detected NDET^Not Detected ng/mL  Final     Comment:     Cutoff for a negative methamphetamine is 500 ng/ml or less.     Opiates (Morphine)   Date Value Ref Range Status   01/16/2020 Not Detected NDET^Not Detected ng/mL Final     Comment:     Cutoff for a negative opiate is 100 ng/ml or less.     Amphetamine (d-Amphetamine)   Date Value Ref Range Status   01/16/2020 Not Detected NDET^Not Detected ng/mL Final     Comment:     Cutoff for a negative amphetamine is 500 ng/mL or less.     Benzodiazepines (Nordiazepam)   Date Value Ref Range Status   01/16/2020 Not Detected NDET^Not Detected ng/mL Final     Comment:     Cutoff for a negative benzodiazepine is 150 ng/ml or less.     Tricyclic Antidepressants (Desipramine)   Date Value Ref Range Status   01/16/2020 Not Detected NDET^Not Detected ng/mL Final     Comment:     Cutoff for a negative tricyclic antidepressant is 300 ng/ml or less.     Methadone (Methadone)   Date Value Ref Range Status   01/16/2020 Not Detected NDET^Not Detected ng/mL Final     Comment:     Cutoff for a negative methadone is 200 ng/ml or less.     Barbiturates (Butalbital)   Date Value Ref Range Status   01/16/2020 Not Detected NDET^Not Detected ng/mL Final     Comment:     Cutoff for a negative barbituate is 200 ng/ml or less.     Oxycodone (Oxycodone)   Date Value Ref Range Status   01/16/2020 Detected, Abnormal Result (A) NDET^Not Detected ng/mL Final     Comment:     Cutoff for a positive oxycodone is greater than 100 ng/ml.  This is an unconfirmed screening result to be used for medical purposes only.   Order [id number] for confirmation or individual confirmation tests to [employer].       Propoxyphene (Norpropoxyphene)   Date Value Ref Range Status   01/16/2020 Not Detected NDET^Not Detected ng/mL Final     Comment:     Cutoff for a negative propoxyphene is 300 ng/ml or less     Buprenorphine (Buprenorphine)   Date Value Ref Range Status   01/16/2020 Not Detected NDET^Not Detected ng/mL Final     Comment:     Cutoff for a negative  buprenorphine is 10 ng/ml or less        Processing:  Rx to be electronically transmitted to pharmacy by provider     [email or web address]    Routing refill request to provider for review/approval because:  Drug not on the [facility] refill protocol         Heidi James RN  [facility]

## 2021-04-05 NOTE — TELEPHONE ENCOUNTER
Outpatient Medication Detail   Disp Refills Start End ISAÍAS   oxyCODONE (XTAMPZA ER) 18 MG 12 hr tablet 60 tablet 0 3/6/2021  No   Sig - Route: Take 1 tablet (18 mg) by mouth every 12 hours - Oral     Problem List Complete:    Yes    Last Office Visit with [facility] primary care provider: 8/24/2020    Future Office visit:   Next 5 appointments (look out 90 days)    Apr 15, 2021 11:40 AM  Office Visit with Todd Pastrana MD  [facility] ([facility] ) [address] [place] [id number] [phone]          Controlled substance agreement:   Encounter-Level CSA - 11/30/2015:    Controlled Substance Agreement - Scan on 12/1/2015  2:00 PM: CONTROLLED SUBSTANCE AGREEMENT 11/30/15     Patient-Level CSA:    Controlled Substance Agreement - Opioid - Scan on 1/16/2020  4:43 PM         Last Urine Drug Screen:   Pain Drug SCR UR W RPTD Meds   Date Value Ref Range Status   04/17/2018 FINAL  Final     Comment:     (Note)  ====================================================================  TOX[facility]RE COMP DRUG ANALYSIS,UR  ====================================================================  Test                             Result       Flag       Units        Drug Present and Declared for Prescription Verification   Carboxy-THC                    119          EXPECTED   ng/mg creat    Carboxy-THC is a metabolite of tetrahydrocannabinol  (THC).    Source of THC is most commonly illicit, but THC is also present    in a scheduled prescription medication.   Oxycodone                      1531         EXPECTED   ng/mg creat   Oxymorphone                    501          EXPECTED   ng/mg creat   Noroxycodone                   >5208        EXPECTED   ng/mg creat   Noroxymorphone                 124          EXPECTED   ng/mg creat    Sources of oxycodone are scheduled prescription medications.    Oxymorphone, noroxycodone, and noroxymorphone are expected    metabolites of  oxycodone. Oxymorphone is also available as a    scheduled prescription medication.   Gabapentin                     PRESENT      EXPECTED                 Venlafaxine                    PRESENT      EXPECTED                 Desmethylvenlafaxine           PRESENT      EXPECTED                  Desmethylvenlafaxine is an expected metabolite of venlafaxine.  ====================================================================  Test                      Result    Flag   Units      Ref Range        Creatinine              192              mg/dL      >=20            ====================================================================  Declared Medications:  The flagging and interpretation on this report are based on the  following declared medications.  Unexpected results may arise from  inaccuracies in the declared medications.  **Note: The testing scope of this panel includes these medications:  Cannabis  Gabapentin  Oxycodone  Oxycodone (OxyContin)  Venlafaxine (Effexor)  ====================================================================  For clinical consultation, please call [phone].  ====================================================================  Analysis performed by [employer] [place] [address]     , No results found for: COMDAT,   Cannabinoids (01-zbs-6-carboxy-9-THC)   Date Value Ref Range Status   01/16/2020 Not Detected NDET^Not Detected ng/mL Final     Comment:     Cutoff for a negative cannabinoid is 50 ng/mL or less.     Phencyclidine (Phencyclidine)   Date Value Ref Range Status   01/16/2020 Not Detected NDET^Not Detected ng/mL Final     Comment:     Cutoff for a negative PCP is 25 ng/mL or less.     Cocaine (Benzoylecgonine)   Date Value Ref Range Status   01/16/2020 Not Detected NDET^Not Detected ng/mL Final     Comment:     Cutoff for a negative cocaine is 150 ng/ml or less.     Methamphetamine (d-Methamphetamine)   Date Value Ref Range Status   01/16/2020 Not Detected  NDET^Not Detected ng/mL Final     Comment:     Cutoff for a negative methamphetamine is 500 ng/ml or less.     Opiates (Morphine)   Date Value Ref Range Status   01/16/2020 Not Detected NDET^Not Detected ng/mL Final     Comment:     Cutoff for a negative opiate is 100 ng/ml or less.     Amphetamine (d-Amphetamine)   Date Value Ref Range Status   01/16/2020 Not Detected NDET^Not Detected ng/mL Final     Comment:     Cutoff for a negative amphetamine is 500 ng/mL or less.     Benzodiazepines (Nordiazepam)   Date Value Ref Range Status   01/16/2020 Not Detected NDET^Not Detected ng/mL Final     Comment:     Cutoff for a negative benzodiazepine is 150 ng/ml or less.     Tricyclic Antidepressants (Desipramine)   Date Value Ref Range Status   01/16/2020 Not Detected NDET^Not Detected ng/mL Final     Comment:     Cutoff for a negative tricyclic antidepressant is 300 ng/ml or less.     Methadone (Methadone)   Date Value Ref Range Status   01/16/2020 Not Detected NDET^Not Detected ng/mL Final     Comment:     Cutoff for a negative methadone is 200 ng/ml or less.     Barbiturates (Butalbital)   Date Value Ref Range Status   01/16/2020 Not Detected NDET^Not Detected ng/mL Final     Comment:     Cutoff for a negative barbituate is 200 ng/ml or less.     Oxycodone (Oxycodone)   Date Value Ref Range Status   01/16/2020 Detected, Abnormal Result (A) NDET^Not Detected ng/mL Final     Comment:     Cutoff for a positive oxycodone is greater than 100 ng/ml.  This is an unconfirmed screening result to be used for medical purposes only.   Order [id number] for confirmation or individual confirmation tests to [employer].       Propoxyphene (Norpropoxyphene)   Date Value Ref Range Status   01/16/2020 Not Detected NDET^Not Detected ng/mL Final     Comment:     Cutoff for a negative propoxyphene is 300 ng/ml or less     Buprenorphine (Buprenorphine)   Date Value Ref Range Status   01/16/2020 Not Detected NDET^Not Detected ng/mL Final     Comment:      Cutoff for a negative buprenorphine is 10 ng/ml or less        Processing:  Rx to be electronically transmitted to pharmacy by provider     [email or web address]    Routing refill request to provider for review/approval because:  Drug not on the [facility] refill protocol         Heidi James RN  [facility]

## 2021-04-05 NOTE — TELEPHONE ENCOUNTER
Patient has Med Check appointment scheduled for 4/15. Please refill until appointment.       Shireen NASH

## 2021-04-06 RX ORDER — OXYCODONE 18 MG/1
CAPSULE, EXTENDED RELEASE ORAL
Qty: 20 CAPSULE | Refills: 0 | Status: SHIPPED | OUTPATIENT
Start: 2021-04-06 | End: 2021-04-15

## 2021-04-06 RX ORDER — OXYCODONE HYDROCHLORIDE 15 MG/1
TABLET ORAL
Qty: 20 TABLET | Refills: 0 | Status: SHIPPED | OUTPATIENT
Start: 2021-04-06 | End: 2021-04-15

## 2021-04-06 NOTE — TELEPHONE ENCOUNTER
Next 5 appointments (look out 90 days)    Apr 15, 2021 11:40 AM  Office Visit with Todd Pastrana MD  [facility] ([facility] ) [address] [place] [id number] [phone]               Routing refill request to provider for review/approval because:  Drug not on the [facility] refill protocol       Mariely Lou RN, BSN  [facility]

## 2021-04-06 NOTE — TELEPHONE ENCOUNTER
Waqar melendez huddle with Dr. Ferguson, called patient back and explained Provider is not comfortable signing off on refill according to the policies in place by the clinic. Explained no show for last appointment and the previous refill was to get him to that appointment that was back in February. No other appointment was scheduled after that until yesterday when it was explained an appointment was needed for refill. Will need to wait for appointment with Dr. Pastrana. Patient was not happy with explanation and requested to speak with Clinic Manager. Gave information to Clinic Manager for follow up.    Shireen NASH

## 2021-04-06 NOTE — TELEPHONE ENCOUNTER
Called and spoke with patient about our need to see him in person due to the reason of the visit and that it has been a while since in-person. Patient understands this and will come to his apt next week noted below.     Advised pt his refill will get him to his apt only and that it will be ready for .    Lois

## 2021-04-06 NOTE — TELEPHONE ENCOUNTER
Patient has an appt with PCP on 4/15/21. Temporary refills approved to last him until this appt.    Melody Gillespie PA-C

## 2021-04-13 DIAGNOSIS — E11.42 TYPE 2 DIABETES MELLITUS WITH DIABETIC POLYNEUROPATHY, WITHOUT LONG-TERM CURRENT USE OF INSULIN (H): ICD-10-CM

## 2021-04-14 RX ORDER — GLIMEPIRIDE 4 MG/1
TABLET ORAL
Qty: 30 TABLET | Refills: 0 | Status: SHIPPED | OUTPATIENT
Start: 2021-04-14 | End: 2021-04-15

## 2021-04-14 NOTE — TELEPHONE ENCOUNTER
Next 5 appointments (look out 90 days)    Apr 15, 2021 11:40 AM  Office Visit with Todd Pastrana MD  [facility] ([facility] ) [address] [place] [id number] [phone]        Medication is being filled for 1 time refill only due to:  Patient needs to be seen because due for med check.      Heidi James RN  [facility]

## 2021-04-15 ENCOUNTER — OFFICE VISIT (OUTPATIENT)
Dept: FAMILY MEDICINE | Facility: CLINIC | Age: 51
End: 2021-04-15
Payer: COMMERCIAL

## 2021-04-15 VITALS
BODY MASS INDEX: 39.55 KG/M2 | OXYGEN SATURATION: 99 % | HEIGHT: 69 IN | DIASTOLIC BLOOD PRESSURE: 68 MMHG | HEART RATE: 82 BPM | RESPIRATION RATE: 18 BRPM | WEIGHT: 267 LBS | SYSTOLIC BLOOD PRESSURE: 128 MMHG

## 2021-04-15 DIAGNOSIS — M54.50 CHRONIC LOW BACK PAIN, UNSPECIFIED BACK PAIN LATERALITY, UNSPECIFIED WHETHER SCIATICA PRESENT: ICD-10-CM

## 2021-04-15 DIAGNOSIS — G62.9 PERIPHERAL POLYNEUROPATHY: ICD-10-CM

## 2021-04-15 DIAGNOSIS — Z12.5 SCREENING FOR PROSTATE CANCER: ICD-10-CM

## 2021-04-15 DIAGNOSIS — E11.42 TYPE 2 DIABETES MELLITUS WITH DIABETIC POLYNEUROPATHY, WITHOUT LONG-TERM CURRENT USE OF INSULIN (H): ICD-10-CM

## 2021-04-15 DIAGNOSIS — Z11.59 NEED FOR HEPATITIS C SCREENING TEST: ICD-10-CM

## 2021-04-15 DIAGNOSIS — G89.29 CHRONIC LOW BACK PAIN, UNSPECIFIED BACK PAIN LATERALITY, UNSPECIFIED WHETHER SCIATICA PRESENT: ICD-10-CM

## 2021-04-15 DIAGNOSIS — K64.4 EXTERNAL HEMORRHOIDS: ICD-10-CM

## 2021-04-15 DIAGNOSIS — F33.0 MAJOR DEPRESSIVE DISORDER, RECURRENT EPISODE, MILD (H): ICD-10-CM

## 2021-04-15 DIAGNOSIS — K59.03 CONSTIPATION DUE TO OPIOID THERAPY: ICD-10-CM

## 2021-04-15 DIAGNOSIS — F11.90 CHRONIC, CONTINUOUS USE OF OPIOIDS: ICD-10-CM

## 2021-04-15 DIAGNOSIS — Z12.11 SCREEN FOR COLON CANCER: ICD-10-CM

## 2021-04-15 DIAGNOSIS — K51.919 ULCERATIVE COLITIS WITH COMPLICATION, UNSPECIFIED LOCATION (H): ICD-10-CM

## 2021-04-15 DIAGNOSIS — T40.2X5A CONSTIPATION DUE TO OPIOID THERAPY: ICD-10-CM

## 2021-04-15 DIAGNOSIS — E66.01 MORBID OBESITY DUE TO EXCESS CALORIES (H): ICD-10-CM

## 2021-04-15 DIAGNOSIS — E78.5 HYPERLIPIDEMIA LDL GOAL <70: ICD-10-CM

## 2021-04-15 DIAGNOSIS — Z00.00 ROUTINE GENERAL MEDICAL EXAMINATION AT A HEALTH CARE FACILITY: Primary | ICD-10-CM

## 2021-04-15 DIAGNOSIS — G47.00 INSOMNIA, UNSPECIFIED TYPE: ICD-10-CM

## 2021-04-15 DIAGNOSIS — F41.9 ANXIETY: ICD-10-CM

## 2021-04-15 PROBLEM — J96.01 ACUTE RESPIRATORY FAILURE WITH HYPOXIA (H): Status: RESOLVED | Noted: 2020-11-20 | Resolved: 2021-04-15

## 2021-04-15 LAB
AMPHETAMINES UR QL: NOT DETECTED NG/ML
BARBITURATES UR QL SCN: NOT DETECTED NG/ML
BENZODIAZ UR QL SCN: NOT DETECTED NG/ML
BUPRENORPHINE UR QL: NOT DETECTED NG/ML
CANNABINOIDS UR QL: ABNORMAL NG/ML
CHOLEST SERPL-MCNC: 130 MG/DL
COCAINE UR QL SCN: NOT DETECTED NG/ML
D-METHAMPHET UR QL: NOT DETECTED NG/ML
HBA1C MFR BLD: 8.7 % (ref 0–5.6)
HCV AB SERPL QL IA: NONREACTIVE
HDLC SERPL-MCNC: 41 MG/DL
LDLC SERPL CALC-MCNC: 70 MG/DL
METHADONE UR QL SCN: NOT DETECTED NG/ML
NONHDLC SERPL-MCNC: 89 MG/DL
OPIATES UR QL SCN: NOT DETECTED NG/ML
OXYCODONE UR QL SCN: ABNORMAL NG/ML
PCP UR QL SCN: NOT DETECTED NG/ML
PROPOXYPH UR QL: NOT DETECTED NG/ML
TRICYCLICS UR QL SCN: NOT DETECTED NG/ML
TRIGL SERPL-MCNC: 95 MG/DL

## 2021-04-15 PROCEDURE — G0103 PSA SCREENING: HCPCS | Performed by: FAMILY MEDICINE

## 2021-04-15 PROCEDURE — 99214 OFFICE O/P EST MOD 30 MIN: CPT | Performed by: FAMILY MEDICINE

## 2021-04-15 PROCEDURE — 83036 HEMOGLOBIN GLYCOSYLATED A1C: CPT | Performed by: FAMILY MEDICINE

## 2021-04-15 PROCEDURE — 86803 HEPATITIS C AB TEST: CPT | Performed by: FAMILY MEDICINE

## 2021-04-15 PROCEDURE — 80061 LIPID PANEL: CPT | Performed by: FAMILY MEDICINE

## 2021-04-15 PROCEDURE — 36415 COLL VENOUS BLD VENIPUNCTURE: CPT | Performed by: FAMILY MEDICINE

## 2021-04-15 PROCEDURE — 80306 DRUG TEST PRSMV INSTRMNT: CPT | Performed by: FAMILY MEDICINE

## 2021-04-15 RX ORDER — SIMVASTATIN 20 MG
20 TABLET ORAL AT BEDTIME
Qty: 90 TABLET | Refills: 3 | Status: SHIPPED | OUTPATIENT
Start: 2021-04-15 | End: 2022-06-14

## 2021-04-15 RX ORDER — OXYCODONE HYDROCHLORIDE 15 MG/1
15 TABLET ORAL 2 TIMES DAILY
Qty: 60 TABLET | Refills: 0 | Status: SHIPPED | OUTPATIENT
Start: 2021-04-15 | End: 2021-05-12

## 2021-04-15 RX ORDER — GLIMEPIRIDE 4 MG/1
4 TABLET ORAL
Qty: 90 TABLET | Refills: 1 | Status: SHIPPED | OUTPATIENT
Start: 2021-04-15 | End: 2021-08-09

## 2021-04-15 RX ORDER — NALOXEGOL OXALATE 25 MG/1
25 TABLET, FILM COATED ORAL
Qty: 90 TABLET | Refills: 1 | Status: SHIPPED | OUTPATIENT
Start: 2021-04-15 | End: 2021-11-12

## 2021-04-15 RX ORDER — TRAZODONE HYDROCHLORIDE 50 MG/1
50-100 TABLET, FILM COATED ORAL
Qty: 180 TABLET | Refills: 3 | Status: SHIPPED | OUTPATIENT
Start: 2021-04-15 | End: 2022-06-14

## 2021-04-15 RX ORDER — PREGABALIN 75 MG/1
75 CAPSULE ORAL 2 TIMES DAILY
Qty: 180 CAPSULE | Refills: 0 | Status: SHIPPED | OUTPATIENT
Start: 2021-06-05 | End: 2021-09-08

## 2021-04-15 RX ORDER — OXYCODONE 18 MG/1
18 CAPSULE, EXTENDED RELEASE ORAL EVERY 12 HOURS
Qty: 60 CAPSULE | Refills: 0 | Status: SHIPPED | OUTPATIENT
Start: 2021-04-15 | End: 2021-05-12

## 2021-04-15 ASSESSMENT — MIFFLIN-ST. JEOR: SCORE: 2066.48

## 2021-04-15 ASSESSMENT — PATIENT HEALTH QUESTIONNAIRE - PHQ9: SUM OF ALL RESPONSES TO PHQ QUESTIONS 1-9: 16

## 2021-04-15 NOTE — PROGRESS NOTES
Assessment & Plan   Type 2 diabetes mellitus with diabetic polyneuropathy, without long-term current use of insulin (H)  Poor control and admits to not following diet real well.  Will add semaglutide tablets and continue with glimepiride and Metformin.    - glimepiride (AMARYL) 4 MG tablet  Dispense: 90 tablet; Refill: 1  - blood glucose (CONTOUR NEXT TEST) test strip  Dispense: 200 strip; Refill: 11  - Hemoglobin A1c  - Semaglutide 3 MG TABS  Dispense: 30 tablet; Refill: 0  - Semaglutide 7 MG TABS  Dispense: 90 tablet; Refill: 1    Chronic low back pain, unspecified back pain laterality, unspecified whether sciatica present  - every 3 month med checks/ Peripheral polyneuropathy  Ongoing symptoms partly due to back pain as well as his diabetes mellitus.   He would like to get a second opinion at [facility] and I am okay with referral if covered by insurance  - oxyCODONE IR (ROXICODONE) 15 MG tablet  Dispense: 60 tablet; Refill: 0  - pregabalin (LYRICA) 75 MG capsule  Dispense: 180 capsule; Refill: 0  - oxyCODONE (XTAMPZA ER) 18 MG 12 hr tablet  Dispense: 60 capsule; Refill: 0  - Drug Abuse Screen Panel 13, Urine (Care Map)  - Orthopedic & Spine  Referral    Constipation due to opioid therapy  Ongoing symptoms related to opioids and will continue to work with better diet  - MOVANTIK 25 MG TABS tablet  Dispense: 90 tablet; Refill: 1    Hyperlipidemia LDL goal <70  Controlled - continue medication.  - Lipid panel reflex to direct LDL Fasting  - simvastatin (ZOCOR) 20 MG tablet  Dispense: 90 tablet; Refill: 3    Insomnia, unspecified type  Partially Controlled - continue medication.  - traZODone (DESYREL) 50 MG tablet  Dispense: 180 tablet; Refill: 3    Need for hepatitis C screening test  Possible exposure and will check:  - Hepatitis C Screen Reflex to HCV RNA Quant and Genotype    Screening for prostate cancer  - **Prostate spec antigen screen FUTURE anytime    Chronic, continuous use of opioids  - Drug Abuse  "Screen Panel 13, Urine (Care Map)    Major depressive disorder, recurrent episode, mild (H)c / Anxiety  Prior venlafaxine use and did not like that as once he ran out he feels much clearer in the head (felt unmotivated).  He is down and is interested in trying something else.  Has not used sertraline which is quite activating and might help him with his energy.  Recheck in 1 to 2 months  - sertraline (ZOLOFT) 50 MG tablet  Dispense: 90 tablet; Refill: 1    Ulcerative colitis with complication, unspecified location (H)  Recurrent symptoms and would like to be seen by gastroenterology at [place]  - GASTROENTEROLOGY ADULT REF CONSULT ONLY    Morbid obesity due to excess calories (H)  Diet and exercise    Screen for colon cancer  - GASTROENTEROLOGY ADULT REF CONSULT ONLY            BMI:   Estimated body mass index is 39.43 kg/m  as calculated from the following:    Height as of this encounter: 1.753 m (5' 9\").    Weight as of this encounter: 121.1 kg (267 lb).   Discussed healthy diet and exercise guidelines      Return in about 3 months (around 7/15/2021) for medication recheck, in person, with Dr Dannie Pastrana.      Dannie Pastrana MD      [address] [address]  [employer]   Office: [phone]         BMI:   Estimated body mass index is 39.43 kg/m  as calculated from the following:    Height as of this encounter: 1.753 m (5' 9\").    Weight as of this encounter: 121.1 kg (267 lb).   Weight management plan: Discussed healthy diet and exercise guidelines      Return in about 3 months (around 7/15/2021) for medication recheck, in person, with Dr Dannie Pastrana.      Dannie Pastrana MD      [address] [address]  [employer]   Office: [phone]       Subjective   Harm is a 51 year old who presents for the following health issues     HPI     Diabetes Follow-up    How often are you checking your blood sugar? A few " times a week  What time of day are you checking your blood sugars (select all that apply)?  Before meals  Have you had any blood sugars above 200?  No  Have you had any blood sugars below 70?  Yes sometimes    What symptoms do you notice when your blood sugar is low?  Dizzy and Confusion    What concerns do you have today about your diabetes? None     Do you have any of these symptoms? (Select all that apply)  No numbness or tingling in feet.  No redness, sores or blisters on feet.  No complaints of excessive thirst.  No reports of blurry vision.  No significant changes to weight.    Have you had a diabetic eye exam in the last 12 months? No        BP Readings from Last 2 Encounters:   04/15/21 128/68   11/21/20 135/81     Hemoglobin A1C (%)   Date Value   04/15/2021 8.7 (H)   01/18/2020 7.9 (H)     LDL Cholesterol Calculated (mg/dL)   Date Value   04/15/2021 70   01/18/2020 54               Hyperlipidemia Follow-Up      Are you regularly taking any medication or supplement to lower your cholesterol?   Yes- zocor    Are you having muscle aches or other side effects that you think could be caused by your cholesterol lowering medication?  No      How many servings of fruits and vegetables do you eat daily?  2-3    On average, how many sweetened beverages do you drink each day (Examples: soda, juice, sweet tea, etc.  Do NOT count diet or artificially sweetened beverages)?   0    How many days per week do you exercise enough to make your heart beat faster? 7    How many minutes a day do you exercise enough to make your heart beat faster? 10 - 19    How many days per week do you miss taking your medication? 0    Hemorrhoid flare-up recently - has constipation from opioids.  Also is concerned that his ulcerative colitis might be flaring up again with some abdominal pains     Chronic back pain that is persistent he is frustrated would like to be seen at [facility] in [place] to see if they have anything else to add gone to  "many different pain clinics and had many different interventions.  Did review last MRI and it showed some degenerative disc some foraminal narrowing in the lumbar region (2016)    Review of Systems   Constitutional, HEENT, cardiovascular, pulmonary, gi and gu systems are negative, except as otherwise noted.      Objective    /68   Pulse 82   Resp 18   Ht 1.753 m (5' 9\")   Wt 121.1 kg (267 lb)   SpO2 99%   BMI 39.43 kg/m    Body mass index is 39.43 kg/m .  Physical Exam   GENERAL: healthy, alert and no distress  HENT: ear canals and TM's normal, nose and mouth without ulcers or lesions  NECK: no adenopathy, no asymmetry, masses, or scars and thyroid normal to palpation  RESP: lungs clear to auscultation - no rales, rhonchi or wheezes  CV: regular rate and rhythm, normal S1 S2, no S3 or S4, no murmur, click or rub, no peripheral edema and peripheral pulses strong  ABDOMEN: soft, nontender, no hepatosplenomegaly, no masses and bowel sounds normal  MS: no gross musculoskeletal defects noted, no edema  SKIN: no suspicious lesions or rashes  NEURO: Decreased sensation from about the mid knee down, no open wounds or sores.  Otherwise normal strength and tone, mentation intact and speech normal  BACK: no CVA tenderness, bilateral mid and lumbar paraspinal tenderness.  Otherwise paralumbar tenderness  PSYCH: mentation appears normal, affect normal/bright  LYMPH: no cervical, supraclavicular, axillary, or inguinal adenopathy  Results for orders placed or performed in visit on 04/15/21   Hepatitis C Screen Reflex to HCV RNA Quant and Genotype     Status: None   Result Value Ref Range    Hepatitis C Antibody Nonreactive NR^Nonreactive   Lipid panel reflex to direct LDL Fasting     Status: None   Result Value Ref Range    Cholesterol 130 <200 mg/dL    Triglycerides 95 <150 mg/dL    HDL Cholesterol 41 >39 mg/dL    LDL Cholesterol Calculated 70 <100 mg/dL    Non HDL Cholesterol 89 <130 mg/dL   Hemoglobin A1c     " Status: Abnormal   Result Value Ref Range    Hemoglobin A1C 8.7 (H) 0 - 5.6 %   Drug Abuse Screen Panel 13, Urine (Care Map)     Status: Abnormal   Result Value Ref Range    Cannabinoids (49-hut-1-carboxy-9-THC) Detected, Abnormal Result (A) NDET^Not Detected ng/mL    Phencyclidine (Phencyclidine) Not Detected NDET^Not Detected ng/mL    Cocaine (Benzoylecgonine) Not Detected NDET^Not Detected ng/mL    Methamphetamine (d-Methamphetamine) Not Detected NDET^Not Detected ng/mL    Opiates (Morphine) Not Detected NDET^Not Detected ng/mL    Amphetamine (d-Amphetamine) Not Detected NDET^Not Detected ng/mL    Benzodiazepines (Nordiazepam) Not Detected NDET^Not Detected ng/mL    Tricyclic Antidepressants (Desipramine) Not Detected NDET^Not Detected ng/mL    Methadone (Methadone) Not Detected NDET^Not Detected ng/mL    Barbiturates (Butalbital) Not Detected NDET^Not Detected ng/mL    Oxycodone (Oxycodone) Detected, Abnormal Result (A) NDET^Not Detected ng/mL    Propoxyphene (Norpropoxyphene) Not Detected NDET^Not Detected ng/mL    Buprenorphine (Buprenorphine) Not Detected NDET^Not Detected ng/mL

## 2021-04-15 NOTE — LETTER
Opioid / Opioid Plus Controlled Substance Agreement    This is an agreement between you and your provider about the safe and appropriate use of controlled substance/opioids prescribed by your care team. Controlled substances are medicines that can cause physical and mental dependence (abuse).    There are strict laws about having and using these medicines. We here at [facility] are committing to working with you in your efforts to get better. To support you in this work, we ll help you schedule regular office appointments for medicine refills. If we must cancel or change your appointment for any reason, we ll make sure you have enough medicine to last until your next appointment.     As a Provider, I will:    Listen carefully to your concerns and treat you with respect.     Recommend a treatment plan that I believe is in your best interest. This plan may involve therapies other than opioid pain medication.     Talk with you often about the possible benefits, and the risk of harm of any medicine that we prescribe for you.     Provide a plan on how to taper (discontinue or go off) using this medicine if the decision is made to stop its use.  As a Patient, I understand that opioid(s):     Are a controlled substance prescribed by my care team to help me function or work and manage my condition(s).     Are strong medicines and can cause serious side effects such as:    Drowsiness, which can seriously affect my driving ability    A lower breathing rate, enough to cause death    Harm to my thinking ability     Depression     Abuse of and addiction to this medicine    Need to be taken exactly as prescribed. Combining opioids with certain medicines or chemicals (such as illegal drugs, sedatives, sleeping pills, and benzodiazepines) can be dangerous or even fatal. If I stop opioids suddenly, I may have severe withdrawal symptoms.    Do not work for all types of pain nor for all patients. If they re not helpful, I may  be asked to stop them.    The risks, benefits and side effects of these medicine(s) were explained to me. I agree that:  1. I will take part in other treatments as advised by my care team. This may be psychiatry or counseling, physical therapy, behavioral therapy, group treatment or a referral to a specialist.     2. I will keep all my appointments. I understand that this is part of the monitoring of opioids. My care team may require an office visit for EVERY opioid/controlled substance refill. If I miss appointments or don t follow instructions, my care team may stop my medicine.    3. I will take my medicines as prescribed. I will not change the dose or schedule unless my care team tells me to. There will be no refills if I run out early.     4. I may be asked to come to the clinic and complete a urine drug test or complete a pill count at any time. If I don t give a urine sample or participate in a pill count, the care team may stop my medicine.    5. I will only receive prescriptions from this clinic for chronic pain. If I am treated by another provider for acute pain issues, I will tell them that I am taking opioid pain medication for chronic pain and that I have a treatment agreement with this provider. I will inform my [facility] care team within one business day if I am given a prescription for any pain medication by another healthcare provider. My [facility] care team can contact other providers and pharmacists about my use of any medicines.    6. It is up to me to make sure that I don t run out of my medicines on weekends or holidays. If my care team is willing to refill my opioid prescription without a visit, I must request refills only during office hours. Refills may take up to 3 business days to process. I will use one pharmacy to fill all my opioid and other controlled substance prescriptions. I will notify the clinic about any changes to my insurance or medication availability.    7. I  am responsible for my prescriptions. If the medicine/prescription is lost, stolen or destroyed, it will not be replaced. I also agree not to share controlled substance medicines with anyone.  8. I am aware I should not use any illegal or recreational drugs. I agree not to drink alcohol unless my care team says I can.     9. If I enroll in the [place] Medical Cannabis program, I will tell my care team prior to my next refill.     10. I will tell my care team right away if I become pregnant, have a new medical problem treated outside of my regular clinic, or have a change in my medications.    11. I understand that this medicine can affect my thinking, judgment and reaction time. Alcohol and drugs affect the brain and body, which can affect the safety of my driving. Being under the influence of alcohol or drugs can affect my decision-making, behaviors, personal safety, and the safety of others. Driving while impaired (DWI) can occur if a person is driving, operating, or in physical control of a car, motorcycle, boat, snowmobile, ATV, motorbike, off-road vehicle, or any other motor vehicle ([place] Statute 169A.20). I understand the risk if I choose to drive or operate any vehicle or machinery.    I understand that if I do not follow any of the conditions above, my prescriptions or treatment may be stopped or changed.                Opioids  What You Need to Know    What are opioids?   Opioids are pain medicines that must be prescribed by a doctor. They are also known as narcotics.     Examples are:   1. morphine (MS Contin, Sara)  2. oxycodone (Oxycontin)  3. oxycodone and acetaminophen (Percocet)  4. hydrocodone and acetaminophen (Vicodin, Norco)   5. fentanyl patch (Duragesic)   6. hydromorphone (Dilaudid)   7. methadone  8. codeine (Tylenol #3)     What do opioids do well?   Opioids are best for severe short-term pain such as after a surgery or injury. They may work well for cancer pain. They may help some people  with long-lasting (chronic) pain.     What do opioids NOT do well?   Opioids never get rid of pain entirely, and they don t work well for most patients with chronic pain. Opioids don t reduce swelling, one of the causes of pain.                                    Other ways to manage chronic pain and improve function include:       Treat the health problem that may be causing pain    Anti-inflammation medicines, which reduce swelling and tenderness, such as ibuprofen (Advil, Motrin) or naproxen (Aleve)    Acetaminophen (Tylenol)    Antidepressants and anti-seizure medicines, especially for nerve pain    Topical treatments such as patches or creams    Injections or nerve blocks    Chiropractic or osteopathic treatment    Acupuncture, massage, deep breathing, meditation, visual imagery, aromatherapy    Use heat or ice at the pain site    Physical therapy     Exercise    Stop smoking    Take part in therapy       Risks and side effects     Talk to your doctor before you start or decide to keep taking opioids. Possible side effects include:      Lowering your breathing rate enough to cause death    Overdose, including death, especially if taking higher than prescribed doses    Worse depression symptoms; less pleasure in things you usually enjoy    Feeling tired or sluggish    Slower thoughts or cloudy thinking    Being more sensitive to pain over time; pain is harder to control    Trouble sleeping or restless sleep    Changes in hormone levels (for example, less testosterone)    Changes in sex drive or ability to have sex    Constipation    Unsafe driving    Itching and sweating    Dizziness    Nausea, throwing up and dry mouth    What else should I know about opioids?    Opioids may lead to dependence, tolerance, or addiction.      Dependence means that if you stop or reduce the medicine too quickly, you will have withdrawal symptoms. These include loose poop (diarrhea), jitters, flu-like symptoms, nervousness and  tremors. Dependence is not the same as addiction.                       Tolerance means needing higher doses over time to get the same effect. This may increase the chance of serious side effects.      Addiction is when people improperly use a substance that harms their body, their mind or their relations with others. Use of opiates can cause a relapse of addiction if you have a history of drug or alcohol abuse.      People who have used opioids for a long time may have a lower quality of life, worse depression, higher levels of pain and more visits to doctors.    You can overdose on opioids. Take these steps to lower your risk of overdose:    1. Recognize the signs:  Signs of overdose include decrease or loss of consciousness (blackout), slowed breathing, trouble waking up and blue lips. If someone is worried about overdose, they should call 911.    2. Talk to your doctor about Narcan (naloxone).   If you are at risk for overdose, you may be given a prescription for Narcan. This medicine very quickly reverses the effects of opioids.   If you overdose, a friend or family member can give you Narcan while waiting for the ambulance. They need to know the signs of overdose and how to give Narcan.     3. Don't use alcohol or street drugs.   Taking them with opioids can cause death.    4. Do not take any of these medicines unless your doctor says it s OK. Taking these with opioids can cause death:    Benzodiazepines, such as lorazepam (Ativan), alprazolam (Xanax) or diazepam (Valium)    Muscle relaxers, such as cyclobenzaprine (Flexeril)    Sleeping pills like zolpidem (Ambien)     Other opioids      How to keep you and other people safe while taking opioids:    1. Never share your opioids with others.  Opioid medicines are regulated by the Drug Enforcement Agency ([employer]). Selling or sharing medications is a criminal act.    2. Be sure to store opioids in a secure place, locked up if possible. Young children can easily  swallow them and overdose.    3. When you are traveling with your medicines, keep them in the original bottles. If you use a pill box, be sure you also carry a copy of your medicine list from your clinic or pharmacy.    4. Safe disposal of opioids    Most pharmacies have places to get rid of medicine, called disposal kiosks. Medicine disposal options are also available in every [facility]. Search your county and  medication disposal  to find more options. You can find more details at:  https://[email or web address]./living-green/managing-unwanted-medications     I agree that my provider, clinic care team, and pharmacy may work with any city, state or federal law enforcement agency that investigates the misuse, sale, or other diversion of my controlled medicine. I will allow my provider to discuss my care with, or share a copy of, this agreement with any other treating provider, pharmacy or emergency room where I receive care.    I have read this agreement and have asked questions about anything I did not understand.    _______________________________________________________  Patient Signature - Ryland Gee _____________________                   Date     _______________________________________________________  Provider Signature - Todd Pastrana MD   _____________________                   Date     _______________________________________________________  Witness Signature (required if provider not present while patient signing)   _____________________                   Date

## 2021-04-16 LAB — PSA SERPL-ACNC: 0.86 UG/L (ref 0–4)

## 2021-04-20 NOTE — RESULT ENCOUNTER NOTE
Dear Ryland,    Here is a summary of your recent test results:  -PSA (prostate specific antigen) test is normal.  This indicates a low likelihood of prostate cancer.  ADVISE: rechecking this in 1 year.  -Cholesterol levels are at your goal levels.  ADVISE: continuing your medication, a regular exercise program with at least 150 minutes of aerobic exercise per week, and eating a low saturated fat/low carbohydrate diet.  Also, you should recheck this fasting cholesterol panel in 12 months.  -A1C test (average blood sugar the last 2-3 months) is above your goal as discussed at your appointment..   ADVISE: Following diabetic diet more closely, continue medications and making a diabetic followup appointment in 4 weeks. Please check and record your blood sugars at least 4 times daily for 1 week prior to your appointment and bring for review.  Also, you should recheck your A1C in 3 months.  -Hepatitis C antibody screen test shows no signs of a previous hepatitis C infection.  -Drug screen was postive for cannabinoids (THC) and otherwise showed expected results.    For additional lab test information, labtestsonline.org is an excellent reference.    In addition, here is a list of due or overdue Health Maintenance reminders:    Eye Exam due on 01/19/2019    Please call us at [phone] (or use [facility]) to address the above recommendations if needed.           Thank you very much for trusting me and [facility].     Have a peaceful day.    Healthy regards,  Dannie Pastrana MD

## 2021-05-11 DIAGNOSIS — G62.9 PERIPHERAL POLYNEUROPATHY: ICD-10-CM

## 2021-05-11 DIAGNOSIS — M54.50 CHRONIC LOW BACK PAIN, UNSPECIFIED BACK PAIN LATERALITY, UNSPECIFIED WHETHER SCIATICA PRESENT: ICD-10-CM

## 2021-05-11 DIAGNOSIS — F11.90 CHRONIC, CONTINUOUS USE OF OPIOIDS: Primary | ICD-10-CM

## 2021-05-11 DIAGNOSIS — E11.42 TYPE 2 DIABETES MELLITUS WITH DIABETIC POLYNEUROPATHY, WITHOUT LONG-TERM CURRENT USE OF INSULIN (H): ICD-10-CM

## 2021-05-11 DIAGNOSIS — G89.29 CHRONIC LOW BACK PAIN, UNSPECIFIED BACK PAIN LATERALITY, UNSPECIFIED WHETHER SCIATICA PRESENT: ICD-10-CM

## 2021-05-11 RX ORDER — GLIMEPIRIDE 4 MG/1
TABLET ORAL
Qty: 30 TABLET | Refills: 0 | OUTPATIENT
Start: 2021-05-11

## 2021-05-11 NOTE — TELEPHONE ENCOUNTER
Routing refill request to provider for review/approval because:  Drug not on the [facility] refill protocol   Cassidy Cerrato RN, BSN

## 2021-05-12 RX ORDER — OXYCODONE HYDROCHLORIDE 15 MG/1
15 TABLET ORAL 2 TIMES DAILY
Qty: 60 TABLET | Refills: 0 | Status: SHIPPED | OUTPATIENT
Start: 2021-05-15 | End: 2021-06-09

## 2021-05-12 RX ORDER — OXYCODONE 18 MG/1
18 CAPSULE, EXTENDED RELEASE ORAL EVERY 12 HOURS
Qty: 60 CAPSULE | Refills: 0 | Status: SHIPPED | OUTPATIENT
Start: 2021-05-15 | End: 2021-06-09

## 2021-05-12 NOTE — TELEPHONE ENCOUNTER
Medication sent    Last visit in this dept:    4/15/2021     Next visit in this dept:   Next 5 appointments (look out 90 days)    Jul 15, 2021 11:40 AM  SHORT with Todd Pastrana MD  [facility] ([facility] ) [address] [place] [id number]  [phone]          Health Maintenance   Topic Date Due     COVID-19 Vaccine (1) Never done     EYE EXAM  01/19/2019     ADVANCE CARE PLANNING  01/09/2022 (Originally 1970)     A1C  07/15/2021     PREVENTIVE CARE VISIT  08/24/2021     MICROALBUMIN  08/24/2021     DIABETIC FOOT EXAM  08/24/2021     BUSHRA ASSESSMENT  08/24/2021     PHQ-9  10/15/2021     BMP  11/20/2021     ZOSTER IMMUNIZATION (1 of 2) 01/09/2022     LIPID  04/15/2022     PSA  04/15/2022     URINE DRUG SCREEN  04/15/2022     ANNUAL REVIEW OF HM ORDERS  04/15/2022     DTAP/TDAP/TD IMMUNIZATION (2 - Td) 04/18/2027     Pneumococcal Vaccine: Pediatrics (0 to 5 Years) and At-Risk Patients (6 to 64 Years) (2 of 2) 01/09/2037     HEPATITIS C SCREENING  Completed     HIV SCREENING  Addressed     DEPRESSION ACTION PLAN  Addressed     INFLUENZA VACCINE  Addressed     IPV IMMUNIZATION  Aged Out     MENINGITIS IMMUNIZATION  Aged Out     HEPATITIS B IMMUNIZATION  Discontinued

## 2021-06-01 ENCOUNTER — TRANSFERRED RECORDS (OUTPATIENT)
Dept: HEALTH INFORMATION MANAGEMENT | Facility: CLINIC | Age: 51
End: 2021-06-01

## 2021-06-01 LAB — RETINOPATHY: NORMAL

## 2021-06-08 DIAGNOSIS — F11.90 CHRONIC, CONTINUOUS USE OF OPIOIDS: ICD-10-CM

## 2021-06-08 DIAGNOSIS — E11.42 TYPE 2 DIABETES MELLITUS WITH DIABETIC POLYNEUROPATHY, WITHOUT LONG-TERM CURRENT USE OF INSULIN (H): ICD-10-CM

## 2021-06-08 DIAGNOSIS — G62.9 PERIPHERAL POLYNEUROPATHY: ICD-10-CM

## 2021-06-08 DIAGNOSIS — M54.50 CHRONIC LOW BACK PAIN, UNSPECIFIED BACK PAIN LATERALITY, UNSPECIFIED WHETHER SCIATICA PRESENT: ICD-10-CM

## 2021-06-08 DIAGNOSIS — G89.29 CHRONIC LOW BACK PAIN, UNSPECIFIED BACK PAIN LATERALITY, UNSPECIFIED WHETHER SCIATICA PRESENT: ICD-10-CM

## 2021-06-09 RX ORDER — OXYCODONE HYDROCHLORIDE 15 MG/1
15 TABLET ORAL 2 TIMES DAILY
Qty: 60 TABLET | Refills: 0 | Status: SHIPPED | OUTPATIENT
Start: 2021-06-14 | End: 2021-07-07

## 2021-06-09 RX ORDER — OXYCODONE 18 MG/1
CAPSULE, EXTENDED RELEASE ORAL
Qty: 60 CAPSULE | Refills: 0 | Status: SHIPPED | OUTPATIENT
Start: 2021-06-14 | End: 2021-07-07

## 2021-06-09 NOTE — TELEPHONE ENCOUNTER
Controlled Substance Refill Request for   oxyCODONE (XTAMPZA ER) 18 MG 12 hr tablet 60 capsule 0 5/15/2021  No   Sig - Route: Take 1 tablet (18 mg) by mouth every 12 hours - Oral   Sent to pharmacy as: Xtampza ER 18 MG Oral Capsule ER 12 Hour Abuse-Deterrent (oxyCODONE)   Class: E-Prescribe   Earliest Fill Date: 5/15/2021     oxyCODONE IR (ROXICODONE) 15 MG tablet 60 tablet 0 5/15/2021  No   Sig - Route: Take 1 tablet (15 mg) by mouth 2 times daily - Oral   Sent to pharmacy as: oxyCODONE HCl 15 MG Oral Tablet (ROXICODONE)   Class: E-Prescribe   Earliest Fill Date: 5/15/2021       Problem List Complete:    Yes    Last Written Prescription Date:  5/15/21  Last Fill Quantity: 60,   # refills: 0  Last Office Visit with [facility] primary care provider: 4/15/2021      Future Office visit:   Next 5 appointments (look out 90 days)    Jul 15, 2021 11:40 AM  SHORT with Todd Pastrana MD  [facility] ([facility] ) [address] [place] [id number] [phone]          Controlled substance agreement:   Encounter-Level CSA - 11/30/2015:    Controlled Substance Agreement - Scan on 12/1/2015  2:00 PM: CONTROLLED SUBSTANCE AGREEMENT 11/30/15     Patient-Level CSA:    Controlled Substance Agreement - Opioid - Scan on 4/15/2021  1:40 PM  Controlled Substance Agreement - Opioid - Scan on 1/16/2020  4:43 PM         Last Urine Drug Screen:   Pain Drug SCR UR W RPTD Meds   Date Value Ref Range Status   04/17/2018 FINAL  Final     Comment:     (Note)  ====================================================================  TOXAS[facility]RE COMP DRUG ANALYSIS,UR  ====================================================================  Test                             Result       Flag       Units        Drug Present and Declared for Prescription Verification   Carboxy-THC                    119          EXPECTED   ng/mg creat    Carboxy-THC is a metabolite of tetrahydrocannabinol   (THC).    Source of THC is most commonly illicit, but THC is also present    in a scheduled prescription medication.   Oxycodone                      1531         EXPECTED   ng/mg creat   Oxymorphone                    501          EXPECTED   ng/mg creat   Noroxycodone                   >5208        EXPECTED   ng/mg creat   Noroxymorphone                 124          EXPECTED   ng/mg creat    Sources of oxycodone are scheduled prescription medications.    Oxymorphone, noroxycodone, and noroxymorphone are expected    metabolites of oxycodone. Oxymorphone is also available as a    scheduled prescription medication.   Gabapentin                     PRESENT      EXPECTED                 Venlafaxine                    PRESENT      EXPECTED                 Desmethylvenlafaxine           PRESENT      EXPECTED                  Desmethylvenlafaxine is an expected metabolite of venlafaxine.  ====================================================================  Test                      Result    Flag   Units      Ref Range        Creatinine              192              mg/dL      >=20            ====================================================================  Declared Medications:  The flagging and interpretation on this report are based on the  following declared medications.  Unexpected results may arise from  inaccuracies in the declared medications.  **Note: The testing scope of this panel includes these medications:  Cannabis  Gabapentin  Oxycodone  Oxycodone (OxyContin)  Venlafaxine (Effexor)  ====================================================================  For clinical consultation, please call [phone].  ====================================================================  Analysis performed by [employer] [place] [address]     , No results found for: COMDAT,   Cannabinoids (48-xyj-8-carboxy-9-THC)   Date Value Ref Range Status   04/15/2021 Detected, Abnormal Result (A) NDET^Not  Detected ng/mL Final     Comment:     Cutoff for a positive cannabinoid is greater than 50 ng/ml.  This is an unconfirmed screening result to be used for medical purposes only.   Order [id number] for confirmation or individual confirmation tests to [facility].       Phencyclidine (Phencyclidine)   Date Value Ref Range Status   04/15/2021 Not Detected NDET^Not Detected ng/mL Final     Comment:     Cutoff for a negative PCP is 25 ng/mL or less.     Cocaine (Benzoylecgonine)   Date Value Ref Range Status   04/15/2021 Not Detected NDET^Not Detected ng/mL Final     Comment:     Cutoff for a negative cocaine is 150 ng/ml or less.     Methamphetamine (d-Methamphetamine)   Date Value Ref Range Status   04/15/2021 Not Detected NDET^Not Detected ng/mL Final     Comment:     Cutoff for a negative methamphetamine is 500 ng/ml or less.     Opiates (Morphine)   Date Value Ref Range Status   04/15/2021 Not Detected NDET^Not Detected ng/mL Final     Comment:     Cutoff for a negative opiate is 100 ng/ml or less.     Amphetamine (d-Amphetamine)   Date Value Ref Range Status   04/15/2021 Not Detected NDET^Not Detected ng/mL Final     Comment:     Cutoff for a negative amphetamine is 500 ng/mL or less.     Benzodiazepines (Nordiazepam)   Date Value Ref Range Status   04/15/2021 Not Detected NDET^Not Detected ng/mL Final     Comment:     Cutoff for a negative benzodiazepine is 150 ng/ml or less.     Tricyclic Antidepressants (Desipramine)   Date Value Ref Range Status   04/15/2021 Not Detected NDET^Not Detected ng/mL Final     Comment:     Cutoff for a negative tricyclic antidepressant is 300 ng/ml or less.     Methadone (Methadone)   Date Value Ref Range Status   04/15/2021 Not Detected NDET^Not Detected ng/mL Final     Comment:     Cutoff for a negative methadone is 200 ng/ml or less.     Barbiturates (Butalbital)   Date Value Ref Range Status   04/15/2021 Not Detected NDET^Not Detected ng/mL Final     Comment:     Cutoff for a negative  barbituate is 200 ng/ml or less.     Oxycodone (Oxycodone)   Date Value Ref Range Status   04/15/2021 Detected, Abnormal Result (A) NDET^Not Detected ng/mL Final     Comment:     Cutoff for a positive oxycodone is greater than 100 ng/ml.  This is an unconfirmed screening result to be used for medical purposes only.   Order [id number] for confirmation or individual confirmation tests to [facility].       Propoxyphene (Norpropoxyphene)   Date Value Ref Range Status   04/15/2021 Not Detected NDET^Not Detected ng/mL Final     Comment:     Cutoff for a negative propoxyphene is 300 ng/ml or less     Buprenorphine (Buprenorphine)   Date Value Ref Range Status   04/15/2021 Not Detected NDET^Not Detected ng/mL Final     Comment:     Cutoff for a negative buprenorphine is 10 ng/ml or less        Processing:  Rx to be electronically transmitted to pharmacy by provider     [email or web address]   checked in past 3 months?  No, route to RN     Routing refill request to provider for review/approval because:  Drug not on the [facility] refill protocol     Noam ALSTON RN   [facility]

## 2021-06-09 NOTE — TELEPHONE ENCOUNTER
Prescription approved per [facility] Refill Protocol.    Noam ALSTON RN   [facility]

## 2021-08-09 ENCOUNTER — OFFICE VISIT (OUTPATIENT)
Dept: FAMILY MEDICINE | Facility: CLINIC | Age: 51
End: 2021-08-09
Payer: COMMERCIAL

## 2021-08-09 VITALS
SYSTOLIC BLOOD PRESSURE: 112 MMHG | OXYGEN SATURATION: 95 % | HEIGHT: 69 IN | HEART RATE: 80 BPM | RESPIRATION RATE: 16 BRPM | BODY MASS INDEX: 37.61 KG/M2 | WEIGHT: 253.9 LBS | TEMPERATURE: 96.3 F | DIASTOLIC BLOOD PRESSURE: 68 MMHG

## 2021-08-09 DIAGNOSIS — R07.89 CHEST TIGHTNESS: ICD-10-CM

## 2021-08-09 DIAGNOSIS — G62.9 PERIPHERAL POLYNEUROPATHY: ICD-10-CM

## 2021-08-09 DIAGNOSIS — M54.50 CHRONIC LOW BACK PAIN, UNSPECIFIED BACK PAIN LATERALITY, UNSPECIFIED WHETHER SCIATICA PRESENT: ICD-10-CM

## 2021-08-09 DIAGNOSIS — E11.42 TYPE 2 DIABETES MELLITUS WITH DIABETIC POLYNEUROPATHY, WITHOUT LONG-TERM CURRENT USE OF INSULIN (H): Primary | ICD-10-CM

## 2021-08-09 DIAGNOSIS — G89.29 CHRONIC LOW BACK PAIN, UNSPECIFIED BACK PAIN LATERALITY, UNSPECIFIED WHETHER SCIATICA PRESENT: ICD-10-CM

## 2021-08-09 DIAGNOSIS — F11.90 CHRONIC, CONTINUOUS USE OF OPIOIDS: ICD-10-CM

## 2021-08-09 LAB — HBA1C MFR BLD: 6.1 % (ref 0–5.6)

## 2021-08-09 PROCEDURE — 99214 OFFICE O/P EST MOD 30 MIN: CPT | Performed by: FAMILY MEDICINE

## 2021-08-09 PROCEDURE — 82043 UR ALBUMIN QUANTITATIVE: CPT | Performed by: FAMILY MEDICINE

## 2021-08-09 PROCEDURE — 83036 HEMOGLOBIN GLYCOSYLATED A1C: CPT | Performed by: FAMILY MEDICINE

## 2021-08-09 PROCEDURE — 36415 COLL VENOUS BLD VENIPUNCTURE: CPT | Performed by: FAMILY MEDICINE

## 2021-08-09 RX ORDER — OXYCODONE HYDROCHLORIDE 15 MG/1
15 TABLET ORAL 2 TIMES DAILY PRN
Qty: 60 TABLET | Refills: 0 | Status: SHIPPED | OUTPATIENT
Start: 2021-08-13 | End: 2021-09-08

## 2021-08-09 RX ORDER — OXYCODONE 18 MG/1
18 CAPSULE, EXTENDED RELEASE ORAL EVERY 12 HOURS
Qty: 60 CAPSULE | Refills: 0 | Status: SHIPPED | OUTPATIENT
Start: 2021-08-13 | End: 2021-09-08

## 2021-08-09 ASSESSMENT — ANXIETY QUESTIONNAIRES
8. IF YOU CHECKED OFF ANY PROBLEMS, HOW DIFFICULT HAVE THESE MADE IT FOR YOU TO DO YOUR WORK, TAKE CARE OF THINGS AT HOME, OR GET ALONG WITH OTHER PEOPLE?: SOMEWHAT DIFFICULT
GAD7 TOTAL SCORE: 1
5. BEING SO RESTLESS THAT IT IS HARD TO SIT STILL: NOT AT ALL
GAD7 TOTAL SCORE: 1
GAD7 TOTAL SCORE: 1
6. BECOMING EASILY ANNOYED OR IRRITABLE: NOT AT ALL
4. TROUBLE RELAXING: NOT AT ALL
7. FEELING AFRAID AS IF SOMETHING AWFUL MIGHT HAPPEN: NOT AT ALL
3. WORRYING TOO MUCH ABOUT DIFFERENT THINGS: NOT AT ALL
7. FEELING AFRAID AS IF SOMETHING AWFUL MIGHT HAPPEN: NOT AT ALL
2. NOT BEING ABLE TO STOP OR CONTROL WORRYING: NOT AT ALL
1. FEELING NERVOUS, ANXIOUS, OR ON EDGE: SEVERAL DAYS

## 2021-08-09 ASSESSMENT — MIFFLIN-ST. JEOR: SCORE: 2007.06

## 2021-08-09 ASSESSMENT — PATIENT HEALTH QUESTIONNAIRE - PHQ9
SUM OF ALL RESPONSES TO PHQ QUESTIONS 1-9: 7
10. IF YOU CHECKED OFF ANY PROBLEMS, HOW DIFFICULT HAVE THESE PROBLEMS MADE IT FOR YOU TO DO YOUR WORK, TAKE CARE OF THINGS AT HOME, OR GET ALONG WITH OTHER PEOPLE: SOMEWHAT DIFFICULT
SUM OF ALL RESPONSES TO PHQ QUESTIONS 1-9: 7

## 2021-08-09 NOTE — PROGRESS NOTES
Assessment & Plan   Type 2 diabetes mellitus with diabetic polyneuropathy, without long-term current use of insulin (H)  Improved control.  Will stop glimepiride (has had some hypoglycemia) and continue other meds.  - Hemoglobin A1c  - Albumin Random Urine Quantitative with Creat Ratio  - metFORMIN (GLUCOPHAGE) 500 MG tablet  Dispense: 180 tablet; Refill: 1  - Hemoglobin A1c  - Albumin Random Urine Quantitative with Creat Ratio    Chest tightness  Intermittent symptoms and patient desires stress testing which would be reasonable especially with his diabetes history.  Stress echocardiogram ordered    Peripheral polyneuropathy  Chronic low back pain  Chronic, continuous use of opioids - every 3 month medication checks  Ongoing chronic pains.  Meds refilled  - oxyCODONE IR (ROXICODONE) 15 MG tablet  Dispense: 60 tablet; Refill: 0  - oxyCODONE (XTAMPZA ER) 18 MG 12 hr tablet  Dispense: 60 capsule; Refill: 0        Return in about 3 months (around 11/9/2021) for medication recheck, in person, or, with a video visit, with Dr Dannie Pastrana.      Dannie Pastrana MD      [address] [address]  [employer]   Office: [phone]       Rosales Marcelino is a 51 year old who presents for the following health issues     History of Present Illness       Diabetes:   He presents for follow up of diabetes.  He is checking home blood glucose two times daily. He checks blood glucose before and after meals.  Blood glucose is never over 200 and sometimes over 70. He is aware of hypoglycemia symptoms including shakiness, dizziness, weakness, lethargy, blurred vision and confusion. He is concerned about low blood sugar, several less than 70 in the past few weeks.  He is having numbness in feet, burning in feet and redness, sores, or blisters on feet. The patient has had a diabetic eye exam in the last 12 months. Eye exam performed on June 21. [facility]      "     He has less of and appetite      Lab Results   Component Value Date    A1C 8.7 04/15/2021    A1C 7.9 01/18/2020    A1C 7.0 06/03/2019    A1C 6.9 10/23/2018    A1C 6.9 01/22/2018        Diabetes Follow-up    How often are you checking your blood sugar? Two times daily  Blood sugar testing frequency justification:  Frequent hypoglycemia  What time of day are you checking your blood sugars (select all that apply)?  Before meals and morning  Have you had any blood sugars above 200?  No  Have you had any blood sugars below 70?  Yes 42    What symptoms do you notice when your blood sugar is low?  Shaky, Dizzy, Weak, Blurred vision and Other: spots in eyes feeling goofy     What concerns do you have today about your diabetes? Low blood sugar     Do you have any of these symptoms? (Select all that apply)  Numbness in feet, Redness, sores, or blisters on feet, Blurry vision and Weight loss    Have you had a diabetic eye exam in the last 12 months? Yes- Date of last eye exam: 6/15/21,  Location: [facility]        BP Readings from Last 2 Encounters:   08/09/21 112/68   04/15/21 128/68     Hemoglobin A1C (%)   Date Value   08/09/2021 6.1 (H)   04/15/2021 8.7 (H)   01/18/2020 7.9 (H)     LDL Cholesterol Calculated (mg/dL)   Date Value   04/15/2021 70   01/18/2020 54               Hyperlipidemia Follow-Up      Are you regularly taking any medication or supplement to lower your cholesterol?   Yes- simvistatin    Are you having muscle aches or other side effects that you think could be caused by your cholesterol lowering medication?  Yes- calf muscle cramps    Depression and Anxiety Follow-Up    How are you doing with your depression since your last visit? Improved \"pretty good now\"    How are you doing with your anxiety since your last visit?  Improved better    Are you having other symptoms that might be associated with depression or anxiety? Yes:  works from home somtimes has trouble getting out of bed. feels fatigue " frequently     Have you had a significant life event? OTHER: works from home now     Do you have any concerns with your use of alcohol or other drugs? No    Some chest palpitations and occasional tightness - he would like to have a stress test.    Social History     Tobacco Use     Smoking status: Never Smoker     Smokeless tobacco: Never Used   Substance Use Topics     Alcohol use: No     Comment: Does not drink - last was 11/2015     Drug use: No     PHQ 8/24/2020 4/15/2021 8/9/2021   PHQ-9 Total Score 8 16 7   Q9: Thoughts of better off dead/self-harm past 2 weeks Not at all Not at all Not at all     BUSHRA-7 SCORE 1/16/2020 8/24/2020 8/9/2021   Total Score - - 1 (minimal anxiety)   Total Score 2 1 1     Last PHQ-9 8/9/2021   1.  Little interest or pleasure in doing things 0   2.  Feeling down, depressed, or hopeless 1   3.  Trouble falling or staying asleep, or sleeping too much 1   4.  Feeling tired or having little energy 3   5.  Poor appetite or overeating 1   6.  Feeling bad about yourself 0   7.  Trouble concentrating 1   8.  Moving slowly or restless 0   Q9: Thoughts of better off dead/self-harm past 2 weeks 0   PHQ-9 Total Score 7   Difficulty at work, home, or with people -     BUSHRA-7  8/9/2021   1. Feeling nervous, anxious, or on edge 1   2. Not being able to stop or control worrying 0   3. Worrying too much about different things 0   4. Trouble relaxing 0   5. Being so restless that it is hard to sit still 0   6. Becoming easily annoyed or irritable 0   7. Feeling afraid, as if something awful might happen 0   BUSHRA-7 Total Score 1   If you checked any problems, how difficult have they made it for you to do your work, take care of things at home, or get along with other people? -       Suicide Assessment Five-step Evaluation and Treatment (SAFE-T)      How many servings of fruits and vegetables do you eat daily?  2-3    On average, how many sweetened beverages do you drink each day (Examples: soda, juice,  "sweet tea, etc.  Do NOT count diet or artificially sweetened beverages)?   0    How many days per week do you exercise enough to make your heart beat faster? 3 or less    How many minutes a day do you exercise enough to make your heart beat faster? 9 or less  How many days per week do you miss taking your medication? 1    What makes it hard for you to take your medications?  remembering to take      Review of Systems   Constitutional, HEENT, cardiovascular, pulmonary, gi and gu systems are negative, except as otherwise noted.      Objective    /68   Pulse 80   Temp (!) 96.3  F (35.7  C) (Tympanic)   Resp 16   Ht 1.753 m (5' 9\")   Wt 115.2 kg (253 lb 14.4 oz)   SpO2 95%   BMI 37.49 kg/m    Body mass index is 37.49 kg/m .  Physical Exam   GENERAL: healthy, alert and no distress  NECK: no adenopathy, no asymmetry, masses, or scars and thyroid normal to palpation  RESP: lungs clear to auscultation - no rales, rhonchi or wheezes  CV: regular rate and rhythm, normal S1 S2, no S3 or S4, no murmur, click or rub, no peripheral edema and peripheral pulses strong  ABDOMEN: soft, nontender, no hepatosplenomegaly, no masses and bowel sounds normal  MS: no gross musculoskeletal defects noted, no edema  SKIN: no suspicious lesions or rashes  NEURO: Normal strength and tone, mentation intact and speech normal  BACK: no CVA tenderness, no paralumbar tenderness  PSYCH: mentation appears normal, affect normal/bright  Diabetic foot exam: normal DP and PT pulses, no trophic changes or ulcerative lesions and normal sensory exam    Results for orders placed or performed in visit on 08/09/21 (from the past 24 hour(s))   Hemoglobin A1c   Result Value Ref Range    Hemoglobin A1C 6.1 (H) 0.0 - 5.6 %             Answers for HPI/ROS submitted by the patient on 8/9/2021  If you checked off any problems, how difficult have these problems made it for you to do your work, take care of things at home, or get along with other people?: " Somewhat difficult  PHQ9 TOTAL SCORE: 7  BUSHRA 7 TOTAL SCORE: 1

## 2021-08-10 LAB
CREAT UR-MCNC: 370 MG/DL
MICROALBUMIN UR-MCNC: 19 MG/L
MICROALBUMIN/CREAT UR: 5.14 MG/G CR (ref 0–17)

## 2021-08-10 ASSESSMENT — PATIENT HEALTH QUESTIONNAIRE - PHQ9: SUM OF ALL RESPONSES TO PHQ QUESTIONS 1-9: 7

## 2021-08-10 ASSESSMENT — ANXIETY QUESTIONNAIRES: GAD7 TOTAL SCORE: 1

## 2021-08-10 NOTE — RESULT ENCOUNTER NOTE
Dear Ryland,    Here is a summary of your recent test results:  -A1C (test of diabetes control the last 2-3 months) is at your goal. Please continue with your current plan. Also, you should make an appointment to see me and recheck your A1C test in 6 months.   -Microalbumin (urine protein) test is normal.  ADVISE: rechecking this annually.           Thank you very much for trusting me and [facility].     Have a peaceful day.    Healthy regards,  Dannie Pastrana MD

## 2021-08-30 ENCOUNTER — TRANSFERRED RECORDS (OUTPATIENT)
Dept: HEALTH INFORMATION MANAGEMENT | Facility: CLINIC | Age: 51
End: 2021-08-30

## 2021-09-07 DIAGNOSIS — G89.29 CHRONIC LOW BACK PAIN, UNSPECIFIED BACK PAIN LATERALITY, UNSPECIFIED WHETHER SCIATICA PRESENT: ICD-10-CM

## 2021-09-07 DIAGNOSIS — G62.9 PERIPHERAL POLYNEUROPATHY: ICD-10-CM

## 2021-09-07 DIAGNOSIS — M54.50 CHRONIC LOW BACK PAIN, UNSPECIFIED BACK PAIN LATERALITY, UNSPECIFIED WHETHER SCIATICA PRESENT: ICD-10-CM

## 2021-09-07 DIAGNOSIS — F11.90 CHRONIC, CONTINUOUS USE OF OPIOIDS: ICD-10-CM

## 2021-09-07 NOTE — TELEPHONE ENCOUNTER
Patient called wanting to know if he can at least get his Lyrica filled by tomorrow. He is completely out and doesn't really need the oxy right now. He's more concern about the Lyrica. Please let patient know if this is possible by calling back today or at least early morning. Doesn't want to go any more days without it.    Melia Juárez

## 2021-09-08 RX ORDER — OXYCODONE HYDROCHLORIDE 15 MG/1
15 TABLET ORAL 2 TIMES DAILY PRN
Qty: 60 TABLET | Refills: 0 | Status: SHIPPED | OUTPATIENT
Start: 2021-09-12 | End: 2021-10-11

## 2021-09-08 RX ORDER — PREGABALIN 75 MG/1
75 CAPSULE ORAL 2 TIMES DAILY
Qty: 180 CAPSULE | Refills: 0 | Status: SHIPPED | OUTPATIENT
Start: 2021-09-08 | End: 2021-11-12

## 2021-09-08 RX ORDER — OXYCODONE 18 MG/1
18 CAPSULE, EXTENDED RELEASE ORAL EVERY 12 HOURS
Qty: 60 CAPSULE | Refills: 0 | Status: SHIPPED | OUTPATIENT
Start: 2021-09-12 | End: 2021-10-11

## 2021-10-08 DIAGNOSIS — F11.90 CHRONIC, CONTINUOUS USE OF OPIOIDS: ICD-10-CM

## 2021-10-08 DIAGNOSIS — G89.29 CHRONIC LOW BACK PAIN, UNSPECIFIED BACK PAIN LATERALITY, UNSPECIFIED WHETHER SCIATICA PRESENT: ICD-10-CM

## 2021-10-08 DIAGNOSIS — M54.50 CHRONIC LOW BACK PAIN, UNSPECIFIED BACK PAIN LATERALITY, UNSPECIFIED WHETHER SCIATICA PRESENT: ICD-10-CM

## 2021-10-08 DIAGNOSIS — G62.9 PERIPHERAL POLYNEUROPATHY: ICD-10-CM

## 2021-10-11 RX ORDER — OXYCODONE HYDROCHLORIDE 15 MG/1
15 TABLET ORAL 2 TIMES DAILY PRN
Qty: 60 TABLET | Refills: 0 | Status: SHIPPED | OUTPATIENT
Start: 2021-10-12 | End: 2021-11-12

## 2021-10-11 RX ORDER — OXYCODONE 18 MG/1
CAPSULE, EXTENDED RELEASE ORAL
Qty: 60 CAPSULE | Refills: 0 | Status: SHIPPED | OUTPATIENT
Start: 2021-10-12 | End: 2021-11-12

## 2021-10-11 NOTE — TELEPHONE ENCOUNTER
oxyCODONE IR (ROXICODONE) 15 MG tablet 60 tablet 0 9/12/2021  No   Sig - Route: Take 1 tablet (15 mg) by mouth 2 times daily as needed for severe pain - Oral   Sent to pharmacy as: oxyCODONE HCl 15 MG Oral Tablet (ROXICODONE)     oxyCODONE (XTAMPZA ER) 18 MG 12 hr tablet 60 capsule 0 9/12/2021  No   Sig - Route: Take 1 tablet (18 mg) by mouth every 12 hours - Oral   Sent to pharmacy as: Xtampza ER 18 MG Oral Capsule ER 12 Hour Abuse-Deterrent (oxyCODONE       Last office visit: 8/9/2021 with prescribing provider:     Future Office Visit:          Encounter-Level CSA - 11/30/2015:    Controlled Substance Agreement - Scan on 12/1/2015  2:00 PM: CONTROLLED SUBSTANCE AGREEMENT 11/30/15     Patient-Level CSA:    Controlled Substance Agreement - Opioid - Scan on 4/15/2021  1:40 PM  Controlled Substance Agreement - Opioid - Scan on 1/16/2020  4:43 PM           Routing refill request to provider for review/approval because:  Drug not on the [facility] refill protocol     Mariely Lou RN, BSN  [facility]

## 2021-10-11 NOTE — TELEPHONE ENCOUNTER
Due for med check around 11/9/2021, please schedule, med sent for now and postdated for 10/12/2021    Last visit in this dept:    8/9/2021     Next visit in this dept:       Health Maintenance   Topic Date Due     COVID-19 Vaccine (2 - Pfizer 2-dose series) 06/22/2021     PREVENTIVE CARE VISIT  08/24/2021     INFLUENZA VACCINE (1) 09/01/2021     ADVANCE CARE PLANNING  01/09/2022 (Originally 1970)     A1C  11/09/2021     BMP  11/20/2021     ZOSTER IMMUNIZATION (1 of 2) 01/09/2022     PHQ-9  02/09/2022     LIPID  04/15/2022     PSA  04/15/2022     URINE DRUG SCREEN  04/15/2022     ANNUAL REVIEW OF HM ORDERS  04/15/2022     TREATMENT AGREEMENT FOR CHRONIC PAIN MANAGEMENT  04/15/2022     EYE EXAM  06/01/2022     MICROALBUMIN  08/09/2022     DIABETIC FOOT EXAM  08/09/2022     BUSHRA ASSESSMENT  08/09/2022     DTAP/TDAP/TD IMMUNIZATION (2 - Td or Tdap) 04/18/2027     Pneumococcal Vaccine: Pediatrics (0 to 5 Years) and At-Risk Patients (6 to 64 Years) (2 of 2 - PPSV23) 01/09/2037     HEPATITIS C SCREENING  Completed     HIV SCREENING  Addressed     DEPRESSION ACTION PLAN  Addressed     IPV IMMUNIZATION  Aged Out     MENINGITIS IMMUNIZATION  Aged Out     HEPATITIS B IMMUNIZATION  Discontinued

## 2021-10-21 DIAGNOSIS — F41.9 ANXIETY: ICD-10-CM

## 2021-10-21 DIAGNOSIS — F33.0 MAJOR DEPRESSIVE DISORDER, RECURRENT EPISODE, MILD (H): ICD-10-CM

## 2021-10-22 NOTE — TELEPHONE ENCOUNTER
Routing refill request to provider for review/approval because:  SSRIs Protocol Fbinox93/21/2021 12:49 PM   PHQ-9 score less than 5 in past 6 months     PHQ 8/24/2020 4/15/2021 8/9/2021   PHQ-9 Total Score 8 16 7   Q9: Thoughts of better off dead/self-harm past 2 weeks Not at all Not at all Not at all         Noam ALSTON RN   [facility]

## 2021-10-23 ENCOUNTER — HEALTH MAINTENANCE LETTER (OUTPATIENT)
Age: 51
End: 2021-10-23

## 2021-11-12 ENCOUNTER — OFFICE VISIT (OUTPATIENT)
Dept: FAMILY MEDICINE | Facility: CLINIC | Age: 51
End: 2021-11-12
Payer: COMMERCIAL

## 2021-11-12 ENCOUNTER — TELEPHONE (OUTPATIENT)
Dept: FAMILY MEDICINE | Facility: CLINIC | Age: 51
End: 2021-11-12

## 2021-11-12 VITALS
DIASTOLIC BLOOD PRESSURE: 78 MMHG | SYSTOLIC BLOOD PRESSURE: 118 MMHG | OXYGEN SATURATION: 95 % | BODY MASS INDEX: 38.21 KG/M2 | TEMPERATURE: 98.8 F | HEIGHT: 69 IN | HEART RATE: 110 BPM | WEIGHT: 258 LBS

## 2021-11-12 DIAGNOSIS — T40.2X5A CONSTIPATION DUE TO OPIOID THERAPY: ICD-10-CM

## 2021-11-12 DIAGNOSIS — E11.42 TYPE 2 DIABETES MELLITUS WITH DIABETIC POLYNEUROPATHY, WITHOUT LONG-TERM CURRENT USE OF INSULIN (H): ICD-10-CM

## 2021-11-12 DIAGNOSIS — Z13.21 ENCOUNTER FOR SCREENING FOR NUTRITIONAL DISORDER: ICD-10-CM

## 2021-11-12 DIAGNOSIS — F11.90 CHRONIC, CONTINUOUS USE OF OPIOIDS: ICD-10-CM

## 2021-11-12 DIAGNOSIS — G62.9 PERIPHERAL POLYNEUROPATHY: ICD-10-CM

## 2021-11-12 DIAGNOSIS — K59.03 CONSTIPATION DUE TO OPIOID THERAPY: ICD-10-CM

## 2021-11-12 DIAGNOSIS — G89.29 CHRONIC LOW BACK PAIN, UNSPECIFIED BACK PAIN LATERALITY, UNSPECIFIED WHETHER SCIATICA PRESENT: ICD-10-CM

## 2021-11-12 DIAGNOSIS — M54.50 CHRONIC LOW BACK PAIN, UNSPECIFIED BACK PAIN LATERALITY, UNSPECIFIED WHETHER SCIATICA PRESENT: ICD-10-CM

## 2021-11-12 DIAGNOSIS — L84 CORN OR CALLUS: Primary | ICD-10-CM

## 2021-11-12 LAB
HBA1C MFR BLD: 5.8 % (ref 0–5.6)
VIT B12 SERPL-MCNC: 438 PG/ML (ref 193–986)

## 2021-11-12 PROCEDURE — 83036 HEMOGLOBIN GLYCOSYLATED A1C: CPT | Performed by: FAMILY MEDICINE

## 2021-11-12 PROCEDURE — 82607 VITAMIN B-12: CPT | Performed by: FAMILY MEDICINE

## 2021-11-12 PROCEDURE — 80053 COMPREHEN METABOLIC PANEL: CPT | Performed by: FAMILY MEDICINE

## 2021-11-12 PROCEDURE — 36415 COLL VENOUS BLD VENIPUNCTURE: CPT | Performed by: FAMILY MEDICINE

## 2021-11-12 PROCEDURE — 99214 OFFICE O/P EST MOD 30 MIN: CPT | Performed by: FAMILY MEDICINE

## 2021-11-12 RX ORDER — PREGABALIN 75 MG/1
75 CAPSULE ORAL 2 TIMES DAILY
Qty: 180 CAPSULE | Refills: 1 | Status: SHIPPED | OUTPATIENT
Start: 2021-12-07 | End: 2022-02-15

## 2021-11-12 RX ORDER — NALOXEGOL OXALATE 25 MG/1
25 TABLET, FILM COATED ORAL
Qty: 90 TABLET | Refills: 1 | Status: SHIPPED | OUTPATIENT
Start: 2021-11-12 | End: 2022-06-14

## 2021-11-12 RX ORDER — OXYCODONE 18 MG/1
18 CAPSULE, EXTENDED RELEASE ORAL EVERY 12 HOURS
Qty: 60 CAPSULE | Refills: 0 | Status: SHIPPED | OUTPATIENT
Start: 2021-11-12 | End: 2021-12-07

## 2021-11-12 RX ORDER — OXYCODONE HYDROCHLORIDE 15 MG/1
15 TABLET ORAL 2 TIMES DAILY PRN
Qty: 60 TABLET | Refills: 0 | Status: SHIPPED | OUTPATIENT
Start: 2021-11-12 | End: 2021-12-07

## 2021-11-12 ASSESSMENT — MIFFLIN-ST. JEOR: SCORE: 2025.66

## 2021-11-12 NOTE — PROGRESS NOTES
"  Assessment & Plan   Type 2 diabetes mellitus with diabetic polyneuropathy, without long-term current use of insulin (H)  Controlled - continue medication(s).  - Semaglutide 7 MG TABS  Dispense: 90 tablet; Refill: 1  - Comprehensive metabolic panel (BMP + Alb, Alk Phos, ALT, AST, Total. Bili, TP)  - Hemoglobin A1c  - Comprehensive metabolic panel (BMP + Alb, Alk Phos, ALT, AST, Total. Bili, TP)  - Hemoglobin A1c    Peripheral polyneuropathy  / Chronic low back pain  / Chronic, continuous use of opioids - every 3 month medication checks   Ongoing symptoms and medication helps and will continue:  - oxyCODONE (XTAMPZA ER) 18 MG 12 hr tablet  Dispense: 60 capsule; Refill: 0  - oxyCODONE IR (ROXICODONE) 15 MG tablet  Dispense: 60 tablet; Refill: 0  - pregabalin (LYRICA) 75 MG capsule  Dispense: 180 capsule; Refill: 1    Constipation due to opioid therapy  Stable - continue medication(s).  - MOVANTIK 25 MG TABS tablet  Dispense: 90 tablet; Refill: 1    Corn or callus  On right lateral foot, pared with 15 blade    Encounter for screening for nutritional disorder  Monitor:  - Vitamin B12  - Vitamin B12    BMI:   Estimated body mass index is 38.1 kg/m  as calculated from the following:    Height as of this encounter: 1.753 m (5' 9\").    Weight as of this encounter: 117 kg (258 lb).   Weight management plan: Discussed healthy diet and exercise guidelines      Return in about 3 months (around 2/12/2022) for medication recheck, with a video visit, with Dr Dannie Pastrana.      Dannie Pastrana MD      [address] [address]  [email or web address]   Office: [phone]       Subjective   Harm is a 51 year old who presents for the following health issues     HPI     Diabetes Follow-up    How often are you checking your blood sugar? One time daily  What time of day are you checking your blood sugars (select all that apply)?  Before meals  Have you had any blood sugars above 200?  No  Have " you had any blood sugars below 70?  Yes     What symptoms do you notice when your blood sugar is low?  Shaky, Dizzy, Weak, Lethargy and Confusion    What concerns do you have today about your diabetes? None     Do you have any of these symptoms? (Select all that apply)  No numbness or tingling in feet.  No redness, sores or blisters on feet.  No complaints of excessive thirst.  No reports of blurry vision.  No significant changes to weight.      BP Readings from Last 2 Encounters:   11/12/21 118/78   08/09/21 112/68     Hemoglobin A1C (%)   Date Value   11/12/2021 5.8 (H)   08/09/2021 6.1 (H)   04/15/2021 8.7 (H)   01/18/2020 7.9 (H)     LDL Cholesterol Calculated (mg/dL)   Date Value   04/15/2021 70   01/18/2020 54               Hyperlipidemia Follow-Up      Are you regularly taking any medication or supplement to lower your cholesterol?   Yes- Simvastatin    Are you having muscle aches or other side effects that you think could be caused by your cholesterol lowering medication?  Yes- possibly related - isn't sure if that is related to the medication    Depression and Anxiety Follow-Up    How are you doing with your depression since your last visit? No change doing well on sertraline 50 mg daily now.     How are you doing with your anxiety since your last visit?  No change    Are you having other symptoms that might be associated with depression or anxiety? No    Have you had a significant life event? No     Do you have any concerns with your use of alcohol or other drugs? No    Social History     Tobacco Use     Smoking status: Never Smoker     Smokeless tobacco: Never Used   Substance Use Topics     Alcohol use: No     Comment: Does not drink - last was 11/2015     Drug use: No     PHQ 4/15/2021 8/9/2021 11/12/2021   PHQ-9 Total Score 16 7 5   Q9: Thoughts of better off dead/self-harm past 2 weeks Not at all Not at all Not at all     BUSHRA-7 SCORE 1/16/2020 8/24/2020 8/9/2021   Total Score - - 1 (minimal anxiety)  "  Total Score 2 1 1     Last PHQ-9 11/12/2021   1.  Little interest or pleasure in doing things 0   2.  Feeling down, depressed, or hopeless 1   3.  Trouble falling or staying asleep, or sleeping too much 0   4.  Feeling tired or having little energy 2   5.  Poor appetite or overeating 0   6.  Feeling bad about yourself 0   7.  Trouble concentrating 2   8.  Moving slowly or restless 0   Q9: Thoughts of better off dead/self-harm past 2 weeks 0   PHQ-9 Total Score 5   Difficulty at work, home, or with people Not difficult at all     BUSHRA-7  8/9/2021   1. Feeling nervous, anxious, or on edge 1   2. Not being able to stop or control worrying 0   3. Worrying too much about different things 0   4. Trouble relaxing 0   5. Being so restless that it is hard to sit still 0   6. Becoming easily annoyed or irritable 0   7. Feeling afraid, as if something awful might happen 0   BUSHRA-7 Total Score 1   If you checked any problems, how difficult have they made it for you to do your work, take care of things at home, or get along with other people? -       Suicide Assessment Five-step Evaluation and Treatment (SAFE-T)        Medication Followup of Oxycodone    Taking Medication as prescribed: yes    Side Effects:  None    Medication Helping Symptoms:  yes     UPPER RESPIRATORY INFECTION  - initial sore throat, slight cough in the morning.  No loss of taste or smell.  Onset 8-9 days  No teeth pain or sinus pressure. No pleuritic chest pain. ? Slight wheezing.         Review of Systems   Constitutional, HEENT, cardiovascular, pulmonary, gi and gu systems are negative, except as otherwise noted.      Objective    /78 (BP Location: Right arm, Cuff Size: Adult Large)   Pulse 110   Temp 98.8  F (37.1  C) (Tympanic)   Ht 1.753 m (5' 9\")   Wt 117 kg (258 lb)   SpO2 95%   BMI 38.10 kg/m    Body mass index is 38.1 kg/m .  Physical Exam   GENERAL: healthy, alert and no distress  NECK: no adenopathy, no asymmetry, masses, or scars and " thyroid normal to palpation  RESP: lungs clear to auscultation - no rales, rhonchi or wheezes  CV: regular rate and rhythm, normal S1 S2, no S3 or S4, no murmur, click or rub, no peripheral edema and peripheral pulses strong  ABDOMEN: soft, nontender, no hepatosplenomegaly, no masses and bowel sounds normal  MS: no gross musculoskeletal defects noted, no edema  NEURO: Normal strength and tone, mentation intact and speech normal  LYMPH: no cervical, supraclavicular, axillary, or inguinal adenopathy  Diabetic foot exam: normal DP and PT pulses, no trophic changes or ulcerative lesions and reduced sensation at on the toes      Results for orders placed or performed in visit on 11/12/21   Vitamin B12     Status: Normal   Result Value Ref Range    Vitamin B12 438 193 - 986 pg/mL   Hemoglobin A1c     Status: Abnormal   Result Value Ref Range    Hemoglobin A1C 5.8 (H) 0.0 - 5.6 %

## 2021-11-12 NOTE — TELEPHONE ENCOUNTER
Prior Authorization Retail Medication Request    Medication/Dose: Pt insurance requires prior authorization for Movantik 25mg  ICD code (if different than what is on RX):  K59.03  Previously Tried and Failed:  Unknown  Rationale:  Unknown    Insurance Name:  [employer]  Insurance ID:  [id number]      Thank You,  Ava Jaimes [facility]  [phone]

## 2021-11-13 LAB
ALBUMIN SERPL-MCNC: 3.6 G/DL (ref 3.4–5)
ALP SERPL-CCNC: 75 U/L (ref 40–150)
ALT SERPL W P-5'-P-CCNC: 24 U/L (ref 0–70)
ANION GAP SERPL CALCULATED.3IONS-SCNC: 3 MMOL/L (ref 3–14)
AST SERPL W P-5'-P-CCNC: 14 U/L (ref 0–45)
BILIRUB SERPL-MCNC: 0.2 MG/DL (ref 0.2–1.3)
BUN SERPL-MCNC: 10 MG/DL (ref 7–30)
CALCIUM SERPL-MCNC: 8.7 MG/DL (ref 8.5–10.1)
CHLORIDE BLD-SCNC: 107 MMOL/L (ref 94–109)
CO2 SERPL-SCNC: 30 MMOL/L (ref 20–32)
CREAT SERPL-MCNC: 1.08 MG/DL (ref 0.66–1.25)
GFR SERPL CREATININE-BSD FRML MDRD: 80 ML/MIN/1.73M2
GLUCOSE BLD-MCNC: 114 MG/DL (ref 70–99)
POTASSIUM BLD-SCNC: 5.5 MMOL/L (ref 3.4–5.3)
PROT SERPL-MCNC: 7.8 G/DL (ref 6.8–8.8)
SODIUM SERPL-SCNC: 140 MMOL/L (ref 133–144)

## 2021-11-13 ASSESSMENT — PATIENT HEALTH QUESTIONNAIRE - PHQ9: SUM OF ALL RESPONSES TO PHQ QUESTIONS 1-9: 5

## 2021-11-15 NOTE — TELEPHONE ENCOUNTER
Central Prior Authorization Team   Phone: [phone]      PA Initiation    Medication: Movantik 25mg-Initiated  Insurance Company: [employer] - Phone [phone] Fax [phone]  Pharmacy [place]illing the Rx[place] [facility] [facility] [place] [address] [place] [address]  Filling Pharmacy Phone: [phone]  Filling Pharmacy Fax:    Start Date: 11/15/2021

## 2021-11-16 NOTE — TELEPHONE ENCOUNTER
Prior Authorization Approval    Authorization Effective Date: 11/12/2021  Authorization Expiration Date: 11/12/2022  Medication: Movantik 25mg-APPROVED  Approved Dose/Quantity:  Reference #:     Insurance Company: [employer] - Phone [phone] Fax [phone]  Expected CoPay:       CoPay Card Available:      Foundation Assistance Needed:    Which Pharmacy is filling the prescription (Not needed for infusion/clinic administered): [facility] [place], [place] - [address] [place] [address]  Pharmacy Notified: Yes  Patient Notified: No    Pharmacy will notify patient when medication is ready.

## 2021-11-17 NOTE — RESULT ENCOUNTER NOTE
Dear Ryland,    Here is a summary of your recent test results:  -Liver and gallbladder tests (ALT,AST, Alk phos,bilirubin) are normal.  -Kidney function (GFR) is normal.  -Sodium is normal.  -Potassium is elevated.  ADVISE: rechecking this in 1 month.  -Calcium is normal.  -Glucose is elevated due to your diabetes.  -A1C (test of diabetes control the last 2-3 months) is at your goal. Please continue with your current plan. Also, you should make an appointment to see me and recheck your A1C test in 6 months.   -Normal B12 level.     In addition, here is a list of due or overdue Health Maintenance reminders:  Preventive Care Visit due on 08/24/2021    Please call us at [phone] (or use [employer]) to address the above recommendations if needed.           Thank you very much for trusting me and [facility].     Have a peaceful day.    Healthy regards,  Dannie Pastrana MD

## 2021-12-01 RX ORDER — GLIMEPIRIDE 4 MG/1
TABLET ORAL
Qty: 90 TABLET | Refills: 1 | OUTPATIENT
Start: 2021-12-01

## 2021-12-06 DIAGNOSIS — G89.29 CHRONIC LOW BACK PAIN, UNSPECIFIED BACK PAIN LATERALITY, UNSPECIFIED WHETHER SCIATICA PRESENT: ICD-10-CM

## 2021-12-06 DIAGNOSIS — M54.50 CHRONIC LOW BACK PAIN, UNSPECIFIED BACK PAIN LATERALITY, UNSPECIFIED WHETHER SCIATICA PRESENT: ICD-10-CM

## 2021-12-06 DIAGNOSIS — G62.9 PERIPHERAL POLYNEUROPATHY: ICD-10-CM

## 2021-12-06 DIAGNOSIS — F11.90 CHRONIC, CONTINUOUS USE OF OPIOIDS: ICD-10-CM

## 2021-12-06 DIAGNOSIS — E29.1 HYPOTESTOSTERONEMIA IN MALE: Primary | ICD-10-CM

## 2021-12-06 DIAGNOSIS — E11.42 TYPE 2 DIABETES MELLITUS WITH DIABETIC POLYNEUROPATHY, WITHOUT LONG-TERM CURRENT USE OF INSULIN (H): ICD-10-CM

## 2021-12-06 NOTE — TELEPHONE ENCOUNTER
Pt calls about pain meds xtampza and oxycodone.  He says he is leaving Wednesday night.  He is requesting an early refill.  He says he is going to [place] until Sunday.      Oxycodone 15 mg  LRF 11/12/21, disp 60 with no refills  LOV 11/12/21    xtampza 18 mg  LRF 11/12/21, disp 60 with no refills    Routing refill request to provider for review/approval because:  Drug not on the [facility] [place] refill protocol - should he have some until Sunday - last refill given 11/12/21

## 2021-12-07 RX ORDER — OXYCODONE HYDROCHLORIDE 15 MG/1
15 TABLET ORAL 2 TIMES DAILY PRN
Qty: 60 TABLET | Refills: 0 | Status: SHIPPED | OUTPATIENT
Start: 2021-12-07 | End: 2022-01-07

## 2021-12-07 RX ORDER — OXYCODONE 18 MG/1
CAPSULE, EXTENDED RELEASE ORAL
Qty: 60 CAPSULE | Refills: 0 | Status: SHIPPED | OUTPATIENT
Start: 2021-12-07 | End: 2022-01-07

## 2021-12-07 RX ORDER — GLIMEPIRIDE 4 MG/1
TABLET ORAL
Qty: 90 TABLET | Refills: 1 | Status: SHIPPED | OUTPATIENT
Start: 2021-12-07 | End: 2022-02-15

## 2021-12-07 NOTE — TELEPHONE ENCOUNTER
Patient calling for update, is concerned about running out of medication while he is out of town (leaving tomorrow afternoon).  -Sadaf Torres

## 2022-01-06 DIAGNOSIS — M54.50 CHRONIC LOW BACK PAIN, UNSPECIFIED BACK PAIN LATERALITY, UNSPECIFIED WHETHER SCIATICA PRESENT: ICD-10-CM

## 2022-01-06 DIAGNOSIS — G62.9 PERIPHERAL POLYNEUROPATHY: ICD-10-CM

## 2022-01-06 DIAGNOSIS — F11.90 CHRONIC, CONTINUOUS USE OF OPIOIDS: ICD-10-CM

## 2022-01-06 DIAGNOSIS — G89.29 CHRONIC LOW BACK PAIN, UNSPECIFIED BACK PAIN LATERALITY, UNSPECIFIED WHETHER SCIATICA PRESENT: ICD-10-CM

## 2022-01-07 RX ORDER — OXYCODONE HYDROCHLORIDE 15 MG/1
TABLET ORAL
Qty: 60 TABLET | Refills: 0 | Status: SHIPPED | OUTPATIENT
Start: 2022-01-07 | End: 2022-02-08

## 2022-01-07 RX ORDER — OXYCODONE 18 MG/1
CAPSULE, EXTENDED RELEASE ORAL
Qty: 60 CAPSULE | Refills: 0 | Status: SHIPPED | OUTPATIENT
Start: 2022-01-07 | End: 2022-02-08

## 2022-01-07 NOTE — TELEPHONE ENCOUNTER
Controlled Substance Refill Request for   oxyCODONE IR (ROXICODONE) 15 MG tablet 60 tablet 0 12/7/2021  No   Sig - Route: TAKE 1 TABLET (15 MG) BY MOUTH 2 TIMES DAILY AS NEEDED FOR SEVERE PAIN - Oral   Sent to pharmacy as: oxyCODONE HCl 15 MG Oral Tablet (ROXICODONE)   Class: E-Prescribe     XTAMPZA ER 18 MG 12 hr tablet 60 capsule 0 12/7/2021  No   Sig: TAKE 1 CAPSULE (18 MG) BY MOUTH EVERY 12 HOURS   Sent to pharmacy as: Xtampza ER 18 MG Oral Capsule ER 12 Hour Abuse-Deterrent (oxyCODONE)   Class: E-Prescribe       Problem List Complete:    Yes    THE MOST RECENT OFFICE VISIT MUST BE WITHIN THE PAST 3 MONTHS. AT LEAST ONE FACE TO FACE VISIT MUST OCCUR EVERY 6 MONTHS. ADDITIONAL VISITS CAN BE VIRTUAL.  (THIS STATEMENT SHOULD BE DELETED.)    Last Office Visit with [facility] primary care provider: 11/12/2021     Future Office visit:   Next 5 appointments (look out 90 days)    Feb 15, 2022 10:00 AM  (Arrive by 9:40 AM)  Provider Visit with Todd Pastrana MD  [facility] ([facility] ) [address] [place] [id number] [phone]          Controlled substance agreement:   CSA -- Encounter Level on 11/30/2015:    Controlled Substance Agreement - Scan on 12/1/2015  2:00 PM: CONTROLLED SUBSTANCE AGREEMENT 11/30/15     CSA -- Patient Level:    Controlled Substance Agreement - Opioid - Scan on 4/15/2021  1:40 PM  Controlled Substance Agreement - Opioid - Scan on 1/16/2020  4:43 PM         Last Urine Drug Screen:   Pain Drug SCR UR W RPTD Meds   Date Value Ref Range Status   04/17/2018 FINAL  Final     Comment:     (Note)  ====================================================================  TOXASSURE COMP DRUG ANALYSIS,UR  ====================================================================  Test                             Result       Flag       Units        Drug Present and Declared for Prescription Verification   Carboxy-THC                    119           EXPECTED   ng/mg creat    Carboxy-THC is a metabolite of tetrahydrocannabinol  (THC).    Source of THC is most commonly illicit, but THC is also present    in a scheduled prescription medication.   Oxycodone                      1531         EXPECTED   ng/mg creat   Oxymorphone                    501          EXPECTED   ng/mg creat   Noroxycodone                   >5208        EXPECTED   ng/mg creat   Noroxymorphone                 124          EXPECTED   ng/mg creat    Sources of oxycodone are scheduled prescription medications.    Oxymorphone, noroxycodone, and noroxymorphone are expected    metabolites of oxycodone. Oxymorphone is also available as a    scheduled prescription medication.   Gabapentin                     PRESENT      EXPECTED                 Venlafaxine                    PRESENT      EXPECTED                 Desmethylvenlafaxine           PRESENT      EXPECTED                  Desmethylvenlafaxine is an expected metabolite of venlafaxine.  ====================================================================  Test                      Result    Flag   Units      Ref Range        Creatinine              192              mg/dL      >=20            ====================================================================  Declared Medications:  The flagging and interpretation on this report are based on the  following declared medications.  Unexpected results may arise from  inaccuracies in the declared medications.  **Note: The testing scope of this panel includes these medications:  Cannabis  Gabapentin  Oxycodone  Oxycodone (OxyContin)  Venlafaxine (Effexor)  ====================================================================  For clinical consultation, please call [phone].  ====================================================================  Analysis performed by [employer] [place] [address]     , No results found for: COMDAT,   Cannabinoids (14-pfm-8-carboxy-9-THC)    Date Value Ref Range Status   04/15/2021 Detected, Abnormal Result (A) NDET^Not Detected ng/mL Final     Comment:     Cutoff for a positive cannabinoid is greater than 50 ng/ml.  This is an unconfirmed screening result to be used for medical purposes only.   Order [id number] for confirmation or individual confirmation tests to [employer].       Phencyclidine (Phencyclidine)   Date Value Ref Range Status   04/15/2021 Not Detected NDET^Not Detected ng/mL Final     Comment:     Cutoff for a negative PCP is 25 ng/mL or less.     Cocaine (Benzoylecgonine)   Date Value Ref Range Status   04/15/2021 Not Detected NDET^Not Detected ng/mL Final     Comment:     Cutoff for a negative cocaine is 150 ng/ml or less.     Methamphetamine (d-Methamphetamine)   Date Value Ref Range Status   04/15/2021 Not Detected NDET^Not Detected ng/mL Final     Comment:     Cutoff for a negative methamphetamine is 500 ng/ml or less.     Opiates (Morphine)   Date Value Ref Range Status   04/15/2021 Not Detected NDET^Not Detected ng/mL Final     Comment:     Cutoff for a negative opiate is 100 ng/ml or less.     Amphetamine (d-Amphetamine)   Date Value Ref Range Status   04/15/2021 Not Detected NDET^Not Detected ng/mL Final     Comment:     Cutoff for a negative amphetamine is 500 ng/mL or less.     Benzodiazepines (Nordiazepam)   Date Value Ref Range Status   04/15/2021 Not Detected NDET^Not Detected ng/mL Final     Comment:     Cutoff for a negative benzodiazepine is 150 ng/ml or less.     Tricyclic Antidepressants (Desipramine)   Date Value Ref Range Status   04/15/2021 Not Detected NDET^Not Detected ng/mL Final     Comment:     Cutoff for a negative tricyclic antidepressant is 300 ng/ml or less.     Methadone (Methadone)   Date Value Ref Range Status   04/15/2021 Not Detected NDET^Not Detected ng/mL Final     Comment:     Cutoff for a negative methadone is 200 ng/ml or less.     Barbiturates (Butalbital)   Date Value Ref Range Status   04/15/2021 Not  Detected NDET^Not Detected ng/mL Final     Comment:     Cutoff for a negative barbituate is 200 ng/ml or less.     Oxycodone (Oxycodone)   Date Value Ref Range Status   04/15/2021 Detected, Abnormal Result (A) NDET^Not Detected ng/mL Final     Comment:     Cutoff for a positive oxycodone is greater than 100 ng/ml.  This is an unconfirmed screening result to be used for medical purposes only.   Order [id number] for confirmation or individual confirmation tests to [employer].       Propoxyphene (Norpropoxyphene)   Date Value Ref Range Status   04/15/2021 Not Detected NDET^Not Detected ng/mL Final     Comment:     Cutoff for a negative propoxyphene is 300 ng/ml or less     Buprenorphine (Buprenorphine)   Date Value Ref Range Status   04/15/2021 Not Detected NDET^Not Detected ng/mL Final     Comment:     Cutoff for a negative buprenorphine is 10 ng/ml or less        Processing:  Rx to be electronically transmitted to pharmacy by provider     [email or web address]    Routing refill request to provider for review/approval because:  Drug not on the [facility] refill protocol        Charlotte Sanabria RN  [facility]

## 2022-02-07 DIAGNOSIS — F11.90 CHRONIC, CONTINUOUS USE OF OPIOIDS: ICD-10-CM

## 2022-02-07 DIAGNOSIS — G62.9 PERIPHERAL POLYNEUROPATHY: ICD-10-CM

## 2022-02-07 DIAGNOSIS — M54.50 CHRONIC LOW BACK PAIN, UNSPECIFIED BACK PAIN LATERALITY, UNSPECIFIED WHETHER SCIATICA PRESENT: ICD-10-CM

## 2022-02-07 DIAGNOSIS — G89.29 CHRONIC LOW BACK PAIN, UNSPECIFIED BACK PAIN LATERALITY, UNSPECIFIED WHETHER SCIATICA PRESENT: ICD-10-CM

## 2022-02-07 NOTE — TELEPHONE ENCOUNTER
oxyCODONE IR (ROXICODONE) 15 MG tablet 60 tablet 0 1/7/2022 2/6/2022 No   Sig: TAKE ONE TABLET BY MOUTH TWICE A DAY AS NEEDED FOR SEVERE PAIN   Sent to pharmacy as: oxyCODONE HCl 15 MG Oral Tablet (ROXICODONE)     XTAMPZA ER 18 MG 12 hr tablet 60 capsule 0 1/7/2022 2/6/2022 No   Sig: TAKE 1 CAPSULE (18 MG) BY MOUTH EVERY 12 HOURS   Sent to pharmacy as: Xtampza ER 18 MG Oral Capsule ER 12 Hour Abuse-Deterrent (oxyCODONE)   Class: E-Prescribe         Last office visit: 11/12/2021     Future Office Visit:   Next 5 appointments (look out 90 days)    Feb 15, 2022  3:00 PM  (Arrive by 2:40 PM)  Provider Visit with Todd Pastrana MD  [facility] ([facility] ) [address] [place] [id number] [phone]             CSA -- Patient Level:    Controlled Substance Agreement - Opioid - Scan on 4/15/2021  1:40 PM  Controlled Substance Agreement - Opioid - Scan on 1/16/2020  4:43 PM             Routing refill request to provider for review/approval because:  Drug not on the [facility] refill protocol     Mariely Luo RN, BSN  [facility]

## 2022-02-08 RX ORDER — OXYCODONE HYDROCHLORIDE 15 MG/1
TABLET ORAL
Qty: 60 TABLET | Refills: 0 | Status: SHIPPED | OUTPATIENT
Start: 2022-02-08 | End: 2022-03-09

## 2022-02-08 RX ORDER — OXYCODONE 18 MG/1
CAPSULE, EXTENDED RELEASE ORAL
Qty: 60 CAPSULE | Refills: 0 | Status: SHIPPED | OUTPATIENT
Start: 2022-02-08 | End: 2022-03-09

## 2022-02-15 ENCOUNTER — OFFICE VISIT (OUTPATIENT)
Dept: FAMILY MEDICINE | Facility: CLINIC | Age: 52
End: 2022-02-15
Payer: COMMERCIAL

## 2022-02-15 VITALS
SYSTOLIC BLOOD PRESSURE: 104 MMHG | BODY MASS INDEX: 37.95 KG/M2 | TEMPERATURE: 97.7 F | OXYGEN SATURATION: 97 % | WEIGHT: 257 LBS | HEART RATE: 100 BPM | DIASTOLIC BLOOD PRESSURE: 70 MMHG

## 2022-02-15 DIAGNOSIS — E66.01 MORBID OBESITY DUE TO EXCESS CALORIES (H): ICD-10-CM

## 2022-02-15 DIAGNOSIS — K59.03 DRUG-INDUCED CONSTIPATION: ICD-10-CM

## 2022-02-15 DIAGNOSIS — M79.671 RIGHT FOOT PAIN: ICD-10-CM

## 2022-02-15 DIAGNOSIS — E78.5 HYPERLIPIDEMIA LDL GOAL <70: ICD-10-CM

## 2022-02-15 DIAGNOSIS — F11.90 CHRONIC, CONTINUOUS USE OF OPIOIDS: ICD-10-CM

## 2022-02-15 DIAGNOSIS — Z12.11 SCREEN FOR COLON CANCER: ICD-10-CM

## 2022-02-15 DIAGNOSIS — K51.919 ULCERATIVE COLITIS WITH COMPLICATION, UNSPECIFIED LOCATION (H): ICD-10-CM

## 2022-02-15 DIAGNOSIS — J02.9 SORE THROAT: ICD-10-CM

## 2022-02-15 DIAGNOSIS — M76.61 RIGHT ACHILLES TENDINITIS: ICD-10-CM

## 2022-02-15 DIAGNOSIS — G62.9 PERIPHERAL POLYNEUROPATHY: ICD-10-CM

## 2022-02-15 DIAGNOSIS — Q38.3 TONGUE ABNORMALITY: ICD-10-CM

## 2022-02-15 DIAGNOSIS — E11.42 TYPE 2 DIABETES MELLITUS WITH DIABETIC POLYNEUROPATHY, WITHOUT LONG-TERM CURRENT USE OF INSULIN (H): Primary | ICD-10-CM

## 2022-02-15 DIAGNOSIS — R53.83 FATIGUE, UNSPECIFIED TYPE: ICD-10-CM

## 2022-02-15 DIAGNOSIS — G89.29 CHRONIC LOW BACK PAIN, UNSPECIFIED BACK PAIN LATERALITY, UNSPECIFIED WHETHER SCIATICA PRESENT: ICD-10-CM

## 2022-02-15 DIAGNOSIS — F33.0 MAJOR DEPRESSIVE DISORDER, RECURRENT EPISODE, MILD (H): ICD-10-CM

## 2022-02-15 DIAGNOSIS — R07.89 CHEST TIGHTNESS: ICD-10-CM

## 2022-02-15 DIAGNOSIS — R00.2 PALPITATIONS: ICD-10-CM

## 2022-02-15 DIAGNOSIS — M54.50 CHRONIC LOW BACK PAIN, UNSPECIFIED BACK PAIN LATERALITY, UNSPECIFIED WHETHER SCIATICA PRESENT: ICD-10-CM

## 2022-02-15 DIAGNOSIS — F41.9 ANXIETY: ICD-10-CM

## 2022-02-15 PROCEDURE — 99215 OFFICE O/P EST HI 40 MIN: CPT | Performed by: FAMILY MEDICINE

## 2022-02-15 RX ORDER — PREGABALIN 75 MG/1
75 CAPSULE ORAL 2 TIMES DAILY
Qty: 180 CAPSULE | Refills: 1 | Status: SHIPPED | OUTPATIENT
Start: 2022-02-15 | End: 2022-06-14

## 2022-02-15 RX ORDER — GLIMEPIRIDE 4 MG/1
4 TABLET ORAL
Qty: 90 TABLET | Refills: 1 | Status: SHIPPED | OUTPATIENT
Start: 2022-02-15 | End: 2022-06-14

## 2022-02-15 ASSESSMENT — ANXIETY QUESTIONNAIRES
6. BECOMING EASILY ANNOYED OR IRRITABLE: NOT AT ALL
1. FEELING NERVOUS, ANXIOUS, OR ON EDGE: SEVERAL DAYS
3. WORRYING TOO MUCH ABOUT DIFFERENT THINGS: NOT AT ALL
2. NOT BEING ABLE TO STOP OR CONTROL WORRYING: NOT AT ALL
4. TROUBLE RELAXING: NOT AT ALL
7. FEELING AFRAID AS IF SOMETHING AWFUL MIGHT HAPPEN: NOT AT ALL
GAD7 TOTAL SCORE: 1
5. BEING SO RESTLESS THAT IT IS HARD TO SIT STILL: NOT AT ALL
7. FEELING AFRAID AS IF SOMETHING AWFUL MIGHT HAPPEN: NOT AT ALL
GAD7 TOTAL SCORE: 1
GAD7 TOTAL SCORE: 1

## 2022-02-15 ASSESSMENT — PATIENT HEALTH QUESTIONNAIRE - PHQ9
SUM OF ALL RESPONSES TO PHQ QUESTIONS 1-9: 19
SUM OF ALL RESPONSES TO PHQ QUESTIONS 1-9: 19
10. IF YOU CHECKED OFF ANY PROBLEMS, HOW DIFFICULT HAVE THESE PROBLEMS MADE IT FOR YOU TO DO YOUR WORK, TAKE CARE OF THINGS AT HOME, OR GET ALONG WITH OTHER PEOPLE: EXTREMELY DIFFICULT

## 2022-02-15 ASSESSMENT — ENCOUNTER SYMPTOMS: SORE THROAT: 1

## 2022-02-15 NOTE — PROGRESS NOTES
Assessment & Plan   Type 2 diabetes mellitus with diabetic polyneuropathy, without long-term current use of insulin (H)  Controlled - continue medication(s).  - HEMOGLOBIN A1C  - glimepiride (AMARYL) 4 MG tablet  Dispense: 90 tablet; Refill: 1  - metFORMIN (GLUCOPHAGE) 500 MG tablet  Dispense: 180 tablet; Refill: 1  - Semaglutide 7 MG TABS  Dispense: 90 tablet; Refill: 1  - HEMOGLOBIN A1C    h/o Ulcerative colitis with complication, unspecified location (H)  Prior history and Stable     Morbid obesity due to excess calories (H)  Diet and exercise    Major depressive disorder, recurrent episode, mild (H) Anxiety  Increase/uncontrolled symptoms and will switch to fluoxetine  - FLUoxetine (PROZAC) 20 MG capsule  Dispense: 90 capsule; Refill: 1    Hyperlipidemia LDL goal <70  Controlled - continue medication(s).    Chronic low back pain, unspecified back pain laterality, unspecified whether sciatica present  Chronic, continuous use of opioids - every 3 month medication checks  Peripheral polyneuropathy  Ongoing symptoms and will continue meds:  - pregabalin (LYRICA) 75 MG capsule  Dispense: 180 capsule; Refill: 1    Screen for colon cancer  - Adult Gastro Ref - Procedure Only    Right foot pain  Right Achilles tendinitis  Ongoing symptoms we will have him see podiatry  - Orthopedic  Referral    Fatigue, unspecified type  Sleeps often and might be medication related and will switch to fluoxetine  - TSH with free T4 reflex  - TSH with free T4 reflex    Drug-induced constipation  Ongoing symptoms and will try:  - Naldemedine Tosylate 0.2 MG TABS  Dispense: 90 tablet; Refill: 0    Palpitations  Chest tightness  Nonspecific findings and will check:  - Echocardiogram Exercise Stress    Tongue abnormality  Sore throat  Has firm areas in the mid tongue bilaterally of unclear cause and some sore throat as well and will have ENT see  - Otolaryngology Referral    I spent a total of 52 minutes on the day of the  visit.  -Doing chart review, history and exam, documentation and further activities as noted.      Return in about 6 months (around 8/15/2022) for wellness exam with fasting labs with Dannie Pastrana MD.      Dannie Pastrana MD      [address] [address]  [employer]   Office: [phone]       Rosales Marcelino is a 52 year old who presents for the following health issues  accompanied by his self.    Pharyngitis   Sore Throat - x 3-4 months, on going; Lump - his tongue feels like internal lumps - on the right side and then the left side and now about the same now for 2 weeks         Chronic Back Pain:  He presents for follow up of back pain.   Location of back pain:  Right lower back and left lower back  Description of back pain: dull ache, sharp, shooting and stabbing  Back pain spreads: right foot and left foot  Since patient first noticed back pain, pain is: gradually worsening - he feels like he has no drive.  Does back pain interfere with his job:  Yes  - pain med not working, Med change request    Energy Level - no energy.  Cardiac Stress Test was never completed    RIGHT Achilles tendonitis - known heal spurs near the insertion of the achilles tendon. - Patient requsting x-ray.    Diabetes:   He presents for follow up of diabetes.  He is checking home blood glucose a few times a week. He checks blood glucose before and after meals and at bedtime.  Blood glucose is sometimes over 200 and sometimes over 70. He is aware of hypoglycemia symptoms including shakiness, dizziness, lethargy and confusion. He is concerned about other.  He is having numbness in feet, burning in feet, weight loss and weight gain.      He eats 0-1 servings of fruits and vegetables daily.He consumes 1 sweetened beverage(s) daily.He exercises with enough effort to increase his heart rate 9 or less minutes per day.  He exercises with enough effort to increase his heart rate 3 or less  days per week.   He is taking medications regularly.     Hyperlipidemia Follow-Up      Are you regularly taking any medication or supplement to lower your cholesterol?   ? Pt is not sure has he though he was told to stop?    Are you having muscle aches or other side effects that you think could be caused by your cholesterol lowering medication?  n/a      Hemoglobin A1C POCT (%)   Date Value   04/15/2021 8.7 (H)   01/18/2020 7.9 (H)     Hemoglobin A1C (%)   Date Value   11/12/2021 5.8 (H)   08/09/2021 6.1 (H)     LDL Cholesterol Calculated (mg/dL)   Date Value   04/15/2021 70   01/18/2020 54       Depression and Anxiety Follow-Up  Mental Health Follow-up:  Patient presents to follow-up on Depression & Anxiety.The patient is having other symptoms associated with depression.  Patient's anxiety since last visit has been:  Medium  The patient is having other symptoms associated with anxiety.  Any significant life events: job concerns, financial concerns and health concerns  Patient is not feeling anxious or having panic attacks.  Patient has no concerns about alcohol or drug use.    Social History     Tobacco Use     Smoking status: Never Smoker     Smokeless tobacco: Never Used   Substance Use Topics     Alcohol use: No     Comment: Does not drink - last was 11/2015     Drug use: No     PHQ 8/9/2021 11/12/2021 2/15/2022   PHQ-9 Total Score 7 5 19   Q9: Thoughts of better off dead/self-harm past 2 weeks Not at all Not at all Not at all     BUSHRA-7 SCORE 8/24/2020 8/9/2021 2/15/2022   Total Score - 1 (minimal anxiety) 1 (minimal anxiety)   Total Score 1 1 1     Last PHQ-9 2/15/2022   1.  Little interest or pleasure in doing things 3   2.  Feeling down, depressed, or hopeless 2   3.  Trouble falling or staying asleep, or sleeping too much 3   4.  Feeling tired or having little energy 3   5.  Poor appetite or overeating 3   6.  Feeling bad about yourself 1   7.  Trouble concentrating 3   8.  Moving slowly or restless 1   Q9:  Thoughts of better off dead/self-harm past 2 weeks 0   PHQ-9 Total Score 19   Difficulty at work, home, or with people -     BUSHRA-7  2/15/2022   1. Feeling nervous, anxious, or on edge 1   2. Not being able to stop or control worrying 0   3. Worrying too much about different things 0   4. Trouble relaxing 0   5. Being so restless that it is hard to sit still 0   6. Becoming easily annoyed or irritable 0   7. Feeling afraid, as if something awful might happen 0   BUSHRA-7 Total Score 1   If you checked any problems, how difficult have they made it for you to do your work, take care of things at home, or get along with other people? -       Suicide Assessment Five-step Evaluation and Treatment (SAFE-T)        Review of Systems   HENT: Positive for sore throat.       Constitutional, HEENT, cardiovascular, pulmonary, gi and gu systems are negative, except as otherwise noted.      Objective    /70 (BP Location: Left arm, Patient Position: Sitting, Cuff Size: Adult Large)   Pulse 100   Temp 97.7  F (36.5  C) (Tympanic)   Wt 116.6 kg (257 lb)   SpO2 97%   BMI 37.95 kg/m    Body mass index is 37.95 kg/m .  Physical Exam   GENERAL: healthy, alert and no distress  EYES: Eyes grossly normal to inspection, PERRL and conjunctivae and sclerae normal  HENT: ear canals and TM's normal, nose and mouth without ulcers or lesions  NECK: no adenopathy, no asymmetry, masses, or scars and thyroid normal to palpation  RESP: lungs clear to auscultation - no rales, rhonchi or wheezes  CV: regular rate and rhythm, normal S1 S2, no S3 or S4, no murmur, click or rub, no peripheral edema and peripheral pulses strong  ABDOMEN: soft, nontender, no hepatosplenomegaly, no masses and bowel sounds normal  MS: no gross musculoskeletal defects noted, no edema  SKIN: no suspicious lesions or rashes  NEURO: Normal strength and tone, mentation intact and speech normal  BACK: no CVA tenderness, no paralumbar tenderness  PSYCH: mentation appears  normal, affect normal/bright  LYMPH: no cervical, supraclavicular, axillary, or inguinal adenopathy  Diabetic foot exam: normal DP and PT pulses, no trophic changes or ulcerative lesions and reduced sensation at mid shin distally    No results found for any visits on 02/15/22.          Answers for HPI/ROS submitted by the patient on 2/15/2022  If you checked off any problems, how difficult have these problems made it for you to do your work, take care of things at home, or get along with other people?: Extremely difficult  PHQ9 TOTAL SCORE: 19  BUSHRA 7 TOTAL SCORE: 1

## 2022-02-16 ENCOUNTER — TELEPHONE (OUTPATIENT)
Dept: FAMILY MEDICINE | Facility: CLINIC | Age: 52
End: 2022-02-16
Payer: COMMERCIAL

## 2022-02-16 ASSESSMENT — ANXIETY QUESTIONNAIRES: GAD7 TOTAL SCORE: 1

## 2022-02-16 ASSESSMENT — PATIENT HEALTH QUESTIONNAIRE - PHQ9: SUM OF ALL RESPONSES TO PHQ QUESTIONS 1-9: 19

## 2022-02-16 NOTE — TELEPHONE ENCOUNTER
Prior Authorization Retail Medication Request    Medication/Dose: requesting a prior authorization for the medication symproic 0.2 mg tablets  ICD code (if different than what is on RX):    Previously Tried and Failed:    Rationale:      Insurance Name: [employer] [place] [employer]  Insurance ID:  [id number]      Pharmacy Information (if different than what is on RX)  Name:  [facility]  Phone:  [phone]    Thank You,  Antonina Pickard, Jose AngelT  [facility]

## 2022-02-17 ENCOUNTER — ANCILLARY PROCEDURE (OUTPATIENT)
Dept: GENERAL RADIOLOGY | Facility: CLINIC | Age: 52
End: 2022-02-17
Attending: PODIATRIST
Payer: COMMERCIAL

## 2022-02-17 ENCOUNTER — OFFICE VISIT (OUTPATIENT)
Dept: PODIATRY | Facility: CLINIC | Age: 52
End: 2022-02-17
Attending: FAMILY MEDICINE
Payer: COMMERCIAL

## 2022-02-17 VITALS
HEIGHT: 69 IN | BODY MASS INDEX: 38.3 KG/M2 | WEIGHT: 258.6 LBS | DIASTOLIC BLOOD PRESSURE: 72 MMHG | SYSTOLIC BLOOD PRESSURE: 112 MMHG

## 2022-02-17 DIAGNOSIS — M79.671 RIGHT FOOT PAIN: ICD-10-CM

## 2022-02-17 DIAGNOSIS — M76.61 RIGHT ACHILLES TENDINITIS: ICD-10-CM

## 2022-02-17 DIAGNOSIS — M54.40 LOW BACK PAIN WITH SCIATICA, SCIATICA LATERALITY UNSPECIFIED, UNSPECIFIED BACK PAIN LATERALITY, UNSPECIFIED CHRONICITY: ICD-10-CM

## 2022-02-17 DIAGNOSIS — E11.42 DIABETIC POLYNEUROPATHY ASSOCIATED WITH TYPE 2 DIABETES MELLITUS (H): Primary | ICD-10-CM

## 2022-02-17 DIAGNOSIS — M77.51 BONE SPUR OF RIGHT ANKLE: ICD-10-CM

## 2022-02-17 PROBLEM — F11.90 CHRONIC, CONTINUOUS USE OF OPIOIDS: Status: ACTIVE | Noted: 2021-05-12

## 2022-02-17 PROCEDURE — 99244 OFF/OP CNSLTJ NEW/EST MOD 40: CPT | Performed by: PODIATRIST

## 2022-02-17 PROCEDURE — 73630 X-RAY EXAM OF FOOT: CPT | Mod: RT | Performed by: RADIOLOGY

## 2022-02-17 RX ORDER — DEXAMETHASONE SODIUM PHOSPHATE 4 MG/ML
4 INJECTION, SOLUTION INTRA-ARTICULAR; INTRALESIONAL; INTRAMUSCULAR; INTRAVENOUS; SOFT TISSUE SEE ADMIN INSTRUCTIONS
Qty: 30 ML | Refills: 0 | Status: SHIPPED | OUTPATIENT
Start: 2022-02-17 | End: 2022-06-14

## 2022-02-17 NOTE — LETTER
2/17/2022         RE: Ryland Gee  [address]  [place] [address]        Dear Colleague,    Thank you for referring your patient, Ryland Gee, to the [facility]. Please see a copy of my visit note below.    PATIENT HISTORY:  Dr. Pastrana requested I see this patient for their foot issue.  Ryland Gee is a 52 year old male who presents to clinic for right foot pain and achilles tendonitis.  Patient states he has had pain for the last 3 years but the last couple months more significant.  Burning aching shooting pain to the back of his right heel.  Can be 8 out of 10 at its worst.  Notes its every day and worse with walking.  Denies specific injury.  Is diabetic and has neuropathy.  Wondering what is causing the pain and what can be done for it.  Patient is also wondering about his neuropathy.  He states that he started noticing the numbness to his feet after his back issues started.  Wondering if there is anything that can be done for it.    Review of Systems:  Patient denies fever, chills, rash, wound,  numbness, weakness, heart burn, blood in stool, chest pain with activity, calf pain when walking, shortness of breath with activity, chronic cough, easy bleeding/bruising, swelling of ankles, excessive thirst, fatigue, depression, anxiety.  Patient admits to limping, stiffness.     PAST MEDICAL HISTORY:   Past Medical History:   Diagnosis Date     Diabetes (H)      IBD (inflammatory bowel disease) 12/2008    ulcerative colitis        PAST SURGICAL HISTORY:   Past Surgical History:   Procedure Laterality Date     ESOPHAGOSCOPY, GASTROSCOPY, DUODENOSCOPY (EGD), COMBINED N/A 5/26/2015    Procedure: COMBINED ESOPHAGOSCOPY, GASTROSCOPY, DUODENOSCOPY (EGD), BIOPSY SINGLE OR MULTIPLE;  Surgeon: Mario Patterson MD;  Location:  GI     VASECTOMY  2002     [facility] APPENDECTOMY  1993        MEDICATIONS:   Current Outpatient Medications:      blood glucose (CONTOUR NEXT TEST)  test strip, 1 strip by In Vitro route 2 times daily, Disp: 200 strip, Rfl: 11     blood glucose calibration (CONTOUR NEXT CONTROL LEVEL 2) NORMAL solution, Use to calibrate blood glucose monitor as needed as directed., Disp: 1 Bottle, Rfl: 11     blood glucose monitoring (ARTURO CONTOUR NEXT MONITOR) meter device kit, Use to test blood sugar 3 times daily or as directed., Disp: 1 kit, Rfl: 0     blood glucose monitoring (ARTURO MICROLET) lancets, Use to test blood sugar 3 times daily or as directed., Disp: 100 Box, Rfl: 11     FLUoxetine (PROZAC) 20 MG capsule, Take 1 capsule (20 mg) by mouth daily, Disp: 90 capsule, Rfl: 1     glimepiride (AMARYL) 4 MG tablet, Take 1 tablet (4 mg) by mouth every morning (before breakfast), Disp: 90 tablet, Rfl: 1     metFORMIN (GLUCOPHAGE) 500 MG tablet, Take 1 tablet (500 mg) by mouth 2 times daily (with meals), Disp: 180 tablet, Rfl: 1     MOVANTIK 25 MG TABS tablet, Take 1 tablet (25 mg) by mouth every morning (before breakfast), Disp: 90 tablet, Rfl: 1     Naldemedine Tosylate 0.2 MG TABS, Take 0.2 mg by mouth daily as needed (constitpation), Disp: 90 tablet, Rfl: 0     omeprazole (PRILOSEC) 20 MG DR capsule, Take 1 capsule (20 mg) by mouth daily, Disp: 90 capsule, Rfl: 1     oxyCODONE IR (ROXICODONE) 15 MG tablet, TAKE ONE TABLET BY MOUTH TWICE A DAY AS NEEDED FOR SEVERE PAIN, Disp: 60 tablet, Rfl: 0     pregabalin (LYRICA) 75 MG capsule, Take 1 capsule (75 mg) by mouth 2 times daily, Disp: 180 capsule, Rfl: 1     Semaglutide 7 MG TABS, Take 7 mg by mouth daily before breakfast - take with <4 oz water, 30 minutes before 1st food/drink/med, Disp: 90 tablet, Rfl: 1     simvastatin (ZOCOR) 20 MG tablet, Take 1 tablet (20 mg) by mouth At Bedtime, Disp: 90 tablet, Rfl: 3     traZODone (DESYREL) 50 MG tablet, Take 1-2 tablets ( mg) by mouth nightly as needed for sleep, Disp: 180 tablet, Rfl: 3     XTAMPZA ER 18 MG 12 hr tablet, TAKE 1 CAPSULE (18 MG) BY MOUTH EVERY 12 HOURS,  "Disp: 60 capsule, Rfl: 0     ALLERGIES:    Allergies   Allergen Reactions     Latex      Flu like symptoms with Azacol          SOCIAL HISTORY:   Social History     Socioeconomic History     Marital status:      Spouse name: Not on file     Number of children: Not on file     Years of education: Not on file     Highest education level: Not on file   Occupational History     Not on file   Tobacco Use     Smoking status: Never Smoker     Smokeless tobacco: Never Used   Substance and Sexual Activity     Alcohol use: No     Comment: Does not drink - last was 11/2015     Drug use: No     Sexual activity: Yes     Partners: Female     Birth control/protection: Surgical   Other Topics Concern     Parent/sibling w/ CABG, MI or angioplasty before 65F 55M? Not Asked   Social History Narrative     Not on file     Social Determinants of Health     Financial Resource Strain: Not on file   Food Insecurity: Not on file   Transportation Needs: Not on file   Physical Activity: Not on file   Stress: Not on file   Social Connections: Not on file   Intimate Partner Violence: Not on file   Housing Stability: Not on file        FAMILY HISTORY:   Family History   Problem Relation Age of Onset     Diabetes Father         EXAM:Vitals: Ht 1.753 m (5' 9\")   Wt 117.3 kg (258 lb 9.6 oz)   BMI 38.19 kg/m    BMI= Body mass index is 38.19 kg/m .    A1C: 5.8 (11/2021)    General appearance: Patient is alert and fully cooperative with history & exam.  No sign of distress is noted during the visit.     Psychiatric: Affect is pleasant & appropriate.  Patient appears motivated to improve health.     Respiratory: Breathing is regular & unlabored while sitting.     HEENT: Hearing is intact to spoken word.  Speech is clear.  No gross evidence of visual impairment that would impact ambulation.     Dermatologic: Skin is intact to both lower extremities without significant lesions, rash or abrasion.  No paronychia or evidence of soft tissue infection " is noted.     Vascular: DP & PT pulses are intact & regular bilaterally.   edema and varicosities noted.  CFT and skin temperature is normal to both lower extremities.     Neurologic: Lower extremity sensation is diminished to the toes.     Musculoskeletal: Patient is ambulatory without assistive device or brace.  Decreased dorsiflexion right ankle with pain on palpation of the Achilles tendon insertion.  Pain with plantarflexion.    Radiographs:  Right foot xray -  I personally reviewed the xrays -No fractures are identified. Mild degenerative changes the first MTP joint, multiple joint fluid and the ankle joint. Moderate Achilles calcaneal spur. Small plantar calcaneal spur.     ASSESSMENT:    Right foot pain  Right Achilles tendinitis  Diabetic polyneuropathy associated with type 2 diabetes mellitus (H)  Bone spur of right ankle  Low back pain with sciatica, sciatica laterality unspecified, unspecified back pain laterality, unspecified chronicity     Medical Decision Making/Plan:  Reviewed [facility] in [facility].  Reviewed and discussed x-rays with patient.  Discussed the large bone spur on the back of his right heel.  Explained that this is from inflammation and tension from his Achilles tendon. Reviewed and discussed causes of tendonitis.  We discussed treatments such as immobiliation, icing, stretching, heel lifts, orthotics, physical therapy, MRI.     Discussed that with surgery would be a same-day surgery under general anesthesia.  We would have to detach the tendon to remove the bone spur and reattach the tendon like the length and it.  He would be nonweightbearing for 6 weeks followed by minimal weightbearing in a boot for 6 weeks.  He would not be able to drive during this time because it is his right foot. Talked about risks including infection, numbness, continued pain, , recurrence, need for further surgery, blood loss, blood clotting. You will scar.    Patient is not interested in surgery at this  time.  We will start with physical therapy and iontophoresis.  Also will do inserts in the shoes as well as over-the-counter topical pain cream.  Discussed possible boot for patient to wear for pain but he is going to hold off on that at this time.  Talked about an ankle sleeve that has gel cushion in the back to help with pressure of the bone spur in the shoe.  If pain continues we may revisit surgery or boot.    For his back and nerve issues recommend following up with a orthospine doctor.  Referral was placed for this.  All questions were answered to patient satisfaction and he will call for the questions or concerns.    Patient risk factor: Patient is at low risk for infection.        Christina Sahni DPM, Podiatry/Foot and Ankle Surgery        Again, thank you for allowing me to participate in the care of your patient.        Sincerely,        Christina Sahni DPM, Podiatry/Foot and Ankle Surgery

## 2022-02-17 NOTE — PATIENT INSTRUCTIONS
Thank you for choosing [facility] / Foot & Ankle Surgery!    DR ORELLANA'S CLINIC:  [facility]   [address]   [place] [address]      TRIAGE LINE: [phone] - [address]  APPOINTMENTS: [phone]  RADIOLOGY: [phone]  SET UP SURGERY: [phone]  FAX NUMBER: [phone]  BILLING QUESTIONS: [phone]       A referral has been made for you to see a spine specialist -     TENDONITIS   Tendons are the strong fibrous portions of muscles that attach to bones and allow the muscle to move a joint when it contracts. Tendons are very strong because they have a lot of force exerted on them. Sometimes tendons can become painful because they have suffered an acute injury, in which too much force was exerted at one time, or an overuse injury, in which a normal force was exerted too frequently or over a prolonged period of time. As a result, there is damage to the tendon and its surrounding soft tissue structures and they become inflammed. Because tendons do not have a great blood supply, they do not heal rapidly and the inflammation can become chronic.   Conservative treatment for tendinitis involves rest and anti-inflammatory measures. Ice is applied 15 minutes 2-3 times daily. Anti-inflammatory medications called NSAIDs (ibuprofen, example) can be taken provided they are used with caution, as they can lead to internal bleeding and increase the risk ofstroke and heart attack. Sometimes topical nitroglycerin is prescribed to help with pain. Often your doctor will use a special shoe or removable walking cast to immobilize the tendon, allowing it to heal without further damage from use. These devices are very useful in helping tendons heal, but they may slow you down or make you feel like your hip, knee, or back are out ofalignment. This is temporary and should go away once you are out ofthe immobilization. You should not use a walking cast when showering or driving.  Another option is Platelet Rich Plasma injections. (Normally done with a Sports and Orthorapedic doctor.   If conservative measures fail, your physician may need to surgically repair the tendon by removing any chronic inflammatory tissue and sewing it back together. Sometimes it is sewn to an adjacent tendon with similar function for support and sometimes it is lengthened. . Sometimes the bones around the tendon need to be realigned or reshaped to better support the tendon or prevent further damage. Your foot and ankle surgeon will discuss the specifics of your surgery with you, should you need it.      Towel stretch: Sit on a hard surface with your injured leg stretched out in front of you. Loop a towel around your toes and the ball of your foot and pull the towel toward your body keeping your leg straight. Hold this position for 15 to 30 seconds and then relax. Repeat 3 times. Then push the towel away with the ball of your foot. Repeat 3 times.  When you don't feel much of a stretch using the towel, you can start the standing calf stretch and the following exercises.    Standing calf stretch: Stand facing a wall with your hands on the wall at about eye level. Keep your injured leg back with your heel on the floor. Keep the other leg forward with the knee bent. Turn your back foot slightly inward (as if you were pigeon-toed). Slowly lean into the wall until you feel a stretch in the back of your calf. Hold the stretch for 15 to 30 seconds. Return to the starting position. Repeat 3 times. Do this exercise several times each day.     Standing soleus stretch: Stand facing a wall with your hands on the wall at about chest height. Keep your injured leg back with your heel on the floor. Keep the other leg forward with the knee bent. Turn your back foot slightly inward (as if you were pigeon-toed). Bend your back knee slightly and gently lean into the wall until you feel a stretch in the lower calf of your injured leg.  Hold the stretch for 15 to 30 seconds. Return to the starting position. Repeat 3 times.     Achilles stretch: Stand with the ball of one foot on a stair. Reach for the step below with your heel until you feel a stretch in the arch of your foot. Hold this position for 15 to 30 seconds and then relax. Repeat 3 times.     Heel raise: Balance yourself while standing behind a chair or counter. Using the chair or counter as a support to help you, raise your body up onto your toes and hold for 5 seconds. Then slowly lower yourself down without holding onto the support. (It's OK to keep holding onto the support if you need to.) When this exercise becomes less painful, try lowering yourself down on the injured leg only. Repeat 15 times. Do 2 sets of 15. Rest 30 seconds between sets.     Step-up: Stand with the foot of your injured leg on a support 3 to 5 inches high (like a small step or block of wood). Keep your other foot flat on the floor. Shift your weight onto the injured leg on the support. Straighten your injured leg as the other leg comes off the floor. Return to the starting position by bending your injured leg and slowly lowering your uninjured leg back to the floor. Do 2 sets of 15.     Resisted ankle eversion: Sit with both legs stretched out in front of you, with your feet about a shoulder's width apart. Tie a loop in one end of elastic tubing. Put the foot of your injured leg through the loop so that the tubing goes around the arch of that foot and wraps around the outside of the other foot. Hold onto the other end of the tubing with your hand to provide tension. Turn the foot of your injured leg up and out. Make sure you keep your other foot still so that it will allow the tubing to stretch as you move the foot of your injured leg. Return to the starting position. Do 2 sets of 15.     Balance and reach exercises: Stand next to a chair with your injured leg farther from the chair. The chair will provide support  if you need it. Stand on the foot of your injured leg and bend your knee slightly. Try to raise the arch of this foot while keeping your big toe on the floor. Keep your foot in this position. With the hand that is farther away from the chair, reach forward in front of you by bending at the waist. Avoid bending your knee any more as you do this. Repeat this 10 times. To make the exercise more challenging, reach farther in front of you. Do 2 sets of 10.  the same position as above. While keeping your arch height, reach the hand that is farther away from the chair across your body toward the chair. The farther you reach, the more challenging the exercise. Do 2 sets of 10.     Resisted ankle eversion: Sit with both legs stretched out in front of you, with your feet about a shoulder's width apart. Tie a loop in one end of elastic tubing. Put the foot of your injured leg through the loop so that the tubing goes around the arch of that foot and wraps around the outside of the other foot. Hold onto the other end of the tubing with your hand to provide tension. Turn the foot of your injured leg up and out. Make sure you keep your other foot still so that it will allow the tubing to stretch as you move the foot of your injured leg. Return to the starting position. Do 2 sets of 15.   If you have access to a wobble board, do the following exercises:  Wobble board exercises:     Stand on a wobble board with your feet shoulder width apart. Rock the board forwards and backwards 30 times, then side to side 30 times. Hold on to a chair if you need support.     Rotate the wobble board around so that the edge of the board is in contact with the floor at all times. Do this 30 times in a clockwise and then a counterclockwise direction.     Balance on the wobble board for as long as you can without letting the edges touch the floor. Try to do this for 2 minutes without touching the floor.     Rotate the wobble board in clockwise  "and counterclockwise circles, but do not let the edge of the board touch the floor.     When you have mastered exercises A through D, try repeating them while standing on just your injured leg.     After you are able to do these exercises on one leg, try to do them with your eyes closed. Make sure you have something nearby to support you in case you lose your balance.  BONE SPUR  A bone spur (osteophyte) is a bony growth formed on normal bone. Most people think of something sharp when they think of a \"spur,\" but a bone spur is just extra bone. It s usually smooth, but it can cause wear and tear or pain if it presses or rubs on other bones or soft tissues such as ligaments, tendons, or nerves in the body. Common places for bone spurs include the spine, shoulders, hands, hips, knees, and feet.  CAUSES  A bone spur forms as the body tries to repair itself by building extra bone. It generally forms in response to pressure, rubbing, or stress that continues over a long period of time.  Some bone spurs form as part of the aging process. As we age, the slippery tissue called cartilage that covers the ends of the bones within joints breaks down and eventually wears away (osteoarthritis). Over time, this leads to pain and swelling and, in some cases, bone spurs forming along the edges of the joint. Bone spurs due to aging are especially common in the joints of the spine and feet.  Bone spurs also form in the feet in response to tight ligaments, to activities such as dancing and running that put stress on the feet, and to pressure from being overweight or from poorly fitting shoes. For example, the long ligament on the bottom of the foot (plantar fascia) can become stressed or tight and pull on the heel, causing the ligament to become inflamed (plantar fasciitis). As the bone tries to mend itself, a bone spur can form on the bottom of the heel (known as a \"heel spur\"). Pressure at the back of the heel from frequently wearing " "shoes that are too tight can cause a bone spur on the back of the heel. This is sometimes called a \"pump bump,\" because it is often seen in women who wear high heels.  SYMPTOMS  Many people have bone spurs without ever knowing it, because most bone spurs cause no symptoms. But if the bone spurs are pressing on other bones or tissues or are causing a muscle or tendon to rub, they can break that tissue down over time, causing swelling, pain, and tearing. Bone spurs in the foot can also cause corns and calluses when tissue builds up to provide added padding over the bone spur.  DIAGNOSIS  A bone spur is usually visible on an X-ray. But since most bone spurs do not cause problems, it would be unusual to take an X-ray just to see whether you have a bone spur. If you had an X-ray to evaluate one of the problems associated with bone spurs, such as arthritis, bone spurs would be visible on that X-ray.  TREATMENT  Bone spurs do not require treatment unless they are causing pain or damaging other tissues. When needed, treatment may be directed at the causes, the symptoms, or the bone spurs themselves.  Treatment directed at the cause of bone spurs may include weight loss to take some pressure off the joints (especially when osteoarthritis or plantar fasciitis is the cause) and stretching the affected area, such as the heel cord and bottom of the foot. Seeing a physical therapist for ultrasound or deep tissue massage may be helpful for plantar fasciitis or shoulder pain.  Treatment directed at symptoms could include rest, ice, stretching, and nonsteroidal anti-inflammatory drugs (NSAIDs) such as ibuprofen. Education in how to protect your joints is helpful if you have osteoarthritis. If a bone spur is in your foot, changing footwear or adding padding or a shoe insert such as a heel cup or orthotic may help. If the bone spur is causing corns or calluses, padding the area or wearing different shoes can help. A podiatrist (foot " doctor) may be consulted if corns and calluses become a bigger problem. If the bone spur continues to cause symptoms, your doctor may suggest a corticosteroid injection at the painful area to decrease pain and inflammation of the soft tissues next to the bone spur.  Sometimes the bone spurs themselves are treated. Bone spurs can be surgically removed or treated as part of a surgery to repair or replace a joint when osteoarthritis has caused considerable damage and deformity. Examples might include repair of a bunion or heel spur in the foot. At other times, fusion of a joint may be warrented to decrease pain.     www.pedifix.com   1-800-PEDIFIX

## 2022-02-17 NOTE — PROGRESS NOTES
PATIENT HISTORY:  Dr. Pastrana requested I see this patient for their foot issue.  Ryland Gee is a 52 year old male who presents to clinic for right foot pain and achilles tendonitis.  Patient states he has had pain for the last 3 years but the last couple months more significant.  Burning aching shooting pain to the back of his right heel.  Can be 8 out of 10 at its worst.  Notes its every day and worse with walking.  Denies specific injury.  Is diabetic and has neuropathy.  Wondering what is causing the pain and what can be done for it.  Patient is also wondering about his neuropathy.  He states that he started noticing the numbness to his feet after his back issues started.  Wondering if there is anything that can be done for it.    Review of Systems:  Patient denies fever, chills, rash, wound,  numbness, weakness, heart burn, blood in stool, chest pain with activity, calf pain when walking, shortness of breath with activity, chronic cough, easy bleeding/bruising, swelling of ankles, excessive thirst, fatigue, depression, anxiety.  Patient admits to limping, stiffness.     PAST MEDICAL HISTORY:   Past Medical History:   Diagnosis Date     Diabetes (H)      IBD (inflammatory bowel disease) 12/2008    ulcerative colitis        PAST SURGICAL HISTORY:   Past Surgical History:   Procedure Laterality Date     ESOPHAGOSCOPY, GASTROSCOPY, DUODENOSCOPY (EGD), COMBINED N/A 5/26/2015    Procedure: COMBINED ESOPHAGOSCOPY, GASTROSCOPY, DUODENOSCOPY (EGD), BIOPSY SINGLE OR MULTIPLE;  Surgeon: Mario Patterson MD;  Location:  GI     VASECTOMY  2002     [facility] APPENDECTOMY  1993        MEDICATIONS:   Current Outpatient Medications:      blood glucose (CONTOUR NEXT TEST) test strip, 1 strip by In Vitro route 2 times daily, Disp: 200 strip, Rfl: 11     blood glucose calibration (CONTOUR NEXT CONTROL LEVEL 2) NORMAL solution, Use to calibrate blood glucose monitor as needed as directed., Disp: 1 Bottle, Rfl: 11     blood glucose  monitoring (ARTURO CONTOUR NEXT MONITOR) meter device kit, Use to test blood sugar 3 times daily or as directed., Disp: 1 kit, Rfl: 0     blood glucose monitoring (ARTURO MICROLET) lancets, Use to test blood sugar 3 times daily or as directed., Disp: 100 Box, Rfl: 11     FLUoxetine (PROZAC) 20 MG capsule, Take 1 capsule (20 mg) by mouth daily, Disp: 90 capsule, Rfl: 1     glimepiride (AMARYL) 4 MG tablet, Take 1 tablet (4 mg) by mouth every morning (before breakfast), Disp: 90 tablet, Rfl: 1     metFORMIN (GLUCOPHAGE) 500 MG tablet, Take 1 tablet (500 mg) by mouth 2 times daily (with meals), Disp: 180 tablet, Rfl: 1     MOVANTIK 25 MG TABS tablet, Take 1 tablet (25 mg) by mouth every morning (before breakfast), Disp: 90 tablet, Rfl: 1     Naldemedine Tosylate 0.2 MG TABS, Take 0.2 mg by mouth daily as needed (constitpation), Disp: 90 tablet, Rfl: 0     omeprazole (PRILOSEC) 20 MG DR capsule, Take 1 capsule (20 mg) by mouth daily, Disp: 90 capsule, Rfl: 1     oxyCODONE IR (ROXICODONE) 15 MG tablet, TAKE ONE TABLET BY MOUTH TWICE A DAY AS NEEDED FOR SEVERE PAIN, Disp: 60 tablet, Rfl: 0     pregabalin (LYRICA) 75 MG capsule, Take 1 capsule (75 mg) by mouth 2 times daily, Disp: 180 capsule, Rfl: 1     Semaglutide 7 MG TABS, Take 7 mg by mouth daily before breakfast - take with <4 oz water, 30 minutes before 1st food/drink/med, Disp: 90 tablet, Rfl: 1     simvastatin (ZOCOR) 20 MG tablet, Take 1 tablet (20 mg) by mouth At Bedtime, Disp: 90 tablet, Rfl: 3     traZODone (DESYREL) 50 MG tablet, Take 1-2 tablets ( mg) by mouth nightly as needed for sleep, Disp: 180 tablet, Rfl: 3     XTAMPZA ER 18 MG 12 hr tablet, TAKE 1 CAPSULE (18 MG) BY MOUTH EVERY 12 HOURS, Disp: 60 capsule, Rfl: 0     ALLERGIES:    Allergies   Allergen Reactions     Latex      Flu like symptoms with Azacol          SOCIAL HISTORY:   Social History     Socioeconomic History     Marital status:      Spouse name: Not on file     Number of  "children: Not on file     Years of education: Not on file     Highest education level: Not on file   Occupational History     Not on file   Tobacco Use     Smoking status: Never Smoker     Smokeless tobacco: Never Used   Substance and Sexual Activity     Alcohol use: No     Comment: Does not drink - last was 11/2015     Drug use: No     Sexual activity: Yes     Partners: Female     Birth control/protection: Surgical   Other Topics Concern     Parent/sibling w/ CABG, MI or angioplasty before 65F 55M? Not Asked   Social History Narrative     Not on file     Social Determinants of Health     Financial Resource Strain: Not on file   Food Insecurity: Not on file   Transportation Needs: Not on file   Physical Activity: Not on file   Stress: Not on file   Social Connections: Not on file   Intimate Partner Violence: Not on file   Housing Stability: Not on file        FAMILY HISTORY:   Family History   Problem Relation Age of Onset     Diabetes Father         EXAM:Vitals: Ht 1.753 m (5' 9\")   Wt 117.3 kg (258 lb 9.6 oz)   BMI 38.19 kg/m    BMI= Body mass index is 38.19 kg/m .    A1C: 5.8 (11/2021)    General appearance: Patient is alert and fully cooperative with history & exam.  No sign of distress is noted during the visit.     Psychiatric: Affect is pleasant & appropriate.  Patient appears motivated to improve health.     Respiratory: Breathing is regular & unlabored while sitting.     HEENT: Hearing is intact to spoken word.  Speech is clear.  No gross evidence of visual impairment that would impact ambulation.     Dermatologic: Skin is intact to both lower extremities without significant lesions, rash or abrasion.  No paronychia or evidence of soft tissue infection is noted.     Vascular: DP & PT pulses are intact & regular bilaterally.   edema and varicosities noted.  CFT and skin temperature is normal to both lower extremities.     Neurologic: Lower extremity sensation is diminished to the toes.     Musculoskeletal: " Patient is ambulatory without assistive device or brace.  Decreased dorsiflexion right ankle with pain on palpation of the Achilles tendon insertion.  Pain with plantarflexion.    Radiographs:  Right foot xray -  I personally reviewed the xrays -No fractures are identified. Mild degenerative changes the first MTP joint, multiple joint fluid and the ankle joint. Moderate Achilles calcaneal spur. Small plantar calcaneal spur.     ASSESSMENT:    Right foot pain  Right Achilles tendinitis  Diabetic polyneuropathy associated with type 2 diabetes mellitus (H)  Bone spur of right ankle  Low back pain with sciatica, sciatica laterality unspecified, unspecified back pain laterality, unspecified chronicity     Medical Decision Making/Plan:  Reviewed patient[facility] in [facility].  Reviewed and discussed x-rays with patient.  Discussed the large bone spur on the back of his right heel.  Explained that this is from inflammation and tension from his Achilles tendon. Reviewed and discussed causes of tendonitis.  We discussed treatments such as immobiliation, icing, stretching, heel lifts, orthotics, physical therapy, MRI.     Discussed that with surgery would be a same-day surgery under general anesthesia.  We would have to detach the tendon to remove the bone spur and reattach the tendon like the length and it.  He would be nonweightbearing for 6 weeks followed by minimal weightbearing in a boot for 6 weeks.  He would not be able to drive during this time because it is his right foot. Talked about risks including infection, numbness, continued pain, , recurrence, need for further surgery, blood loss, blood clotting. You will scar.    Patient is not interested in surgery at this time.  We will start with physical therapy and iontophoresis.  Also will do inserts in the shoes as well as over-the-counter topical pain cream.  Discussed possible boot for patient to wear for pain but he is going to hold off on that at this time.  Talked  about an ankle sleeve that has gel cushion in the back to help with pressure of the bone spur in the shoe.  If pain continues we may revisit surgery or boot.    For his back and nerve issues recommend following up with a orthospine doctor.  Referral was placed for this.  All questions were answered to patient satisfaction and he will call for the questions or concerns.    Patient risk factor: Patient is at low risk for infection.        Christina Sahni DPM, Podiatry/Foot and Ankle Surgery

## 2022-02-21 ENCOUNTER — HOSPITAL ENCOUNTER (OUTPATIENT)
Facility: CLINIC | Age: 52
End: 2022-02-21
Attending: INTERNAL MEDICINE | Admitting: INTERNAL MEDICINE
Payer: COMMERCIAL

## 2022-02-22 ENCOUNTER — OFFICE VISIT (OUTPATIENT)
Dept: NEUROSURGERY | Facility: CLINIC | Age: 52
End: 2022-02-22
Attending: PODIATRIST
Payer: COMMERCIAL

## 2022-02-22 VITALS — SYSTOLIC BLOOD PRESSURE: 123 MMHG | HEART RATE: 70 BPM | OXYGEN SATURATION: 98 % | DIASTOLIC BLOOD PRESSURE: 83 MMHG

## 2022-02-22 DIAGNOSIS — M76.61 RIGHT ACHILLES TENDINITIS: ICD-10-CM

## 2022-02-22 DIAGNOSIS — M54.40 LOW BACK PAIN WITH SCIATICA, SCIATICA LATERALITY UNSPECIFIED, UNSPECIFIED BACK PAIN LATERALITY, UNSPECIFIED CHRONICITY: ICD-10-CM

## 2022-02-22 DIAGNOSIS — M77.51 BONE SPUR OF RIGHT ANKLE: ICD-10-CM

## 2022-02-22 DIAGNOSIS — M54.16 LUMBAR RADICULOPATHY: Primary | ICD-10-CM

## 2022-02-22 DIAGNOSIS — M79.671 RIGHT FOOT PAIN: ICD-10-CM

## 2022-02-22 DIAGNOSIS — E11.42 DIABETIC POLYNEUROPATHY ASSOCIATED WITH TYPE 2 DIABETES MELLITUS (H): ICD-10-CM

## 2022-02-22 PROCEDURE — G0463 HOSPITAL OUTPT CLINIC VISIT: HCPCS

## 2022-02-22 PROCEDURE — 99203 OFFICE O/P NEW LOW 30 MIN: CPT | Performed by: PHYSICIAN ASSISTANT
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
## 2022-02-22 NOTE — PATIENT INSTRUCTIONS
-Physical Therapy referral placed, they will call you to schedule   -Should symptoms worsen or persist, contact our office and we will place orders for Lumbar Spine MRI without contrast     Catherine RAJAN [facility] Neurosurgery  [address] [address]    Tel [phone]

## 2022-02-23 NOTE — TELEPHONE ENCOUNTER
Prior Authorization Approval    Authorization Effective Date: 2/16/2022  Authorization Expiration Date: 2/16/2023  Medication: symproic 0.2 mg tablets- APPROVED  Approved Dose/Quantity:   Reference #:     Insurance Company: [employer] [place] - Phone [phone] Fax [phone]  Expected CoPay:       CoPay Card Available:      Foundation Assistance Needed:    Which Pharmacy is filling the prescription (Not needed for infusion/clinic administered): [facility] - [place], [place] - [address] [place] [address]  Pharmacy Notified: Yes  Patient Notified:  **Instructed pharmacy to notify patient when script is ready to /ship.**

## 2022-02-23 NOTE — TELEPHONE ENCOUNTER
Central Prior Authorization Team  Phone: [phone]    PA Initiation    Medication: symproic 0.2 mg tablets  Insurance Company: [facility] - Phone [phone] Fax [phone]  Pharmacy Filling the Rx: [facility] - [place], [place] - [address] [place] [address]  Filling Pharmacy Phone: [phone]  Filling Pharmacy Fax:    Start Date: 2/23/2022

## 2022-02-24 ENCOUNTER — THERAPY VISIT (OUTPATIENT)
Dept: PHYSICAL THERAPY | Facility: CLINIC | Age: 52
End: 2022-02-24
Attending: PODIATRIST
Payer: COMMERCIAL

## 2022-02-24 DIAGNOSIS — M77.51 BONE SPUR OF RIGHT ANKLE: ICD-10-CM

## 2022-02-24 DIAGNOSIS — E11.42 DIABETIC POLYNEUROPATHY ASSOCIATED WITH TYPE 2 DIABETES MELLITUS (H): ICD-10-CM

## 2022-02-24 DIAGNOSIS — M79.671 RIGHT FOOT PAIN: ICD-10-CM

## 2022-02-24 DIAGNOSIS — M76.61 RIGHT ACHILLES TENDINITIS: ICD-10-CM

## 2022-02-24 DIAGNOSIS — M54.40 LOW BACK PAIN WITH SCIATICA, SCIATICA LATERALITY UNSPECIFIED, UNSPECIFIED BACK PAIN LATERALITY, UNSPECIFIED CHRONICITY: ICD-10-CM

## 2022-02-24 PROCEDURE — 97110 THERAPEUTIC EXERCISES: CPT | Mod: GP | Performed by: PHYSICAL THERAPIST

## 2022-02-24 PROCEDURE — 97161 PT EVAL LOW COMPLEX 20 MIN: CPT | Mod: GP | Performed by: PHYSICAL THERAPIST

## 2022-02-24 NOTE — PROGRESS NOTES
Physical Therapy Initial Evaluation  Subjective:  History of lumbar pain for 5 years of unknown etiology. Pt has noted increased pain over the past few months. Pt referred by MD for physical therapy to the low back on 2-22-22. Pt reports 3 year history of right posterior ankle pain secondary to a calcaneal spur. Pt referred by MD for physical therapy on 2-17-22      Therapist Generated HPI Evaluation         Type of problem:  Lumbar.      Condition occurred with:  Insidious onset.  Where condition occurred: for unknown reasons.  Patient reports pain:  Lumbar spine left and lumbar spine right.  Pain is described as aching, burning, cramping, sharp and stabbing and is constant.  Pain radiates to:  Gluteals left and thigh left. Pain is worse in the A.M..  Since onset symptoms are gradually worsening.  Associated symptoms:  Loss of motion/stiffness, loss of strength, numbness and tingling (pt reports paresthesia in the left thigh, bilateral lower legs and feet). Symptoms are exacerbated by twisting, sitting, walking, standing, carrying and lifting  and relieved by heat, ice, analgesics, NSAID's and rest.    Previous treatment includes chiropractic (with traction). There was moderate improvement following previous treatment.  Restrictions due to condition include:  Currently not working due to present treatment.  Barriers include:  None as reported by patient.    Therapist Generated HPI Evaluation         Type of problem:  Right ankle.    This is a chronic condition.  Condition occurred with:  Other reason (calcaneal spur).  Where condition occurred: other.  Patient reports pain:  Posterior.  Pain is described as aching and sharp and is intermittent.  Pain is worse in the A.M..  Since onset symptoms are gradually worsening.  Associated symptoms:  Loss of motion/stiffness and loss of strength. Symptoms are exacerbated by standing, descending stairs, walking and ascending stairs  and relieved by rest.  Special tests  included:  X-ray (see report).  Past treatment: none.   Restrictions due to condition include:  Currently not working due to present treatment.  Barriers include:  None as reported by patient.                        Objective:  Standing Alignment:        Lumbar deviations alignment: increased lordosis, pelvic base even.            Gait:  Decreased functional dorsiflexion        Flexibility/Screens:       Lower Extremity:  Decreased left lower extremity flexibility:Hip Flexors; Hamstrings; Gastroc and Soleus    Decreased right lower extremity flexibility:  Hip Flexors; Hamstrings; Gastroc and Soleus          Ankle/Foot Evaluation  ROM:    AROM:    Dorsiflexion:  Left:   4  Right:   6  Plantarflexion:  Left:  50    Right:  55          PROM:    Dorsiflexion:  Left:    6     Right:   8   Plantarflexion:  Left:    55     Right:  58              Strength:    Dorsiflexion:  Left: 5-/5     Pain:   Right: 4+/5   Pain:  Plantarflexion: Left: 5/5   Pain:   Right: 5/5  Pain:  Inversion:Left: 5/5  Pain:     Right: 5/5  Pain:  Eversion:Left: 5/5  Pain:  Right: 5/5  Pain:                  LIGAMENT TESTING: normal                PALPATION:     Right ankle tenderness present at:   achilles tendon           Lumbar/SI Evaluation  ROM:    AROM Lumbar:   Flexion:            Moderate loss pain at 20 degrees of flexion   Ext:                    Maximum loss    Side Bend:        Left:  Minimal loss     Right:  Moderate loss with pain on right side   Rotation:           Left:  Moderate loss     Right:  Moderate loss with pain on right side   Side Glide:        Left:     Right:         Strength: weak lower abdominals and pelvic stabilizers  Lumbar Myotomes:  normal            Lumbar DTR's:  normal        Lumbar Dermtomes:  Lumbar dermatomes: normal to light touch but pt reports parestesia in lower legs and feet                 Neural Tension/Mobility:      Left side:SLR or SLR w/DF  negative.     Right side:   SLR w/DF or SLR  negative.    Lumbar Palpation:  Palpation (lumbar): point tenderness L5-S1 spinous processes and adjacent lumbar paraspinals                                                          General     ROS    Assessment/Plan:    Patient is a 52 year old male with lumbar and right side ankle complaints.    Patient has the following significant findings with corresponding treatment plan.                Diagnosis 1:  Lumbar radiculopathy / right Achilles tendonitis  Pain -  hot/cold therapy, self management, education, home program traction and US as indicated   Decreased ROM/flexibility - therapeutic exercise, therapeutic activity and home program  Decreased strength - therapeutic exercise, therapeutic activities and home program    Therapy Evaluation Codes:   1) History comprised of:   Personal factors that impact the plan of care:      None.    Comorbidity factors that impact the plan of care are:      Diabetes, Depression, Numbness/tingling, Overweight and Pain at night/rest.     Medications impacting care: Anti-depressant, Pain and Sleep.  2) Examination of Body Systems comprised of:   Body structures and functions that impact the plan of care:      Ankle and Lumbar spine.   Activity limitations that impact the plan of care are:      Bending, Lifting, Sitting, Squatting/kneeling, Stairs, Standing, Walking and Sleeping.  3) Clinical presentation characteristics are:   Stable/Uncomplicated.  4) Decision-Making    Low complexity using standardized patient assessment instrument and/or measureable assessment of functional outcome.  Cumulative Therapy Evaluation is: Low complexity.    Previous and current functional limitations:  (See Goal Flow Sheet for this information)    Short term and Long term goals: (See Goal Flow Sheet for this information)     Communication ability:  Patient appears to be able to clearly communicate and understand verbal and written communication and follow directions correctly.  Treatment Explanation - The  following has been discussed with the patient:   RX ordered/plan of care  Anticipated outcomes  Possible risks and side effects  This patient would benefit from PT intervention to resume normal activities.   Rehab potential is good.    Frequency:  1 X week, once daily  Duration:  for 6   weeks  Discharge Plan:  Achieve all LTG.  Independent in home treatment program.  Reach maximal therapeutic benefit.    Please refer to the daily flowsheet for treatment today, total treatment time and time spent performing 1:1 timed codes.

## 2022-02-27 DIAGNOSIS — Z11.59 ENCOUNTER FOR SCREENING FOR OTHER VIRAL DISEASES: Primary | ICD-10-CM

## 2022-03-09 DIAGNOSIS — G89.29 CHRONIC LOW BACK PAIN, UNSPECIFIED BACK PAIN LATERALITY, UNSPECIFIED WHETHER SCIATICA PRESENT: ICD-10-CM

## 2022-03-09 DIAGNOSIS — G62.9 PERIPHERAL POLYNEUROPATHY: ICD-10-CM

## 2022-03-09 DIAGNOSIS — M54.50 CHRONIC LOW BACK PAIN, UNSPECIFIED BACK PAIN LATERALITY, UNSPECIFIED WHETHER SCIATICA PRESENT: ICD-10-CM

## 2022-03-09 DIAGNOSIS — F11.90 CHRONIC, CONTINUOUS USE OF OPIOIDS: ICD-10-CM

## 2022-03-09 RX ORDER — OXYCODONE HYDROCHLORIDE 15 MG/1
TABLET ORAL
Qty: 60 TABLET | Refills: 0 | Status: SHIPPED | OUTPATIENT
Start: 2022-03-09 | End: 2022-04-07

## 2022-03-09 RX ORDER — OXYCODONE 18 MG/1
CAPSULE, EXTENDED RELEASE ORAL
Qty: 60 CAPSULE | Refills: 0 | Status: SHIPPED | OUTPATIENT
Start: 2022-03-09 | End: 2022-04-07

## 2022-03-09 NOTE — TELEPHONE ENCOUNTER
oxyCODONE IR (ROXICODONE) 15 MG tablet 60 tablet 0 2/8/2022 3/10/2022 No   Sig: TAKE ONE TABLET BY MOUTH TWICE A DAY AS NEEDED FOR SEVERE PAIN   Sent to pharmacy as: oxyCODONE HCl 15 MG Oral Tablet (ROXICODONE     XTAMPZA ER 18 MG 12 hr tablet 60 capsule 0 2/8/2022 3/10/2022 No   Sig: TAKE 1 CAPSULE (18 MG) BY MOUTH EVERY 12 HOURS   Sent to pharmacy as: Xtampza ER 18 MG Oral Capsule ER 12 Hour Abuse-Deterrent (oxyCODONE)   Class: E-Prescribe         Last office visit: 2/15/2022     Future Office Visit:        CSA -- Patient Level:    Controlled Substance Agreement - Opioid - Scan on 4/15/2021  1:40 PM  Controlled Substance Agreement - Opioid - Scan on 1/16/2020  4:43 PM             Routing refill request to provider for review/approval because:  Drug not on the [facility] refill protocol     Mariely Lou RN, BSN  [facility]

## 2022-03-11 ENCOUNTER — HOSPITAL ENCOUNTER (OUTPATIENT)
Dept: CARDIOLOGY | Facility: CLINIC | Age: 52
Discharge: HOME | End: 2022-03-11
Attending: FAMILY MEDICINE | Admitting: FAMILY MEDICINE
Payer: COMMERCIAL

## 2022-03-11 DIAGNOSIS — R00.2 PALPITATIONS: ICD-10-CM

## 2022-03-11 DIAGNOSIS — R07.89 CHEST TIGHTNESS: ICD-10-CM

## 2022-03-11 PROCEDURE — 93350 STRESS TTE ONLY: CPT | Mod: 26 | Performed by: INTERNAL MEDICINE

## 2022-03-11 PROCEDURE — 93016 CV STRESS TEST SUPVJ ONLY: CPT | Performed by: INTERNAL MEDICINE

## 2022-03-11 PROCEDURE — 93350 STRESS TTE ONLY: CPT | Mod: TC

## 2022-03-11 PROCEDURE — 93321 DOPPLER ECHO F-UP/LMTD STD: CPT | Mod: TC

## 2022-03-11 PROCEDURE — 93018 CV STRESS TEST I&R ONLY: CPT | Performed by: INTERNAL MEDICINE

## 2022-03-11 PROCEDURE — 93325 DOPPLER ECHO COLOR FLOW MAPG: CPT | Mod: 26 | Performed by: INTERNAL MEDICINE

## 2022-03-11 PROCEDURE — 93321 DOPPLER ECHO F-UP/LMTD STD: CPT | Mod: 26 | Performed by: INTERNAL MEDICINE

## 2022-03-12 NOTE — RESULT ENCOUNTER NOTE
Dear Ryland,    Here is a summary of your recent test results:  normal stress echocardiogram    For additional lab test information, [email or web address] is a very good reference.    In addition, here is a list of due or overdue Health Maintenance reminders:    Colorectal Cancer Screening due on 02/27/2018  Preventive Care Visit due on 08/24/2021  A1C Lab due on 02/12/2022      Please call us at [phone] (or use [employer]) to address the above recommendations if needed.           Thank you very much for trusting me and [facility].     Have a peaceful day.    Healthy regards,  Dannie Pastrana MD

## 2022-04-06 DIAGNOSIS — G89.29 CHRONIC LOW BACK PAIN, UNSPECIFIED BACK PAIN LATERALITY, UNSPECIFIED WHETHER SCIATICA PRESENT: ICD-10-CM

## 2022-04-06 DIAGNOSIS — G62.9 PERIPHERAL POLYNEUROPATHY: ICD-10-CM

## 2022-04-06 DIAGNOSIS — M54.50 CHRONIC LOW BACK PAIN, UNSPECIFIED BACK PAIN LATERALITY, UNSPECIFIED WHETHER SCIATICA PRESENT: ICD-10-CM

## 2022-04-06 DIAGNOSIS — F11.90 CHRONIC, CONTINUOUS USE OF OPIOIDS: ICD-10-CM

## 2022-04-06 NOTE — TELEPHONE ENCOUNTER
oxyCODONE IR (ROXICODONE) 15 MG tablet 60 tablet 0 3/9/2022 4/8/2022 No   Sig: TAKE ONE TABLET BY MOUTH TWICE A DAY AS NEEDED FOR SEVERE PAIN   Sent to pharmacy as: oxyCODONE HCl 15 MG Oral Tablet (ROXICODONE)     XTAMPZA ER 18 MG 12 hr tablet 60 capsule 0 3/9/2022 4/8/2022 No   Sig: TAKE 1 CAPSULE (18 MG) BY MOUTH EVERY 12 HOURS   Sent to pharmacy as: Xtampza ER 18 MG Oral Capsule ER 12 Hour Abuse-Deterrent (oxyCODONE)   Class: E-Prescribe   Earliest Fill Date: 3/9/2022   Order: [id number]       Last office visit: 2/15/2022     Future Office Visit:        CSA -- Patient Level:    Controlled Substance Agreement - Opioid - Scan on 4/15/2021  1:40 PM  Controlled Substance Agreement - Opioid - Scan on 1/16/2020  4:43 PM             Routing refill request to provider for review/approval because:  Drug not on the [facility] refill protocol     Mariely Lou RN, BSN  [facility]

## 2022-04-07 RX ORDER — OXYCODONE HYDROCHLORIDE 15 MG/1
TABLET ORAL
Qty: 60 TABLET | Refills: 0 | Status: SHIPPED | OUTPATIENT
Start: 2022-04-07 | End: 2022-05-09

## 2022-04-07 RX ORDER — OXYCODONE 18 MG/1
CAPSULE, EXTENDED RELEASE ORAL
Qty: 60 CAPSULE | Refills: 0 | Status: SHIPPED | OUTPATIENT
Start: 2022-04-07 | End: 2022-05-09

## 2022-04-08 ENCOUNTER — THERAPY VISIT (OUTPATIENT)
Dept: PHYSICAL THERAPY | Facility: CLINIC | Age: 52
End: 2022-04-08
Payer: COMMERCIAL

## 2022-04-08 DIAGNOSIS — M77.51 BONE SPUR OF RIGHT ANKLE: ICD-10-CM

## 2022-04-08 DIAGNOSIS — M79.671 RIGHT FOOT PAIN: ICD-10-CM

## 2022-04-08 DIAGNOSIS — M54.40 LOW BACK PAIN WITH SCIATICA, SCIATICA LATERALITY UNSPECIFIED, UNSPECIFIED BACK PAIN LATERALITY, UNSPECIFIED CHRONICITY: ICD-10-CM

## 2022-04-08 DIAGNOSIS — M76.61 RIGHT ACHILLES TENDINITIS: ICD-10-CM

## 2022-04-08 PROCEDURE — 97110 THERAPEUTIC EXERCISES: CPT | Mod: GP | Performed by: PHYSICAL THERAPIST

## 2022-04-08 NOTE — PROGRESS NOTES
Subjective:  HPI  Physical Exam                    Objective:  System    Physical Exam    General     ROS    Assessment/Plan:    SUBJECTIVE  Subjective changes as noted by pt:  Little change in back. Ankle is feeling better     Current pain level: 3/10     Changes in function:  Pt still notes pain with bending at the waist     Adverse reaction to treatment or activity:  None    OBJECTIVE  Changes in objective findings:  Pt notes sharp pain with lumbar flexion at 30 degrees , feels better after getting past 30 degrees.         ASSESSMENT  Harm continues to require intervention to meet STG and LTG's: PT  Patient's symptoms are resolving.  Response to therapy has shown an improvement in  pain level  Progress made towards STG/LTG?  Yes,     PLAN  Current treatment program is being advanced to more complex exercises.    PTA/ATC plan:  N/A    Please refer to the daily flowsheet for treatment today, total treatment time and time spent performing 1:1 timed codes.

## 2022-04-09 ENCOUNTER — HEALTH MAINTENANCE LETTER (OUTPATIENT)
Age: 52
End: 2022-04-09

## 2022-04-12 DIAGNOSIS — Z11.59 ENCOUNTER FOR SCREENING FOR OTHER VIRAL DISEASES: Primary | ICD-10-CM

## 2022-04-14 ENCOUNTER — THERAPY VISIT (OUTPATIENT)
Dept: PHYSICAL THERAPY | Facility: CLINIC | Age: 52
End: 2022-04-14
Payer: COMMERCIAL

## 2022-04-14 DIAGNOSIS — M77.51 BONE SPUR OF RIGHT ANKLE: ICD-10-CM

## 2022-04-14 DIAGNOSIS — M76.61 RIGHT ACHILLES TENDINITIS: ICD-10-CM

## 2022-04-14 DIAGNOSIS — M79.671 RIGHT FOOT PAIN: Primary | ICD-10-CM

## 2022-04-14 DIAGNOSIS — M54.40 LOW BACK PAIN WITH SCIATICA, SCIATICA LATERALITY UNSPECIFIED, UNSPECIFIED BACK PAIN LATERALITY, UNSPECIFIED CHRONICITY: ICD-10-CM

## 2022-04-14 PROCEDURE — 97035 APP MDLTY 1+ULTRASOUND EA 15: CPT | Mod: GP | Performed by: PHYSICAL THERAPIST

## 2022-04-14 PROCEDURE — 97012 MECHANICAL TRACTION THERAPY: CPT | Mod: GP | Performed by: PHYSICAL THERAPIST

## 2022-04-21 ENCOUNTER — THERAPY VISIT (OUTPATIENT)
Dept: PHYSICAL THERAPY | Facility: CLINIC | Age: 52
End: 2022-04-21
Payer: COMMERCIAL

## 2022-04-21 DIAGNOSIS — M77.51 BONE SPUR OF RIGHT ANKLE: ICD-10-CM

## 2022-04-21 DIAGNOSIS — M79.671 RIGHT FOOT PAIN: Primary | ICD-10-CM

## 2022-04-21 DIAGNOSIS — M76.61 RIGHT ACHILLES TENDINITIS: ICD-10-CM

## 2022-04-21 DIAGNOSIS — M54.40 LOW BACK PAIN WITH SCIATICA, SCIATICA LATERALITY UNSPECIFIED, UNSPECIFIED BACK PAIN LATERALITY, UNSPECIFIED CHRONICITY: ICD-10-CM

## 2022-04-21 PROCEDURE — 97012 MECHANICAL TRACTION THERAPY: CPT | Mod: GP | Performed by: PHYSICAL THERAPIST

## 2022-04-21 PROCEDURE — 97035 APP MDLTY 1+ULTRASOUND EA 15: CPT | Mod: GP | Performed by: PHYSICAL THERAPIST

## 2022-04-21 NOTE — PROGRESS NOTES
Subjective:  HPI  Physical Exam                    Objective:  System    Physical Exam    General     ROS    Assessment/Plan:    SUBJECTIVE  Subjective changes as noted by pt:  Pt was sore the next day after traction but feeling better now     Current pain level: 4/10     Changes in function:  Pt notes decreased pain with standing.      Adverse reaction to treatment or activity:  None    OBJECTIVE  Changes in objective findings:  Pt demonstrates improved strength lower abdominals and pelvic stabilizers. Pt tolerated 95# of lumbar traction today without difficulty.         ASSESSMENT  Harm continues to require intervention to meet STG and LTG's: PT  Patient's symptoms are resolving.  Response to therapy has shown an improvement in  strength and function  Progress made towards STG/LTG?  Yes,     PLAN  Current treatment program is being advanced to more complex exercises.    PTA/ATC plan:  N/A    Please refer to the daily flowsheet for treatment today, total treatment time and time spent performing 1:1 timed codes.

## 2022-05-05 ENCOUNTER — THERAPY VISIT (OUTPATIENT)
Dept: PHYSICAL THERAPY | Facility: CLINIC | Age: 52
End: 2022-05-05
Payer: COMMERCIAL

## 2022-05-05 DIAGNOSIS — M79.671 RIGHT FOOT PAIN: Primary | ICD-10-CM

## 2022-05-05 DIAGNOSIS — M76.61 RIGHT ACHILLES TENDINITIS: ICD-10-CM

## 2022-05-05 DIAGNOSIS — M54.40 LOW BACK PAIN WITH SCIATICA, SCIATICA LATERALITY UNSPECIFIED, UNSPECIFIED BACK PAIN LATERALITY, UNSPECIFIED CHRONICITY: ICD-10-CM

## 2022-05-05 DIAGNOSIS — M77.51 BONE SPUR OF RIGHT ANKLE: ICD-10-CM

## 2022-05-05 PROCEDURE — 97110 THERAPEUTIC EXERCISES: CPT | Mod: GP | Performed by: PHYSICAL THERAPIST

## 2022-05-05 PROCEDURE — 97012 MECHANICAL TRACTION THERAPY: CPT | Mod: GP | Performed by: PHYSICAL THERAPIST

## 2022-05-05 NOTE — PROGRESS NOTES
Subjective:  HPI  Physical Exam                    Objective:  System    Physical Exam    General     ROS    Assessment/Plan:    SUBJECTIVE  Subjective changes as noted by pt:  Pt still notes intermittent pain in the back and paresthesia in the legs. Pt feels traction helps but does not notice any difference with ultrasound.      Current pain level: 5/10     Changes in function:  unchanged     Adverse reaction to treatment or activity:  None    OBJECTIVE  Changes in objective findings:  Pt demonstrates improved strength lower abdominals and obliques.         ASSESSMENT  Harm continues to require intervention to meet STG and LTG's: PT  Patient is progressing as expected.  Response to therapy has shown an improvement in  strength  Progress made towards STG/LTG?  yes    PLAN  Current treatment program is being advanced to more complex exercises.    PTA/ATC plan:  N/A    Please refer to the daily flowsheet for treatment today, total treatment time and time spent performing 1:1 timed codes.

## 2022-05-26 DIAGNOSIS — Z11.59 ENCOUNTER FOR SCREENING FOR OTHER VIRAL DISEASES: Primary | ICD-10-CM

## 2022-06-06 DIAGNOSIS — G89.29 CHRONIC LOW BACK PAIN, UNSPECIFIED BACK PAIN LATERALITY, UNSPECIFIED WHETHER SCIATICA PRESENT: ICD-10-CM

## 2022-06-06 DIAGNOSIS — M54.50 CHRONIC LOW BACK PAIN, UNSPECIFIED BACK PAIN LATERALITY, UNSPECIFIED WHETHER SCIATICA PRESENT: ICD-10-CM

## 2022-06-06 DIAGNOSIS — F11.90 CHRONIC, CONTINUOUS USE OF OPIOIDS: ICD-10-CM

## 2022-06-06 DIAGNOSIS — G62.9 PERIPHERAL POLYNEUROPATHY: ICD-10-CM

## 2022-06-06 NOTE — PROGRESS NOTES
Subjective:Subjective changes as noted by pt:  Pt still notes intermittent pain in the back and paresthesia in the legs. Pt feels traction helps but does not notice any difference with ultrasound.      Current pain level: 5/10     Changes in function:  unchanged     Adverse reaction to treatment or activity:  None     OBJECTIVE  Changes in objective findings:  Pt demonstrates improved strength lower abdominals and obliques.  HPI  Physical Exam                    Objective:  System    Physical Exam    General     ROS    Assessment/Plan:    ASSESSMENT/PLAN  Updated problem list and treatment plan: Diagnosis 1:  Right Achilles Tendonitis/ lumbar radiculopathy  Pain -  hot/cold therapy, mechanical traction, self management, education and home program  Decreased ROM/flexibility - therapeutic exercise, therapeutic activity and home program  Decreased strength - therapeutic exercise, therapeutic activities and home program  Progress toward STG/LTGs have been made:  Yes,   Assessment of Progress: The patient's condition is improving.  Self Management Plans:  Patient has been instructed in a home treatment program.  I have re-evaluated this patient and find that the nature, scope, duration and intensity of the therapy is appropriate for the medical condition of the patient.  Harm continues to require the following intervention to meet STG and LT's:  PT    Recommendations:  Pt has not returned for treatment since 5-5-22. Pt discharged at this time.      Please refer to the daily flowsheet for treatment today, total treatment time and time spent performing 1:1 timed codes.

## 2022-06-06 NOTE — TELEPHONE ENCOUNTER
oxyCODONE (XTAMPZA ER) 18 MG 12 hr tablet 60 capsule 0 5/9/2022  No   Sig - Route: Take 1 tablet (18 mg) by mouth every 12 hours - Oral   Sent to pharmacy as: Xtampza ER 18 MG Oral Capsule ER 12 Hour Abuse-Deterrent (oxyCODONE)   Class: E-Prescribe   Earliest Fill Date: 5/9/2022   Order: [id number]   E-Prescribing Status: Receipt confirmed by pharmacy (5/9/2022  9:53 AM CDT)     oxyCODONE IR (ROXICODONE) 15 MG tablet 60 tablet 0 5/9/2022  No   Sig - Route: Take 1 tablet (15 mg) by mouth 2 times daily as needed for severe pain - Oral   Sent to pharmacy as: oxyCODONE HCl 15 MG Oral Tablet (ROXICODONE)   Class: E-Prescribe   Earliest Fill Date: 5/9/2022   Order: [id number]       Last office visit: 2/15/2022     Future Office Visit:        CSA -- Patient Level:    Controlled Substance Agreement - Opioid - Scan on 4/15/2021  1:40 PM  Controlled Substance Agreement - Opioid - Scan on 1/16/2020  4:43 PM             Routing refill request to provider for review/approval because:  Drug not on the [facility] refill protocol     Mariely Lou RN, BSN  [facility]

## 2022-06-07 RX ORDER — OXYCODONE 18 MG/1
18 CAPSULE, EXTENDED RELEASE ORAL EVERY 12 HOURS
Qty: 60 CAPSULE | Refills: 0 | Status: SHIPPED | OUTPATIENT
Start: 2022-06-08 | End: 2022-06-14

## 2022-06-07 RX ORDER — OXYCODONE HYDROCHLORIDE 15 MG/1
15 TABLET ORAL 2 TIMES DAILY PRN
Qty: 60 TABLET | Refills: 0 | Status: SHIPPED | OUTPATIENT
Start: 2022-06-08 | End: 2022-06-14

## 2022-06-07 NOTE — TELEPHONE ENCOUNTER
Overdue for med check, 1 refill sent and no further refills until seen -please schedule appointment in about 30 days from now.

## 2022-06-14 ENCOUNTER — OFFICE VISIT (OUTPATIENT)
Dept: FAMILY MEDICINE | Facility: CLINIC | Age: 52
End: 2022-06-14
Payer: COMMERCIAL

## 2022-06-14 VITALS
HEART RATE: 62 BPM | BODY MASS INDEX: 38.21 KG/M2 | WEIGHT: 258 LBS | OXYGEN SATURATION: 97 % | TEMPERATURE: 97.7 F | HEIGHT: 69 IN | SYSTOLIC BLOOD PRESSURE: 102 MMHG | DIASTOLIC BLOOD PRESSURE: 74 MMHG

## 2022-06-14 DIAGNOSIS — E11.42 TYPE 2 DIABETES MELLITUS WITH DIABETIC POLYNEUROPATHY, WITHOUT LONG-TERM CURRENT USE OF INSULIN (H): ICD-10-CM

## 2022-06-14 DIAGNOSIS — G62.9 PERIPHERAL POLYNEUROPATHY: ICD-10-CM

## 2022-06-14 DIAGNOSIS — F11.90 CHRONIC, CONTINUOUS USE OF OPIOIDS: ICD-10-CM

## 2022-06-14 DIAGNOSIS — R10.13 EPIGASTRIC PAIN: ICD-10-CM

## 2022-06-14 DIAGNOSIS — F33.0 MAJOR DEPRESSIVE DISORDER, RECURRENT EPISODE, MILD (H): ICD-10-CM

## 2022-06-14 DIAGNOSIS — Z12.11 SCREEN FOR COLON CANCER: ICD-10-CM

## 2022-06-14 DIAGNOSIS — F41.9 ANXIETY: ICD-10-CM

## 2022-06-14 DIAGNOSIS — Z12.5 SCREENING FOR PROSTATE CANCER: ICD-10-CM

## 2022-06-14 DIAGNOSIS — Z79.899 ENCOUNTER FOR LONG-TERM (CURRENT) USE OF MEDICATIONS: ICD-10-CM

## 2022-06-14 DIAGNOSIS — E78.5 HYPERLIPIDEMIA LDL GOAL <70: ICD-10-CM

## 2022-06-14 DIAGNOSIS — Z00.00 ROUTINE GENERAL MEDICAL EXAMINATION AT A HEALTH CARE FACILITY: Primary | ICD-10-CM

## 2022-06-14 DIAGNOSIS — K59.03 CONSTIPATION DUE TO OPIOID THERAPY: ICD-10-CM

## 2022-06-14 DIAGNOSIS — M54.50 CHRONIC LOW BACK PAIN, UNSPECIFIED BACK PAIN LATERALITY, UNSPECIFIED WHETHER SCIATICA PRESENT: ICD-10-CM

## 2022-06-14 DIAGNOSIS — T40.2X5A CONSTIPATION DUE TO OPIOID THERAPY: ICD-10-CM

## 2022-06-14 DIAGNOSIS — G47.00 INSOMNIA, UNSPECIFIED TYPE: ICD-10-CM

## 2022-06-14 DIAGNOSIS — L30.9 DERMATITIS: ICD-10-CM

## 2022-06-14 DIAGNOSIS — G89.29 CHRONIC LOW BACK PAIN, UNSPECIFIED BACK PAIN LATERALITY, UNSPECIFIED WHETHER SCIATICA PRESENT: ICD-10-CM

## 2022-06-14 LAB
AMPHETAMINES UR QL: NOT DETECTED
BARBITURATES UR QL SCN: NOT DETECTED
BENZODIAZ UR QL SCN: NOT DETECTED
BUPRENORPHINE UR QL: NOT DETECTED
CANNABINOIDS UR QL: NOT DETECTED
CHOLEST SERPL-MCNC: 136 MG/DL
COCAINE UR QL SCN: NOT DETECTED
D-METHAMPHET UR QL: NOT DETECTED
FASTING STATUS PATIENT QL REPORTED: YES
HBA1C MFR BLD: 5.8 % (ref 0–5.6)
HDLC SERPL-MCNC: 40 MG/DL
LDLC SERPL CALC-MCNC: 85 MG/DL
METHADONE UR QL SCN: NOT DETECTED
NONHDLC SERPL-MCNC: 96 MG/DL
OPIATES UR QL SCN: NOT DETECTED
OXYCODONE UR QL SCN: DETECTED
PCP UR QL SCN: NOT DETECTED
PROPOXYPH UR QL: NOT DETECTED
PSA SERPL-MCNC: 1.6 UG/L (ref 0–4)
TRICYCLICS UR QL SCN: NOT DETECTED
TRIGL SERPL-MCNC: 57 MG/DL

## 2022-06-14 PROCEDURE — 80306 DRUG TEST PRSMV INSTRMNT: CPT | Performed by: FAMILY MEDICINE

## 2022-06-14 PROCEDURE — 83036 HEMOGLOBIN GLYCOSYLATED A1C: CPT | Performed by: FAMILY MEDICINE

## 2022-06-14 PROCEDURE — G0103 PSA SCREENING: HCPCS | Performed by: FAMILY MEDICINE

## 2022-06-14 PROCEDURE — 99214 OFFICE O/P EST MOD 30 MIN: CPT | Mod: 25 | Performed by: FAMILY MEDICINE

## 2022-06-14 PROCEDURE — 36415 COLL VENOUS BLD VENIPUNCTURE: CPT | Performed by: FAMILY MEDICINE

## 2022-06-14 PROCEDURE — 99396 PREV VISIT EST AGE 40-64: CPT | Performed by: FAMILY MEDICINE

## 2022-06-14 PROCEDURE — 80061 LIPID PANEL: CPT | Performed by: FAMILY MEDICINE

## 2022-06-14 RX ORDER — GLIMEPIRIDE 4 MG/1
4 TABLET ORAL
Qty: 90 TABLET | Refills: 3 | Status: SHIPPED | OUTPATIENT
Start: 2022-06-14 | End: 2023-08-04

## 2022-06-14 RX ORDER — SIMVASTATIN 20 MG
20 TABLET ORAL AT BEDTIME
Qty: 90 TABLET | Refills: 3 | Status: SHIPPED | OUTPATIENT
Start: 2022-06-14 | End: 2023-08-07

## 2022-06-14 RX ORDER — NALOXEGOL OXALATE 25 MG/1
25 TABLET, FILM COATED ORAL
Qty: 90 TABLET | Refills: 3 | Status: SHIPPED | OUTPATIENT
Start: 2022-06-14 | End: 2023-08-07

## 2022-06-14 RX ORDER — OXYCODONE HYDROCHLORIDE 15 MG/1
15 TABLET ORAL 2 TIMES DAILY PRN
Qty: 60 TABLET | Refills: 0 | Status: SHIPPED | OUTPATIENT
Start: 2022-06-14 | End: 2022-08-06

## 2022-06-14 RX ORDER — BETAMETHASONE DIPROPIONATE 0.5 MG/G
CREAM TOPICAL 2 TIMES DAILY
Qty: 45 G | Refills: 1 | Status: SHIPPED | OUTPATIENT
Start: 2022-06-14 | End: 2023-08-07

## 2022-06-14 RX ORDER — PREGABALIN 75 MG/1
75 CAPSULE ORAL 2 TIMES DAILY
Qty: 180 CAPSULE | Refills: 1 | Status: SHIPPED | OUTPATIENT
Start: 2022-06-14 | End: 2023-02-06

## 2022-06-14 RX ORDER — OXYCODONE 18 MG/1
18 CAPSULE, EXTENDED RELEASE ORAL EVERY 12 HOURS
Qty: 60 CAPSULE | Refills: 0 | Status: SHIPPED | OUTPATIENT
Start: 2022-07-08 | End: 2022-08-06

## 2022-06-14 RX ORDER — TRAZODONE HYDROCHLORIDE 50 MG/1
50-100 TABLET, FILM COATED ORAL
Qty: 180 TABLET | Refills: 3 | Status: SHIPPED | OUTPATIENT
Start: 2022-06-14 | End: 2023-08-07

## 2022-06-14 ASSESSMENT — ANXIETY QUESTIONNAIRES
7. FEELING AFRAID AS IF SOMETHING AWFUL MIGHT HAPPEN: NOT AT ALL
GAD7 TOTAL SCORE: 1
8. IF YOU CHECKED OFF ANY PROBLEMS, HOW DIFFICULT HAVE THESE MADE IT FOR YOU TO DO YOUR WORK, TAKE CARE OF THINGS AT HOME, OR GET ALONG WITH OTHER PEOPLE?: NOT DIFFICULT AT ALL
6. BECOMING EASILY ANNOYED OR IRRITABLE: NOT AT ALL
2. NOT BEING ABLE TO STOP OR CONTROL WORRYING: NOT AT ALL
5. BEING SO RESTLESS THAT IT IS HARD TO SIT STILL: NOT AT ALL
1. FEELING NERVOUS, ANXIOUS, OR ON EDGE: NOT AT ALL
GAD7 TOTAL SCORE: 1
3. WORRYING TOO MUCH ABOUT DIFFERENT THINGS: SEVERAL DAYS
GAD7 TOTAL SCORE: 1
4. TROUBLE RELAXING: NOT AT ALL
7. FEELING AFRAID AS IF SOMETHING AWFUL MIGHT HAPPEN: NOT AT ALL

## 2022-06-14 ASSESSMENT — PATIENT HEALTH QUESTIONNAIRE - PHQ9
SUM OF ALL RESPONSES TO PHQ QUESTIONS 1-9: 6
10. IF YOU CHECKED OFF ANY PROBLEMS, HOW DIFFICULT HAVE THESE PROBLEMS MADE IT FOR YOU TO DO YOUR WORK, TAKE CARE OF THINGS AT HOME, OR GET ALONG WITH OTHER PEOPLE: SOMEWHAT DIFFICULT
SUM OF ALL RESPONSES TO PHQ QUESTIONS 1-9: 6

## 2022-06-14 NOTE — LETTER
Opioid / Opioid Plus Controlled Substance Agreement    This is an agreement between you and your provider about the safe and appropriate use of controlled substance/opioids prescribed by your care team. Controlled substances are medicines that can cause physical and mental dependence (abuse).    There are strict laws about having and using these medicines. We here at [facility] are committing to working with you in your efforts to get better. To support you in this work, we ll help you schedule regular office appointments for medicine refills. If we must cancel or change your appointment for any reason, we ll make sure you have enough medicine to last until your next appointment.     As a Provider, I will:    Listen carefully to your concerns and treat you with respect.     Recommend a treatment plan that I believe is in your best interest. This plan may involve therapies other than opioid pain medication.     Talk with you often about the possible benefits, and the risk of harm of any medicine that we prescribe for you.     Provide a plan on how to taper (discontinue or go off) using this medicine if the decision is made to stop its use.    As a Patient, I understand that opioid(s):     Are a controlled substance prescribed by my care team to help me function or work and manage my condition(s).     Are strong medicines and can cause serious side effects such as:    Drowsiness, which can seriously affect my driving ability    A lower breathing rate, enough to cause death    Harm to my thinking ability     Depression     Abuse of and addiction to this medicine    Need to be taken exactly as prescribed. Combining opioids with certain medicines or chemicals (such as illegal drugs, sedatives, sleeping pills, and benzodiazepines) can be dangerous or even fatal. If I stop opioids suddenly, I may have severe withdrawal symptoms.    Do not work for all types of pain nor for all patients. If they re not helpful, I may  be asked to stop them.    The risks, benefits and side effects of these medicine(s) were explained to me. I agree that:  1. I will take part in other treatments as advised by my care team. This may be psychiatry or counseling, physical therapy, behavioral therapy, group treatment or a referral to a specialist.     2. I will keep all my appointments. I understand that this is part of the monitoring of opioids. My care team may require an office visit for EVERY opioid/controlled substance refill. If I miss appointments or don t follow instructions, my care team may stop my medicine.    3. I will take my medicines as prescribed. I will not change the dose or schedule unless my care team tells me to. There will be no refills if I run out early.   4. I may be asked to come to the clinic and complete a urine drug test or complete a pill count at any time. If I don t give a urine sample or participate in a pill count, the care team may stop my medicine.  5. I will only receive prescriptions from this clinic for chronic pain. If I am treated by another provider for acute pain issues, I will tell them that I am taking opioid pain medication for chronic pain and that I have a treatment agreement with this provider. I will inform my [facility] care team within one business day if I am given a prescription for any pain medication by another healthcare provider. My [facility] care team can contact other providers and pharmacists about my use of any medicines.    6. It is up to me to make sure that I don t run out of my medicines on weekends or holidays. If my care team is willing to refill my opioid prescription without a visit, I must request refills only during office hours. Refills may take up to 3 business days to process. I will use one pharmacy to fill all my opioid and other controlled substance prescriptions. I will notify the clinic about any changes to my insurance or medication availability.    7. I am  responsible for my prescriptions. If the medicine/prescription is lost, stolen or destroyed, it will not be replaced. I also agree not to share controlled substance medicines with anyone.    8. I am aware I should not use any illegal or recreational drugs. I agree not to drink alcohol unless my care team says I can.       9. If I enroll in the [place] [facility] program, I will tell my care team prior to my next refill.     10. I will tell my care team right away if I become pregnant, have a new medical problem treated outside of my regular clinic, or have a change in my medications.    11. I understand that this medicine can affect my thinking, judgment and reaction time. Alcohol and drugs affect the brain and body, which can affect the safety of my driving. Being under the influence of alcohol or drugs can affect my decision-making, behaviors, personal safety, and the safety of others. Driving while impaired (DWI) can occur if a person is driving, operating, or in physical control of a car, motorcycle, boat, snowmobile, ATV, motorbike, off-road vehicle, or any other motor vehicle ([place] Statute 169A.20). I understand the risk if I choose to drive or operate any vehicle or machinery.    I understand that if I do not follow any of the conditions above, my prescriptions or treatment may be stopped or changed.          Opioids  What You Need to Know    What are opioids?   Opioids are pain medicines that must be prescribed by a doctor. They are also known as narcotics.     Examples are:   1. morphine (MS Contin, Sara)  2. oxycodone (Oxycontin)  3. oxycodone and acetaminophen (Percocet)  4. hydrocodone and acetaminophen (Vicodin, Norco)   5. fentanyl patch (Duragesic)   6. hydromorphone (Dilaudid)   7. methadone  8. codeine (Tylenol #3)     What do opioids do well?   Opioids are best for severe short-term pain such as after a surgery or injury. They may work well for cancer pain. They may help some people with  long-lasting (chronic) pain.     What do opioids NOT do well?   Opioids never get rid of pain entirely, and they don t work well for most patients with chronic pain. Opioids don t reduce swelling, one of the causes of pain.                                    Other ways to manage chronic pain and improve function include:       Treat the health problem that may be causing pain    Anti-inflammation medicines, which reduce swelling and tenderness, such as ibuprofen (Advil, Motrin) or naproxen (Aleve)    Acetaminophen (Tylenol)    Antidepressants and anti-seizure medicines, especially for nerve pain    Topical treatments such as patches or creams    Injections or nerve blocks    Chiropractic or osteopathic treatment    Acupuncture, massage, deep breathing, meditation, visual imagery, aromatherapy    Use heat or ice at the pain site    Physical therapy     Exercise    Stop smoking    Take part in therapy       Risks and side effects     Talk to your doctor before you start or decide to keep taking opioids. Possible side effects include:      Lowering your breathing rate enough to cause death    Overdose, including death, especially if taking higher than prescribed doses    Worse depression symptoms; less pleasure in things you usually enjoy    Feeling tired or sluggish    Slower thoughts or cloudy thinking    Being more sensitive to pain over time; pain is harder to control    Trouble sleeping or restless sleep    Changes in hormone levels (for example, less testosterone)    Changes in sex drive or ability to have sex    Constipation    Unsafe driving    Itching and sweating    Dizziness    Nausea, throwing up and dry mouth    What else should I know about opioids?    Opioids may lead to dependence, tolerance, or addiction.      Dependence means that if you stop or reduce the medicine too quickly, you will have withdrawal symptoms. These include loose poop (diarrhea), jitters, flu-like symptoms, nervousness and tremors.  Dependence is not the same as addiction.                       Tolerance means needing higher doses over time to get the same effect. This may increase the chance of serious side effects.      Addiction is when people improperly use a substance that harms their body, their mind or their relations with others. Use of opiates can cause a relapse of addiction if you have a history of drug or alcohol abuse.      People who have used opioids for a long time may have a lower quality of life, worse depression, higher levels of pain and more visits to doctors.    You can overdose on opioids. Take these steps to lower your risk of overdose:    1. Recognize the signs:  Signs of overdose include decrease or loss of consciousness (blackout), slowed breathing, trouble waking up and blue lips. If someone is worried about overdose, they should call 911.    2. Talk to your doctor about Narcan (naloxone).   If you are at risk for overdose, you may be given a prescription for Narcan. This medicine very quickly reverses the effects of opioids.   If you overdose, a friend or family member can give you Narcan while waiting for the ambulance. They need to know the signs of overdose and how to give Narcan.     3. Don't use alcohol or street drugs.   Taking them with opioids can cause death.    4. Do not take any of these medicines unless your doctor says it s OK. Taking these with opioids can cause death:    Benzodiazepines, such as lorazepam (Ativan), alprazolam (Xanax) or diazepam (Valium)    Muscle relaxers, such as cyclobenzaprine (Flexeril)    Sleeping pills like zolpidem (Ambien)     Other opioids      How to keep you and other people safe while taking opioids:    1. Never share your opioids with others.  Opioid medicines are regulated by the Drug Enforcement Agency ([employer]). Selling or sharing medications is a criminal act.    2. Be sure to store opioids in a secure place, locked up if possible. Young children can easily swallow them  and overdose.    3. When you are traveling with your medicines, keep them in the original bottles. If you use a pill box, be sure you also carry a copy of your medicine list from your clinic or pharmacy.    4. Safe disposal of opioids    Most pharmacies have places to get rid of medicine, called disposal kiosks. Medicine disposal options are also available in every [facility]. Search your county and  medication disposal  to find more options. You can find more details at:  https://[email or web address]-green/managing-unwanted-medications     I agree that my provider, clinic care team, and pharmacy may work with any city, state or federal law enforcement agency that investigates the misuse, sale, or other diversion of my controlled medicine. I will allow my provider to discuss my care with, or share a copy of, this agreement with any other treating provider, pharmacy or emergency room where I receive care.    I have read this agreement and have asked questions about anything I did not understand.    _______________________________________________________  Patient Signature - Ryland Gee _____________________                   Date     _______________________________________________________  Provider Signature - Todd Pastrana MD   _____________________                   Date     _______________________________________________________  Witness Signature (required if provider not present while patient signing)   _____________________                   Date

## 2022-06-14 NOTE — PROGRESS NOTES
SUBJECTIVE:   CC: Ryland Gee is an 52 year old male who presents for preventive health visit.     Patient has been advised of split billing requirements and indicates understanding: Yes  Healthy Habits:    Taking medications regularly:  7    Barriers to taking medications:  Remembering to take    PHQ-2 Total Score:PHQ2 Score: Incomplete.  History of Present Illness       Mental Health Follow-up:  Patient presents to follow-up on Depression & Anxiety.Patient's depression since last visit has been:  Good  The patient is not having other symptoms associated with depression.  Patient's anxiety since last visit has been:  Good  The patient is not having other symptoms associated with anxiety.  Any significant life events: No  Patient is feeling anxious or having panic attacks.  Patient has no concerns about alcohol or drug use.    Diabetes:   He presents for follow up of diabetes.  He is checking home blood glucose two times daily. He checks blood glucose before meals.  Blood glucose is never over 200 and sometimes under 70. He is aware of hypoglycemia symptoms including shakiness, dizziness, weakness, lethargy, blurred vision and confusion. He has no concerns regarding his diabetes at this time.  He is having numbness in feet, burning in feet and blurry vision. The patient has not had a diabetic eye exam in the last 12 months.         He eats 0-1 servings of fruits and vegetables daily.He consumes 1 sweetened beverage(s) daily.He exercises with enough effort to increase his heart rate 10 to 19 minutes per day.  He exercises with enough effort to increase his heart rate 3 or less days per week. He is missing 7 dose(s) of medications per week.  He is not taking prescribed medications regularly due to remembering to take.    Today's PHQ-9         PHQ-9 Total Score: 6    PHQ-9 Q9 Thoughts of better off dead/self-harm past 2 weeks :   Not at all    How difficult have these problems made it for you to do your work, take  care of things at home, or get along with other people: Somewhat difficult  Today's BUSHRA-7 Score: 1      Today's PHQ-2 Score:   PHQ-2 ( 1999 [employer]) 6/14/2022 2/15/2022   Q1: Little interest or pleasure in doing things - -   Q2: Feeling down, depressed or hopeless - -   PHQ-2 Score - -   PHQ-2 Score Incomplete Incomplete       Abuse: Current or Past(Physical, Sexual or Emotional)- No  Do you feel safe in your environment? Yes    Have you ever done Advance Care Planning? (For example, a Health Directive, POLST, or a discussion with a medical provider or your loved ones about your wishes): No, advance care planning information given to patient to review.  Patient declined advance care planning discussion at this time.    Social History     Tobacco Use     Smoking status: Never Smoker     Smokeless tobacco: Never Used   Substance Use Topics     Alcohol use: No     Comment: Does not drink - last was 11/2015     If you drink alcohol do you typically have >3 drinks per day or >7 drinks per week?                       Last PSA:   PSA   Date Value Ref Range Status   04/15/2021 0.86 0 - 4 ug/L Final     Comment:     Assay Method:  Chemiluminescence using Siemens Vista analyzer     Prostate Specific Antigen Screen   Date Value Ref Range Status   06/14/2022 1.60 0.00 - 4.00 ug/L Final       Reviewed orders with patient. Reviewed health maintenance and updated orders accordingly - Yes  Reviewed and updated as needed this visit by clinical staff   Tobacco  Allergies  Meds  Problems  Med Hx  Surg Hx  Fam Hx  Soc   Hx          Reviewed and updated as needed this visit by Provider   Tobacco  Allergies  Meds  Problems  Med Hx  Surg Hx  Fam Hx             ROS:  Review of Systems   Constitutional, HEENT, cardiovascular, pulmonary, GI, , musculoskeletal, neuro, skin, endocrine and psych systems are negative, except as otherwise noted.  OBJECTIVE:   /74 (BP Location: Left arm, Patient Position: Sitting, Cuff Size:  "Adult Large)   Pulse 62   Temp 97.7  F (36.5  C) (Tympanic)   Ht 1.753 m (5' 9\")   Wt 117 kg (258 lb)   SpO2 97%   BMI 38.10 kg/m    EXAM:  GENERAL: healthy, alert and no distress  EYES: Eyes grossly normal to inspection, PERRL and conjunctivae and sclerae normal  HENT: ear canals and TM's normal, nose and mouth without ulcers or lesions  NECK: no adenopathy, no asymmetry, masses, or scars and thyroid normal to palpation  RESP: lungs clear to auscultation - no rales, rhonchi or wheezes  BREAST: normal without masses, tenderness or nipple discharge and no palpable axillary masses or adenopathy  CV: regular rate and rhythm, normal S1 S2, no S3 or S4, no murmur, click or rub, no peripheral edema and peripheral pulses strong  ABDOMEN: soft, nontender, no hepatosplenomegaly, no masses and bowel sounds normal   (male): normal male genitalia without lesions or urethral discharge, no hernia  MS: no gross musculoskeletal defects noted, no edema  SKIN: no suspicious lesions or rashes  NEURO: Normal strength and tone, mentation intact and speech normal  PSYCH: mentation appears normal, affect normal/bright  LYMPH: no cervical, supraclavicular, axillary, or inguinal adenopathy    Results for orders placed or performed in visit on 06/14/22   Lipid panel reflex to direct LDL Fasting     Status: None   Result Value Ref Range    Cholesterol 136 <200 mg/dL    Triglycerides 57 <150 mg/dL    Direct Measure HDL 40 >=40 mg/dL    LDL Cholesterol Calculated 85 <=100 mg/dL    Non HDL Cholesterol 96 <130 mg/dL    Patient Fasting > 8hrs? Yes     Narrative    Cholesterol  Desirable:  <200 mg/dL    Triglycerides  Normal:  Less than 150 mg/dL  Borderline High:  150-199 mg/dL  High:  200-499 mg/dL  Very High:  Greater than or equal to 500 mg/dL    Direct Measure HDL  Female:  Greater than or equal to 50 mg/dL   Male:  Greater than or equal to 40 mg/dL    LDL Cholesterol  Desirable:  <100mg/dL  Above Desirable:  100-129 mg/dL   Borderline " High:  130-159 mg/dL   High:  160-189 mg/dL   Very High:  >= 190 mg/dL    Non HDL Cholesterol  Desirable:  130 mg/dL  Above Desirable:  130-159 mg/dL  Borderline High:  160-189 mg/dL  High:  190-219 mg/dL  Very High:  Greater than or equal to 220 mg/dL   PROSTATE SPEC ANTIGEN SCREEN     Status: Normal   Result Value Ref Range    Prostate Specific Antigen Screen 1.60 0.00 - 4.00 ug/L   Drug Abuse Screen Panel 13, Urine (Pain Care Package) - lab collect     Status: Abnormal   Result Value Ref Range    Cannabinoids (86-idi-9-carboxy-9-THC) Not Detected Not Detected, Indeterminate    Phencyclidine Not Detected Not Detected, Indeterminate    Cocaine (Benzoylecgonine) Not Detected Not Detected, Indeterminate    Methamphetamine (d-Methamphetamine) Not Detected Not Detected, Indeterminate    Opiates (Morphine) Not Detected Not Detected, Indeterminate    Amphetamine (d-Amphetamine) Not Detected Not Detected, Indeterminate    Benzodiazepines (Nordiazepam) Not Detected Not Detected, Indeterminate    Tricyclic Antidepressants (Desipramine) Not Detected Not Detected, Indeterminate    Methadone Not Detected Not Detected, Indeterminate    Barbiturates (Butalbital) Not Detected Not Detected, Indeterminate    Oxycodone Detected (A) Not Detected, Indeterminate    Propoxyphene (Norpropoxyphene) Not Detected Not Detected, Indeterminate    Buprenorphine Not Detected Not Detected, Indeterminate   Hemoglobin A1c     Status: Abnormal   Result Value Ref Range    Hemoglobin A1C 5.8 (H) 0.0 - 5.6 %         ASSESSMENT/PLAN:   Routine general medical examination at a health care facility      Peripheral polyneuropathy  Chronic low back pain  Chronic, continuous use of opioids - every 3 month medication checks  Ongoing chronic pain, medication has been helpful and will continue.  - oxyCODONE IR (ROXICODONE) 15 MG tablet  Dispense: 60 tablet; Refill: 0  - oxyCODONE (XTAMPZA ER) 18 MG 12 hr tablet  Dispense: 60 capsule; Refill: 0  - pregabalin  "(LYRICA) 75 MG capsule  Dispense: 180 capsule; Refill: 1  - Drug Abuse Screen Panel 13, Urine (Pain Care Package) - lab collect    Constipation due to opioid therapy  Medication helpful for side effect of opioids  - MOVANTIK 25 MG TABS tablet  Dispense: 90 tablet; Refill: 3    Type 2 diabetes mellitus with diabetic polyneuropathy, without long-term current use of insulin (H)  Controlled - continue medication(s).  - Hemoglobin A1c  - metFORMIN (GLUCOPHAGE) 500 MG tablet  Dispense: 180 tablet; Refill: 3  - glimepiride (AMARYL) 4 MG tablet  Dispense: 90 tablet; Refill: 3  - Semaglutide 7 MG TABS  Dispense: 90 tablet; Refill: 3  - Hemoglobin A1c    Major depressive disorder, recurrent episode, mild (H)  Anxiety  Stable - continue medication(s).  - FLUoxetine (PROZAC) 20 MG capsule  Dispense: 90 capsule; Refill: 3    Hyperlipidemia LDL goal <70  Controlled - continue medication(s).  - simvastatin (ZOCOR) 20 MG tablet  Dispense: 90 tablet; Refill: 3      Insomnia, unspecified type  Ongoing history medication helpful and will continue:  - traZODone (DESYREL) 50 MG tablet  Dispense: 180 tablet; Refill: 3    Epigastric pain  Persistent symptoms and medication helps:  - omeprazole (PRILOSEC) 20 MG DR capsule  Dispense: 90 capsule; Refill: 3    Dermatitis  Dry patch and will continue:  - betamethasone dipropionate (DIPROSONE) 0.05 % external cream  Dispense: 45 g; Refill: 1      Screening for prostate cancer  - PROSTATE SPEC ANTIGEN SCREEN  - PROSTATE SPEC ANTIGEN SCREEN    Screening for hyperlipidemia  - Lipid panel reflex to direct LDL Fasting  - Lipid panel reflex to direct LDL Fasting      COUNSELING:  Reviewed preventive health counseling, as reflected in patient instructions    Estimated body mass index is 38.1 kg/m  as calculated from the following:    Height as of this encounter: 1.753 m (5' 9\").    Weight as of this encounter: 117 kg (258 lb).  Weight management plan: Discussed healthy diet and exercise guidelines     " reports that he has never smoked. He has never used smokeless tobacco.      Return in about 3 months (around 9/14/2022) for medication recheck, with a video visit, or, with a telephone visit, with Dr Dannie Pastrana.           Dannie Pastrana MD     [address] [address]  [email or web address]     Office: [phone]

## 2022-06-14 NOTE — PROGRESS NOTES
{PROVIDER CHARTING PREFERENCE:483115}    Rosales Marcelino is a 52 year old who presents for the following health issues.       Diabetes Follow-up  {Reference  Diabetes Management Resources :108333}    {Reference  Diabetes Log - 7 days :111823}  Diabetes:   He presents for follow up of diabetes.  He is checking home blood glucose two times daily. He checks blood glucose before meals.  Blood glucose is never over 200 and sometimes under 70. He is aware of hypoglycemia symptoms including shakiness, dizziness, weakness, lethargy, blurred vision and confusion. He has no concerns regarding his diabetes at this time.  He is having numbness in feet, burning in feet and blurry vision. The patient has not had a diabetic eye exam in the last 12 months.       Hyperlipidemia Follow-Up      Are you regularly taking any medication or supplement to lower your cholesterol?   Yes- simvastatin (ZOCOR) 20 MG tablet    Are you having muscle aches or other side effects that you think could be caused by your cholesterol lowering medication?  No    BP Readings from Last 2 Encounters:   06/14/22 102/74   02/22/22 123/83     Hemoglobin A1C POCT (%)   Date Value   04/15/2021 8.7 (H)   01/18/2020 7.9 (H)     Hemoglobin A1C (%)   Date Value   11/12/2021 5.8 (H)   08/09/2021 6.1 (H)     LDL Cholesterol Calculated (mg/dL)   Date Value   04/15/2021 70   01/18/2020 54       Depression and Anxiety Follow-Up  Social History     Tobacco Use     Smoking status: Never Smoker     Smokeless tobacco: Never Used   Vaping Use     Vaping Use: Never used   Substance Use Topics     Alcohol use: No     Comment: Does not drink - last was 11/2015     Drug use: No     PHQ 11/12/2021 2/15/2022 6/14/2022   PHQ-9 Total Score 5 19 6   Q9: Thoughts of better off dead/self-harm past 2 weeks Not at all Not at all Not at all     BUSHRA-7 SCORE 8/9/2021 2/15/2022 6/14/2022   Total Score 1 (minimal anxiety) 1 (minimal anxiety) 1 (minimal anxiety)   Total Score 1 1 1      Last PHQ-9 6/14/2022   1.  Little interest or pleasure in doing things 0   2.  Feeling down, depressed, or hopeless 1   3.  Trouble falling or staying asleep, or sleeping too much 0   4.  Feeling tired or having little energy 3   5.  Poor appetite or overeating 2   6.  Feeling bad about yourself 0   7.  Trouble concentrating 0   8.  Moving slowly or restless 0   Q9: Thoughts of better off dead/self-harm past 2 weeks 0   PHQ-9 Total Score 6   Difficulty at work, home, or with people -     BUSHRA-7  6/14/2022   1. Feeling nervous, anxious, or on edge 0   2. Not being able to stop or control worrying 0   3. Worrying too much about different things 1   4. Trouble relaxing 0   5. Being so restless that it is hard to sit still 0   6. Becoming easily annoyed or irritable 0   7. Feeling afraid, as if something awful might happen 0   BUSHRA-7 Total Score 1   If you checked any problems, how difficult have they made it for you to do your work, take care of things at home, or get along with other people? -     Suicide Assessment Five-step Evaluation and Treatment (SAFE-T)  {Provider  Link to Depression Care Package SmartSet :490983}    Pain History:  When did you first notice your pain? - Chronic Pain   Have you seen this provider for your pain in the past?   Yes   Where in your body do you have pain? Lower back., neuropy in feet and Right heal Spur.  Are you seeing anyone else for your pain? No    PHQ-9 SCORE 11/12/2021 2/15/2022 6/14/2022   PHQ-9 Total Score - - -   PHQ-9 Total Score MyChart - 19 (Moderately severe depression) 6 (Mild depression)   PHQ-9 Total Score 5 19 6       BUSHRA-7 SCORE 8/9/2021 2/15/2022 6/14/2022   Total Score 1 (minimal anxiety) 1 (minimal anxiety) 1 (minimal anxiety)   Total Score 1 1 1       {Rooming staff  Please complete the PEG Assessment  Assess Pain, Function, and Quality of Life. Complete every pain visit :678841}    PEG Score 6/14/2022   PEG Total Score 8.67         Chronic Pain Follow  "Up:  Location of pain: ***  Analgesia/pain control:    - Recent changes:  ***    - Overall control: {Pain control level :995334::\"Tolerable with discomfort\"}    - Current treatments: ***   Adherence:     - Do you ever take more pain medicine than prescribed? {Yes ***/No :200775}    - When did you take your last dose of pain medicine?  ***   Adverse effects: {Yes ***/No :924493}   PDMP Review       Value Time User    State PDMP site checked  Yes 4/7/2022  1:17 PM Angelia Upton, CNP        Last CSA Agreement:   CSA -- Patient Level:    Controlled Substance Agreement - Opioid - Scan on 4/15/2021  1:40 PM  Controlled Substance Agreement - Opioid - Scan on 1/16/2020  4:43 PM       Last UDS: 4/15/2021  {Provider  Link to Pain Management SmartSet  Includes non-opioid pharmacological medications and referrals  :415693}  {If newly prescribing or considering opioid therapy, the following assessments must be completed :452648}  {Pull in assessment results (Optional) :294810}  {additonal problems for provider to add (Optional):913312}    History of Present Illness       Mental Health Follow-up:  Patient presents to follow-up on Depression & Anxiety.Patient's depression since last visit has been:  Good  The patient is not having other symptoms associated with depression.  Patient's anxiety since last visit has been:  Good  The patient is not having other symptoms associated with anxiety.  Any significant life events: No  Patient is feeling anxious or having panic attacks.  Patient has no concerns about alcohol or drug use.          He eats 0-1 servings of fruits and vegetables daily.He consumes 1 sweetened beverage(s) daily.He exercises with enough effort to increase his heart rate 10 to 19 minutes per day.  He exercises with enough effort to increase his heart rate 3 or less days per week. He is missing 7 dose(s) of medications per week.  He is not taking prescribed medications regularly due to remembering to " "take.    Today's PHQ-9         PHQ-9 Total Score: 6    PHQ-9 Q9 Thoughts of better off dead/self-harm past 2 weeks :   Not at all    How difficult have these problems made it for you to do your work, take care of things at home, or get along with other people: Somewhat difficult  Today's BUSHRA-7 Score: 1    Review of Systems   {ROS COMP (Optional):267660}      Objective    /74 (BP Location: Left arm, Patient Position: Sitting, Cuff Size: Adult Large)   Pulse 62   Temp 97.7  F (36.5  C) (Tympanic)   Ht 1.753 m (5' 9\")   Wt 117 kg (258 lb)   SpO2 97%   BMI 38.10 kg/m    Body mass index is 38.1 kg/m .  Physical Exam   {Exam List (Optional):838192}    {Diagnostic Test Results (Optional):421817}    {AMBULATORY ATTESTATION (Optional):030811}        "

## 2022-06-20 NOTE — RESULT ENCOUNTER NOTE
Dear Ryland,    Here is a summary of your recent test results:  -PSA (prostate specific antigen) test is normal.  This indicates a low likelihood of prostate cancer.  ADVISE: rechecking this in 1 year.  -Cholesterol levels are at your goal levels.  ADVISE: continuing your medication, a regular exercise program with at least 150 minutes of aerobic exercise per week, and eating a low saturated fat/low carbohydrate diet.  Also, you should recheck this fasting cholesterol panel in 12 months.  -A1C (test of diabetes control the last 2-3 months) is at your goal. Please continue with your current plan. Also, you should make an appointment to see me and recheck your A1C test in 6 months.   -Drug screen showed expected results.    For additional lab test information, [email or web address] is a very good reference.    In addition, here is a list of due or overdue Health Maintenance reminders:    Colorectal Cancer Screening due on 02/27/2018  Eye Exam due on 06/01/2022      Please call us at [phone] (or use [employer]) to address the above recommendations if needed.           Thank you very much for trusting me and [facility].     Have a peaceful day.    Healthy regards,  Dannie Pastrana MD

## 2022-07-01 ENCOUNTER — TRANSFERRED RECORDS (OUTPATIENT)
Dept: MULTI SPECIALTY CLINIC | Facility: CLINIC | Age: 52
End: 2022-07-01

## 2022-07-01 LAB — RETINOPATHY: NORMAL

## 2022-07-12 ENCOUNTER — VIRTUAL VISIT (OUTPATIENT)
Dept: FAMILY MEDICINE | Facility: CLINIC | Age: 52
End: 2022-07-12
Payer: COMMERCIAL

## 2022-07-12 DIAGNOSIS — J31.2 CHRONIC SORE THROAT: Primary | ICD-10-CM

## 2022-07-12 DIAGNOSIS — R09.82 POST-NASAL DRAINAGE: ICD-10-CM

## 2022-07-12 DIAGNOSIS — J02.9 SORE THROAT IN THE MORNING: ICD-10-CM

## 2022-07-12 DIAGNOSIS — J35.8 TONSILLITH: ICD-10-CM

## 2022-07-12 DIAGNOSIS — R10.13 EPIGASTRIC PAIN: ICD-10-CM

## 2022-07-12 PROCEDURE — 99214 OFFICE O/P EST MOD 30 MIN: CPT | Mod: 95 | Performed by: NURSE PRACTITIONER

## 2022-07-12 RX ORDER — FLUTICASONE PROPIONATE 50 MCG
2 SPRAY, SUSPENSION (ML) NASAL DAILY
Qty: 15.8 ML | Refills: 1 | Status: SHIPPED | OUTPATIENT
Start: 2022-07-12 | End: 2023-08-07

## 2022-07-12 NOTE — PROGRESS NOTES
"Ryland is a 52 year old who is being evaluated via a billable video visit.      How would you like to obtain your AVS? MyChart  If the video visit is dropped, the invitation should be resent by: Text to cell phone: [phone]  Will anyone else be joining your video visit? No          Assessment & Plan     Chronic sore throat  ENT referral placed. I recommended treatment of reflux as well as rhinitis to see if symptoms improve/ resolve.   - Adult ENT  Referral; Future    Sore throat in the morning  Suspect likely from reflux encouraged omeprazole daily.     Tonsillith  Likely related to cobblestoning of throat in relation to PND and Reflux.   - Adult ENT  Referral; Future    Epigastric pain  With esophageal reflux. Discussed taking this daily and why this may help with symptoms of sore throat.   - omeprazole (PRILOSEC) 20 MG DR capsule; Take 1 capsule (20 mg) by mouth daily    Post-nasal drainage  Encouraged use of flonase and an oral antihistamine to see if this reduces symptoms of PND and sore throat.   - fluticasone (FLONASE) 50 MCG/ACT nasal spray; Spray 2 sprays into both nostrils daily        Return in about 4 weeks (around 8/9/2022) for ongoing symptoms if not improving.    Franchesca Hernandez, [facility]    Rosales Marcelino is a 52 year old, presenting for the following health issues:  Referral (For ENT for tonsils)      HPI   PT needs a referral for his tonsils he's had multiple sore throats. He reports recurrent symptoms of sinusitis and sore throat. He is \"always clearing his throat\". He reports that he has had a persistent sore throat for the last 3 weeks. He has been evaluated for viral and bacterial infection and these were negative.     He reports tonsil stones are present frequently. He reports a chronic sensation of \"something in the back of his throat that he cant get out\".     He has taken benedryl every other day for about 1.5 weeks. He has not " used any other allergy medications.     He is on omeprazole and takes this as needed. He last took this daily last winter and stopped taking this on a daily basis about 1 year ago. He denies any symptoms of heart burn on a regular basis.       Review of Systems   Constitutional, HEENT, cardiovascular, pulmonary, gi and gu systems are negative, except as otherwise noted.      Objective           Vitals:  No vitals were obtained today due to virtual visit.    Physical Exam   GENERAL: Healthy, alert and no distress  EYES: Eyes grossly normal to inspection.  No discharge or erythema, or obvious scleral/conjunctival abnormalities.  RESP: No audible wheeze, cough, or visible cyanosis.  No visible retractions or increased work of breathing.    SKIN: Visible skin clear. No significant rash, abnormal pigmentation or lesions.  NEURO: Cranial nerves grossly intact.  Mentation and speech appropriate for age.  PSYCH: Mentation appears normal, affect normal/bright, judgement and insight intact, normal speech and appearance well-groomed.                Video-Visit Details    Video Start Time: 10:36 AM    Type of service:  Video Visit    Video End Time:10:55 AM    Originating Location (pt. Location): Home    Distant Location (provider location):  [facility]     Platform used for Video Visit: [facility]    .  ..

## 2022-08-04 DIAGNOSIS — M54.50 CHRONIC LOW BACK PAIN, UNSPECIFIED BACK PAIN LATERALITY, UNSPECIFIED WHETHER SCIATICA PRESENT: ICD-10-CM

## 2022-08-04 DIAGNOSIS — G62.9 PERIPHERAL POLYNEUROPATHY: ICD-10-CM

## 2022-08-04 DIAGNOSIS — F11.90 CHRONIC, CONTINUOUS USE OF OPIOIDS: ICD-10-CM

## 2022-08-04 DIAGNOSIS — G89.29 CHRONIC LOW BACK PAIN, UNSPECIFIED BACK PAIN LATERALITY, UNSPECIFIED WHETHER SCIATICA PRESENT: ICD-10-CM

## 2022-08-05 NOTE — TELEPHONE ENCOUNTER
Routing refill request to provider for review/approval because:  Drug not on the [facility] refill protocol     Charlotte Sanabria RN  [facility]          99.5

## 2022-08-06 RX ORDER — OXYCODONE 18 MG/1
CAPSULE, EXTENDED RELEASE ORAL
Qty: 60 CAPSULE | Refills: 0 | Status: SHIPPED | OUTPATIENT
Start: 2022-08-06 | End: 2022-09-06

## 2022-08-06 RX ORDER — OXYCODONE HYDROCHLORIDE 15 MG/1
TABLET ORAL
Qty: 60 TABLET | Refills: 0 | Status: SHIPPED | OUTPATIENT
Start: 2022-08-06 | End: 2022-09-06

## 2022-08-17 ENCOUNTER — OFFICE VISIT (OUTPATIENT)
Dept: OTOLARYNGOLOGY | Facility: CLINIC | Age: 52
End: 2022-08-17
Attending: NURSE PRACTITIONER
Payer: COMMERCIAL

## 2022-08-17 DIAGNOSIS — J31.2 CHRONIC SORE THROAT: ICD-10-CM

## 2022-08-17 DIAGNOSIS — J35.8 TONSILLITH: ICD-10-CM

## 2022-08-17 PROCEDURE — 99203 OFFICE O/P NEW LOW 30 MIN: CPT | Performed by: OTOLARYNGOLOGY

## 2022-08-17 NOTE — LETTER
8/17/2022         RE: Ryland Gee  [address]  [place] [address]        Dear Colleague,    Thank you for referring your patient, Ryland Gee, to the [facility]. Please see a copy of my visit note below.    HPI: This patient is a 51yo M who presents for evaluation of the tonsils at the request of Franchesca Hernandez CNP. There have been 6-8 episodes of tonsillitis per year over the last several years, with this last year being particularly challenging in that each infection is taking longer to kick. He also has constant tonsil stone issues  Otherwise healthy and without fever, weight loss, odynophagia, dysphagia, hemoptysis, shortness of breath, or other major symptoms. He also has some issues with chronic reflux and subsequent globus sensation. He is on medication for this.    Past medical history, surgical history, social history, family history, medications, and allergies have been reviewed with the patient and are documented above.    Review of Systems: a 10-system review was performed. Pertinent positives are noted in the HPI and on a separate scanned document in the chart.    PHYSICAL EXAMINATION:  GEN: no acute distress, normocephalic  EYES: extraocular movements are intact, pupils are equal and round. Sclera clear.   EARS: auricles are normally formed. The external auditory canals are clear with minimal to no cerumen. Tympanic membranes are intact bilaterally with no signs of infection, effusion, retractions, or perforations.  NOSE: anterior nares are patent. There are no masses or lesions. The septum is non-obstructing.  OC/OP: clear, dentition is in good repair. The tongue and palate are fully mobile and symmetric. Tonsils are 2+, cryptic  NECK: soft and supple. No lymphadenopathy or masses. Airway is midline.  NEURO: CN VII and XII symmetric. alert and oriented. No spontaneous nystagmus. Gait is normal.  PULM: breathing comfortably on room air, normal chest expansion  with respiration  CARDS: no cyanosis or clubbing, normal carotid pulses    MEDICAL DECISION-MAKING: Harm is a 53yo M with chronic tonsillitis and tonsil stones who would benefit from a tonsillectomy. The risks and benefits were discussed and the patient will schedule at their convenience.        Again, thank you for allowing me to participate in the care of your patient.        Sincerely,        Rima Eubanks MD

## 2022-08-17 NOTE — PROGRESS NOTES
HPI: This patient is a 51yo M who presents for evaluation of the tonsils at the request of Franchesca Hernandez CNP. There have been 6-8 episodes of tonsillitis per year over the last several years, with this last year being particularly challenging in that each infection is taking longer to kick. He also has constant tonsil stone issues  Otherwise healthy and without fever, weight loss, odynophagia, dysphagia, hemoptysis, shortness of breath, or other major symptoms. He also has some issues with chronic reflux and subsequent globus sensation. He is on medication for this.    Past medical history, surgical history, social history, family history, medications, and allergies have been reviewed with the patient and are documented above.    Review of Systems: a 10-system review was performed. Pertinent positives are noted in the HPI and on a separate scanned document in the chart.    PHYSICAL EXAMINATION:  GEN: no acute distress, normocephalic  EYES: extraocular movements are intact, pupils are equal and round. Sclera clear.   EARS: auricles are normally formed. The external auditory canals are clear with minimal to no cerumen. Tympanic membranes are intact bilaterally with no signs of infection, effusion, retractions, or perforations.  NOSE: anterior nares are patent. There are no masses or lesions. The septum is non-obstructing.  OC/OP: clear, dentition is in good repair. The tongue and palate are fully mobile and symmetric. Tonsils are 2+, cryptic  NECK: soft and supple. No lymphadenopathy or masses. Airway is midline.  NEURO: CN VII and XII symmetric. alert and oriented. No spontaneous nystagmus. Gait is normal.  PULM: breathing comfortably on room air, normal chest expansion with respiration  CARDS: no cyanosis or clubbing, normal carotid pulses    MEDICAL DECISION-MAKING: Harm is a 51yo M with chronic tonsillitis and tonsil stones who would benefit from a tonsillectomy. The risks and benefits were discussed and the  patient will schedule at their convenience.

## 2022-08-18 ENCOUNTER — TELEPHONE (OUTPATIENT)
Dept: OTOLARYNGOLOGY | Facility: CLINIC | Age: 52
End: 2022-08-18

## 2022-08-18 ENCOUNTER — MYC MEDICAL ADVICE (OUTPATIENT)
Dept: SURGERY | Facility: CLINIC | Age: 52
End: 2022-08-18

## 2022-08-25 NOTE — TELEPHONE ENCOUNTER
Left a VM for patient to ask him to contact us if he would like to move forward in scheduling with Dr. Raimundo HOOPER.  Call back # [phone] and\or use [employer] to contact us.

## 2022-09-06 DIAGNOSIS — G62.9 PERIPHERAL POLYNEUROPATHY: ICD-10-CM

## 2022-09-06 DIAGNOSIS — F11.90 CHRONIC, CONTINUOUS USE OF OPIOIDS: ICD-10-CM

## 2022-09-06 DIAGNOSIS — G89.29 CHRONIC LOW BACK PAIN, UNSPECIFIED BACK PAIN LATERALITY, UNSPECIFIED WHETHER SCIATICA PRESENT: ICD-10-CM

## 2022-09-06 DIAGNOSIS — M54.50 CHRONIC LOW BACK PAIN, UNSPECIFIED BACK PAIN LATERALITY, UNSPECIFIED WHETHER SCIATICA PRESENT: ICD-10-CM

## 2022-09-06 RX ORDER — OXYCODONE HYDROCHLORIDE 15 MG/1
TABLET ORAL
Qty: 60 TABLET | Refills: 0 | Status: SHIPPED | OUTPATIENT
Start: 2022-09-07 | End: 2022-10-04

## 2022-09-06 RX ORDER — OXYCODONE 18 MG/1
CAPSULE, EXTENDED RELEASE ORAL
Qty: 60 CAPSULE | Refills: 0 | Status: SHIPPED | OUTPATIENT
Start: 2022-09-07 | End: 2022-10-04

## 2022-09-06 NOTE — TELEPHONE ENCOUNTER
Med sent    Last visit in this dept:    7/12/2022     Next visit in this dept:       Health Maintenance   Topic Date Due     COLORECTAL CANCER SCREENING  02/27/2018     Pneumococcal Vaccine: Pediatrics (0 to 5 Years) and At-Risk Patients (6 to 64 Years) (2 - PCV) 04/18/2018     COVID-19 Vaccine (3 - Booster for Pfizer series) 01/23/2022     EYE EXAM  06/01/2022     MICROALBUMIN  08/09/2022     INFLUENZA VACCINE (1) 09/01/2022     ZOSTER IMMUNIZATION (1 of 2) 01/09/2022     A1C  09/14/2022     BMP  11/12/2022     CMP  11/12/2022     PHQ-9  12/14/2022     DIABETIC FOOT EXAM  02/15/2023     PREVENTIVE CARE VISIT  06/14/2023     LIPID  06/14/2023     PSA  06/14/2023     BUSHRA ASSESSMENT  06/14/2023     URINE DRUG SCREEN  06/14/2023     ANNUAL REVIEW OF HM ORDERS  06/14/2023     TREATMENT AGREEMENT FOR CHRONIC PAIN MANAGEMENT  06/15/2023     DTAP/TDAP/TD IMMUNIZATION (2 - Td or Tdap) 04/18/2027     ADVANCE CARE PLANNING  06/21/2027     HEPATITIS C SCREENING  Completed     HIV SCREENING  Addressed     DEPRESSION ACTION PLAN  Addressed     IPV IMMUNIZATION  Aged Out     MENINGITIS IMMUNIZATION  Aged Out     HEPATITIS B IMMUNIZATION  Discontinued

## 2022-09-29 ENCOUNTER — OFFICE VISIT (OUTPATIENT)
Dept: PODIATRY | Facility: CLINIC | Age: 52
End: 2022-09-29
Payer: COMMERCIAL

## 2022-09-29 VITALS — BODY MASS INDEX: 38.1 KG/M2 | SYSTOLIC BLOOD PRESSURE: 102 MMHG | WEIGHT: 258 LBS | DIASTOLIC BLOOD PRESSURE: 70 MMHG

## 2022-09-29 DIAGNOSIS — E11.42 DIABETIC POLYNEUROPATHY ASSOCIATED WITH TYPE 2 DIABETES MELLITUS (H): Primary | ICD-10-CM

## 2022-09-29 DIAGNOSIS — M79.672 LEFT FOOT PAIN: ICD-10-CM

## 2022-09-29 DIAGNOSIS — L97.522 ULCER OF LEFT FOOT WITH FAT LAYER EXPOSED (H): ICD-10-CM

## 2022-09-29 PROCEDURE — 11042 DBRDMT SUBQ TIS 1ST 20SQCM/<: CPT | Performed by: PODIATRIST

## 2022-09-29 PROCEDURE — 99214 OFFICE O/P EST MOD 30 MIN: CPT | Mod: 25 | Performed by: PODIATRIST

## 2022-09-29 RX ORDER — SULFAMETHOXAZOLE/TRIMETHOPRIM 800-160 MG
1 TABLET ORAL 2 TIMES DAILY
Qty: 20 TABLET | Refills: 0 | Status: SHIPPED | OUTPATIENT
Start: 2022-09-29 | End: 2022-10-09

## 2022-09-29 NOTE — LETTER
9/29/2022         RE: Ryland Gee  [address]  [place] [address]        Dear Colleague,    Thank you for referring your patient, Ryland Gee, to the [facility]. Please see a copy of my visit note below.    Podiatry / Foot and Ankle Surgery Progress Note    September 29, 2022    Subject: Patient was seen for wound on his left great toe.  States it started about 2 weeks ago.  It was a blister at first.  He notes it is aching and throbbing.  Pain can be 5 out of 10 at its worst.  Worse with standing and walking.  He has been putting bacitracin on it.  Denies fever, nausea, vomiting.  Denies specific injury.  Wondering what can be done for it.    Objective:  Vitals: /70   Wt 117 kg (258 lb)   BMI 38.10 kg/m      A1C: 5.8 (6/14/2022)     General appearance: Patient is alert and fully cooperative with history & exam.  No sign of distress is noted during the visit.      Dermatologic: Full-thickness ulceration to the plantar aspect of the left IPJ.  Measures approximately 0.8 cm x 0.7 cm x 0.2 cm after debridement.  Base of the wound is granular.  No surrounding erythema purulent drainage or malodor noted.  There is copious amounts of clear serous drainage with maceration around the wound edges.     Vascular: DP & PT pulses are intact & regular bilaterally.   edema and varicosities noted.  CFT and skin temperature is normal to both lower extremities.     Neurologic: Lower extremity sensation is diminished to the toes.     Musculoskeletal: Patient is ambulatory without assistive device or brace.      Radiographs:  left foot xray -  no fractures noted.  No bone infection noted. Posterior heel spur noted.       Assessment:    Diabetic polyneuropathy associated with type 2 diabetes mellitus (H)  Ulcer of left foot with fat layer exposed (H)  Left foot pain    Medical Decision Making/Plan: At this time the ulcer was debrided.  Please see procedure note below.  Recommend  Iodoplex dressing changes daily with 2x2 gauze pad and a large Band-Aid.  We talked about getting pressure off of this area.  Recommend super feet insert cut out under the ulcer area.  He is to wear this insert at all times even around the house and not go barefoot or in socks.  We discussed that if it this does not get enough pressure off the area that we would try a boot.  He was given an order for an oral antibiotic.  We will do baseline x-rays today.  We will have him follow-up in 1 month.  If anything changes he is to call or go to the hospital.  All questions were answered to patient satisfaction and he will call for the questions or concerns.    Procedure: After verbal consent, excisional debridement was performed on ulcer.  #15 blade was used to debride ulcer down to and including subcutaneous tissue. Bleeding controlled with light pressure.   No drainage noted.  No anesthesia was used due to neuropathy. Dry dressing applied to foot.  Patient tolerated procedure well.    Patient Risk Factor:  Patient is a medium risk factor for infection.     Durable Medical Equipment Wound Care Orders     Wound Care Order for DME - ONLY FOR DME   As directed      [facility]    Wound Supply Order Options: Complex Wound    Optional: .dmewound can be used to pull in order specific information into documentation    Wound Number: Wound 1    Wound 1 Location: left great toe    Wound 1 Dressing Change Frequency: Daily    Wound 1 Length of Need: 30 days    Wound 1 - Dressing Supplies:  Primary  Wrap/Gauze       Wound 1 - Primary Dressing Dispensing Instructions: Ok to Substitute    Wound 1 - Primary Dressing Types: Foam    Wound 1 - Foam Types: Ioplex []    Wound 1 - Ioplex Foam Size: 4 x 5 Comment - cut in 4 pieces and use 1 piece with each dressing change    Wound 1 - Ioplex Foam Quantity: 12    Wound 1 - Wrap/Gauze Types: Loafs    Wound 1 - Loaf Type: Gauze Loaf []    Wound 1 - Gauze Loaf Size: 2 x 2  Comment - 2 gauze pads per dressing change    Wound 1 - Gauze Loaf Quantity: 200    Diabetic polyneuropathy associated with type 2 diabetes mellitus (H)          Christina Sahni DPM, Podiatry/Foot and Ankle Surgery            Again, thank you for allowing me to participate in the care of your patient.        Sincerely,        Christina Sahni DPM, Podiatry/Foot and Ankle Surgery

## 2022-09-29 NOTE — PATIENT INSTRUCTIONS
"Thank you for choosing [facility] Podiatry / Foot & Ankle Surgery!    DR ORELLANA'S CLINIC:  [facility]   [address]   [place] [address]      TRIAGE LINE: [phone]  APPOINTMENTS: [phone]  RADIOLOGY: [phone]  SET UP SURGERY: [phone]  FAX NUMBER: [phone]  BILLING QUESTIONS: [phone]       Follow up:  1 month    Super Feet- Green    Iodine until you get the foam      DIABETES AND YOUR FEET  Diabetes can result in several problems in the feet including ulcers (open sores) and amputations. Two of the most important reasons why people develop foot problems when they have diabetes is : 1. Neuropathy (loss of feeling)  2. Vascular disease (loss or decrease of blood flow).    Neuropathy is a term used to describe a loss of nerve function.  Patients with diabetes are at risk of developing neuropathy if their sugars continue to run high and are above the normal value. One theory for neuropathy is that the \"extra\" sugar in the body enters the nerves and is broken down. These by-products build up in the nerve causing it to swell and impairing nerve function. Often times, this can be prevented by controlling your sugars, dieting and exercise.    When a person develops neuropathy, they usually begin to feel numbness or tingling in their feet and sometime in their legs.  Other symptoms may include painful burning or hot feet, tingling or feeling like insects or ants are crawling on your feet or legs.  If the diabetes is sever and the sugars run high for long periods of time, neuropathy can also occur in the hands.    Vascular disease  is a term used to describe a loss or decrease in circulation (blood flow). There is a problem in getting blood and oxygen to areas that need it. Similar to neuropathy, sugars can build up in the walls of the arteries (blood vessels) and cause them to become swollen, thickened and hardened. This decreases the amount of blood that can go to " an area that needs it. Though this is common in the legs of diabetic patients, it can also affect other arteries (blood vessels) in the body such as in the heart and eyes.    In the legs, vascular disease usually results in cramping. Patients who develop leg cramps after walking the same distance every time (i.e. One block, half a mile, ect.) need to let their doctors know so that their circulation may be checked. Cramps causing severe pain in the feet and/or legs while sleeping and the cramps go away when you stand or hang your legs off the side of the bed, may also be a sign of poor blood circulation.  Occasional cramping in cold weather or on rare occasions with activity may not be due to poor circulation, but you should inform your doctor.    PREVENTION OF THESE DISEASES  The key to prevention is good blood sugar control. Poor blood sugar control is a big reason many of these problems start. Physical activity (exercise) is a very good way to help decrease your blood sugars. Exercise can lower your blood sugar, blood pressure, and cholesterol. It also reduces your risk for heart disease and stroke, relieves stress, and strengthens your heart, muscles and bones.  In addition, regular activity helps insulin work better, improves your blood circulation, and keeps your joints flexible. If you're trying to lose weight, a combination of exercise and wise food choices can help you reach your target weight and maintain it.      PAIN MANAGEMENT (**Please speak with your primary doctor about any medications**)  1.Blood Sugar Control - Most important  2. Medications such as:  Amytriptylline, duloxetine, gabapentin, lyrica, tramadol (talk with your primary care doctor about this).     NUTRITION:  Nutrition is also important to help with healing. If your body does not have what it needs, it can't heal.   Increasing your protein intake is important.  With wounds you need 60-90gm of protein a day to help with healing. Over the  "counter protein shakes such as Washington, Glucerna, Ensure, ect... can help to supplement your daily protein intake.   It is also important to take Vitamins to help with healing.  Vitamins such as B12, B6 and Vitamin D3 are important for healing. These can be gotten over the counter at pharmacies or at stores like [employer] or the [employer].    I can also prescribe a dietary supplement called \"Rheumate\" that has a lot of essential vitamins in one capsule.  This may not be covered by insurance though.     FOOT CARE RECOMMENDATIONS   1. Wash your feet with lukewarm water and a mild soap and then dry them thoroughly, especially between the toes.     2. Examine your feet daily looking for cuts, corns, blisters, cracks, ect, especially after wearing new shoes. Make sure to look between your toes. If you cannot see the bottom of your feet, set a mirror on the floor and hold your foot over it, or ask a spouse, friend or family member to examine your feet for you. Contact your doctor immediately if new problems are noted or if sores are not healing.     3. Immediately apply moisturizer to the tops and bottoms of your feet, avoiding areas between the toes. Hand lotion (Intesive Care, Dedra, Eucerin, Neutrogena, Curel, ect) is sufficient unless your doctor prescribes a medicated lotion. Apply sunscreen to your feet when going swimming outside.     4. Use clean comfortable shoes, wear white socks (if you have any bleeding or drainage, you will see it on white socks). Socks should not have thick seams or cut off the circulation around the leg. Break in new shoes slowly and rotate with older shoes until broken in. Check the inside of your shoes with your hand to look for areas of irritation or objects that may have fallen into your shoes.       5. Keep slippers by the side of your bed for use during the night.     6.  Shoes should be fitted by a professional and should not cause areas of irritation.  Check your feet regularly when " wearing a new pair of shoes and replace them as needed.     7.  Talk to your doctor about proper exercise. Exercise and stretching stimulate blood flow to your feet and maintain proper glucose levels.     8.  Monitor your blood glucose level as instructed by your doctor. Notify your doctor immediately if your blood sugar is abnormally high or low.    9. Cut your nails straight across, but then gently round any sharp edges with a cardboard nail file. If you have neuropathy, peripheral vascular disease or cannot see that well to trim your own toenails contact [employer] ([phone]) or [employer] ([phone]).      THINGS TO AVOID DOING   1.  Do not soak your feet if you have an open sore. Use only lukewarm water and always check the temperature with your hand as hot water can easily burn your feet.       2.  Never use a hot water bottle or heating pad on your feet. Also do not apply cold compresses to your feet. With decreased sensation, you could burn or freeze your feet.       3.  Do not apply any of these to your feet:    -  Over the counter medicine for corns or warts    -  Harsh chemicals like boric acid    -  Do not self-treat corns, cuts, blisters or infections. Always consult your doctor.       4.  Do not wear sandals, slippers or walk barefoot, especially on hot sand or concrete or other harsh surfaces.     5.  If you smoke, stop!!!     FOOT ULCER (WOUND) EDUCATION  Ulceration ofthe foot involves a break or hole in the skin. Skin is our best protection against infection. Skin is quite durable, however, the underlying tissues are fragile. For this reason, the wound is likely to deepen rapidly. Deep wounds usually get infected and require amputation. Prompt healing is therefore essential to avoid limb loss.     Foot ulcers do not heal without intervention. Walking on the foot and living your normal life is not typically compatible with healing the sore. Successful healing will require several months of  significant alteration of your daily activities.   Ulcer complications frequently develop. This primarily includes infection of skin, which then spreads deep into your joints, bones and tendons. Spreading infection may travel up your leg and into other parts of your body. Deep infection is usually treated with amputation ofpart ofyour foot or your leg. Signs of infection include fever, chills, nausea, vomiting, erratic blood sugars, local redness, pus, strong odor and localized warmth. Signs of infection should be taken seriously. Prompt evaluation in the clinic or hospital emergency room is required.   Ulcer treatment requires debridement or surgical removal of devitalized tissue. Your doctor will trim away callused, moistened, unhealthy tissue from the wound surface and margin. This helps to clean the wound and allows proper inspection. Debridement also stimulates healing even though the wound originally appears larger. Expect some bleeding with each debridement. You will be given instruction regarding wound bandaging. This often includes ointment and gauze. Avoid tape directly on the skin. Hand washing is essential since most infections will come from your fingertips. Ulcer care requires a no touch technique. Your fingers should not touch the margin or base of the wound.    HELPFUL HEALING TIPS:  1. Debridement: Getting rid of bad tissue makes way for good tissue to promote healing  2. Addressing Foot Deformities: Hammertoes and bunions can cause increased pressure  3. Pressure Reduction: If pressure remains to the wound, it won't heal  4. Good Pulses: If bloodflow is not getting to the foot, the ulcer will not heal  5. Good Nutrition: If you are not getting proper nutrition your body can't heal.Protein! At least 90g a day.  Supplements are a good way to help get this, such as Washington, Glucerna, Ensure. Also taking 5000units of Vitamin D a day.   6. Infection Control: Keep the ulcer clean with wound cleanser. DO  NOT SOAK IT!  7. Moisture Control: Keep edema down and make sure that drainage is getting pulled away from the ulcer    IMPORTANCE OF DEBRIDEMENT   Reduces bioburden to help control or reduce infection. Even if an ulcer is not  infected,  the bacterial bioburden causes increased local inflammation.   Allows more accurate visualization of the wound base and edges, which allows for more precise staging.   Removes necrotic/non-viable tissue, which impedes wound healing, causes protein loss and can be a nidus for infection.   Stimulates new circulation (angiogenesis) and allows adequate oxygen delivery to the wound.   Removes undermining and tunneling, and may help reduce abscess formation.   Releases healing growth factors at the edge of the wound.   Prepares the wound bed by leaving only tissues that are capable of regenerating. OVER THE COUNTER INSERTS  SuperFeet   Sofsole Fit Spenco   Power Step   Walk-Fit Arch Cradles     Most of these can be found at your local [employer], [employer], or online.  **A good high quality over the counter insert should cost around $40-$50 **    [facility] LOCATIONS  [address]  [phone]   [address]  [phone] [place]  [address] [place] [address]  [phone]   [place]  [address]  [phone]   [place]  [address]  [phone] [place] [address]  [phone]   [place]  [address]  [phone]   [place]  [address]  [phone] [place]  [address]  [phone]

## 2022-09-29 NOTE — PROGRESS NOTES
Podiatry / Foot and Ankle Surgery Progress Note    September 29, 2022    Subject: Patient was seen for wound on his left great toe.  States it started about 2 weeks ago.  It was a blister at first.  He notes it is aching and throbbing.  Pain can be 5 out of 10 at its worst.  Worse with standing and walking.  He has been putting bacitracin on it.  Denies fever, nausea, vomiting.  Denies specific injury.  Wondering what can be done for it.    Objective:  Vitals: /70   Wt 117 kg (258 lb)   BMI 38.10 kg/m      A1C: 5.8 (6/14/2022)     General appearance: Patient is alert and fully cooperative with history & exam.  No sign of distress is noted during the visit.      Dermatologic: Full-thickness ulceration to the plantar aspect of the left IPJ.  Measures approximately 0.8 cm x 0.7 cm x 0.2 cm after debridement.  Base of the wound is granular.  No surrounding erythema purulent drainage or malodor noted.  There is copious amounts of clear serous drainage with maceration around the wound edges.     Vascular: DP & PT pulses are intact & regular bilaterally.   edema and varicosities noted.  CFT and skin temperature is normal to both lower extremities.     Neurologic: Lower extremity sensation is diminished to the toes.     Musculoskeletal: Patient is ambulatory without assistive device or brace.      Radiographs:  left foot xray -  no fractures noted.  No bone infection noted. Posterior heel spur noted.       Assessment:    Diabetic polyneuropathy associated with type 2 diabetes mellitus (H)  Ulcer of left foot with fat layer exposed (H)  Left foot pain    Medical Decision Making/Plan: At this time the ulcer was debrided.  Please see procedure note below.  Recommend Iodoplex dressing changes daily with 2x2 gauze pad and a large Band-Aid.  We talked about getting pressure off of this area.  Recommend super feet insert cut out under the ulcer area.  He is to wear this insert at all times even around the house and not go  barefoot or in socks.  We discussed that if it this does not get enough pressure off the area that we would try a boot.  He was given an order for an oral antibiotic.  We will do baseline x-rays today.  We will have him follow-up in 1 month.  If anything changes he is to call or go to the hospital.  All questions were answered to patient satisfaction and he will call for the questions or concerns.    Procedure: After verbal consent, excisional debridement was performed on ulcer.  #15 blade was used to debride ulcer down to and including subcutaneous tissue. Bleeding controlled with light pressure.   No drainage noted.  No anesthesia was used due to neuropathy. Dry dressing applied to foot.  Patient tolerated procedure well.    Patient Risk Factor:  Patient is a medium risk factor for infection.     Durable Medical Equipment Wound Care Orders     Wound Care Order for DME - ONLY FOR DME   As directed      DME Provider: [facility]    Wound Supply Order Options: Complex Wound    Optional: .dmewound can be used to pull in order specific information into documentation    Wound Number: Wound 1    Wound 1 Location: left great toe    Wound 1 Dressing Change Frequency: Daily    Wound 1 Length of Need: 30 days    Wound 1 - Dressing Supplies:  Primary  Wrap/Gauze       Wound 1 - Primary Dressing Dispensing Instructions: Ok to Substitute    Wound 1 - Primary Dressing Types: Foam    Wound 1 - Foam Types: Ioplex []    Wound 1 - Ioplex Foam Size: 4 x 5 Comment - cut in 4 pieces and use 1 piece with each dressing change    Wound 1 - Ioplex Foam Quantity: 12    Wound 1 - Wrap/Gauze Types: Loafs    Wound 1 - Loaf Type: Gauze Loaf []    Wound 1 - Gauze Loaf Size: 2 x 2 Comment - 2 gauze pads per dressing change    Wound 1 - Gauze Loaf Quantity: 200    Diabetic polyneuropathy associated with type 2 diabetes mellitus (H)          Christina Sahni DPM, Podiatry/Foot and Ankle Surgery

## 2022-10-03 DIAGNOSIS — M54.50 CHRONIC LOW BACK PAIN, UNSPECIFIED BACK PAIN LATERALITY, UNSPECIFIED WHETHER SCIATICA PRESENT: ICD-10-CM

## 2022-10-03 DIAGNOSIS — F11.90 CHRONIC, CONTINUOUS USE OF OPIOIDS: ICD-10-CM

## 2022-10-03 DIAGNOSIS — G89.29 CHRONIC LOW BACK PAIN, UNSPECIFIED BACK PAIN LATERALITY, UNSPECIFIED WHETHER SCIATICA PRESENT: ICD-10-CM

## 2022-10-03 DIAGNOSIS — G62.9 PERIPHERAL POLYNEUROPATHY: ICD-10-CM

## 2022-10-03 NOTE — TELEPHONE ENCOUNTER
XTAMPZA ER 18 MG 12 hr tablet 60 capsule 0 9/7/2022  No   Sig: TAKE ONE TABLET BY MOUTH EVERY 12 HOURS FOR 30 DAYS   Sent to pharmacy as: Xtampza ER 18 MG Oral Capsule ER 12 Hour Abuse-Deterrent (oxyCODONE)   Class: E-Prescribe   Earliest Fill Date: 9/7/2022   Order: [id number]     oxyCODONE IR (ROXICODONE) 15 MG tablet 60 tablet 0 9/7/2022  No   Sig: TAKE ONE TABLET BY MOUTH TWICE A DAY AS NEEDED FOR SEVERE PAIN.   Sent to pharmacy as: oxyCODONE HCl 15 MG Oral Tablet (ROXICODONE)   Class: E-Prescribe     He said he has some pain meds that he is going to run out of in the next week but is going out of town.       Last office visit: 7/12/2022     Future Office Visit:        CSA -- Patient Level:    Controlled Substance Agreement - Opioid - Scan on 6/15/2022  5:25 PM  Controlled Substance Agreement - Opioid - Scan on 4/15/2021  1:40 PM  Controlled Substance Agreement - Opioid - Scan on 1/16/2020  4:43 PM             Routing refill request to provider for review/approval because:  Drug not on the [facility] refill protocol     Mariely Lou RN, BSN  [facility]

## 2022-10-04 RX ORDER — OXYCODONE HYDROCHLORIDE 15 MG/1
TABLET ORAL
Qty: 60 TABLET | Refills: 0 | Status: SHIPPED | OUTPATIENT
Start: 2022-10-07 | End: 2022-10-05

## 2022-10-04 RX ORDER — OXYCODONE 18 MG/1
CAPSULE, EXTENDED RELEASE ORAL
Qty: 60 CAPSULE | Refills: 0 | Status: SHIPPED | OUTPATIENT
Start: 2022-10-07 | End: 2022-10-05

## 2022-10-05 ENCOUNTER — TELEPHONE (OUTPATIENT)
Dept: FAMILY MEDICINE | Facility: CLINIC | Age: 52
End: 2022-10-05

## 2022-10-05 RX ORDER — OXYCODONE HYDROCHLORIDE 15 MG/1
15 TABLET ORAL 2 TIMES DAILY PRN
Qty: 60 TABLET | Refills: 0 | Status: SHIPPED | OUTPATIENT
Start: 2022-10-05 | End: 2022-11-30

## 2022-10-05 RX ORDER — OXYCODONE 18 MG/1
18 CAPSULE, EXTENDED RELEASE ORAL EVERY 12 HOURS
Qty: 60 CAPSULE | Refills: 0 | Status: SHIPPED | OUTPATIENT
Start: 2022-10-05 | End: 2022-11-30

## 2022-10-05 NOTE — TELEPHONE ENCOUNTER
Pt walked into clinic and pharmacy demanding that he gets them filled as he is leaving town tomorrow morning.     Can he fill early?       Mariely Lou RN, BSN  [facility]

## 2022-10-06 NOTE — TELEPHONE ENCOUNTER
PA Initiation    Medication: PA renewal for Movantik PA RENEWAL INITIATED  Insurance Company: [employer] [place] - Phone [phone] Fax [phone]  Pharmacy Filling the Rx: [facility] - [place], [place] - [address] [place] [address]  Filling Pharmacy Phone: [phone]  Filling Pharmacy Fax:    Start Date: 10/5/2022    Central Prior Authorization Team   Phone: [phone]        
Please do prior authorization .  
Prior Authorization Approval    Authorization Effective Date: 11/13/2022  Authorization Expiration Date: 11/13/2023  Medication: PA renewal for Movantik PA RENEWAL APPROVED  Approved Dose/Quantity: 90  Reference #:     Insurance Company: [employer] [place] - Phone [phone] Fax [phone]  Expected CoPay:       CoPay Card Available:      Foundation Assistance Needed:    Which Pharmacy is filling the prescription (Not needed for infusion/clinic administered): [facility] - [place], [place] - [address] [place] [address]  Pharmacy Notified: Yes  Patient Notified: Comment:  Pharmacy will notify patient.        
Prior Authorization Retail Medication Request    Medication/Dose: Movantik 25 mg   ICD code (if different than what is on RX):    Previously Tried and Failed:    Rationale:      Insurance Name:  In chart  Insurance ID:        Pharmacy Information (if different than what is on RX)  Name:  [facility]   Phone:  [phone]    Cover My Meds Key: [id number]     Current PA will  soon, please change RX or advise to send for new PA authorization       Judy Churchill     Time for renewal of PA     
How Severe Is Your Scar?: mild
Is This A New Presentation, Or A Follow-Up?: Scar
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

## 2022-10-09 ENCOUNTER — HEALTH MAINTENANCE LETTER (OUTPATIENT)
Age: 52
End: 2022-10-09

## 2022-12-06 ENCOUNTER — OFFICE VISIT (OUTPATIENT)
Dept: PODIATRY | Facility: CLINIC | Age: 52
End: 2022-12-06
Payer: COMMERCIAL

## 2022-12-06 VITALS — WEIGHT: 258 LBS | BODY MASS INDEX: 38.1 KG/M2 | DIASTOLIC BLOOD PRESSURE: 68 MMHG | SYSTOLIC BLOOD PRESSURE: 100 MMHG

## 2022-12-06 DIAGNOSIS — L97.522 DIABETIC ULCER OF OTHER PART OF LEFT FOOT ASSOCIATED WITH TYPE 2 DIABETES MELLITUS, WITH FAT LAYER EXPOSED (H): ICD-10-CM

## 2022-12-06 DIAGNOSIS — M20.5X2 HALLUX LIMITUS, LEFT: ICD-10-CM

## 2022-12-06 DIAGNOSIS — E11.621 DIABETIC ULCER OF OTHER PART OF LEFT FOOT ASSOCIATED WITH TYPE 2 DIABETES MELLITUS, WITH FAT LAYER EXPOSED (H): ICD-10-CM

## 2022-12-06 DIAGNOSIS — E11.42 DIABETIC POLYNEUROPATHY ASSOCIATED WITH TYPE 2 DIABETES MELLITUS (H): Primary | ICD-10-CM

## 2022-12-06 PROCEDURE — 99213 OFFICE O/P EST LOW 20 MIN: CPT | Mod: 25 | Performed by: PODIATRIST

## 2022-12-06 PROCEDURE — 11042 DBRDMT SUBQ TIS 1ST 20SQCM/<: CPT | Performed by: PODIATRIST

## 2022-12-06 NOTE — PATIENT INSTRUCTIONS
Thank you for choosing [facility] Podiatry / Foot & Ankle Surgery!    DR ORELLANA'S CLINIC:  [facility]   [address]   [place] [address]      TRIAGE LINE: [phone]  APPOINTMENTS: [phone]  RADIOLOGY: [phone]  SET UP SURGERY: [phone]  FAX NUMBER: [phone]  BILLING QUESTIONS: [phone]       Follow up: 1 month

## 2022-12-06 NOTE — PROGRESS NOTES
"Podiatry / Foot and Ankle Surgery Progress Note    December 6, 2022    Subject: Patient was seen for nonhealing ulceration of the left great toe.  He notes that he has been using the Iodoflex.  States that he gets a lot of \"white\" discoloration at the end of the day when he does a dressing change due to drainage.  Denies fever, nausea, vomiting.  Is wondering why the wound is not getting better.  He notes that he does not wear any offloading devices around the house and is mostly in socks or barefoot.    Objective:  Vitals: /68   Wt 117 kg (258 lb)   BMI 38.10 kg/m      A1C: 5.8 (6/14/2022)     General appearance: Patient is alert and fully cooperative with history & exam.  No sign of distress is noted during the visit.      Dermatologic: Full-thickness ulceration to the plantar aspect of the left IPJ.  Measures approximately 0.6 cm x 0.5 cm x 0.2 cm after debridement.  Base of the wound is granular.  No surrounding erythema purulent drainage or malodor noted.  There is copious amounts of clear serous drainage with maceration around the wound edges.     Vascular: DP & PT pulses are intact & regular bilaterally.   edema and varicosities noted.  CFT and skin temperature is normal to both lower extremities.     Neurologic: Lower extremity sensation is diminished to the toes.     Musculoskeletal: Patient is ambulatory without assistive device or brace.      Assessment:     Diabetic polyneuropathy associated with type 2 diabetes mellitus (H)  Diabetic ulcer of other part of left foot associated with type 2 diabetes mellitus, with fat layer exposed (H)  Hallux limitus, left     Medical Decision Making/Plan: At this time the ulcer was debrided.  Please see procedure note below.    We will have him switch to daily dressing changes with Iodosorb.  He will cover this with 2x2 gauze pad and a large Band-Aid.  We again talked about getting pressure off of this area.  Recommend super feet insert cut out under the ulcer " area.  He is to wear this insert at all times even around the house and not go barefoot or in socks.  We discussed that if it this does not get enough pressure off the area that we would try a boot.    His inserts in his regular shoe was modified today to offload the ulcer however his shoes sizes too big.  We did discuss getting better fitting shoes and inserts however he notes that he did not feel comfortable.  He was given an order for a DH 2 shoe to wear around the house to offload the ulcer there and not go barefoot.   We will have him follow-up in 1 month.  If anything changes he is to call or go to the hospital.  All questions were answered to patient satisfaction and he will call for the questions or concerns.     Procedure: After verbal consent, excisional debridement was performed on ulcer.  #15 blade was used to debride ulcer down to and including subcutaneous tissue. Bleeding controlled with light pressure.   No drainage noted.  No anesthesia was used due to neuropathy. Dry dressing applied to foot.  Patient tolerated procedure well.     Patient Risk Factor:  Patient is a medium risk factor for infection.     Durable Medical Equipment Wound Care Orders     Wound Care Order for DME - ONLY FOR DME   As directed      DME Provider: [facility]    Start Date: 12/6/2022    Wound Supply Order Options: Complex Wound    Optional: .dmewound can be used to pull in order specific information into documentation    Wound Number: Wound 1    Wound 1 Location: left foot ulcer    Wound 1 Dressing Change Frequency: Daily    Wound 1 Length of Need: 30 days    Wound 1 - Dressing Supplies: Primary    Wound 1 - Primary Dressing Dispensing Instructions: Ok to Substitute    Wound 1 - Primary Dressing Types: Other (Comments)    Wound 1 - Other Primary Dressing Type: Idosorb Gel    Wound 1 - Iodosorb Gel Size: 10 gm    Wound 1 - Iodosorb Gel Quantity: 1 Tube    Diabetic polyneuropathy associated with type 2 diabetes mellitus (H)             Christina Sahni DPM, Podiatry/Foot and Ankle Surgery

## 2022-12-06 NOTE — LETTER
"    12/6/2022         RE: Ryland Gee  [address]  [facility] [address]        Dear Colleague,    Thank you for referring your patient, Ryland Gee, to the [facility] PODIATRY. Please see a copy of my visit note below.    Podiatry / Foot and Ankle Surgery Progress Note    December 6, 2022    Subject: Patient was seen for nonhealing ulceration of the left great toe.  He notes that he has been using the Iodoflex.  States that he gets a lot of \"white\" discoloration at the end of the day when he does a dressing change due to drainage.  Denies fever, nausea, vomiting.  Is wondering why the wound is not getting better.  He notes that he does not wear any offloading devices around the house and is mostly in socks or barefoot.    Objective:  Vitals: /68   Wt 117 kg (258 lb)   BMI 38.10 kg/m      A1C: 5.8 (6/14/2022)     General appearance: Patient is alert and fully cooperative with history & exam.  No sign of distress is noted during the visit.      Dermatologic: Full-thickness ulceration to the plantar aspect of the left IPJ.  Measures approximately 0.6 cm x 0.5 cm x 0.2 cm after debridement.  Base of the wound is granular.  No surrounding erythema purulent drainage or malodor noted.  There is copious amounts of clear serous drainage with maceration around the wound edges.     Vascular: DP & PT pulses are intact & regular bilaterally.   edema and varicosities noted.  CFT and skin temperature is normal to both lower extremities.     Neurologic: Lower extremity sensation is diminished to the toes.     Musculoskeletal: Patient is ambulatory without assistive device or brace.      Assessment:     Diabetic polyneuropathy associated with type 2 diabetes mellitus (H)  Diabetic ulcer of other part of left foot associated with type 2 diabetes mellitus, with fat layer exposed (H)  Hallux limitus, left     Medical Decision Making/Plan: At this time the ulcer was debrided.  Please see " procedure note below.    We will have him switch to daily dressing changes with Iodosorb.  He will cover this with 2x2 gauze pad and a large Band-Aid.  We again talked about getting pressure off of this area.  Recommend super feet insert cut out under the ulcer area.  He is to wear this insert at all times even around the house and not go barefoot or in socks.  We discussed that if it this does not get enough pressure off the area that we would try a boot.    His inserts in his regular shoe was modified today to offload the ulcer however his shoes sizes too big.  We did discuss getting better fitting shoes and inserts however he notes that he did not feel comfortable.  He was given an order for a DH 2 shoe to wear around the house to offload the ulcer there and not go barefoot.   We will have him follow-up in 1 month.  If anything changes he is to call or go to the hospital.  All questions were answered to patient satisfaction and he will call for the questions or concerns.     Procedure: After verbal consent, excisional debridement was performed on ulcer.  #15 blade was used to debride ulcer down to and including subcutaneous tissue. Bleeding controlled with light pressure.   No drainage noted.  No anesthesia was used due to neuropathy. Dry dressing applied to foot.  Patient tolerated procedure well.     Patient Risk Factor:  Patient is a medium risk factor for infection.     Durable Medical Equipment Wound Care Orders     Wound Care Order for DME - ONLY FOR DME   As directed      DME Provider: [facility]    Start Date: 12/6/2022    Wound Supply Order Options: Complex Wound    Optional: .dmewound can be used to pull in order specific information into documentation    Wound Number: Wound 1    Wound 1 Location: left foot ulcer    Wound 1 Dressing Change Frequency: Daily    Wound 1 Length of Need: 30 days    Wound 1 - Dressing Supplies: Primary    Wound 1 - Primary Dressing Dispensing Instructions: Ok to  Substitute    Wound 1 - Primary Dressing Types: Other (Comments)    Wound 1 - Other Primary Dressing Type: Idosorb Gel    Wound 1 - Iodosorb Gel Size: 10 gm    Wound 1 - Iodosorb Gel Quantity: 1 Tube    Diabetic polyneuropathy associated with type 2 diabetes mellitus (H)            Christina Sahni DPM, Podiatry/Foot and Ankle Surgery               Again, thank you for allowing me to participate in the care of your patient.        Sincerely,        Christina Sahni DPM, Podiatry/Foot and Ankle Surgery

## 2022-12-15 ENCOUNTER — TELEPHONE (OUTPATIENT)
Dept: FAMILY MEDICINE | Facility: CLINIC | Age: 52
End: 2022-12-15

## 2022-12-15 ENCOUNTER — MYC MEDICAL ADVICE (OUTPATIENT)
Dept: FAMILY MEDICINE | Facility: CLINIC | Age: 52
End: 2022-12-15

## 2022-12-15 DIAGNOSIS — U07.1 INFECTION DUE TO 2019 NOVEL CORONAVIRUS: Primary | ICD-10-CM

## 2022-12-15 DIAGNOSIS — U07.1 INFECTION DUE TO 2019 NOVEL CORONAVIRUS: ICD-10-CM

## 2022-12-15 NOTE — TELEPHONE ENCOUNTER
Pt has COVID and is worried about his diabetes while having COVID. He is also worried about his taste and smell that are gone; he wants to know if Dr. Pastrana would recommend that he do anything.     Routing message to Dr. Pastrana and  triage

## 2022-12-16 NOTE — TELEPHONE ENCOUNTER
Criteria met,  .  Patient will hold the simvastatin prior to, while taking and for 5 days after.  In reduce his oxycodone dose

## 2022-12-16 NOTE — TELEPHONE ENCOUNTER
Patient called he would like to take medicine, meets criteria but will take less of his oxycodone and narcotics as well as hold the simvastatin.  Sent to pharmacy.

## 2022-12-16 NOTE — PATIENT INSTRUCTIONS
COVID-19 Outpatient Treatments  Your care team can help you find the best treatments for COVID-19. Talk to a health care provider or refer to the FDA medicine fact sheets below.    Important: You can't have Paxlovid or molnupiravir if you're starting the medicine more than 5 days after your symptoms have started.  Paxlovid: https://www.fda.gov/media/781052/download  Molnupiravir (Lagevrio): https://www.fda.gov/media/106901/download  Paxlovid (nimatrelvir and ritonavir)  How it works  Two medicines (nirmatrelvir and ritonavir) are taken together. They stop the virus from growing. Less amount of virus is easier for your body to fight.  Benefits  Lowers risk of a hospital stay or death from COVID-19.  How to take    Medicine comes in a daily container with both medicine tablets. Take by mouth twice daily (once in the morning, once at night) for 5 days.    The number of tablets to take varies by patient.    Don't chew or break capsules. Swallow whole.  When to take  Take as soon as possible after positive COVID-19 test result, and within 5 days of your first symptoms.  Who can take it  Patients must be 12 years or older, weigh at least 88 pounds, and have tested positive for COVID-19. Paxlovid is the preferred treatment for pregnant patients.  Possible side effects  Can cause altered sense of taste, diarrhea (loose, watery stools), high blood pressure, muscle aches.  Medicine conflicts    Some medicines may conflict with Paxlovid and may cause serious side effects.    Tell your care team about all the medicines you take, including prescription and over-the-counter medicines, vitamins, and herbal supplements.    Your care team will review your medicines to make sure that you can safely take Paxlovid.  Cautions    Paxlovid is not advised for patients with severe kidney or liver disease. If you have kidney or liver problems, the dose may need to be changed.    If you're pregnant or breastfeeding, talk to your care team  about your options.    If you take hormonal birth control (such as the Pill), then you or your partner should also use a non-hormonal form of birth control (such as a condom). Keep doing this for 1 menstrual cycle after your last dose of Paxlovid.  Molnupiravir (Lagevrio)  How it works  Stops the virus from growing. Less amount of virus is easier for your body to fight.  Benefits  Lowers risk of a hospital stay or death from COVID-19.  How to take  Take 4 capsules by mouth every 12 hours (4 in the morning and 4 at night) for 5 days. Don't chew or break capsules. Swallow whole.  When to take  Take as soon as possible after positive COVID-19 test result, and within 5 days of your first symptoms.  Who can take it  Patients must be 18 years or older and have tested positive for COVID-19.  Possible side effects  Diarrhea (loose, watery stools), nausea (feeling sick to your stomach), dizziness, headaches.  Medicine conflicts  Right now, there are no known conflicts with other drugs. But tell your care team about all medicines you take.  Cautions    This medicine is not advised for patients who are pregnant.    If you are someone who could become pregnant, use trusted birth control until 4 days after your last dose of molnupiravir.    If your partner could become pregnant, you should use trusted birth control until 3 months after your last dose of molnupiravir.  For informational purposes only. Not to replace the advice of your health care provider. Copyright   2022 [facility]. All rights reserved. Clinically reviewed by Indira Grubbs, PharmD, BCACP. [employer] RFID [id number] - REV 12/22.    Instructions for Patients      What are the symptoms of COVID-19?  Symptoms can include fever, cough, shortness of breath, chills, headache, muscle pain sore throat, fatigue, runny or stuffy nose, and loss of taste and smell. Other less common symptoms include nausea, vomiting, or diarrhea (watery stools).    Know when to call  911. Emergency warning signs include:    Trouble breathing or shortness of breath    Pain or pressure in the chest that doesn't go away    Feeling confused like you haven't felt before, or not being able to wake up    Bluish-colored lips or face    How can I take care of myself?  4. Get lots of rest. Drink extra fluids (unless a doctor has told you not to).  5. Take Tylenol (acetaminophen) for fever or pain. If you have liver or kidney problems, ask your family doctor if it's okay to take Tylenol   Adults:   650 mg (two 325 mg pills or tablets) every 4 to 6 hours, or...   1,000 mg (two 500 mg pills or tablets) every 8 hours as needed.  Note: Don't take more than 3,000 mg in one day. Acetaminophen is found in many medicines (both prescribed and over the counter). Read all labels to be sure you don't take too much.  For children, check the Tylenol bottle for the right dose. The dose is based on the child's age or weight.  6. Take over the counter medicines for your symptoms as needed. Talk to your pharmacist.  7. If you have other health problems (like cancer, heart failure, an organ transplant, or severe kidney disease): Call your specialty clinic if you don't feel better in the next 2 days.    Where can I get more information?    [facility] COVID-19 Resource Hub: [email or web address]     CDC Quarantine & Isolation: https://www.cdc.gov/coronavirus/2019-ncov/your-health/quarantine-isolation.html     CDC - What to Do If You're Sick: https://www.cdc.gov/coronavirus/2019-ncov/if-you-are-sick/index.html  Coping with Life After COVID-19  Being in the hospital because of COVID-19 is scary. Going home can be scary, too. You may face changes to your life, the way you work or what you can eat. It s hard to adjust to change, and it s normal to feel afraid, frustrated or even angry. These feelings usually go away over time. If your feelings don t start to get better, it s called  adjustment disorder.      What  signs should I look out for?  Adjustment disorder can happen to anyone in a time of stress. It makes it hard to cope with daily life. You may feel lonely or fight with loved ones, even if you re glad to be home. Watch for these signs:  Fear or worry  Hard time focusing  Sadness or anger  Trouble talking to family or friends  Feeling like you don t fit in or isolating yourself  Problems with sleep   Drinking alcohol or taking drugs to cope    What can I do?  You can help yourself get better. Feeling you have control helps you move forward. You may wonder if you ll be able to do things you did before. Be patient. Do your best to make the most of every day. Try to build relationships, be as active as you can, eat right and keep a sense of humor. Avoid smoking and drinking too much alcohol. Call your family doctor or clinic if you re not sure what to do. They can guide you to care or other services.    When should I get help?  Think about getting counseling if your sadness or frustration gets worse. Together with a trained counselor, you can talk about your problems adjusting to life after your hospital stay. You can come up with new ways to handle changes that give you more control. Your family doctor or care team can help you find a counselor.     Get help if you re thinking about hurting yourself. If you need help right away, call 911 or go to the nearest emergency room. You can also try the Crisis Text Line.    Crisis Text Line: 431-901 (http://www.crisistextline.org)  The Crisis Text Line serves anyone, in any crisis. It gives free, 24/7 support. Here's how it works:  Text HOME to 246469 from anywhere in the [place], anytime, about any type of crisis.  A live, trained Crisis Counselor will text you back quickly.  The volunteer Crisis Counselor can help you move from a  hot moment  to a  cool moment.  They can also help you work out a safety plan.

## 2022-12-29 ENCOUNTER — VIRTUAL VISIT (OUTPATIENT)
Dept: FAMILY MEDICINE | Facility: CLINIC | Age: 52
End: 2022-12-29
Payer: COMMERCIAL

## 2022-12-29 DIAGNOSIS — E11.42 TYPE 2 DIABETES MELLITUS WITH DIABETIC POLYNEUROPATHY, WITHOUT LONG-TERM CURRENT USE OF INSULIN (H): ICD-10-CM

## 2022-12-29 DIAGNOSIS — G89.29 CHRONIC LOW BACK PAIN, UNSPECIFIED BACK PAIN LATERALITY, UNSPECIFIED WHETHER SCIATICA PRESENT: ICD-10-CM

## 2022-12-29 DIAGNOSIS — M54.50 CHRONIC LOW BACK PAIN, UNSPECIFIED BACK PAIN LATERALITY, UNSPECIFIED WHETHER SCIATICA PRESENT: ICD-10-CM

## 2022-12-29 DIAGNOSIS — Z12.11 SCREEN FOR COLON CANCER: ICD-10-CM

## 2022-12-29 DIAGNOSIS — G62.9 PERIPHERAL POLYNEUROPATHY: ICD-10-CM

## 2022-12-29 DIAGNOSIS — F11.90 CHRONIC, CONTINUOUS USE OF OPIOIDS: ICD-10-CM

## 2022-12-29 PROCEDURE — 99213 OFFICE O/P EST LOW 20 MIN: CPT | Mod: 95 | Performed by: FAMILY MEDICINE

## 2022-12-29 RX ORDER — OXYCODONE HYDROCHLORIDE 15 MG/1
15 TABLET ORAL 2 TIMES DAILY PRN
Qty: 60 TABLET | Refills: 0 | Status: SHIPPED | OUTPATIENT
Start: 2023-01-02 | End: 2023-02-01

## 2022-12-29 RX ORDER — PREGABALIN 75 MG/1
75 CAPSULE ORAL 2 TIMES DAILY
Qty: 180 CAPSULE | Refills: 1 | Status: CANCELLED | OUTPATIENT
Start: 2022-12-29

## 2022-12-29 RX ORDER — OXYCODONE 18 MG/1
18 CAPSULE, EXTENDED RELEASE ORAL EVERY 12 HOURS
Qty: 60 CAPSULE | Refills: 0 | Status: SHIPPED | OUTPATIENT
Start: 2023-01-02 | End: 2023-02-01

## 2022-12-29 NOTE — PROGRESS NOTES
"Ryland is a 52 year old who is being evaluated via a billable video visit.      How would you like to obtain your AVS? MyChart  If the video visit is dropped, the invitation should be resent by: Text to cell phone: [phone]  Will anyone else be joining your video visit? No          Assessment & Plan   Type 2 diabetes mellitus with diabetic polyneuropathy, without long-term current use of insulin (H)   Controlled - continue medication.       Peripheral polyneuropathy  Chronic low back pain  Chronic, continuous use of opioids - every 3 month medication checks  Symptoms improved while taking Paxlovid for his COVID-19 infection, could have been due to the interaction and higher levels of medicines.  Alternatively may have been a side effect of the medicine.  Otherwise continue with current plan:  - oxyCODONE IR (ROXICODONE) 15 MG tablet  Dispense: 60 tablet; Refill: 0  - oxyCODONE (XTAMPZA ER) 18 MG 12 hr tablet  Dispense: 60 capsule; Refill: 0    Screen for colon cancer  Due for screening:  - Colonoscopy Screening  Referral      BMI:   Estimated body mass index is 38.1 kg/m  as calculated from the following:    Height as of 6/14/22: 1.753 m (5' 9\").    Weight as of 12/6/22: 117 kg (258 lb).   Weight management plan: Discussed healthy diet and exercise guidelines      Return in about 3 months (around 3/29/2023) for medication recheck, with a video visit, with Dr Dannie Pastrana.      Dannie Pastrana MD      [address] [address]  [email or web address]   Office: [phone]       Rosales Marcelino is a 52 year old, presenting for the following health issues:  Recheck Medication and Refill Request      HPI   Recent covid-19 infection - he had more energy with taking the paxclovid medication (he lowered his opoid dosage due to interaction.) Also only took morning dose of his  Symptoms resolved entirely after about 5-6 days - felt \"knocked\" out and fatigued. He had more " muscle aches when he stopped the well-appearing and happy get sick but more often medication       Diabetes Follow-up iron noted.    How often are you checking your blood sugar? Two times daily  What time of day are you checking your blood sugars (select all that apply)?  Morning and night  Have you had any blood sugars above 200?  No  Have you had any blood sugars below 70?  Yes .     What symptoms do you notice when your blood sugar is low?  Lethargy    What concerns do you have today about your diabetes? None     Do you have any of these symptoms? (Select all that apply)  Blurry vision    Have you had a diabetic eye exam in the last 12 months? Yes-  Location: 07/22, [facility].        BP Readings from Last 2 Encounters:   12/06/22 100/68   09/29/22 102/70     Hemoglobin A1C (%)   Date Value   06/14/2022 5.8 (H)   11/12/2021 5.8 (H)   04/15/2021 8.7 (H)   01/18/2020 7.9 (H)     LDL Cholesterol Calculated (mg/dL)   Date Value   06/14/2022 85   04/15/2021 70   01/18/2020 54               Hyperlipidemia Follow-Up      Are you regularly taking any medication or supplement to lower your cholesterol?   Yes- Simvastatin    Are you having muscle aches or other side effects that you think could be caused by your cholesterol lowering medication?  Yes- muscle aches and sore to move.    Depression and Anxiety Follow-Up    How are you doing with your depression since your last visit? No change    How are you doing with your anxiety since your last visit?  No change    Are you having other symptoms that might be associated with depression or anxiety? No    Have you had a significant life event? No     Do you have any concerns with your use of alcohol or other drugs? No    Social History     Tobacco Use     Smoking status: Never     Smokeless tobacco: Never   Vaping Use     Vaping Use: Never used   Substance Use Topics     Alcohol use: No     Comment: Does not drink - last was 11/2015     Drug use: No     PHQ  11/12/2021 2/15/2022 6/14/2022   PHQ-9 Total Score 5 19 6   Q9: Thoughts of better off dead/self-harm past 2 weeks Not at all Not at all Not at all     BUSHRA-7 SCORE 8/9/2021 2/15/2022 6/14/2022   Total Score 1 (minimal anxiety) 1 (minimal anxiety) 1 (minimal anxiety)   Total Score 1 1 1     Last PHQ-9 6/14/2022   1.  Little interest or pleasure in doing things 0   2.  Feeling down, depressed, or hopeless 1   3.  Trouble falling or staying asleep, or sleeping too much 0   4.  Feeling tired or having little energy 3   5.  Poor appetite or overeating 2   6.  Feeling bad about yourself 0   7.  Trouble concentrating 0   8.  Moving slowly or restless 0   Q9: Thoughts of better off dead/self-harm past 2 weeks 0   PHQ-9 Total Score 6   Difficulty at work, home, or with people -     BUSHRA-7  6/14/2022   1. Feeling nervous, anxious, or on edge 0   2. Not being able to stop or control worrying 0   3. Worrying too much about different things 1   4. Trouble relaxing 0   5. Being so restless that it is hard to sit still 0   6. Becoming easily annoyed or irritable 0   7. Feeling afraid, as if something awful might happen 0   BUSHRA-7 Total Score 1   If you checked any problems, how difficult have they made it for you to do your work, take care of things at home, or get along with other people? -       Suicide Assessment Five-step Evaluation and Treatment (SAFE-T)      How many servings of fruits and vegetables do you eat daily?  0-1    On average, how many sweetened beverages do you drink each day (Examples: soda, juice, sweet tea, etc.  Do NOT count diet or artificially sweetened beverages)?   2    How many days per week do you exercise enough to make your heart beat faster? 3 or less    How many minutes a day do you exercise enough to make your heart beat faster? 20 - 29  How many days per week do you miss taking your medication? 1    What makes it hard for you to take your medications?  remembering to take        Review of Systems        Objective           Vitals:  No vitals were obtained today due to virtual visit.    Physical Exam   GENERAL: Healthy, alert and no distress  EYES: Eyes grossly normal to inspection.  No discharge or erythema, or obvious scleral/conjunctival abnormalities.  RESP: No audible wheeze, cough, or visible cyanosis.  No visible retractions or increased work of breathing.    SKIN: Visible skin clear. No significant rash, abnormal pigmentation or lesions.  NEURO: Cranial nerves grossly intact.  Mentation and speech appropriate for age.  PSYCH: Mentation appears normal, affect normal/bright, judgement and insight intact, normal speech and appearance well-groomed.          Video-Visit Details    Type of service:  Video Visit     Originating Location (pt. Location): Home    Distant Location (provider location):  On-site  Platform used for Video Visit: [employer]

## 2023-01-31 DIAGNOSIS — G89.29 CHRONIC LOW BACK PAIN, UNSPECIFIED BACK PAIN LATERALITY, UNSPECIFIED WHETHER SCIATICA PRESENT: ICD-10-CM

## 2023-01-31 DIAGNOSIS — M54.50 CHRONIC LOW BACK PAIN, UNSPECIFIED BACK PAIN LATERALITY, UNSPECIFIED WHETHER SCIATICA PRESENT: ICD-10-CM

## 2023-01-31 DIAGNOSIS — F11.90 CHRONIC, CONTINUOUS USE OF OPIOIDS: ICD-10-CM

## 2023-01-31 DIAGNOSIS — G62.9 PERIPHERAL POLYNEUROPATHY: ICD-10-CM

## 2023-02-01 ENCOUNTER — TELEPHONE (OUTPATIENT)
Dept: GASTROENTEROLOGY | Facility: CLINIC | Age: 53
End: 2023-02-01
Payer: COMMERCIAL

## 2023-02-01 ENCOUNTER — HOSPITAL ENCOUNTER (OUTPATIENT)
Facility: AMBULATORY SURGERY CENTER | Age: 53
End: 2023-02-01
Attending: INTERNAL MEDICINE | Admitting: INTERNAL MEDICINE
Payer: COMMERCIAL

## 2023-02-01 RX ORDER — OXYCODONE HYDROCHLORIDE 15 MG/1
15 TABLET ORAL 2 TIMES DAILY PRN
Qty: 60 TABLET | Refills: 0 | Status: SHIPPED | OUTPATIENT
Start: 2023-02-01 | End: 2023-02-06

## 2023-02-01 RX ORDER — OXYCODONE 18 MG/1
18 CAPSULE, EXTENDED RELEASE ORAL EVERY 12 HOURS
Qty: 60 CAPSULE | Refills: 0 | Status: SHIPPED | OUTPATIENT
Start: 2023-02-01 | End: 2023-02-06

## 2023-02-01 NOTE — TELEPHONE ENCOUNTER
Screening Questions  BLUE  KIND OF PREP RED  LOCATION [review exclusion criteria] GREEN  SEDATION TYPE        Yes Are you active on mychart?      Victoriano  Ordering/Referring Provider?        [employer] What type of coverage do you have?      N Have you had a positive covid test in the last 14 days?     37.3 1. BMI  [BMI 40+ - review exclusion criteria]    Yes  2. Are you able to give consent for your medical care? [IF NO,RN REVIEW]          Oxycodone, xtampza, lyrica 3. Are you taking any prescription pain medications on a routine schedule   (ex narcotics: tramadol, oxycodone, roxicodone, oxycontin,  and percocet)?        NA  3a. EXTENDED PREP What kind of prescription?     N 4. Do you have any chemical dependencies such as alcohol, street drugs, or methadone?        **If yes 3- 5 , please schedule with MAC sedation.**          IF YES TO ANY 6 - 10 - HOSPITAL SETTING ONLY.     N 6.   Do you need assistance transferring?     N 7.   Have you had a heart or lung transplant?    N 8.   Are you currently on dialysis?   N 9.   Do you use daily home oxygen?   N 10. Do you take nitroglycerin?   10a. NA If yes, how often?     11. [FEMALES]  NA Are you currently pregnant?    11a. NA If yes, how many weeks? [ Greater than 12 weeks, OR NEEDED]    N 12. Do you have Pulmonary Hypertension? *NEED PAC APPT AT [facility]*     N 13. [review exclusion criteria]  Do you have any implantable devices in your body (pacemaker, defib, LVAD)?    N 14. In the past 6 months, have you had any heart related issues including cardiomyopathy or heart attack?     14a. N If yes, did it require cardiac stenting if so when?     N 15. Have you had a stroke or Transient ischemic attack (TIA - aka  mini stroke ) within 6 months?      N 16. Do you have mod to severe Obstructive Sleep Apnea?  [Hospital only]    N 17. Do you have SEVERE AND UNCONTROLLED asthma? *NEED PAC APPT AT [facility]*     N 18. Are you currently taking any blood thinners?     18a. If yes, inform  "patient to \"follow up w/ ordering provider for bridging instructions.\"    N 19. Do you take the medication Phentermine?    19a. If yes, \"Hold for 7 days before procedure.  Please consult your prescribing provider if you have questions about holding this medication.\"     N  20. Do you have chronic kidney disease?      Yes  21. Do you have a diagnosis of diabetes?     Yes  22. On a regular basis do you go 3-5 days between bowel movements?      23. Preferred LOCAL Pharmacy for Pre Prescription    [ LIST ONLY ONE PHARMACY]          [facility] [address] [place] [address] [place]        - CLOSING REMINDERS -    Informed patient they will need an adult    Cannot take any type of public or medical transportation alone    Conscious Sedation- Needs  for 6 hours after the procedure       MAC/General-Needs  for 24 hours after procedure    Pre-Procedure Covid test to be completed [[facility] PCR Testing Required]    Confirmed Nurse will call to complete assessment       - SCHEDULING DETAILS -  N Hospital Setting Required? If yes, what is the exclusion?: NA   [place]  Surgeon    4/5/23  Date of Procedure  Lower Endoscopy [Colonoscopy]  Type of Procedure Scheduled  [facility] [place] [facility]   GOLYTELY EXTENDED - If you answer yes to questions #1, #3, #22 (De Joseen and CF pts)Which Colonoscopy Prep was Sent?  Pain meds and constipation   MAC-pain meds Sedation Type     No PAC / Pre-op Required                 "

## 2023-02-06 ENCOUNTER — OFFICE VISIT (OUTPATIENT)
Dept: FAMILY MEDICINE | Facility: CLINIC | Age: 53
End: 2023-02-06
Payer: COMMERCIAL

## 2023-02-06 VITALS
OXYGEN SATURATION: 96 % | BODY MASS INDEX: 39.6 KG/M2 | SYSTOLIC BLOOD PRESSURE: 128 MMHG | DIASTOLIC BLOOD PRESSURE: 80 MMHG | TEMPERATURE: 97.6 F | RESPIRATION RATE: 16 BRPM | HEART RATE: 83 BPM | WEIGHT: 267.4 LBS | HEIGHT: 69 IN

## 2023-02-06 DIAGNOSIS — E11.42 TYPE 2 DIABETES MELLITUS WITH DIABETIC POLYNEUROPATHY, WITHOUT LONG-TERM CURRENT USE OF INSULIN (H): ICD-10-CM

## 2023-02-06 DIAGNOSIS — E11.621 DIABETIC ULCER OF TOE OF LEFT FOOT ASSOCIATED WITH TYPE 2 DIABETES MELLITUS, LIMITED TO BREAKDOWN OF SKIN (H): Primary | ICD-10-CM

## 2023-02-06 DIAGNOSIS — M25.551 HIP PAIN, RIGHT: ICD-10-CM

## 2023-02-06 DIAGNOSIS — F11.90 CHRONIC, CONTINUOUS USE OF OPIOIDS: ICD-10-CM

## 2023-02-06 DIAGNOSIS — L97.521 DIABETIC ULCER OF TOE OF LEFT FOOT ASSOCIATED WITH TYPE 2 DIABETES MELLITUS, LIMITED TO BREAKDOWN OF SKIN (H): Primary | ICD-10-CM

## 2023-02-06 DIAGNOSIS — E66.01 MORBID OBESITY DUE TO EXCESS CALORIES (H): ICD-10-CM

## 2023-02-06 DIAGNOSIS — G62.9 PERIPHERAL POLYNEUROPATHY: ICD-10-CM

## 2023-02-06 DIAGNOSIS — R10.13 EPIGASTRIC PAIN: ICD-10-CM

## 2023-02-06 DIAGNOSIS — M54.50 CHRONIC LOW BACK PAIN, UNSPECIFIED BACK PAIN LATERALITY, UNSPECIFIED WHETHER SCIATICA PRESENT: ICD-10-CM

## 2023-02-06 DIAGNOSIS — Z12.11 SCREEN FOR COLON CANCER: ICD-10-CM

## 2023-02-06 DIAGNOSIS — K51.919 ULCERATIVE COLITIS WITH COMPLICATION, UNSPECIFIED LOCATION (H): ICD-10-CM

## 2023-02-06 DIAGNOSIS — F33.0 MAJOR DEPRESSIVE DISORDER, RECURRENT EPISODE, MILD (H): ICD-10-CM

## 2023-02-06 DIAGNOSIS — G89.29 CHRONIC LOW BACK PAIN, UNSPECIFIED BACK PAIN LATERALITY, UNSPECIFIED WHETHER SCIATICA PRESENT: ICD-10-CM

## 2023-02-06 PROBLEM — L97.522 DIABETIC ULCER OF OTHER PART OF LEFT FOOT ASSOCIATED WITH TYPE 2 DIABETES MELLITUS, WITH FAT LAYER EXPOSED (H): Status: ACTIVE | Noted: 2023-02-06

## 2023-02-06 LAB
ALBUMIN SERPL BCG-MCNC: 4.1 G/DL (ref 3.5–5.2)
ALP SERPL-CCNC: 74 U/L (ref 40–129)
ALT SERPL W P-5'-P-CCNC: 19 U/L (ref 10–50)
ANION GAP SERPL CALCULATED.3IONS-SCNC: 11 MMOL/L (ref 7–15)
AST SERPL W P-5'-P-CCNC: 21 U/L (ref 10–50)
BILIRUB SERPL-MCNC: 0.2 MG/DL
BUN SERPL-MCNC: 12.7 MG/DL (ref 6–20)
CALCIUM SERPL-MCNC: 9.2 MG/DL (ref 8.6–10)
CHLORIDE SERPL-SCNC: 106 MMOL/L (ref 98–107)
CREAT SERPL-MCNC: 1.08 MG/DL (ref 0.67–1.17)
CREAT UR-MCNC: 377 MG/DL
DEPRECATED HCO3 PLAS-SCNC: 25 MMOL/L (ref 22–29)
GFR SERPL CREATININE-BSD FRML MDRD: 83 ML/MIN/1.73M2
GLUCOSE SERPL-MCNC: 103 MG/DL (ref 70–99)
HBA1C MFR BLD: 6.2 % (ref 0–5.6)
MICROALBUMIN UR-MCNC: <12 MG/L
MICROALBUMIN/CREAT UR: NORMAL MG/G{CREAT}
POTASSIUM SERPL-SCNC: 4.6 MMOL/L (ref 3.4–5.3)
PROT SERPL-MCNC: 7.1 G/DL (ref 6.4–8.3)
SODIUM SERPL-SCNC: 142 MMOL/L (ref 136–145)

## 2023-02-06 PROCEDURE — 99214 OFFICE O/P EST MOD 30 MIN: CPT | Performed by: FAMILY MEDICINE

## 2023-02-06 PROCEDURE — 82043 UR ALBUMIN QUANTITATIVE: CPT | Performed by: FAMILY MEDICINE

## 2023-02-06 PROCEDURE — 83036 HEMOGLOBIN GLYCOSYLATED A1C: CPT | Performed by: FAMILY MEDICINE

## 2023-02-06 PROCEDURE — 80053 COMPREHEN METABOLIC PANEL: CPT | Performed by: FAMILY MEDICINE

## 2023-02-06 PROCEDURE — 82570 ASSAY OF URINE CREATININE: CPT | Performed by: FAMILY MEDICINE

## 2023-02-06 PROCEDURE — 36415 COLL VENOUS BLD VENIPUNCTURE: CPT | Performed by: FAMILY MEDICINE

## 2023-02-06 RX ORDER — OXYCODONE HYDROCHLORIDE 15 MG/1
15 TABLET ORAL 2 TIMES DAILY PRN
Qty: 60 TABLET | Refills: 0 | Status: SHIPPED | OUTPATIENT
Start: 2023-03-03 | End: 2023-04-02

## 2023-02-06 RX ORDER — PREGABALIN 75 MG/1
75 CAPSULE ORAL 2 TIMES DAILY
Qty: 180 CAPSULE | Refills: 1 | Status: SHIPPED | OUTPATIENT
Start: 2023-02-06 | End: 2023-06-30

## 2023-02-06 RX ORDER — OXYCODONE 18 MG/1
18 CAPSULE, EXTENDED RELEASE ORAL EVERY 12 HOURS
Qty: 60 CAPSULE | Refills: 0 | Status: SHIPPED | OUTPATIENT
Start: 2023-03-03 | End: 2023-04-02

## 2023-02-06 RX ORDER — OXYCODONE HYDROCHLORIDE 15 MG/1
15 TABLET ORAL 2 TIMES DAILY PRN
Qty: 60 TABLET | Refills: 0 | Status: SHIPPED | OUTPATIENT
Start: 2023-05-02 | End: 2023-06-02

## 2023-02-06 RX ORDER — OXYCODONE 18 MG/1
18 CAPSULE, EXTENDED RELEASE ORAL EVERY 12 HOURS
Qty: 60 CAPSULE | Refills: 0 | Status: SHIPPED | OUTPATIENT
Start: 2023-05-02 | End: 2023-06-02

## 2023-02-06 RX ORDER — OXYCODONE 18 MG/1
18 CAPSULE, EXTENDED RELEASE ORAL EVERY 12 HOURS
Qty: 60 CAPSULE | Refills: 0 | Status: SHIPPED | OUTPATIENT
Start: 2023-04-02 | End: 2023-05-02

## 2023-02-06 RX ORDER — OXYCODONE HYDROCHLORIDE 15 MG/1
15 TABLET ORAL 2 TIMES DAILY PRN
Qty: 60 TABLET | Refills: 0 | Status: SHIPPED | OUTPATIENT
Start: 2023-04-02 | End: 2023-05-02

## 2023-02-06 ASSESSMENT — ANXIETY QUESTIONNAIRES
8. IF YOU CHECKED OFF ANY PROBLEMS, HOW DIFFICULT HAVE THESE MADE IT FOR YOU TO DO YOUR WORK, TAKE CARE OF THINGS AT HOME, OR GET ALONG WITH OTHER PEOPLE?: SOMEWHAT DIFFICULT
GAD7 TOTAL SCORE: 6
IF YOU CHECKED OFF ANY PROBLEMS ON THIS QUESTIONNAIRE, HOW DIFFICULT HAVE THESE PROBLEMS MADE IT FOR YOU TO DO YOUR WORK, TAKE CARE OF THINGS AT HOME, OR GET ALONG WITH OTHER PEOPLE: SOMEWHAT DIFFICULT
GAD7 TOTAL SCORE: 6
2. NOT BEING ABLE TO STOP OR CONTROL WORRYING: SEVERAL DAYS
5. BEING SO RESTLESS THAT IT IS HARD TO SIT STILL: NOT AT ALL
GAD7 TOTAL SCORE: 6
4. TROUBLE RELAXING: SEVERAL DAYS
7. FEELING AFRAID AS IF SOMETHING AWFUL MIGHT HAPPEN: SEVERAL DAYS
1. FEELING NERVOUS, ANXIOUS, OR ON EDGE: MORE THAN HALF THE DAYS
3. WORRYING TOO MUCH ABOUT DIFFERENT THINGS: SEVERAL DAYS
6. BECOMING EASILY ANNOYED OR IRRITABLE: NOT AT ALL
7. FEELING AFRAID AS IF SOMETHING AWFUL MIGHT HAPPEN: SEVERAL DAYS

## 2023-02-06 ASSESSMENT — PATIENT HEALTH QUESTIONNAIRE - PHQ9
SUM OF ALL RESPONSES TO PHQ QUESTIONS 1-9: 11
10. IF YOU CHECKED OFF ANY PROBLEMS, HOW DIFFICULT HAVE THESE PROBLEMS MADE IT FOR YOU TO DO YOUR WORK, TAKE CARE OF THINGS AT HOME, OR GET ALONG WITH OTHER PEOPLE: SOMEWHAT DIFFICULT
SUM OF ALL RESPONSES TO PHQ QUESTIONS 1-9: 11

## 2023-02-06 NOTE — PROGRESS NOTES
Assessment & Plan   Peripheral polyneuropathy  Chronic low back pain  Chronic, continuous use of opioids - every 3 month medication checks  Ongoing pain and medications helpful and will continue: No concerns in use.  Does get associated constipation and continue medications and diet adjustments as needed.  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  Essentially healed.    Type 2 diabetes mellitus with diabetic polyneuropathy, without long-term current use of insulin (H)  Controlled - continue medication(s).  - Albumin Random Urine Quantitative with Creat Ratio  - HEMOGLOBIN A1C  - COMPREHENSIVE METABOLIC PANEL  - Albumin Random Urine Quantitative with Creat Ratio  - HEMOGLOBIN A1C  - COMPREHENSIVE METABOLIC PANEL    Major depressive disorder, recurrent episode, mild (H)  Stable, continue medication.    Ulcerative colitis with complication, unspecified location (H)  Prior history and no current symptoms.    Morbid obesity due to excess calories (H)  Healthy diet and exercise.    Hip pain, right  Lateral hip pain suspicious for trochanteric bursitis, stretches and range of motion exercises recommended.  Ice and/or massage/heat to the area as needed.    Epigastric pain  Chronic history of medications helpful and will continue.  - omeprazole (PRILOSEC) 20 MG DR capsule  Dispense: 90 capsule; Refill: 0        Return in about 6 months (around 8/6/2023) for wellness exam with fasting labs with Dannie Pastrana MD. and medication recheck.      Dannie Pastrana MD      [address] [address]  [email or web address]   Office: [phone]       Subjective   Harm is a 53 year old, presenting for the  following health issues:  Back Pain      History of Present Illness       Mental Health Follow-up:  Patient presents to follow-up on Depression & Anxiety.Patient's depression since last visit has been:  No change  The patient is not having other symptoms associated with depression.  Patient's anxiety since last visit has been:  Worse  The patient is having other symptoms associated with anxiety.  Any significant life events: No  Patient is feeling anxious or having panic attacks.  Patient has no concerns about alcohol or drug use.    Diabetes:   He presents for follow up of diabetes.  He is checking home blood glucose one time daily. He checks blood glucose before and after meals and at bedtime.  Blood glucose is never over 200 and sometimes under 70. He is aware of hypoglycemia symptoms including shakiness, dizziness, lethargy, blurred vision and confusion. He has no concerns regarding his diabetes at this time.  He is having numbness in feet, burning in feet and weight gain.         Heart Failure hx:  He presents for follow up of heart failure. He is experiencing shortness of breath with rest and activity, He is experiencing lower extremity edema which is worse than usual. He has orthopenea and is not coughing at night. Patient is not checking weight daily. He has recently had a weight increase. He has side effects from medications including fatigue. He has had no other medical visits for heart failure since the last visit.    He eats 0-1 servings of fruits and vegetables daily.He consumes 2 sweetened beverage(s) daily.He exercises with enough effort to increase his heart rate 9 or less minutes per day.  He exercises with enough effort to increase his heart rate 3 or less days per week. He is missing 1 dose(s) of medications per week.    Today's PHQ-9         PHQ-9 Total Score: 11    PHQ-9 Q9 Thoughts of better off dead/self-harm past 2 weeks :   Not at all    How difficult have these problems made it for you to  do your work, take care of things at home, or get along with other people: Somewhat difficult  Today's BUSHRA-7 Score: 6     He gets occasional palpitation of the heart.     Right hip achiness for the last 3 months  - worse if he lay on that side.   More frequent.      Pain History:  When did you first notice your pain? - Chronic Pain   Have you seen this provider for your pain in the past?   Yes   Where in your body do you have pain? Low back pain and lower leg neuropathy   Are you seeing anyone else for your pain? No        PHQ-9 SCORE 2/15/2022 6/14/2022 2/6/2023   PHQ-9 Total Score - - -   PHQ-9 Total Score MyChart 19 (Moderately severe depression) 6 (Mild depression) 11 (Moderate depression)   PHQ-9 Total Score 19 6 11     BUSHRA-7 SCORE 2/15/2022 6/14/2022 2/6/2023   Total Score 1 (minimal anxiety) 1 (minimal anxiety) 6 (mild anxiety)   Total Score 1 1 6     PEG Score 6/14/2022 2/6/2023   PEG Total Score 8.67 8.67       Chronic Pain Follow Up:    Location of pain: low and neuropathy pain  Analgesia/pain control:    - Recent changes:  none    - Overall control: Tolerable with discomfort    - Current treatments: pain meds   Adherence:     - Do you ever take more pain medicine than prescribed? No    - When did you take your last dose of pain medicine?  This am   Adverse effects: None except constipation form pain medications - ges every 3-5 days    PDMP Review       Value Time User    State PDMP site checked  Yes 2/6/2023 11:37 AM Todd Pastrana MD        Last CSA Agreement:   CSA -- Patient Level:     [Media Unavailable] Controlled Substance Agreement - Opioid - Scan on 6/15/2022  5:25 PM   [Media Unavailable] Controlled Substance Agreement - Opioid - Scan on 4/15/2021  1:40 PM   [Media Unavailable] Controlled Substance Agreement - Opioid - Scan on 1/16/2020  4:43 PM       Last UDS: 6/14/2022            Review of Systems   Constitutional, HEENT, cardiovascular, pulmonary, gi and gu systems are negative, except as  "otherwise noted.      Objective    /80   Pulse 83   Temp 97.6  F (36.4  C) (Tympanic)   Resp 16   Ht 1.753 m (5' 9\")   Wt 121.3 kg (267 lb 6.4 oz)   SpO2 96%   BMI 39.49 kg/m    Body mass index is 39.49 kg/m .  Physical Exam   GENERAL: healthy, alert and no distress  NECK: no adenopathy, no asymmetry, masses, or scars and thyroid normal to palpation  RESP: lungs clear to auscultation - no rales, rhonchi or wheezes  CV: regular rate and rhythm, normal S1 S2, no S3 or S4, no murmur, click or rub, no peripheral edema and peripheral pulses strong  ABDOMEN: soft, nontender, no hepatosplenomegaly, no masses and bowel sounds normal  MS: no gross musculoskeletal defects noted, no edema  Diabetic foot exam: normal DP and PT pulses, no trophic changes or ulcerative lesions and reduced sensation at forefoot        Results for orders placed or performed in visit on 02/06/23   HEMOGLOBIN A1C     Status: Abnormal   Result Value Ref Range    Hemoglobin A1C 6.2 (H) 0.0 - 5.6 %                 " No

## 2023-02-07 NOTE — RESULT ENCOUNTER NOTE
Dear Harm,    Here is a summary of your recent test results:  -Liver and gallbladder tests (ALT,AST, Alk phos,bilirubin) are normal.  -Kidney function (GFR) is normal.  -Sodium is normal.  -Potassium is normal.  -Calcium is normal.  -Glucose is elevated due to your diabetes.  -A1C (test of diabetes control the last 2-3 months) is at your goal. Please continue with your current plan. Also, you should make an appointment to see me and recheck your A1C test in 6 months.   -Microalbumin (urine protein) test is normal.  ADVISE: rechecking this annually.    For additional lab test information, [email or web address] is a very good reference.    In addition, here is a list of due or overdue Health Maintenance reminders:  Colorectal Cancer Screening due on 02/27/2018  Pneumococcal Vaccine(2 - PCV) due on 04/18/2018  COVID-19 Vaccine(3 - Booster for Pfizer series) due on 10/18/2021  Zoster (Shingles) Vaccine(1 of 2) Never done  Flu Vaccine(1) due on 09/01/2022    Please call us at [phone] (or use [employer]) to address the above recommendations if needed.           Thank you very much for trusting me and [facility].     Have a peaceful day.    Healthy regards,  Dannie Pastrana MD

## 2023-03-20 ENCOUNTER — TELEPHONE (OUTPATIENT)
Dept: GASTROENTEROLOGY | Facility: CLINIC | Age: 53
End: 2023-03-20

## 2023-03-20 DIAGNOSIS — Z12.11 SPECIAL SCREENING FOR MALIGNANT NEOPLASMS, COLON: Primary | ICD-10-CM

## 2023-03-20 RX ORDER — BISACODYL 5 MG/1
TABLET, DELAYED RELEASE ORAL
Qty: 4 TABLET | Refills: 0 | Status: SHIPPED | OUTPATIENT
Start: 2023-03-20 | End: 2024-04-09

## 2023-03-20 NOTE — TELEPHONE ENCOUNTER
Patient scheduled for Colonoscopy  on 4.5.23.     Discuss Covid policy.     Pre op exam needed? N/A    Arrival time: 0815. Procedure time 0915    [facility]; [address] [place] [address], [address] Floor, [place] [address]    Sedation type: MAC    Anticoagulations? No    Electronic implanted devices? No    Diabetic? Yes - Patient to hold oral diabetic medications day of procedure    Indication for procedure: screening    Bowel prep recommendation: Extended prep Golytely     Prep instructions sent via [employer] Bowel prep script sent to    [facility] - [place], [place] - [address] [place] [address]    Pre visit planning completed.    Melodie Toure RN  Endoscopy Procedure Pre Assessment RN

## 2023-03-21 NOTE — TELEPHONE ENCOUNTER
Attempted to contact patient regarding upcoming Colonoscopy  procedure on 4.5.23 for pre assessment questions. No answer.     Left message to return call to [phone] #4      Melodie Toure RN  Endoscopy Procedure Pre Assessment RN

## 2023-03-29 NOTE — TELEPHONE ENCOUNTER
Second attempt for pre-assessment prior to upcoming colonoscopy on 4.5.23     No answer.  Unable to leave .    [employer] message sent    Melodie Toure RN  Endoscopy Procedure Pre Assessment RN

## 2023-04-03 NOTE — TELEPHONE ENCOUNTER
Third attempt for pre-assessment prior to upcoming colonoscopy     No answer.  Left message to return call [phone] #4    Heidi Villa RN  Endoscopy Procedure Pre Assessment RN

## 2023-04-04 ENCOUNTER — HOSPITAL ENCOUNTER (OUTPATIENT)
Facility: AMBULATORY SURGERY CENTER | Age: 53
End: 2023-04-04
Attending: INTERNAL MEDICINE
Payer: COMMERCIAL

## 2023-04-04 ENCOUNTER — TELEPHONE (OUTPATIENT)
Dept: GASTROENTEROLOGY | Facility: CLINIC | Age: 53
End: 2023-04-04
Payer: COMMERCIAL

## 2023-04-04 NOTE — TELEPHONE ENCOUNTER
Caller: Harm  Reason for Reschedule/Cancellation (please be detailed, any staff messages or encounters to note?): Per epic message    Angelia Hernandez, RN  P Endoscopy Scheduling Pool    Hello,     Can someone please call and reschedule this patient's colonoscopy?     Thank you,     Angelia          Prior to reschedule please review:    Ordering Provider:Victoriano Agosto per order:CS    Does patient have any ASC Exclusions, please identify?: N      Notes on Cancelled Procedure:    Procedure:Lower Endoscopy [Colonoscopy]     Date: 4/5/23    Location:[facility]; [address] [place] [address], [address] [place] [address]    Surgeon: Leventhal        Rescheduled: Yes    Procedure: Lower Endoscopy [Colonoscopy]     Date: 6/23/23    Location:[facility] [place]; [address] [place] [address], [address] [place] [address]    Surgeon: MADINA Phipps    Sedation Level Scheduled  MAC,  Reason for Sedation Level pain meds    Prep/Instructions updated and sent: Yes

## 2023-04-07 ENCOUNTER — TELEPHONE (OUTPATIENT)
Dept: FAMILY MEDICINE | Facility: CLINIC | Age: 53
End: 2023-04-07
Payer: COMMERCIAL

## 2023-04-07 DIAGNOSIS — F11.90 CHRONIC, CONTINUOUS USE OF OPIOIDS: Primary | ICD-10-CM

## 2023-04-07 NOTE — TELEPHONE ENCOUNTER
Narcan dispensed at pharmacy per Opiate Antagonist Protocol and Collaborative Practice Agreement.    Patient requested Narcan for:  own use in the event of an opioid overdose.    Screening Questions:  I currently use or have a history of using opioids.  Yes   I am in contact with a person who has a history of using opioids.  No  The person to whom Narcan will be administered has an allergy to naloxone.  No    Angelia Ramey, [place]  [facility]  PH: [phone]

## 2023-05-21 ENCOUNTER — HEALTH MAINTENANCE LETTER (OUTPATIENT)
Age: 53
End: 2023-05-21

## 2023-05-31 DIAGNOSIS — G62.9 PERIPHERAL POLYNEUROPATHY: ICD-10-CM

## 2023-05-31 DIAGNOSIS — M54.50 CHRONIC LOW BACK PAIN, UNSPECIFIED BACK PAIN LATERALITY, UNSPECIFIED WHETHER SCIATICA PRESENT: ICD-10-CM

## 2023-05-31 DIAGNOSIS — G89.29 CHRONIC LOW BACK PAIN, UNSPECIFIED BACK PAIN LATERALITY, UNSPECIFIED WHETHER SCIATICA PRESENT: ICD-10-CM

## 2023-05-31 DIAGNOSIS — F11.90 CHRONIC, CONTINUOUS USE OF OPIOIDS: ICD-10-CM

## 2023-06-02 RX ORDER — OXYCODONE HYDROCHLORIDE 15 MG/1
15 TABLET ORAL 2 TIMES DAILY PRN
Qty: 60 TABLET | Refills: 0 | Status: SHIPPED | OUTPATIENT
Start: 2023-06-02 | End: 2023-06-30

## 2023-06-02 RX ORDER — OXYCODONE 18 MG/1
CAPSULE, EXTENDED RELEASE ORAL
Qty: 60 CAPSULE | Refills: 0 | Status: SHIPPED | OUTPATIENT
Start: 2023-06-02 | End: 2023-06-30

## 2023-06-08 NOTE — TELEPHONE ENCOUNTER
Rescheduled colonoscopy    Attempted to contact patient regarding upcoming Colonoscopy  procedure on 6.23.23 for pre assessment questions. No answer.     Left message to return call to [phone] #4    Discuss Covid policy and designated  policy.    Pre op exam? N/A    Arrival time: 0930. Procedure time: 1030    [facility]; [address] [place] [address], [address] Floor, [place] [address]    Sedation type: MAC    NSAIDs? No NSAID medications per patient's medication list.  RN will verify with pre-assessment call.    Anticoagulants: No    Electronic implanted devices? No    Diabetic? Yes. Oral medications.  Glimepiride (amaryl). Patient to hold oral diabetic medications day of procedure. and Metformin (glucophage). Patient to hold oral diabetic medications day of procedure. Pt is also listed as taking semaglutide 7 mg orally. Need indication for hold time.    Indication for procedure: screening colonoscopy    Bowel prep recommendation: Extended prep Golytely      Prep instructions sent via [employer]. Bowel prep script previously sent to      [facility] - [place], [place] - [address] [place] [address]        Melodie Toure RN  Endoscopy Procedure Pre Assessment RN

## 2023-06-16 NOTE — TELEPHONE ENCOUNTER
Second attempt for pre-assessment prior to upcoming colonoscopy     No answer.  Unable to leave message, mailbox is full.    Jacklyn Ceron RN  Endoscopy Procedure Pre Assessment RN

## 2023-06-21 NOTE — TELEPHONE ENCOUNTER
Third attempt for pre-assessment prior to upcoming colonoscopy     No answer.  Left message to return call [phone] #4    Pre op exam? N/A     [employer] message sent - patient has not logged in to [employer] since 12/15/22    Ana Contreras RN  Endoscopy Procedure Pre Assessment RN

## 2023-06-22 RX ORDER — LIDOCAINE 40 MG/G
CREAM TOPICAL
Status: CANCELLED | OUTPATIENT
Start: 2023-06-22

## 2023-06-22 RX ORDER — ONDANSETRON 2 MG/ML
4 INJECTION INTRAMUSCULAR; INTRAVENOUS
Status: CANCELLED | OUTPATIENT
Start: 2023-06-22

## 2023-06-23 ENCOUNTER — TELEPHONE (OUTPATIENT)
Dept: GASTROENTEROLOGY | Facility: CLINIC | Age: 53
End: 2023-06-23
Payer: COMMERCIAL

## 2023-06-23 NOTE — TELEPHONE ENCOUNTER
Caller: Writer to patient  Reason for Reschedule/Cancellation (please be detailed, any staff messages or encounters to note?):   Kelsy Blackmon, RN  P Endoscopy Scheduling Pool  Hi     Please reschedule pt for colonoscopy. Pt was a no-show for appt today.     Thanks!     Prior to reschedule please review:    Ordering Provider: Todd Pastrana MD    Sedation per order: Moderate    Does patient have any ASC Exclusions, please identify?: No      Notes on Cancelled Procedure:    Procedure: Lower Endoscopy [Colonoscopy]     Date: 6/23/2023    [facility] [address] [place] [address], 5th Floor, [place] [id number]    Surgeon: LEO Phipps      Rescheduled: No , LVM to reschedule.

## 2023-06-28 DIAGNOSIS — G62.9 PERIPHERAL POLYNEUROPATHY: ICD-10-CM

## 2023-06-28 DIAGNOSIS — M54.50 CHRONIC LOW BACK PAIN, UNSPECIFIED BACK PAIN LATERALITY, UNSPECIFIED WHETHER SCIATICA PRESENT: ICD-10-CM

## 2023-06-28 DIAGNOSIS — F11.90 CHRONIC, CONTINUOUS USE OF OPIOIDS: ICD-10-CM

## 2023-06-28 DIAGNOSIS — G89.29 CHRONIC LOW BACK PAIN, UNSPECIFIED BACK PAIN LATERALITY, UNSPECIFIED WHETHER SCIATICA PRESENT: ICD-10-CM

## 2023-06-30 RX ORDER — PREGABALIN 75 MG/1
75 CAPSULE ORAL 2 TIMES DAILY
Qty: 180 CAPSULE | Refills: 1 | Status: SHIPPED | OUTPATIENT
Start: 2023-06-30 | End: 2023-08-07

## 2023-06-30 RX ORDER — OXYCODONE 18 MG/1
CAPSULE, EXTENDED RELEASE ORAL
Qty: 60 CAPSULE | Refills: 0 | Status: SHIPPED | OUTPATIENT
Start: 2023-07-01 | End: 2023-07-31

## 2023-06-30 RX ORDER — OXYCODONE HYDROCHLORIDE 15 MG/1
15 TABLET ORAL 2 TIMES DAILY PRN
Qty: 60 TABLET | Refills: 0 | Status: SHIPPED | OUTPATIENT
Start: 2023-07-01 | End: 2023-07-31

## 2023-06-30 NOTE — TELEPHONE ENCOUNTER
Med sent for now.  Due for 3-month med check appointment virtual or in person is fine.  Please schedule.  noodls scheduling ticket sent as well.    Last visit in this dept:    2/6/2023     Last visit -this provider:  4/7/2023     Next visit in this dept:   Future Appointments 6/30/2023 - 12/27/2023    None          Health Maintenance   Topic Date Due     COLORECTAL CANCER SCREENING  02/27/2018     Pneumococcal Vaccine: Pediatrics (0 to 5 Years) and At-Risk Patients (6 to 64 Years) (2 - PCV) 04/18/2018     COVID-19 Vaccine (3 - Pfizer series) 10/18/2021     ZOSTER IMMUNIZATION (1 of 2) Never done     A1C  05/06/2023     LIPID  06/14/2023     URINE DRUG SCREEN  06/14/2023     ANNUAL REVIEW OF HM ORDERS  06/14/2023     TREATMENT AGREEMENT FOR CHRONIC PAIN MANAGEMENT  06/15/2023     YEARLY PREVENTIVE VISIT  06/14/2023     PSA  06/14/2023     EYE EXAM  07/01/2023     INFLUENZA VACCINE (Season Ended) 09/01/2023     BMP  02/06/2024     CMP  02/06/2024     MICROALBUMIN  02/06/2024     DIABETIC FOOT EXAM  02/06/2024     BUSHRA ASSESSMENT  02/06/2024     PHQ-9  02/06/2024     DTAP/TDAP/TD IMMUNIZATION (2 - Td or Tdap) 04/18/2027     ADVANCE CARE PLANNING  06/21/2027     HEPATITIS C SCREENING  Completed     HIV SCREENING  Addressed     DEPRESSION ACTION PLAN  Addressed     IPV IMMUNIZATION  Aged Out     MENINGITIS IMMUNIZATION  Aged Out     HEPATITIS B IMMUNIZATION  Discontinued        CSA -- Patient Level:     [Media Unavailable] Controlled Substance Agreement - Opioid - Scan on 6/15/2022  5:25 PM   [Media Unavailable] Controlled Substance Agreement - Opioid - Scan on 4/15/2021  1:40 PM   [Media Unavailable] Controlled Substance Agreement - Opioid - Scan on 1/16/2020  4:43 PM

## 2023-07-01 ENCOUNTER — TRANSFERRED RECORDS (OUTPATIENT)
Dept: MULTI SPECIALTY CLINIC | Facility: CLINIC | Age: 53
End: 2023-07-01

## 2023-07-01 LAB — RETINOPATHY: NORMAL

## 2023-07-25 NOTE — TELEPHONE ENCOUNTER
Signed and in NORTH in basket    Cheek-To-Nose Interpolation Flap Text: A decision was made to reconstruct the defect utilizing an interpolation axial flap and a staged reconstruction.  A telfa template was made of the defect.  This telfa template was then used to outline the Cheek-To-Nose Interpolation flap.  The donor area for the pedicle flap was then injected with anesthesia.  The flap was excised through the skin and subcutaneous tissue down to the layer of the underlying musculature.  The interpolation flap was carefully excised within this deep plane to maintain its blood supply.  The edges of the donor site were undermined.   The donor site was closed in a primary fashion.  The pedicle was then rotated into position and sutured.  Once the tube was sutured into place, adequate blood supply was confirmed with blanching and refill.  The pedicle was then wrapped with xeroform gauze and dressed appropriately with a telfa and gauze bandage to ensure continued blood supply and protect the attached pedicle.

## 2023-07-31 DIAGNOSIS — M54.50 CHRONIC LOW BACK PAIN, UNSPECIFIED BACK PAIN LATERALITY, UNSPECIFIED WHETHER SCIATICA PRESENT: ICD-10-CM

## 2023-07-31 DIAGNOSIS — F11.90 CHRONIC, CONTINUOUS USE OF OPIOIDS: ICD-10-CM

## 2023-07-31 DIAGNOSIS — G89.29 CHRONIC LOW BACK PAIN, UNSPECIFIED BACK PAIN LATERALITY, UNSPECIFIED WHETHER SCIATICA PRESENT: ICD-10-CM

## 2023-07-31 DIAGNOSIS — G62.9 PERIPHERAL POLYNEUROPATHY: ICD-10-CM

## 2023-07-31 RX ORDER — OXYCODONE 18 MG/1
CAPSULE, EXTENDED RELEASE ORAL
Qty: 60 CAPSULE | Refills: 0 | Status: SHIPPED | OUTPATIENT
Start: 2023-07-31 | End: 2023-08-07

## 2023-07-31 RX ORDER — OXYCODONE HYDROCHLORIDE 15 MG/1
TABLET ORAL
Qty: 60 TABLET | Refills: 0 | Status: SHIPPED | OUTPATIENT
Start: 2023-07-31 | End: 2023-08-07

## 2023-08-04 DIAGNOSIS — E11.42 TYPE 2 DIABETES MELLITUS WITH DIABETIC POLYNEUROPATHY, WITHOUT LONG-TERM CURRENT USE OF INSULIN (H): ICD-10-CM

## 2023-08-04 DIAGNOSIS — F33.0 MAJOR DEPRESSIVE DISORDER, RECURRENT EPISODE, MILD (H): ICD-10-CM

## 2023-08-04 DIAGNOSIS — F41.9 ANXIETY: ICD-10-CM

## 2023-08-04 RX ORDER — GLIMEPIRIDE 4 MG/1
4 TABLET ORAL
Qty: 90 TABLET | Refills: 0 | Status: SHIPPED | OUTPATIENT
Start: 2023-08-04 | End: 2023-08-07

## 2023-08-04 NOTE — TELEPHONE ENCOUNTER
Routing refill request to provider for review/approval because:  Drug not on the [facility] refill protocol   PHQ-9 score:        2/6/2023    11:04 AM   PHQ   PHQ-9 Total Score 11   Q9: Thoughts of better off dead/self-harm past 2 weeks Not at all

## 2023-08-07 ENCOUNTER — OFFICE VISIT (OUTPATIENT)
Dept: FAMILY MEDICINE | Facility: CLINIC | Age: 53
End: 2023-08-07
Payer: COMMERCIAL

## 2023-08-07 VITALS
SYSTOLIC BLOOD PRESSURE: 112 MMHG | BODY MASS INDEX: 39.4 KG/M2 | HEIGHT: 69 IN | RESPIRATION RATE: 18 BRPM | OXYGEN SATURATION: 95 % | WEIGHT: 266 LBS | DIASTOLIC BLOOD PRESSURE: 78 MMHG | HEART RATE: 70 BPM | TEMPERATURE: 97.4 F

## 2023-08-07 DIAGNOSIS — E11.42 TYPE 2 DIABETES MELLITUS WITH DIABETIC POLYNEUROPATHY, WITHOUT LONG-TERM CURRENT USE OF INSULIN (H): ICD-10-CM

## 2023-08-07 DIAGNOSIS — R09.82 POST-NASAL DRAINAGE: ICD-10-CM

## 2023-08-07 DIAGNOSIS — L30.9 DERMATITIS: ICD-10-CM

## 2023-08-07 DIAGNOSIS — T40.2X5A CONSTIPATION DUE TO OPIOID THERAPY: ICD-10-CM

## 2023-08-07 DIAGNOSIS — Z00.00 ROUTINE GENERAL MEDICAL EXAMINATION AT A HEALTH CARE FACILITY: Primary | ICD-10-CM

## 2023-08-07 DIAGNOSIS — R10.13 EPIGASTRIC PAIN: ICD-10-CM

## 2023-08-07 DIAGNOSIS — F41.9 ANXIETY: ICD-10-CM

## 2023-08-07 DIAGNOSIS — F11.90 CHRONIC, CONTINUOUS USE OF OPIOIDS: ICD-10-CM

## 2023-08-07 DIAGNOSIS — E78.5 HYPERLIPIDEMIA LDL GOAL <70: ICD-10-CM

## 2023-08-07 DIAGNOSIS — Z12.11 SCREEN FOR COLON CANCER: ICD-10-CM

## 2023-08-07 DIAGNOSIS — L97.522 DIABETIC ULCER OF OTHER PART OF LEFT FOOT ASSOCIATED WITH TYPE 2 DIABETES MELLITUS, WITH FAT LAYER EXPOSED (H): ICD-10-CM

## 2023-08-07 DIAGNOSIS — E11.621 DIABETIC ULCER OF OTHER PART OF LEFT FOOT ASSOCIATED WITH TYPE 2 DIABETES MELLITUS, WITH FAT LAYER EXPOSED (H): ICD-10-CM

## 2023-08-07 DIAGNOSIS — G47.00 INSOMNIA, UNSPECIFIED TYPE: ICD-10-CM

## 2023-08-07 DIAGNOSIS — G62.9 PERIPHERAL POLYNEUROPATHY: ICD-10-CM

## 2023-08-07 DIAGNOSIS — K59.03 CONSTIPATION DUE TO OPIOID THERAPY: ICD-10-CM

## 2023-08-07 DIAGNOSIS — Z12.5 SCREENING FOR PROSTATE CANCER: ICD-10-CM

## 2023-08-07 DIAGNOSIS — G89.29 CHRONIC LOW BACK PAIN, UNSPECIFIED BACK PAIN LATERALITY, UNSPECIFIED WHETHER SCIATICA PRESENT: ICD-10-CM

## 2023-08-07 DIAGNOSIS — F33.0 MAJOR DEPRESSIVE DISORDER, RECURRENT EPISODE, MILD (H): ICD-10-CM

## 2023-08-07 DIAGNOSIS — M54.50 CHRONIC LOW BACK PAIN, UNSPECIFIED BACK PAIN LATERALITY, UNSPECIFIED WHETHER SCIATICA PRESENT: ICD-10-CM

## 2023-08-07 PROCEDURE — 99214 OFFICE O/P EST MOD 30 MIN: CPT | Mod: 25 | Performed by: FAMILY MEDICINE

## 2023-08-07 PROCEDURE — 80306 DRUG TEST PRSMV INSTRMNT: CPT | Performed by: FAMILY MEDICINE

## 2023-08-07 PROCEDURE — 99396 PREV VISIT EST AGE 40-64: CPT | Performed by: FAMILY MEDICINE

## 2023-08-07 RX ORDER — GLIMEPIRIDE 4 MG/1
4 TABLET ORAL
Qty: 90 TABLET | Refills: 3 | Status: SHIPPED | OUTPATIENT
Start: 2023-08-07

## 2023-08-07 RX ORDER — NALOXEGOL OXALATE 25 MG/1
25 TABLET, FILM COATED ORAL
Qty: 90 TABLET | Refills: 3 | Status: SHIPPED | OUTPATIENT
Start: 2023-08-07

## 2023-08-07 RX ORDER — TRAZODONE HYDROCHLORIDE 50 MG/1
50-100 TABLET, FILM COATED ORAL
Qty: 180 TABLET | Refills: 3 | Status: SHIPPED | OUTPATIENT
Start: 2023-08-07

## 2023-08-07 RX ORDER — OXYCODONE HYDROCHLORIDE 15 MG/1
15 TABLET ORAL 2 TIMES DAILY PRN
Qty: 60 TABLET | Refills: 0 | Status: SHIPPED | OUTPATIENT
Start: 2023-08-31 | End: 2023-09-28

## 2023-08-07 RX ORDER — PREGABALIN 75 MG/1
75 CAPSULE ORAL 2 TIMES DAILY
Qty: 180 CAPSULE | Refills: 1 | Status: SHIPPED | OUTPATIENT
Start: 2023-08-07 | End: 2024-02-01

## 2023-08-07 RX ORDER — OXYCODONE 18 MG/1
18 CAPSULE, EXTENDED RELEASE ORAL EVERY 12 HOURS
Qty: 60 CAPSULE | Refills: 0 | Status: SHIPPED | OUTPATIENT
Start: 2023-08-31 | End: 2023-09-28

## 2023-08-07 RX ORDER — BETAMETHASONE DIPROPIONATE 0.5 MG/G
CREAM TOPICAL 2 TIMES DAILY
Qty: 45 G | Refills: 1 | Status: SHIPPED | OUTPATIENT
Start: 2023-08-07

## 2023-08-07 RX ORDER — SIMVASTATIN 20 MG
20 TABLET ORAL AT BEDTIME
Qty: 90 TABLET | Refills: 3 | Status: SHIPPED | OUTPATIENT
Start: 2023-08-07

## 2023-08-07 RX ORDER — ORAL SEMAGLUTIDE 7 MG/1
7 TABLET ORAL
Qty: 90 TABLET | Refills: 3 | Status: SHIPPED | OUTPATIENT
Start: 2023-08-07 | End: 2024-02-01

## 2023-08-07 RX ORDER — FLUTICASONE PROPIONATE 50 MCG
2 SPRAY, SUSPENSION (ML) NASAL DAILY
Qty: 15.8 ML | Refills: 11 | Status: SHIPPED | OUTPATIENT
Start: 2023-08-07

## 2023-08-07 ASSESSMENT — ANXIETY QUESTIONNAIRES
GAD7 TOTAL SCORE: 3
1. FEELING NERVOUS, ANXIOUS, OR ON EDGE: SEVERAL DAYS
7. FEELING AFRAID AS IF SOMETHING AWFUL MIGHT HAPPEN: SEVERAL DAYS
4. TROUBLE RELAXING: NOT AT ALL
GAD7 TOTAL SCORE: 3
2. NOT BEING ABLE TO STOP OR CONTROL WORRYING: NOT AT ALL
IF YOU CHECKED OFF ANY PROBLEMS ON THIS QUESTIONNAIRE, HOW DIFFICULT HAVE THESE PROBLEMS MADE IT FOR YOU TO DO YOUR WORK, TAKE CARE OF THINGS AT HOME, OR GET ALONG WITH OTHER PEOPLE: NOT DIFFICULT AT ALL
6. BECOMING EASILY ANNOYED OR IRRITABLE: NOT AT ALL
3. WORRYING TOO MUCH ABOUT DIFFERENT THINGS: SEVERAL DAYS
5. BEING SO RESTLESS THAT IT IS HARD TO SIT STILL: NOT AT ALL

## 2023-08-07 ASSESSMENT — PATIENT HEALTH QUESTIONNAIRE - PHQ9
SUM OF ALL RESPONSES TO PHQ QUESTIONS 1-9: 8
10. IF YOU CHECKED OFF ANY PROBLEMS, HOW DIFFICULT HAVE THESE PROBLEMS MADE IT FOR YOU TO DO YOUR WORK, TAKE CARE OF THINGS AT HOME, OR GET ALONG WITH OTHER PEOPLE: NOT DIFFICULT AT ALL
SUM OF ALL RESPONSES TO PHQ QUESTIONS 1-9: 8

## 2023-08-07 NOTE — PROGRESS NOTES
{PROVIDER CHARTING PREFERENCE:955939}    Rosales Marcelino is a 53 year old, presenting for the following health issues:  Recheck Medication        8/7/2023     1:32 PM   Additional Questions   Roomed by Raegan KERR       History of Present Illness       Diabetes:   He presents for follow up of diabetes.  He is checking home blood glucose a few times a week.   He checks blood glucose before and after meals.  Blood glucose is never over 200 and sometimes under 70. He is aware of hypoglycemia symptoms including dizziness, shakiness, weakness and lethargy.    He has no concerns regarding his diabetes at this time.  He is having numbness in feet and burning in feet.  The patient has not had a diabetic eye exam in the last 12 months.          Hyperlipidemia:  He presents for follow up of hyperlipidemia.   He is taking medication to lower cholesterol. He is not having myalgia or other side effects to statin medications.    Reason for visit:  Med check    He eats 0-1 servings of fruits and vegetables daily.He consumes 1 sweetened beverage(s) daily.He exercises with enough effort to increase his heart rate 10 to 19 minutes per day.  He exercises with enough effort to increase his heart rate 3 or less days per week.   He is taking medications regularly.     Depression and Anxiety Follow-Up  How are you doing with your depression since your last visit? No change  How are you doing with your anxiety since your last visit?  No change  Are you having other symptoms that might be associated with depression or anxiety? No  Have you had a significant life event? No   Do you have any concerns with your use of alcohol or other drugs? No    Social History     Tobacco Use    Smoking status: Never    Smokeless tobacco: Never   Vaping Use    Vaping Use: Never used   Substance Use Topics    Alcohol use: No     Comment: Does not drink - last was 11/2015    Drug use: No         6/14/2022    11:35 AM 2/6/2023    11:04 AM 8/7/2023     1:11 PM  "  PHQ   PHQ-9 Total Score 6 11 8   Q9: Thoughts of better off dead/self-harm past 2 weeks Not at all Not at all Not at all         6/14/2022    11:37 AM 2/6/2023    11:06 AM 8/7/2023     1:12 PM   BUSHRA-7 SCORE   Total Score 1 (minimal anxiety) 6 (mild anxiety) 3 (minimal anxiety)   Total Score 1 6 3     {Last PHQ9 or GAD7 Responses (Optional):000056}    Suicide Assessment Five-step Evaluation and Treatment (SAFE-T)  {Provider  Link to Depression Care Package SmartSet :388206}      Hemoglobin A1C (%)   Date Value   02/06/2023 6.2 (H)   06/14/2022 5.8 (H)   04/15/2021 8.7 (H)   01/18/2020 7.9 (H)     LDL Cholesterol Calculated (mg/dL)   Date Value   06/14/2022 85   04/15/2021 70   01/18/2020 54     {additonal problems for provider to add (Optional):904309}      Review of Systems   {ROS COMP (Optional):248328}      Objective    /78   Pulse 70   Temp 97.4  F (36.3  C)   Resp 18   Ht 1.753 m (5' 9\")   Wt 120.7 kg (266 lb)   SpO2 95%   BMI 39.28 kg/m    Body mass index is 39.28 kg/m .  Physical Exam   {Exam List (Optional):054749}    {Diagnostic Test Results (Optional):015443}    {AMBULATORY ATTESTATION (Optional):796166}              "

## 2023-08-07 NOTE — PROGRESS NOTES
SUBJECTIVE:   CC: Ryland Gee is an 53 year old male who presents for preventive health visit.     Patient has been advised of split billing requirements and indicates understanding: Yes  History of Present Illness       Reason for visit:  Med check    He eats 0-1 servings of fruits and vegetables daily.He consumes 1 sweetened beverage(s) daily.He exercises with enough effort to increase his heart rate 10 to 19 minutes per day.  He exercises with enough effort to increase his heart rate 3 or less days per week.   He is taking medications regularly.      Healthy Habits:  Do you get at least three servings of calcium containing foods daily (dairy, green leafy vegetables, etc.)? no, taking calcium and/or vitamin D supplement: yes - on vit D   Medication side effects: Yes no energy on metformin 6-8 months ago - stopped and felt better  Have you had an eye exam in the past two years? yes  Do you see a dentist twice per year? yes  Do you have sleep apnea, excessive snoring or daytime drowsiness?no    Diabetes:   He presents for follow up of diabetes.  He is checking home blood glucose a few times a week.   He checks blood glucose before and after meals.  Blood glucose is never over 200 and sometimes under 70. He is aware of hypoglycemia symptoms including dizziness, shakiness, weakness and lethargy.    He has no concerns regarding his diabetes at this time.  He is having numbness in feet and burning in feet.  The patient has not had a diabetic eye exam in the last 12 months.             Hyperlipidemia:  He presents for follow up of hyperlipidemia.   He is taking medication to lower cholesterol. He is not having myalgia or other side effects to statin medications.     Reason for visit:  Med check     He eats 0-1 servings of fruits and vegetables daily.He consumes 1 sweetened beverage(s) daily.He exercises with enough effort to increase his heart rate 10 to 19 minutes per day.  He exercises with enough effort to increase  his heart rate 3 or less days per week.   He is taking medications regularly.     Depression and Anxiety Follow-Up  How are you doing with your depression since your last visit? No change  How are you doing with your anxiety since your last visit?  No change  Are you having other symptoms that might be associated with depression or anxiety? No  Have you had a significant life event? No        Do you have any concerns with your use of alcohol or other drugs? No     Social History            Tobacco Use    Smoking status: Never    Smokeless tobacco: Never   Vaping Use    Vaping Use: Never used   Substance Use Topics    Alcohol use: No       Comment: Does not drink - last was 11/2015    Drug use: No           6/14/2022    11:35 AM 2/6/2023    11:04 AM 8/7/2023     1:11 PM   PHQ   PHQ-9 Total Score 6 11 8   Q9: Thoughts of better off dead/self-harm past 2 weeks Not at all Not at all Not at all           6/14/2022    11:37 AM 2/6/2023    11:06 AM 8/7/2023     1:12 PM   BUSHRA-7 SCORE   Total Score 1 (minimal anxiety) 6 (mild anxiety) 3 (minimal anxiety)   Total Score 1 6 3          8/7/2023     1:11 PM   Last PHQ-9   1.  Little interest or pleasure in doing things 1   2.  Feeling down, depressed, or hopeless 1   3.  Trouble falling or staying asleep, or sleeping too much 0   4.  Feeling tired or having little energy 3   5.  Poor appetite or overeating 1   6.  Feeling bad about yourself 1   7.  Trouble concentrating 1   8.  Moving slowly or restless 0   Q9: Thoughts of better off dead/self-harm past 2 weeks 0   PHQ-9 Total Score 8         8/7/2023     1:12 PM   BUSHRA-7    1. Feeling nervous, anxious, or on edge 1   2. Not being able to stop or control worrying 0   3. Worrying too much about different things 1   4. Trouble relaxing 0   5. Being so restless that it is hard to sit still 0   6. Becoming easily annoyed or irritable 0   7. Feeling afraid, as if something awful might happen 1   BUSHRA-7 Total Score 3   If you checked  "any problems, how difficult have they made it for you to do your work, take care of things at home, or get along with other people? Not difficult at all        Suicide Assessment Five-step Evaluation and Treatment (SAFE-T)             Hemoglobin A1C (%)   Date Value   02/06/2023 6.2 (H)   06/14/2022 5.8 (H)   04/15/2021 8.7 (H)   01/18/2020 7.9 (H)          LDL Cholesterol Calculated (mg/dL)   Date Value   06/14/2022 85   04/15/2021 70   01/18/2020 54        Today's PHQ-2 Score:       2/6/2023    11:20 AM 6/14/2022    11:43 AM   PHQ-2 ( 1999 Pfizer)   PHQ-2 Score Incomplete Incomplete       Abuse: Current or Past(Physical, Sexual or Emotional)- No  Do you feel safe in your environment? Yes        Social History     Tobacco Use    Smoking status: Never    Smokeless tobacco: Never   Substance Use Topics    Alcohol use: No     Comment: Does not drink - last was 11/2015     If you drink alcohol do you typically have >3 drinks per day or >7 drinks per week? No                      Last PSA:   PSA   Date Value Ref Range Status   04/15/2021 0.86 0 - 4 ug/L Final     Comment:     Assay Method:  Chemiluminescence using Siemens Vista analyzer     Prostate Specific Antigen Screen   Date Value Ref Range Status   06/14/2022 1.60 0.00 - 4.00 ug/L Final       Reviewed orders with patient. Reviewed health maintenance and updated orders accordingly - Yes  Reviewed and updated as needed this visit by clinical staff   Tobacco  Allergies  Meds  Problems  Med Hx  Surg Hx  Fam Hx          Reviewed and updated as needed this visit by Provider   Tobacco  Allergies  Meds  Problems  Med Hx  Surg Hx  Fam Hx           ROS:  Review of Systems   Constitutional, HEENT, cardiovascular, pulmonary, GI, , musculoskeletal, neuro, skin, endocrine and psych systems are negative, except as otherwise noted.  OBJECTIVE:   /78   Pulse 70   Temp 97.4  F (36.3  C)   Resp 18   Ht 1.753 m (5' 9\")   Wt 120.7 kg (266 lb)   SpO2 95%   " BMI 39.28 kg/m    EXAM:  GENERAL: healthy, alert and no distress  EYES: Eyes grossly normal to inspection, PERRL and conjunctivae and sclerae normal  HENT: ear canals and TM's normal, nose and mouth without ulcers or lesions  NECK: no adenopathy, no asymmetry, masses, or scars and thyroid normal to palpation  RESP: lungs clear to auscultation - no rales, rhonchi or wheezes  BREAST: normal without masses, tenderness or nipple discharge and no palpable axillary masses or adenopathy  CV: regular rate and rhythm, normal S1 S2, no S3 or S4, no murmur, click or rub, no peripheral edema and peripheral pulses strong  ABDOMEN: soft, nontender, no hepatosplenomegaly, no masses and bowel sounds normal   (male): normal male genitalia without lesions or urethral discharge, no hernia  MS: no gross musculoskeletal defects noted, no edema  SKIN: no suspicious lesions or rashes  NEURO: Normal strength and tone, mentation intact and speech normal  PSYCH: mentation appears normal, affect normal/bright  LYMPH: no cervical, supraclavicular, axillary, or inguinal adenopathy  RECTAL: declined exam  Diabetic foot exam: normal DP and PT pulses, no trophic changes or ulcerative lesions, and normal sensory exam  No results found for any visits on 08/07/23.    ASSESSMENT/PLAN:   Routine general medical examination at a health care facility      Type 2 diabetes mellitus with diabetic polyneuropathy, without long-term current use of insulin (H)  Controlled - continue medication(s).  - Comprehensive metabolic panel (BMP + Alb, Alk Phos, ALT, AST, Total. Bili, TP)  - glimepiride (AMARYL) 4 MG tablet  Dispense: 90 tablet; Refill: 3  - Semaglutide (RYBELSUS) 7 MG tablet  Dispense: 90 tablet; Refill: 3    Peripheral polyneuropathy  Controlled - continue medication(s).  - oxyCODONE IR (ROXICODONE) 15 MG tablet  Dispense: 60 tablet; Refill: 0  - oxyCODONE (XTAMPZA ER) 18 MG 12 hr tablet  Dispense: 60 capsule; Refill: 0  - pregabalin (LYRICA) 75 MG  capsule  Dispense: 180 capsule; Refill: 1    Diabetic ulcer of other part of left foot associated with type 2 diabetes mellitus, with fat layer exposed (H)  No new symptoms:  - HEMOGLOBIN A1C  - Lipid panel reflex to direct LDL Non-fasting    Major depressive disorder, recurrent episode, mild (H)  Anxiety  Stable - continue medication(s).  - FLUoxetine (PROZAC) 20 MG capsule  Dispense: 90 capsule; Refill:     Chronic, continuous use of opioids - every 3 month medication checks  Chronic low back pain  Pain still present but medications do help with ability to do activities throughout the day  - Drug Abuse Screen Panel 13, Urine (Pain Care Map) - lab collect  - oxyCODONE IR (ROXICODONE) 15 MG tablet  Dispense: 60 tablet; Refill: 0  - oxyCODONE (XTAMPZA ER) 18 MG 12 hr tablet  Dispense: 60 capsule; Refill: 0  - Drug Abuse Screen Panel 13, Urine (Pain Care Map) - lab collect    Epigastric pain  Prior history uncontrolled:  - omeprazole (PRILOSEC) 20 MG DR capsule  Dispense: 90 capsule; Refill: 3    Post-nasal drainage  Stable - continue medication(s).  - fluticasone (FLONASE) 50 MCG/ACT nasal spray  Dispense: 15.8 mL; Refill: 11    Dermatitis  Occasional symptoms and med helps:  - betamethasone dipropionate (DIPROSONE) 0.05 % external cream  Dispense: 45 g; Refill: 1    Constipation due to opioid therapy  Uses as needed 1 or the other:  - MOVANTIK 25 MG TABS tablet  Dispense: 90 tablet; Refill: 3  - Naldemedine Tosylate 0.2 MG TABS  Dispense: 90 tablet; Refill: 3    Hyperlipidemia LDL goal <70  Controlled - continue medication(s).  - Comprehensive metabolic panel (BMP + Alb, Alk Phos, ALT, AST, Total. Bili, TP)  - simvastatin (ZOCOR) 20 MG tablet  Dispense: 90 tablet; Refill: 3    Insomnia, unspecified type  Stable - continue medication(s).  - traZODone (DESYREL) 50 MG tablet  Dispense: 180 tablet; Refill: 3    Screening for prostate cancer  - PROSTATE SPEC ANTIGEN SCREEN    Screen for colon  "cancer      COUNSELING:  Reviewed preventive health counseling, as reflected in patient instructions      BMI:   Estimated body mass index is 39.28 kg/m  as calculated from the following:    Height as of this encounter: 1.753 m (5' 9\").    Weight as of this encounter: 120.7 kg (266 lb).   Weight management plan: Discussed healthy diet and exercise guidelines           reports that he has never smoked. He has never used smokeless tobacco.      Return in about 6 months (around 2/7/2024) for medication recheck.    Follow-up Visit   Expected date: Aug 07, 2023      Follow Up Appointment Details:     Follow-up with whom?: Other Primary Care Services    Follow-Up for what?: Lab Visit    How?: In Person             Follow-up Visit   Expected date:  Feb 07, 2024 (Approximate)      Follow Up Appointment Details:     Follow-up with whom?: Me    Follow-Up for what?: Chronic Disease f/u    Chronic Disease f/u:  Hyperlipidemia  Diabetes  General (Other)       Additional Details: chronic pain    How?: In Person or Virtual    Is this an as-needed follow-up?: No                           Dannie Pastrana MD     [address] [address]  [email or web address]     Office: [phone]     "

## 2023-08-07 NOTE — LETTER
Opioid / Opioid Plus Controlled Substance Agreement    This is an agreement between you and your provider about the safe and appropriate use of controlled substance/opioids prescribed by your care team. Controlled substances are medicines that can cause physical and mental dependence (abuse).    There are strict laws about having and using these medicines. We here at [facility] are committing to working with you in your efforts to get better. To support you in this work, we ll help you schedule regular office appointments for medicine refills. If we must cancel or change your appointment for any reason, we ll make sure you have enough medicine to last until your next appointment.     As a Provider, I will:  Listen carefully to your concerns and treat you with respect.   Recommend a treatment plan that I believe is in your best interest. This plan may involve therapies other than opioid pain medication.   Talk with you often about the possible benefits, and the risk of harm of any medicine that we prescribe for you.   Provide a plan on how to taper (discontinue or go off) using this medicine if the decision is made to stop its use.    As a Patient, I understand that opioid(s):   Are a controlled substance prescribed by my care team to help me function or work and manage my condition(s).   Are strong medicines and can cause serious side effects such as:  Drowsiness, which can seriously affect my driving ability  A lower breathing rate, enough to cause death  Harm to my thinking ability   Depression   Abuse of and addiction to this medicine  Need to be taken exactly as prescribed. Combining opioids with certain medicines or chemicals (such as illegal drugs, sedatives, sleeping pills, and benzodiazepines) can be dangerous or even fatal. If I stop opioids suddenly, I may have severe withdrawal symptoms.  Do not work for all types of pain nor for all patients. If they re not helpful, I may be asked to stop  them.      The risks, benefits and side effects of these medicine(s) were explained to me. I agree that:  I will take part in other treatments as advised by my care team. This may be psychiatry or counseling, physical therapy, behavioral therapy, group treatment or a referral to a specialist.     I will keep all my appointments. I understand that this is part of the monitoring of opioids. My care team may require an office visit for EVERY opioid/controlled substance refill. If I miss appointments or don t follow instructions, my care team may stop my medicine.    I will take my medicines as prescribed. I will not change the dose or schedule unless my care team tells me to. There will be no refills if I run out early.     I may be asked to come to the clinic and complete a urine drug test or complete a pill count at any time. If I don t give a urine sample or participate in a pill count, the care team may stop my medicine.    I will only receive prescriptions from this clinic for chronic pain. If I am treated by another provider for acute pain issues, I will tell them that I am taking opioid pain medication for chronic pain and that I have a treatment agreement with this provider. I will inform my [facility] care team within one business day if I am given a prescription for any pain medication by another healthcare provider. My [facility] care team can contact other providers and pharmacists about my use of any medicines.    It is up to me to make sure that I don t run out of my medicines on weekends or holidays. If my care team is willing to refill my opioid prescription without a visit, I must request refills only during office hours. Refills may take up to 3 business days to process. I will use one pharmacy to fill all my opioid and other controlled substance prescriptions. I will notify the clinic about any changes to my insurance or medication availability.    I am responsible for my prescriptions.  If the medicine/prescription is lost, stolen or destroyed, it will not be replaced. I also agree not to share controlled substance medicines with anyone.    I am aware I should not use any illegal or recreational drugs. I agree not to drink alcohol unless my care team says I can.       If I enroll in the [place] [facility] program, I will tell my care team prior to my next refill.     I will tell my care team right away if I become pregnant, have a new medical problem treated outside of my regular clinic, or have a change in my medications.    I understand that this medicine can affect my thinking, judgment and reaction time. Alcohol and drugs affect the brain and body, which can affect the safety of my driving. Being under the influence of alcohol or drugs can affect my decision-making, behaviors, personal safety, and the safety of others. Driving while impaired (DWI) can occur if a person is driving, operating, or in physical control of a car, motorcycle, boat, snowmobile, ATV, motorbike, off-road vehicle, or any other motor vehicle ([place] Statute 169A.20). I understand the risk if I choose to drive or operate any vehicle or machinery.    I understand that if I do not follow any of the conditions above, my prescriptions or treatment may be stopped or changed.          Opioids  What You Need to Know    What are opioids?   Opioids are pain medicines that must be prescribed by a doctor. They are also known as narcotics.     Examples are:   morphine (MS Contin, Sara)  oxycodone (Oxycontin)  oxycodone and acetaminophen (Percocet)  hydrocodone and acetaminophen (Vicodin, Norco)   fentanyl patch (Duragesic)   hydromorphone (Dilaudid)   methadone  codeine (Tylenol #3)     What do opioids do well?   Opioids are best for severe short-term pain such as after a surgery or injury. They may work well for cancer pain. They may help some people with long-lasting (chronic) pain.     What do opioids NOT do well?   Opioids  never get rid of pain entirely, and they don t work well for most patients with chronic pain. Opioids don t reduce swelling, one of the causes of pain.                                    Other ways to manage chronic pain and improve function include:     Treat the health problem that may be causing pain  Anti-inflammation medicines, which reduce swelling and tenderness, such as ibuprofen (Advil, Motrin) or naproxen (Aleve)  Acetaminophen (Tylenol)  Antidepressants and anti-seizure medicines, especially for nerve pain  Topical treatments such as patches or creams  Injections or nerve blocks  Chiropractic or osteopathic treatment  Acupuncture, massage, deep breathing, meditation, visual imagery, aromatherapy  Use heat or ice at the pain site  Physical therapy   Exercise  Stop smoking  Take part in therapy       Risks and side effects     Talk to your doctor before you start or decide to keep taking opioids. Possible side effects include:    Lowering your breathing rate enough to cause death  Overdose, including death, especially if taking higher than prescribed doses  Worse depression symptoms; less pleasure in things you usually enjoy  Feeling tired or sluggish  Slower thoughts or cloudy thinking  Being more sensitive to pain over time; pain is harder to control  Trouble sleeping or restless sleep  Changes in hormone levels (for example, less testosterone)  Changes in sex drive or ability to have sex  Constipation  Unsafe driving  Itching and sweating  Dizziness  Nausea, throwing up and dry mouth    What else should I know about opioids?    Opioids may lead to dependence, tolerance, or addiction.    Dependence means that if you stop or reduce the medicine too quickly, you will have withdrawal symptoms. These include loose poop (diarrhea), jitters, flu-like symptoms, nervousness and tremors. Dependence is not the same as addiction.                     Tolerance means needing higher doses over time to get the same  effect. This may increase the chance of serious side effects.    Addiction is when people improperly use a substance that harms their body, their mind or their relations with others. Use of opiates can cause a relapse of addiction if you have a history of drug or alcohol abuse.    People who have used opioids for a long time may have a lower quality of life, worse depression, higher levels of pain and more visits to doctors.    You can overdose on opioids. Take these steps to lower your risk of overdose:    Recognize the signs:  Signs of overdose include decrease or loss of consciousness (blackout), slowed breathing, trouble waking up and blue lips. If someone is worried about overdose, they should call 911.    Talk to your doctor about Narcan (naloxone).   If you are at risk for overdose, you may be given a prescription for Narcan. This medicine very quickly reverses the effects of opioids.   If you overdose, a friend or family member can give you Narcan while waiting for the ambulance. They need to know the signs of overdose and how to give Narcan.     Don't use alcohol or street drugs.   Taking them with opioids can cause death.    Do not take any of these medicines unless your doctor says it s OK. Taking these with opioids can cause death:  Benzodiazepines, such as lorazepam (Ativan), alprazolam (Xanax) or diazepam (Valium)  Muscle relaxers, such as cyclobenzaprine (Flexeril)  Sleeping pills like zolpidem (Ambien)   Other opioids      How to keep you and other people safe while taking opioids:    Never share your opioids with others.  Opioid medicines are regulated by the Drug Enforcement Agency ([employer]). Selling or sharing medications is a criminal act.    2. Be sure to store opioids in a secure place, locked up if possible. Young children can easily swallow them and overdose.    3. When you are traveling with your medicines, keep them in the original bottles. If you use a pill box, be sure you also carry a copy  of your medicine list from your clinic or pharmacy.    4. Safe disposal of opioids    Most pharmacies have places to get rid of medicine, called disposal kiosks. Medicine disposal options are also available in every [facility]. Search your county and  medication disposal  to find more options. You can find more details at:  [email or web address]://[email or web address]-green/[email or web address]-[email or web address]-medications     I agree that my provider, clinic care team, and pharmacy may work with any city, state or federal law enforcement agency that investigates the misuse, sale, or other diversion of my controlled medicine. I will allow my provider to discuss my care with, or share a copy of, this agreement with any other treating provider, pharmacy or emergency room where I receive care.    I have read this agreement and have asked questions about anything I did not understand.    _______________________________________________________  Patient Signature - Ryland Gee _____________________                   Date     _______________________________________________________  Provider Signature - Todd Pastrana MD   _____________________                   Date     _______________________________________________________  Witness Signature (required if provider not present while patient signing)   _____________________                   Date

## 2023-08-08 LAB
AMPHETAMINES UR QL: NOT DETECTED
BARBITURATES UR QL SCN: NOT DETECTED
BENZODIAZ UR QL SCN: NOT DETECTED
BUPRENORPHINE UR QL: NOT DETECTED
CANNABINOIDS UR QL: NOT DETECTED
COCAINE UR QL SCN: NOT DETECTED
D-METHAMPHET UR QL: NOT DETECTED
METHADONE UR QL SCN: NOT DETECTED
OPIATES UR QL SCN: DETECTED
OXYCODONE UR QL SCN: DETECTED
PCP UR QL SCN: NOT DETECTED
PROPOXYPH UR QL: NOT DETECTED
TRICYCLICS UR QL SCN: NOT DETECTED

## 2023-08-14 NOTE — RESULT ENCOUNTER NOTE
Dear Ryland,    Here is a summary of your recent test results:  -Drug screen showed expected results.    For additional lab test information, www.testing.com is a very good reference.    In addition, here is a list of due or overdue Health Maintenance reminders:  Colorectal Cancer Screening due on 02/27/2018  Pneumococcal Vaccine(2 - PCV) due on 04/18/2018  Zoster (Shingles) Vaccine(1 of 2) Never done  A1C Lab due on 05/06/2023  Cholesterol Lab due on 06/14/2023  Prostate Test due on 06/14/2023  Eye Exam due on 07/01/2023    Please call us at [phone] (or use [employer]) to address the above recommendations if needed.           Thank you very much for trusting me and [facility].     Have a peaceful day.    Healthy regards,  Dannie Pastrana MD    
12-Mar-2021 23:45

## 2023-09-27 DIAGNOSIS — G62.9 PERIPHERAL POLYNEUROPATHY: ICD-10-CM

## 2023-09-27 DIAGNOSIS — M54.50 CHRONIC LOW BACK PAIN, UNSPECIFIED BACK PAIN LATERALITY, UNSPECIFIED WHETHER SCIATICA PRESENT: ICD-10-CM

## 2023-09-27 DIAGNOSIS — F11.90 CHRONIC, CONTINUOUS USE OF OPIOIDS: ICD-10-CM

## 2023-09-27 DIAGNOSIS — G89.29 CHRONIC LOW BACK PAIN, UNSPECIFIED BACK PAIN LATERALITY, UNSPECIFIED WHETHER SCIATICA PRESENT: ICD-10-CM

## 2023-09-28 RX ORDER — OXYCODONE HYDROCHLORIDE 15 MG/1
15 TABLET ORAL 2 TIMES DAILY PRN
Qty: 60 TABLET | Refills: 0 | Status: SHIPPED | OUTPATIENT
Start: 2023-09-30 | End: 2023-10-30

## 2023-09-28 RX ORDER — OXYCODONE 18 MG/1
CAPSULE, EXTENDED RELEASE ORAL
Qty: 60 CAPSULE | Refills: 0 | Status: SHIPPED | OUTPATIENT
Start: 2023-09-30 | End: 2023-10-30

## 2023-10-28 ENCOUNTER — HEALTH MAINTENANCE LETTER (OUTPATIENT)
Age: 53
End: 2023-10-28

## 2023-10-30 DIAGNOSIS — M54.50 CHRONIC LOW BACK PAIN, UNSPECIFIED BACK PAIN LATERALITY, UNSPECIFIED WHETHER SCIATICA PRESENT: ICD-10-CM

## 2023-10-30 DIAGNOSIS — G62.9 PERIPHERAL POLYNEUROPATHY: ICD-10-CM

## 2023-10-30 DIAGNOSIS — G89.29 CHRONIC LOW BACK PAIN, UNSPECIFIED BACK PAIN LATERALITY, UNSPECIFIED WHETHER SCIATICA PRESENT: ICD-10-CM

## 2023-10-30 DIAGNOSIS — F11.90 CHRONIC, CONTINUOUS USE OF OPIOIDS: ICD-10-CM

## 2023-10-30 RX ORDER — OXYCODONE 18 MG/1
18 CAPSULE, EXTENDED RELEASE ORAL EVERY 12 HOURS
Qty: 60 CAPSULE | Refills: 0 | Status: SHIPPED | OUTPATIENT
Start: 2023-10-30 | End: 2023-11-29

## 2023-10-30 RX ORDER — OXYCODONE HYDROCHLORIDE 15 MG/1
15 TABLET ORAL 2 TIMES DAILY PRN
Qty: 60 TABLET | Refills: 0 | Status: SHIPPED | OUTPATIENT
Start: 2023-10-30 | End: 2023-11-29

## 2023-11-28 DIAGNOSIS — M54.50 CHRONIC LOW BACK PAIN, UNSPECIFIED BACK PAIN LATERALITY, UNSPECIFIED WHETHER SCIATICA PRESENT: ICD-10-CM

## 2023-11-28 DIAGNOSIS — G62.9 PERIPHERAL POLYNEUROPATHY: ICD-10-CM

## 2023-11-28 DIAGNOSIS — F11.90 CHRONIC, CONTINUOUS USE OF OPIOIDS: ICD-10-CM

## 2023-11-28 DIAGNOSIS — G89.29 CHRONIC LOW BACK PAIN, UNSPECIFIED BACK PAIN LATERALITY, UNSPECIFIED WHETHER SCIATICA PRESENT: ICD-10-CM

## 2023-11-29 RX ORDER — OXYCODONE HYDROCHLORIDE 15 MG/1
15 TABLET ORAL 2 TIMES DAILY PRN
Qty: 60 TABLET | Refills: 0 | Status: SHIPPED | OUTPATIENT
Start: 2023-11-29 | End: 2023-12-28

## 2023-11-29 RX ORDER — OXYCODONE 18 MG/1
18 CAPSULE, EXTENDED RELEASE ORAL EVERY 12 HOURS
Qty: 60 CAPSULE | Refills: 0 | Status: SHIPPED | OUTPATIENT
Start: 2023-11-29 | End: 2023-12-28

## 2023-12-12 ENCOUNTER — NURSE TRIAGE (OUTPATIENT)
Dept: FAMILY MEDICINE | Facility: CLINIC | Age: 53
End: 2023-12-12
Payer: COMMERCIAL

## 2023-12-12 NOTE — TELEPHONE ENCOUNTER
RN COVID TREATMENT VISIT  12/12/23      The patient has been triaged and does not require a higher level of care.    Ryland Gee  53 year old  Current weight? 266lb    Has the patient been seen by a primary care provider at an [facility] or [facility] within the past two years? Yes.   Have you been in close proximity to/do you have a known exposure to a person with a confirmed case of influenza? No.     General treatment eligibility:  Date of positive COVID test (PCR or at home)?  12/12/2023    Are you or have you been hospitalized for this COVID-19 infection? No.   Have you received monoclonal antibodies or antiviral treatment for COVID-19 since this positive test? No.   Do you have any of the following conditions that place you at risk of being very sick from COVID-19?   - Age 50 years or older  Yes, patient has at least one high risk condition as noted above.     Current COVID symptoms:   - cough  - fatigue  - headache  - new loss of taste or smell  - congestion or runny nose  Yes. Patient has at least one symptom as selected.     How many days since symptoms started? 5 days or less. Established patient, 12 years or older weighing at least 88.2 lbs, who has symptoms that started in the past 5 days, has not been hospitalized nor received treatment already, and is at risk for being very sick from COVID-19.     Treatment eligibility by RN:  Are you currently pregnant or nursing? No  Do you have a clinically significant hypersensitivity to nirmatrelvir or ritonavir, or toxic epidermal necrolysis (TEN) or Tsang-Pedro Syndrome? No  Do you have a history of hepatitis, any hepatic impairment on the Problem List (such as Child-Baires Class C, cirrhosis, fatty liver disease, alcoholic liver disease), or was the last liver lab (hepatic panel, ALT, AST, ALK Phos, bilirubin) elevated in the past 6 months? No  Do you have any history of severe renal impairment (eGFR < 30mL/min)? No    Is patient  eligible to continue? Yes, patient meets all eligibility requirements for the RN COVID treatment (as denoted by all no responses above).     Current Outpatient Medications   Medication Sig Dispense Refill    betamethasone dipropionate (DIPROSONE) 0.05 % external cream Apply topically 2 times daily 45 g 1    bisacodyl (DULCOLAX) 5 MG EC tablet Two days prior to exam take two (2) tablets at 4pm. One day prior to exam take two (2) tablets at 4pm 4 tablet 0    blood glucose (CONTOUR NEXT TEST) test strip 1 strip by In Vitro route 2 times daily 200 strip 11    blood glucose calibration (CONTOUR NEXT CONTROL LEVEL 2) NORMAL solution Use to calibrate blood glucose monitor as needed as directed. 1 Bottle 11    blood glucose monitoring (Empreg[employer] Ligado CONTOUR NEXT MONITOR) meter device kit Use to test blood sugar 3 times daily or as directed. 1 kit 0    blood glucose monitoring (Emp[employer] Ligado MICROLET) lancets Use to test blood sugar 3 times daily or as directed. 100 Box 11    FLUoxetine (PROZAC) 20 MG capsule Take 1 capsule (20 mg) by mouth daily 90 capsule 3    fluticasone (FLONASE) 50 MCG/ACT nasal spray Spray 2 sprays into both nostrils daily 15.8 mL 11    glimepiride (AMARYL) 4 MG tablet Take 1 tablet (4 mg) by mouth daily before breakfast 90 tablet 3    MOVANTIK 25 MG TABS tablet Take 1 tablet (25 mg) by mouth every morning (before breakfast) 90 tablet 3    Naldemedine Tosylate 0.2 MG TABS Take 0.2 mg by mouth daily as needed (constitpation) 90 tablet 3    naloxone (NARCAN) 4 MG/0.1ML nasal spray Spray 1 spray (4 mg) into one nostril alternating nostrils as needed for opioid reversal every 2-3 minutes until assistance arrives 0.2 mL 0    naloxone (NARCAN) 4 MG/0.1ML nasal spray Spray 1 spray (4 mg) into one nostril alternating nostrils as needed for opioid reversal every 2-3 minutes until assistance arrives 0.2 mL 3    omeprazole (PRILOSEC) 20 MG DR capsule Take 1 capsule (20 mg) by mouth daily 90 capsule 3    oxyCODONE (XTAMPZA ER) 18  MG 12 hr tablet Take 1 tablet (18 mg) by mouth every 12 hours 60 capsule 0    oxyCODONE IR (ROXICODONE) 15 MG tablet TAKE ONE TABLET BY MOUTH TWICE A DAY AS NEEDED FOR SEVERE PAIN 60 tablet 0    polyethylene glycol (GOLYTELY) 236 g suspension Take as directed. Two days before your exam fill the first container with water. Cover and shake until mixed well. At 5:00pm drink one 8oz glass every 10-15 minutes until half (1/2) of the first container is empty. Store the remainder in the refrigerator. One day before your exam at 5:00pm drink the second half of the first container until it is gone. Before you go to bed mix the second container with water and put in refrigerator. Six hours before your check in time drink one 8oz glass every 10-15 minutes until half of container is empty. Discard the remainder of solution. 8000 mL 0    pregabalin (LYRICA) 75 MG capsule Take 1 capsule (75 mg) by mouth 2 times daily 180 capsule 1    Semaglutide (RYBELSUS) 7 MG tablet Take 1 tablet (7 mg) by mouth daily before breakfast -with pharmacist recommended needles. 90 tablet 3    simvastatin (ZOCOR) 20 MG tablet Take 1 tablet (20 mg) by mouth At Bedtime 90 tablet 3    traZODone (DESYREL) 50 MG tablet Take 1-2 tablets ( mg) by mouth nightly as needed for sleep 180 tablet 3       Medications from List 1 of the standing order (on medications that exclude the use of Paxlovid) that patient is taking: NONE. Is patient taking Effie's Wort? No  Is patient taking Effie's Wort or any meds from List 1? No.   Medications from List 2 of the standing order (on meds that provider needs to adjust) that patient is taking: oxycodone (Oxycontin, Roxicodone, Percocet, Endocet, Nalocet), explained a provider visit is necessary to discuss medication adjustments while taking Paxlovid. Is patient on any of the meds from List 2? Yes. Patient will be scheduled or transferred to a  at the end of this call.   Noam Heredia RN          Reason  for Disposition   COVID-19 diagnosed by positive lab test (e.g., PCR, rapid self-test kit) and mild symptoms (e.g., cough, fever, others) and no complications or SOB    Additional Information   Negative: SEVERE difficulty breathing (e.g., struggling for each breath, speaks in single words)   Negative: Difficult to awaken or acting confused (e.g., disoriented, slurred speech)   Negative: Bluish (or gray) lips or face now   Negative: Shock suspected (e.g., cold/pale/clammy skin, too weak to stand, low BP, rapid pulse)   Negative: Sounds like a life-threatening emergency to the triager   Negative: SEVERE or constant chest pain or pressure  (Exception: Mild central chest pain, present only when coughing.)   Negative: MODERATE difficulty breathing (e.g., speaks in phrases, SOB even at rest, pulse 100-120)   Negative: Headache and stiff neck (can't touch chin to chest)   Negative: Oxygen level (e.g., pulse oximetry) 90% or lower   Negative: Chest pain or pressure  (Exception: MILD central chest pain, present only when coughing.)   Negative: Drinking very little and dehydration suspected (e.g., no urine > 12 hours, very dry mouth, very lightheaded)   Negative: Patient sounds very sick or weak to the triager   Negative: MILD difficulty breathing (e.g., minimal/no SOB at rest, SOB with walking, pulse <100)   Negative: Fever > 103 F (39.4 C)   Negative: Fever > 101 F (38.3 C) and over 60 years of age   Negative: Fever > 100.0 F (37.8 C) and bedridden (e.g., CVA, chronic illness, recovering from surgery)   Negative: HIGH RISK patient (e.g., weak immune system, age > 64 years, obesity with BMI of 30 or higher, pregnant, chronic lung disease or other chronic medical condition) and COVID symptoms (e.g., cough, fever)  (Exceptions: Already seen by doctor or NP/PA and no new or worsening symptoms.)   Negative: HIGH RISK patient and influenza is widespread in the community and ONE OR MORE respiratory symptoms: cough, sore throat,  runny or stuffy nose   Negative: HIGH RISK patient and influenza exposure within the last 7 days and ONE OR MORE respiratory symptoms: cough, sore throat, runny or stuffy nose   Negative: Oxygen level (e.g., pulse oximetry) 91 to 94%   Negative: COVID-19 infection suspected by caller or triager and mild symptoms (cough, fever, or others) and negative COVID-19 rapid test   Negative: Fever present > 3 days (72 hours)   Negative: Fever returns after gone for over 24 hours and symptoms worse or not improved   Negative: Continuous (nonstop) coughing interferes with work or school and no improvement using cough treatment per Care Advice   Negative: Cough present > 3 weeks    Protocols used: Coronavirus (COVID-19) Diagnosed or [id number]

## 2023-12-27 DIAGNOSIS — G89.29 CHRONIC LOW BACK PAIN, UNSPECIFIED BACK PAIN LATERALITY, UNSPECIFIED WHETHER SCIATICA PRESENT: ICD-10-CM

## 2023-12-27 DIAGNOSIS — M54.50 CHRONIC LOW BACK PAIN, UNSPECIFIED BACK PAIN LATERALITY, UNSPECIFIED WHETHER SCIATICA PRESENT: ICD-10-CM

## 2023-12-27 DIAGNOSIS — F11.90 CHRONIC, CONTINUOUS USE OF OPIOIDS: ICD-10-CM

## 2023-12-27 DIAGNOSIS — G62.9 PERIPHERAL POLYNEUROPATHY: ICD-10-CM

## 2023-12-28 RX ORDER — OXYCODONE HYDROCHLORIDE 15 MG/1
15 TABLET ORAL 2 TIMES DAILY PRN
Qty: 60 TABLET | Refills: 0 | Status: SHIPPED | OUTPATIENT
Start: 2023-12-30 | End: 2024-01-26

## 2023-12-28 RX ORDER — OXYCODONE 18 MG/1
18 CAPSULE, EXTENDED RELEASE ORAL EVERY 12 HOURS
Qty: 60 CAPSULE | Refills: 0 | Status: SHIPPED | OUTPATIENT
Start: 2023-12-30 | End: 2024-01-26

## 2024-01-29 ENCOUNTER — TELEPHONE (OUTPATIENT)
Dept: FAMILY MEDICINE | Facility: CLINIC | Age: 54
End: 2024-01-29

## 2024-01-29 ENCOUNTER — TELEPHONE (OUTPATIENT)
Dept: FAMILY MEDICINE | Facility: CLINIC | Age: 54
End: 2024-01-29
Payer: COMMERCIAL

## 2024-01-29 NOTE — TELEPHONE ENCOUNTER
Prior Authorization Retail Medication Request    Medication/Dose: Need PA for Xtampza ER 18 mg  Diagnosis and ICD code (if different than what is on RX):    New/renewal/insurance change PA/secondary ins. PA:  Previously Tried and Failed:  Unknown  Rationale:  Unknown    Insurance   Primary: [facility]  Insurance ID:  [id number]    Secondary (if applicable):N/A   Insurance ID:  N/A    Thank You,  Ava Jaimes [facility]  [phone]

## 2024-01-29 NOTE — TELEPHONE ENCOUNTER
Prior Authorization Retail Medication Request    Medication/Dose: Insurance is requiring a Prior authorization for Rybelsus 7mg tabs  Diagnosis and ICD code (if different than what is on RX):  E11.42  New/renewal/insurance change PA/secondary ins. PA:  Previously Tried and Failed:  Unknown  Rationale:  Unknown    Insurance   Primary: [facility] [employer]  Insurance ID:  [id number]    Secondary (if applicable):N/A  Insurance ID:  N/A    Thank You,  Ava Jaimes [facility]  [phone]

## 2024-02-01 ENCOUNTER — ANCILLARY PROCEDURE (OUTPATIENT)
Dept: GENERAL RADIOLOGY | Facility: CLINIC | Age: 54
End: 2024-02-01
Attending: FAMILY MEDICINE
Payer: COMMERCIAL

## 2024-02-01 ENCOUNTER — OFFICE VISIT (OUTPATIENT)
Dept: FAMILY MEDICINE | Facility: CLINIC | Age: 54
End: 2024-02-01
Payer: COMMERCIAL

## 2024-02-01 VITALS
TEMPERATURE: 98 F | HEART RATE: 86 BPM | OXYGEN SATURATION: 98 % | BODY MASS INDEX: 40.46 KG/M2 | WEIGHT: 274 LBS | SYSTOLIC BLOOD PRESSURE: 118 MMHG | DIASTOLIC BLOOD PRESSURE: 82 MMHG

## 2024-02-01 DIAGNOSIS — E11.42 TYPE 2 DIABETES MELLITUS WITH DIABETIC POLYNEUROPATHY, WITHOUT LONG-TERM CURRENT USE OF INSULIN (H): ICD-10-CM

## 2024-02-01 DIAGNOSIS — F41.9 ANXIETY: ICD-10-CM

## 2024-02-01 DIAGNOSIS — M25.551 HIP PAIN, RIGHT: ICD-10-CM

## 2024-02-01 DIAGNOSIS — M79.671 RIGHT FOOT PAIN: ICD-10-CM

## 2024-02-01 DIAGNOSIS — Z12.5 SCREENING FOR PROSTATE CANCER: ICD-10-CM

## 2024-02-01 DIAGNOSIS — E78.5 HYPERLIPIDEMIA LDL GOAL <70: ICD-10-CM

## 2024-02-01 DIAGNOSIS — M54.50 CHRONIC LOW BACK PAIN, UNSPECIFIED BACK PAIN LATERALITY, UNSPECIFIED WHETHER SCIATICA PRESENT: ICD-10-CM

## 2024-02-01 DIAGNOSIS — F33.0 MAJOR DEPRESSIVE DISORDER, RECURRENT EPISODE, MILD (H): ICD-10-CM

## 2024-02-01 DIAGNOSIS — F11.90 CHRONIC, CONTINUOUS USE OF OPIOIDS: ICD-10-CM

## 2024-02-01 DIAGNOSIS — M25.511 RIGHT SHOULDER PAIN, UNSPECIFIED CHRONICITY: ICD-10-CM

## 2024-02-01 DIAGNOSIS — G62.9 PERIPHERAL POLYNEUROPATHY: ICD-10-CM

## 2024-02-01 DIAGNOSIS — Z12.11 SCREEN FOR COLON CANCER: Primary | ICD-10-CM

## 2024-02-01 DIAGNOSIS — G89.29 CHRONIC LOW BACK PAIN, UNSPECIFIED BACK PAIN LATERALITY, UNSPECIFIED WHETHER SCIATICA PRESENT: ICD-10-CM

## 2024-02-01 LAB — HBA1C MFR BLD: 6.6 % (ref 0–5.6)

## 2024-02-01 PROCEDURE — 82570 ASSAY OF URINE CREATININE: CPT | Performed by: FAMILY MEDICINE

## 2024-02-01 PROCEDURE — 80053 COMPREHEN METABOLIC PANEL: CPT | Performed by: FAMILY MEDICINE

## 2024-02-01 PROCEDURE — 36415 COLL VENOUS BLD VENIPUNCTURE: CPT | Performed by: FAMILY MEDICINE

## 2024-02-01 PROCEDURE — 82043 UR ALBUMIN QUANTITATIVE: CPT | Performed by: FAMILY MEDICINE

## 2024-02-01 PROCEDURE — 90750 HZV VACC RECOMBINANT IM: CPT | Performed by: FAMILY MEDICINE

## 2024-02-01 PROCEDURE — 83036 HEMOGLOBIN GLYCOSYLATED A1C: CPT | Performed by: FAMILY MEDICINE

## 2024-02-01 PROCEDURE — 73502 X-RAY EXAM HIP UNI 2-3 VIEWS: CPT | Mod: TC | Performed by: RADIOLOGY

## 2024-02-01 PROCEDURE — 96127 BRIEF EMOTIONAL/BEHAV ASSMT: CPT | Performed by: FAMILY MEDICINE

## 2024-02-01 PROCEDURE — 73630 X-RAY EXAM OF FOOT: CPT | Mod: TC | Performed by: RADIOLOGY

## 2024-02-01 PROCEDURE — 73030 X-RAY EXAM OF SHOULDER: CPT | Mod: TC | Performed by: RADIOLOGY

## 2024-02-01 PROCEDURE — 99215 OFFICE O/P EST HI 40 MIN: CPT | Mod: 25 | Performed by: FAMILY MEDICINE

## 2024-02-01 PROCEDURE — G0103 PSA SCREENING: HCPCS | Performed by: FAMILY MEDICINE

## 2024-02-01 PROCEDURE — 80061 LIPID PANEL: CPT | Performed by: FAMILY MEDICINE

## 2024-02-01 PROCEDURE — 90471 IMMUNIZATION ADMIN: CPT | Performed by: FAMILY MEDICINE

## 2024-02-01 RX ORDER — PREGABALIN 75 MG/1
75 CAPSULE ORAL 2 TIMES DAILY
Qty: 180 CAPSULE | Refills: 1 | Status: SHIPPED | OUTPATIENT
Start: 2024-02-01 | End: 2024-07-22

## 2024-02-01 RX ORDER — ACYCLOVIR 400 MG/1
1 TABLET ORAL
Qty: 9 EACH | Refills: 3 | Status: SHIPPED | OUTPATIENT
Start: 2024-02-01

## 2024-02-01 RX ORDER — FLUOXETINE 10 MG/1
10 CAPSULE ORAL DAILY
Qty: 90 CAPSULE | Refills: 3 | Status: SHIPPED | OUTPATIENT
Start: 2024-02-01

## 2024-02-01 RX ORDER — OXYCODONE 18 MG/1
18 CAPSULE, EXTENDED RELEASE ORAL EVERY 12 HOURS
Qty: 60 CAPSULE | Refills: 0 | Status: SHIPPED | OUTPATIENT
Start: 2024-02-01 | End: 2024-02-12

## 2024-02-01 RX ORDER — OXYCODONE 18 MG/1
18 CAPSULE, EXTENDED RELEASE ORAL EVERY 12 HOURS
Qty: 60 CAPSULE | Refills: 0 | Status: SHIPPED | OUTPATIENT
Start: 2024-03-02 | End: 2024-02-12

## 2024-02-01 RX ORDER — ORAL SEMAGLUTIDE 7 MG/1
7 TABLET ORAL
Qty: 90 TABLET | Refills: 3 | Status: SHIPPED | OUTPATIENT
Start: 2024-02-01 | End: 2024-04-09

## 2024-02-01 RX ORDER — OXYCODONE 18 MG/1
18 CAPSULE, EXTENDED RELEASE ORAL EVERY 12 HOURS
Qty: 60 CAPSULE | Refills: 0 | Status: SHIPPED | OUTPATIENT
Start: 2024-02-26 | End: 2024-02-01

## 2024-02-01 RX ORDER — OXYCODONE HYDROCHLORIDE 15 MG/1
15 TABLET ORAL 2 TIMES DAILY PRN
Qty: 60 TABLET | Refills: 0 | Status: SHIPPED | OUTPATIENT
Start: 2024-02-28 | End: 2024-03-20

## 2024-02-01 ASSESSMENT — ANXIETY QUESTIONNAIRES
7. FEELING AFRAID AS IF SOMETHING AWFUL MIGHT HAPPEN: NOT AT ALL
5. BEING SO RESTLESS THAT IT IS HARD TO SIT STILL: NOT AT ALL
GAD7 TOTAL SCORE: 0
GAD7 TOTAL SCORE: 0
2. NOT BEING ABLE TO STOP OR CONTROL WORRYING: NOT AT ALL
6. BECOMING EASILY ANNOYED OR IRRITABLE: NOT AT ALL
IF YOU CHECKED OFF ANY PROBLEMS ON THIS QUESTIONNAIRE, HOW DIFFICULT HAVE THESE PROBLEMS MADE IT FOR YOU TO DO YOUR WORK, TAKE CARE OF THINGS AT HOME, OR GET ALONG WITH OTHER PEOPLE: NOT DIFFICULT AT ALL
4. TROUBLE RELAXING: NOT AT ALL
8. IF YOU CHECKED OFF ANY PROBLEMS, HOW DIFFICULT HAVE THESE MADE IT FOR YOU TO DO YOUR WORK, TAKE CARE OF THINGS AT HOME, OR GET ALONG WITH OTHER PEOPLE?: NOT DIFFICULT AT ALL
3. WORRYING TOO MUCH ABOUT DIFFERENT THINGS: NOT AT ALL
1. FEELING NERVOUS, ANXIOUS, OR ON EDGE: NOT AT ALL
GAD7 TOTAL SCORE: 0
7. FEELING AFRAID AS IF SOMETHING AWFUL MIGHT HAPPEN: NOT AT ALL

## 2024-02-01 ASSESSMENT — PATIENT HEALTH QUESTIONNAIRE - PHQ9
SUM OF ALL RESPONSES TO PHQ QUESTIONS 1-9: 7
SUM OF ALL RESPONSES TO PHQ QUESTIONS 1-9: 7
10. IF YOU CHECKED OFF ANY PROBLEMS, HOW DIFFICULT HAVE THESE PROBLEMS MADE IT FOR YOU TO DO YOUR WORK, TAKE CARE OF THINGS AT HOME, OR GET ALONG WITH OTHER PEOPLE: SOMEWHAT DIFFICULT

## 2024-02-01 NOTE — TELEPHONE ENCOUNTER
Patient called and ran out of Xtampza and is doing okay with his overall pain management with the oxycodone immediate release 15 mg twice daily and the pregabalin.  Will take Xtampza off med list.

## 2024-02-01 NOTE — PROGRESS NOTES
Assessment & Plan   Hip pain, right  Lateral hip and pain in the buttock region most likely from trochanter Olivier bursitis as well as iliotibial band syndrome, see patient instructions regarding particular exercises.  Massage and rolling may help as well.  - XR Hip Right 2-3 Views    Peripheral polyneuropathy  Chronic, continuous use of opioids - every 3 month medication checks  Chronic low back pain  Ongoing and medication helps and will continue.  There is been an issue with prior authorization for his Xtampza and working with the pharmacy.  The medication in combination with the short acting 15 mg tablets have been useful in maintaining his ability to work and function  - pregabalin (LYRICA) 75 MG capsule  Dispense: 180 capsule; Refill: 1  - oxyCODONE IR (ROXICODONE) 15 MG tablet  Dispense: 60 tablet; Refill: 0  addendum: 2/12/2024, 12:26 PM: This is not covered by his insurance, he stopped the medication/ran out and is doing fine off of it and will not refill at this time.      Screen for colon cancer  - Colonoscopy Screening  Referral    Type 2 diabetes mellitus with diabetic polyneuropathy, without long-term current use of insulin (H)  Controlled - continue medication(s).  He would like to get a Dexcom G7 and prescription sent.  Hopefully it will be covered and if not perhaps he can still get as a prices come down some I believe.  He does like to work with an endocrinologist and given referral.  - Albumin Random Urine Quantitative with Creat Ratio  - Adult Endocrinology  Referral  - Continuous Blood Gluc Sensor (DEXCOM G7 SENSOR) MISC  Dispense: 9 each; Refill: 3  - Comprehensive metabolic panel (BMP + Alb, Alk Phos, ALT, AST, Total. Bili, TP)  - Albumin Random Urine Quantitative with Creat Ratio  - Semaglutide (RYBELSUS) 7 MG tablet  Dispense: 90 tablet; Refill: 3    Right shoulder pain, unspecified chronicity  Signs of AC arthritis, otherwise x-rays okay, range of motion exercises  especially for infraspinatus which was good main tendon that caused issues with internal rotation and forced external rotation.  - XR Shoulder Right G/E 3 Views    Right foot pain  Tender under the first MTP, no signs of sesamoid fracture but they do seem tender.  Stiff soled shoe or boot offered but declined and that is okay.  Recommended wearing stiffer soled shoes as well as consider trying to cut out any area of an insult to the relieve pressure in this area.  - XR Foot Right G/E 3 Views    Major depressive disorder, recurrent episode, mild (H24)  Anxiety  Feeling better and would like to lower his dose and will take 10 mg for the next few months and okay to taper off if continuing to do well  - FLUoxetine (PROZAC) 10 MG capsule  Dispense: 90 capsule; Refill: 3    Screening for prostate cancer    - PROSTATE SPEC ANTIGEN SCREEN    Hyperlipidemia LDL goal <70    - Comprehensive metabolic panel (BMP + Alb, Alk Phos, ALT, AST, Total. Bili, TP)      Return in about 3 months (around 5/1/2024) for medication recheck.          Dannie Pastrana MD      [address] [address]  [email or web address]   Office: [phone]       Subjective   Harm is a 54 year old, presenting for the following health issues:  Pain    Via the Health Maintenance questionnaire, the patient has reported the following services have been completed -Eye Exam, this information has been sent to the abstraction team.    Right hip pain for a couple of years especially with laying on that side or longer drives.     Right heel pain - achilles region.  Now pain in the right MTP region.    Right shoulder pain the last 2-3 months. Unable to reach behind a seat and lift something.     History of Present Illness       Mental Health Follow-up:  Patient presents to follow-up on Depression & Anxiety.Patient's depression since last visit has been:  Better  The patient is not having other symptoms associated with  depression.  Patient's anxiety since last visit has been:  Better  The patient is not having other symptoms associated with anxiety.  Any significant life events: No  Patient is not feeling anxious or having panic attacks.  Patient has no concerns about alcohol or drug use.    Reason for visit:  Hip/foot/shoulder  Symptom onset:  More than a month    He eats 0-1 servings of fruits and vegetables daily.He consumes 2 sweetened beverage(s) daily.He exercises with enough effort to increase his heart rate 10 to 19 minutes per day.  He exercises with enough effort to increase his heart rate 3 or less days per week.   He is taking medications regularly.         Depression and Anxiety Follow-Up          6/14/2022    11:49 AM 2/6/2023    11:16 AM 2/1/2024     2:59 PM   PEG Score   PEG Total Score 8.67 8.67 3.33       Social History     Tobacco Use    Smoking status: Never    Smokeless tobacco: Never   Vaping Use    Vaping Use: Never used   Substance Use Topics    Alcohol use: No     Comment: Does not drink - last was 11/2015    Drug use: No         2/6/2023    11:04 AM 8/7/2023     1:11 PM 2/1/2024     2:41 PM   PHQ   PHQ-9 Total Score 11 8 7   Q9: Thoughts of better off dead/self-harm past 2 weeks Not at all Not at all Not at all         2/6/2023    11:06 AM 8/7/2023     1:12 PM 2/1/2024     2:43 PM   BUSHRA-7 SCORE   Total Score 6 (mild anxiety) 3 (minimal anxiety) 0 (minimal anxiety)   Total Score 6 3 0         2/1/2024     2:41 PM   Last PHQ-9   1.  Little interest or pleasure in doing things 1   2.  Feeling down, depressed, or hopeless 1   3.  Trouble falling or staying asleep, or sleeping too much 2   4.  Feeling tired or having little energy 1   5.  Poor appetite or overeating 2   6.  Feeling bad about yourself 0   7.  Trouble concentrating 0   8.  Moving slowly or restless 0   Q9: Thoughts of better off dead/self-harm past 2 weeks 0   PHQ-9 Total Score 7         2/1/2024     2:43 PM   BUSHRA-7    1. Feeling nervous,  anxious, or on edge 0   2. Not being able to stop or control worrying 0   3. Worrying too much about different things 0   4. Trouble relaxing 0   5. Being so restless that it is hard to sit still 0   6. Becoming easily annoyed or irritable 0   7. Feeling afraid, as if something awful might happen 0   BUSHRA-7 Total Score 0   If you checked any problems, how difficult have they made it for you to do your work, take care of things at home, or get along with other people? Not difficult at all     Suicide Assessment Five-step Evaluation and Treatment (SAFE-T)        Objective    /82 (BP Location: Left arm, Patient Position: Sitting, Cuff Size: Adult Large)   Pulse 86   Temp 98  F (36.7  C) (Tympanic)   Wt 124.3 kg (274 lb)   SpO2 98%   BMI 40.46 kg/m    Body mass index is 40.46 kg/m .  Physical Exam   GENERAL: alert and no distress  HENT: ear canals and TM's normal, nose and mouth without ulcers or lesions  NECK: no adenopathy, no asymmetry, masses, or scars  RESP: lungs clear to auscultation - no rales, rhonchi or wheezes  CV: regular rate and rhythm, normal S1 S2, no S3 or S4, no murmur, click or rub, no peripheral edema  ABDOMEN: soft, nontender, no hepatosplenomegaly, no masses and bowel sounds normal  MS: Right shoulder has pain with internal range of motion to the lowe mild impingement signs with arm abducted and externally rotated.  Negative empty can test.  Right knee has some joint line tenderness medial and lateral, no significant effusion, full range of motion,.  Right foot has pain under the first MTP, good range of motion, no tenderness between the metatarsals and no signs of bunion.  No gross musculoskeletal defects noted, no edema  SKIN: no suspicious lesions or rashes  NEURO: Normal strength and tone, mentation intact and speech normal  BACK: no CVA tenderness, no paralumbar tenderness  Diabetic foot exam: normal DP and PT pulses, no trophic changes or ulcerative lesions, and reduced sensation at  soles  X-ray of the shoulder showed some acromioclavicular arthritis otherwise unremarkable; x-ray of the foot showed mild calcifications around the MTP joint, no sesamoid bone fractures large calcaneal spur and multiple joints with mild degenerative arthrosis; x-ray of hip showed minimal arthritis signs      I spent a total of 43 minutes on the day of the visit.  -Doing chart review, history and exam, documentation and further activities as noted.      Signed Electronically by: Todd Pastrana MD

## 2024-02-02 LAB
ALBUMIN SERPL BCG-MCNC: 4.7 G/DL (ref 3.5–5.2)
ALP SERPL-CCNC: 68 U/L (ref 40–150)
ALT SERPL W P-5'-P-CCNC: 30 U/L (ref 0–70)
ANION GAP SERPL CALCULATED.3IONS-SCNC: 11 MMOL/L (ref 7–15)
AST SERPL W P-5'-P-CCNC: 23 U/L (ref 0–45)
BILIRUB SERPL-MCNC: 0.5 MG/DL
BUN SERPL-MCNC: 14.2 MG/DL (ref 6–20)
CALCIUM SERPL-MCNC: 9.3 MG/DL (ref 8.6–10)
CHLORIDE SERPL-SCNC: 103 MMOL/L (ref 98–107)
CHOLEST SERPL-MCNC: 162 MG/DL
CREAT SERPL-MCNC: 1.21 MG/DL (ref 0.67–1.17)
CREAT UR-MCNC: 270 MG/DL
DEPRECATED HCO3 PLAS-SCNC: 27 MMOL/L (ref 22–29)
EGFRCR SERPLBLD CKD-EPI 2021: 72 ML/MIN/1.73M2
FASTING STATUS PATIENT QL REPORTED: YES
GLUCOSE SERPL-MCNC: 69 MG/DL (ref 70–99)
HDLC SERPL-MCNC: 44 MG/DL
LDLC SERPL CALC-MCNC: 101 MG/DL
MICROALBUMIN UR-MCNC: <12 MG/L
MICROALBUMIN/CREAT UR: NORMAL MG/G{CREAT}
NONHDLC SERPL-MCNC: 118 MG/DL
POTASSIUM SERPL-SCNC: 4.6 MMOL/L (ref 3.4–5.3)
PROT SERPL-MCNC: 8.1 G/DL (ref 6.4–8.3)
PSA SERPL DL<=0.01 NG/ML-MCNC: 1.53 NG/ML (ref 0–3.5)
SODIUM SERPL-SCNC: 141 MMOL/L (ref 135–145)
TRIGL SERPL-MCNC: 83 MG/DL

## 2024-02-05 NOTE — RESULT ENCOUNTER NOTE
Dear Ryland,    Here is a summary of your recent test results:  -PSA (prostate specific antigen) test is normal.  This indicates a low likelihood of prostate cancer.  ADVISE: rechecking this in 1 year.    -Cholesterol levels are slightly above your goal levels.  ADVISE: continuing your medication, a regular exercise program with at least 150 minutes of aerobic exercise per week, and eating a low saturated fat/low carbohydrate diet.  Also, you should recheck this fasting cholesterol panel in 12 months.  -Liver and gallbladder tests are normal (ALT,AST, Alk phos, bilirubin), kidney function is normal (Cr, GFR), sodium is normal, potassium is normal, calcium is normal, glucose is normal.  -A1C (test of diabetes control the last 2-3 months) is at your goal. Please continue with your current plan. Also, you should make an appointment to see me and recheck your A1C test in 6 months.   -Microalbumin (urine protein) test is normal.  ADVISE: rechecking this annually.    For additional lab test information, www.testing.com is a very good reference.    In addition, here is a list of due or overdue Health Maintenance reminders:  Colorectal Cancer Screening due on 02/27/2018  Pneumococcal Vaccine(2 of 2 - PCV) due on 04/18/2018    Please call us at [phone] (or use [employer]) to address the above recommendations if needed.           Thank you very much for trusting me and [facility].     Have a peaceful day.    Healthy regards,  Dannie Pastrana MD

## 2024-02-08 NOTE — TELEPHONE ENCOUNTER
Central Prior Authorization Team   Phone: [phone]    PA Initiation    Medication: Xtampza ER 18 mg  Insurance Company: Comment:  [employer]  Pharmacy [place]illing the Rx: [facility] [place] - [address] [place] [address]  Filling Pharmacy Phone: [phone]  Filling Pharmacy Fax:    Start Date: 2/8/2024

## 2024-02-09 NOTE — TELEPHONE ENCOUNTER
Central Prior Authorization Team - Phone: [phone]     PA Initiation    Medication: RYBELSUS 7 MG PO TABS  Insurance Company: Comment:  [employer]  Pharmacy Filling the Rx: [facility] [place] -  [place], [place] - [address] [place] [address]  Filling Pharmacy Phone: [phone]  Filling Pharmacy Fax:    Start Date: 2/9/2024

## 2024-02-12 NOTE — TELEPHONE ENCOUNTER
PRIOR AUTHORIZATION DENIED    Medication: Xtampza ER 18 mg    Denial Date: 2/9/2024    Denial Rational:  Per insurance, medication is excluded from patient's benefit plan and will not be covered. Review and appeal are not available because of this exclusion.              Appeal Information:  N/A       Maintain mammary support for 48 hours after getting home.    At that time, may remove garment, surgical tape, and gauze.    Do not remove Steri-Strips.    May shower after above.    After showering, do not need to reapply a dressing.    Should wear mammary support, or own bra, even at night for the next 5 days.    Dr. Rehman should call with pathology report in approximately 2 weeks, that conversation will determine further management

## 2024-02-13 NOTE — TELEPHONE ENCOUNTER
Central Prior Authorization Team - Phone: [phone]     Prior Authorization Approval    Medication: RYBELSUS 7 MG PO TABS  Authorization Effective Date: 2/9/2024  Authorization Expiration Date: 2/8/2025  Approved Dose/Quantity: 30  Reference #:     Insurance Company: Comment:  [employer]  Expected CoPay: $    CoPay Card Available:      Financial Assistance Needed:   Which Pharmacy is filling the prescription: [facility] [place] - [address] [place] [address]  Pharmacy Notified: YES  Patient Notified: YES pharmacy will notify when ready

## 2024-02-20 ENCOUNTER — TELEPHONE (OUTPATIENT)
Dept: GASTROENTEROLOGY | Facility: CLINIC | Age: 54
End: 2024-02-20
Payer: COMMERCIAL

## 2024-02-20 ENCOUNTER — HOSPITAL ENCOUNTER (OUTPATIENT)
Facility: CLINIC | Age: 54
End: 2024-02-20
Attending: STUDENT IN AN ORGANIZED HEALTH CARE EDUCATION/TRAINING PROGRAM | Admitting: STUDENT IN AN ORGANIZED HEALTH CARE EDUCATION/TRAINING PROGRAM
Payer: COMMERCIAL

## 2024-02-20 DIAGNOSIS — Z12.11 SPECIAL SCREENING FOR MALIGNANT NEOPLASMS, COLON: Primary | ICD-10-CM

## 2024-02-20 NOTE — TELEPHONE ENCOUNTER
"Endoscopy Scheduling Screen    Have you had a positive Covid test in the last 14 days?  No    Are you active on MyChart?   Yes    What insurance is in the chart?  Other:  bcbs     Ordering/Referring Provider:     BEL SWAN       (If ordering provider performs procedure, schedule with ordering provider unless otherwise instructed. )    BMI: Estimated body mass index is 40.46 kg/m  as calculated from the following:    Height as of 8/7/23: 1.753 m (5' 9\").    Weight as of 2/1/24: 124.3 kg (274 lb).     Sedation Ordered  moderate sedation.   If patient BMI > 50 do not schedule in ASC.    If patient BMI > 45 do not schedule at [facility].    Are you taking methadone or Suboxone?  No    Have you had difficulties, pain, or discomfort during past endoscopy procedures?  Yes   Patient must be scheduled with MAC sedation.    Are you taking any prescription medications for pain 3 or more times per week? _Oxycodone 2Xdaily   YES, Colon/Combined: RN review needed to determine if MAC is required.  (RN Review required.)     Do you have a history of malignant hyperthermia or adverse reaction to anesthesia?  No    (Females) Are you currently pregnant?   No     Have you been diagnosed or told you have pulmonary hypertension?   No    Do you have an LVAD?  No    Have you been told you have moderate to severe sleep apnea?  No    Have you been told you have COPD, asthma, or any other lung disease?  No    Do you have any heart conditions?  No     Have you ever had an organ transplant?   No    Have you ever had or are you awaiting a heart or lung transplant?   No    Have you had a stroke or transient ischemic attack (TIA aka \"mini stroke\" in the last 6 months?   No    Have you been diagnosed with or been told you have cirrhosis of the liver?   No    Are you currently on dialysis?   No    Do you need assistance transferring?   No    BMI: Estimated body mass index is 40.46 kg/m  as calculated from the following:    Height as of 8/7/23: 1.753 " "m (5' 9\").    Weight as of 2/1/24: 124.3 kg (274 lb).     Is patients BMI > 40 and scheduling location [facility]?  No    Do you take an injectable medication for weight loss or diabetes (excluding insulin)?  No    Do you take the medication Naltrexone?  No    Do you take blood thinners?  No       Prep   Are you currently on dialysis or do you have chronic kidney disease?  No    Do you have a diagnosis of diabetes?  Yes (Golytely Prep)    Do you have a diagnosis of cystic fibrosis (CF)?  No    On a regular basis do you go 3 -5 days between bowel movements?  Yes (Extended Prep)    BMI > 40?  No    Preferred Pharmacy:    [facility] [address] [facility]  [address] [facility] [place] [address]  Phone: [phone] Fax: [phone] Alternate Fax: [phone], [phone]      Final Scheduling Details       Procedure scheduled  Colonoscopy    Surgeon:  Garima      Date of procedure:  04/11/2024     Pre-OP / PAC:   Yes - Patient informed of pre-op requirement.    Location  [facility] - Per order.    Sedation   MAC/Deep Sedation - Per exclusion criteria.      Patient Reminders:   You will receive a call from a Nurse to review instructions and health history.  This assessment must be completed prior to your procedure.  Failure to complete the Nurse assessment may result in the procedure being cancelled.      On the day of your procedure, please designate an adult(s) who can drive you home stay with you for the next 24 hours. The medicines used in the exam will make you sleepy. You will not be able to drive.      You cannot take public transportation, ride share services, or non-medical taxi service without a responsible caregiver.  Medical transport services are allowed with the requirement that a responsible caregiver will receive you at your destination.  We require that drivers and caregivers are confirmed prior to your procedure.  "

## 2024-02-22 ENCOUNTER — HOSPITAL ENCOUNTER (OUTPATIENT)
Facility: CLINIC | Age: 54
End: 2024-02-22
Attending: STUDENT IN AN ORGANIZED HEALTH CARE EDUCATION/TRAINING PROGRAM | Admitting: STUDENT IN AN ORGANIZED HEALTH CARE EDUCATION/TRAINING PROGRAM
Payer: COMMERCIAL

## 2024-02-22 ENCOUNTER — PREP FOR PROCEDURE (OUTPATIENT)
Dept: SURGERY | Facility: CLINIC | Age: 54
End: 2024-02-22
Payer: COMMERCIAL

## 2024-02-22 DIAGNOSIS — Z12.11 SCREEN FOR COLON CANCER: Primary | ICD-10-CM

## 2024-03-20 DIAGNOSIS — M54.50 CHRONIC LOW BACK PAIN, UNSPECIFIED BACK PAIN LATERALITY, UNSPECIFIED WHETHER SCIATICA PRESENT: ICD-10-CM

## 2024-03-20 DIAGNOSIS — G89.29 CHRONIC LOW BACK PAIN, UNSPECIFIED BACK PAIN LATERALITY, UNSPECIFIED WHETHER SCIATICA PRESENT: ICD-10-CM

## 2024-03-20 DIAGNOSIS — G62.9 PERIPHERAL POLYNEUROPATHY: ICD-10-CM

## 2024-03-20 DIAGNOSIS — F11.90 CHRONIC, CONTINUOUS USE OF OPIOIDS: ICD-10-CM

## 2024-03-20 RX ORDER — OXYCODONE HYDROCHLORIDE 15 MG/1
15 TABLET ORAL 2 TIMES DAILY PRN
Qty: 60 TABLET | Refills: 0 | Status: SHIPPED | OUTPATIENT
Start: 2024-03-29 | End: 2024-04-09

## 2024-03-26 RX ORDER — BISACODYL 5 MG/1
TABLET, DELAYED RELEASE ORAL
Qty: 4 TABLET | Refills: 0 | Status: SHIPPED | OUTPATIENT
Start: 2024-03-26 | End: 2024-06-24

## 2024-03-28 ENCOUNTER — TELEPHONE (OUTPATIENT)
Dept: GASTROENTEROLOGY | Facility: CLINIC | Age: 54
End: 2024-03-28
Payer: COMMERCIAL

## 2024-03-28 NOTE — TELEPHONE ENCOUNTER
Pre visit planning completed.      Procedure details:    Patient scheduled for Colonoscopy  on 4/11/24.     Arrival time: 0600. Procedure time 0800    Facility location: [facility]; [place] [id number]. Check in location: Surgery entrance on the South side of building.     Sedation type: MAC    Pre op exam needed? Yes. Patient is scheduled 4/9 with PCP    Indication for procedure: screening       Chart review:     Electronic implanted devices? No    Recent diagnosis of diverticulitis within the last 6 weeks? No    Diabetic? Yes. Diabetic medication HOLDING recommendations: Oral diabetic medications: Yes:  Glimepiride (amaryl): HOLD day of procedure.  Rybelsus (semaglutide): HOLD 7 days before procedure.        Medication review:    Anticoagulants? No    NSAIDS? No NSAID medications per patient's medication list.  RN will verify with pre-assessment call.    Other medication HOLDING recommendations:  N/A      Prep for procedure:     Bowel prep recommendation: Extended Golytely. Bowel prep prescription sent to    [address] by [facility] team.    Due to: chronic pain medication noted in chart.     Prep instructions sent via [facility]         Blanca Garcia RN  Endoscopy Procedure Pre Assessment RN  [phone] option 4

## 2024-03-29 NOTE — TELEPHONE ENCOUNTER
Attempted to contact patient in order to complete pre assessment questions.     No answer. Left message to return call to [phone] option 4    Callback required communication sent via [employer].      Heidi Davey RN  Endoscopy Procedure Pre Assessment RN

## 2024-04-02 RX ORDER — ONDANSETRON 2 MG/ML
4 INJECTION INTRAMUSCULAR; INTRAVENOUS
Status: CANCELLED | OUTPATIENT
Start: 2024-04-02

## 2024-04-02 RX ORDER — LIDOCAINE 40 MG/G
CREAM TOPICAL
Status: CANCELLED | OUTPATIENT
Start: 2024-04-02

## 2024-04-02 NOTE — TELEPHONE ENCOUNTER
Second call attempt to complete pre assessment.     No answer.  Left message to return call to [phone] #[phone] by next business day prior to 4PM or procedure will be sent to cancel.     Callback required communication sent via [employer].      Heidi Gupta RN  Endoscopy Procedure Pre Assessment RN

## 2024-04-04 ENCOUNTER — TELEPHONE (OUTPATIENT)
Dept: GASTROENTEROLOGY | Facility: CLINIC | Age: 54
End: 2024-04-04
Payer: COMMERCIAL

## 2024-04-04 ENCOUNTER — MYC MEDICAL ADVICE (OUTPATIENT)
Dept: GASTROENTEROLOGY | Facility: CLINIC | Age: 54
End: 2024-04-04
Payer: COMMERCIAL

## 2024-04-04 NOTE — TELEPHONE ENCOUNTER
----- Message from Blanca Garcia RN sent at 4/4/2024  8:58 AM CDT -----  Pre assessment was not completed for upcoming Colonoscopy  scheduled on 4/11/24.    Please cancel per policy.       Thank you,     Blanca Garcia RN  Endoscopy Procedure Pre Assessment RN  [phone] option 4

## 2024-04-04 NOTE — TELEPHONE ENCOUNTER
Caller: No call    Reason for Reschedule/Cancellation   (please be detailed, any staff messages or encounters to note?): Cancellation policy - pre-assessment call not completed.      Prior to reschedule please review:  Ordering Provider: Todd Pastrana Determined: MAC  Does patient have any ASC Exclusions, please identify?: No      Notes on Cancelled Procedure:  Procedure: Lower Endoscopy [Colonoscopy]   Date: 4/11/2024  Location: [facility]; [facility] [address] [place] [address]  Surgeon: Garima      Rescheduled: No, sent MyChart and CTL.        Did you cancel or rescheduled an EUS procedure? No.       CASE IN DEPOT

## 2024-04-04 NOTE — TELEPHONE ENCOUNTER
No return call received.   Pre assessment was not completed for upcoming scheduled procedure.     Staff message sent to endoscopy scheduling to cancel procedure per policy.       Blanca Garcia, RN   Endoscopy Procedure Pre Assessment RN

## 2024-04-09 ENCOUNTER — PREP FOR PROCEDURE (OUTPATIENT)
Dept: GASTROENTEROLOGY | Facility: CLINIC | Age: 54
End: 2024-04-09

## 2024-04-09 ENCOUNTER — OFFICE VISIT (OUTPATIENT)
Dept: FAMILY MEDICINE | Facility: CLINIC | Age: 54
End: 2024-04-09
Payer: COMMERCIAL

## 2024-04-09 VITALS
TEMPERATURE: 97.6 F | DIASTOLIC BLOOD PRESSURE: 78 MMHG | SYSTOLIC BLOOD PRESSURE: 116 MMHG | RESPIRATION RATE: 12 BRPM | HEART RATE: 71 BPM | OXYGEN SATURATION: 99 % | HEIGHT: 69 IN | WEIGHT: 276 LBS | BODY MASS INDEX: 40.88 KG/M2

## 2024-04-09 DIAGNOSIS — M54.50 CHRONIC LOW BACK PAIN, UNSPECIFIED BACK PAIN LATERALITY, UNSPECIFIED WHETHER SCIATICA PRESENT: ICD-10-CM

## 2024-04-09 DIAGNOSIS — Z12.11 SCREEN FOR COLON CANCER: ICD-10-CM

## 2024-04-09 DIAGNOSIS — Z01.818 PREOP GENERAL PHYSICAL EXAM: Primary | ICD-10-CM

## 2024-04-09 DIAGNOSIS — Z12.11 COLON CANCER SCREENING: Primary | ICD-10-CM

## 2024-04-09 DIAGNOSIS — M25.511 RIGHT SHOULDER PAIN, UNSPECIFIED CHRONICITY: ICD-10-CM

## 2024-04-09 DIAGNOSIS — G62.9 PERIPHERAL POLYNEUROPATHY: ICD-10-CM

## 2024-04-09 DIAGNOSIS — G89.29 CHRONIC LOW BACK PAIN, UNSPECIFIED BACK PAIN LATERALITY, UNSPECIFIED WHETHER SCIATICA PRESENT: ICD-10-CM

## 2024-04-09 DIAGNOSIS — F11.90 CHRONIC, CONTINUOUS USE OF OPIOIDS: ICD-10-CM

## 2024-04-09 DIAGNOSIS — E11.42 TYPE 2 DIABETES MELLITUS WITH DIABETIC POLYNEUROPATHY, WITHOUT LONG-TERM CURRENT USE OF INSULIN (H): ICD-10-CM

## 2024-04-09 PROCEDURE — 99214 OFFICE O/P EST MOD 30 MIN: CPT | Performed by: FAMILY MEDICINE

## 2024-04-09 RX ORDER — OXYCODONE HYDROCHLORIDE 15 MG/1
15 TABLET ORAL 2 TIMES DAILY PRN
Qty: 60 TABLET | Refills: 0 | Status: SHIPPED | OUTPATIENT
Start: 2024-04-28 | End: 2024-05-29

## 2024-04-09 RX ORDER — ORAL SEMAGLUTIDE 7 MG/1
7 TABLET ORAL
Qty: 90 TABLET | Refills: 3 | Status: SHIPPED | OUTPATIENT
Start: 2024-04-09

## 2024-04-09 ASSESSMENT — ANXIETY QUESTIONNAIRES
5. BEING SO RESTLESS THAT IT IS HARD TO SIT STILL: NOT AT ALL
1. FEELING NERVOUS, ANXIOUS, OR ON EDGE: SEVERAL DAYS
GAD7 TOTAL SCORE: 2
6. BECOMING EASILY ANNOYED OR IRRITABLE: SEVERAL DAYS
IF YOU CHECKED OFF ANY PROBLEMS ON THIS QUESTIONNAIRE, HOW DIFFICULT HAVE THESE PROBLEMS MADE IT FOR YOU TO DO YOUR WORK, TAKE CARE OF THINGS AT HOME, OR GET ALONG WITH OTHER PEOPLE: NOT DIFFICULT AT ALL
GAD7 TOTAL SCORE: 2
7. FEELING AFRAID AS IF SOMETHING AWFUL MIGHT HAPPEN: NOT AT ALL
7. FEELING AFRAID AS IF SOMETHING AWFUL MIGHT HAPPEN: NOT AT ALL
2. NOT BEING ABLE TO STOP OR CONTROL WORRYING: NOT AT ALL
4. TROUBLE RELAXING: NOT AT ALL
3. WORRYING TOO MUCH ABOUT DIFFERENT THINGS: NOT AT ALL
GAD7 TOTAL SCORE: 2
8. IF YOU CHECKED OFF ANY PROBLEMS, HOW DIFFICULT HAVE THESE MADE IT FOR YOU TO DO YOUR WORK, TAKE CARE OF THINGS AT HOME, OR GET ALONG WITH OTHER PEOPLE?: NOT DIFFICULT AT ALL

## 2024-04-09 ASSESSMENT — PATIENT HEALTH QUESTIONNAIRE - PHQ9
10. IF YOU CHECKED OFF ANY PROBLEMS, HOW DIFFICULT HAVE THESE PROBLEMS MADE IT FOR YOU TO DO YOUR WORK, TAKE CARE OF THINGS AT HOME, OR GET ALONG WITH OTHER PEOPLE: SOMEWHAT DIFFICULT
SUM OF ALL RESPONSES TO PHQ QUESTIONS 1-9: 5
SUM OF ALL RESPONSES TO PHQ QUESTIONS 1-9: 5

## 2024-04-09 NOTE — TELEPHONE ENCOUNTER
Received warm transferred call from scheduling. Patient able to be added back on for 4/11/24 at . Same date and time per .  still needs to build case back onto schedule -  Asif working on this.    -------------------------------------------------------------    Pre assessment completed for upcoming procedure.   (Please see previous telephone encounter notes for complete details)    Patient returned call.       Procedure details:    Arrival time and facility location reviewed.    Pre op exam needed? Yes. Pre op physical exam scheduled for today 4/9/24 with PCP, Todd Pastrana MD. Reminded patient that pre op physical exam is required.      Designated  policy reviewed. Instructed to have someone stay 24 hours post procedure.       Medication review:    Medications reviewed. Please see supporting documentation below. Holding recommendations discussed (if applicable).   Oral diabetic medication(s): Glimepiride (amaryl): HOLD day of procedure.  Rybelsus (semaglutide): HOLD 7 days before procedure. Patient stated they have not taken Rybelsus (Semaglutide) in weeks and are looking to get this discontinued.   NSAID medication(s): Ibuprofen (Advil, Motrin): HOLD 1 day before procedure.      Prep for procedure:     Patient stated they ate an apple yesterday but declined to reschedule due to this. Patient wants to keep the appointment as scheduled on 4/11/24. Advised patient that eating apple/not following low fiber diet could lead to suboptimal bowel prep and a repeat procedure/additional bowel prep may be needed. Patient agreed to risks and wishes to proceed with colonoscopy procedure as scheduled on 4/11/24.    Procedure prep instructions reviewed.    Patient already picked up the bowel prep prescriptions. Patient asked why Extended prep is needed. Advised patient that Extended prep is recommended due to opioid use (oxycodone) and even more so since an apple was eaten in the dietary restriction  period. Patient verbalized understanding.      Any additional information needed:  N/A      Patient  verbalized understanding and had no questions or concerns at this time.      Effie Frederick RN  Endoscopy Procedure Pre Assessment RN  [phone] option 4

## 2024-04-09 NOTE — TELEPHONE ENCOUNTER
Rescheduled: Yes,   Procedure: Lower Endoscopy [Colonoscopy]    Date: 4/11   Location: [facility]; [facility] [address] [place] [address]   Surgeon: Garima   Sedation Level Scheduled  mac ,  Reason for Sedation Level ordered   Instructions updated and sent: y     Does patient need PAC or Pre -Op Rescheduled? : y having today 4/9       Did you cancel or rescheduled an EUS procedure? No.

## 2024-04-09 NOTE — PATIENT INSTRUCTIONS
Preparing for Your Surgery  Getting started  A nurse will call you to review your health history and instructions. They will give you an arrival time based on your scheduled surgery time. Please be ready to share:  Your doctor's clinic name and phone number  Your medical, surgical, and anesthesia history  A list of allergies and sensitivities  A list of medicines, including herbal treatments and over-the-counter drugs  Whether the patient has a legal guardian (ask how to send us the papers in advance)  Please tell us if you're pregnant--or if there's any chance you might be pregnant. Some surgeries may injure a fetus (unborn baby), so they require a pregnancy test. Surgeries that are safe for a fetus don't always need a test, and you can choose whether to have one.   If you have a child who's having surgery, please ask for a copy of Preparing for Your Child's Surgery.    Preparing for surgery  Within 10 to 30 days of surgery: Have a pre-op exam (sometimes called an H&P, or History and Physical). This can be done at a clinic or pre-operative center.  If you're having a , you may not need this exam. Talk to your care team.  At your pre-op exam, talk to your care team about all medicines you take. If you need to stop any medicines before surgery, ask when to start taking them again.  We do this for your safety. Many medicines can make you bleed too much during surgery. Some change how well surgery (anesthesia) drugs work.  Call your insurance company to let them know you're having surgery. (If you don't have insurance, call [phone].)  Call your clinic if there's any change in your health. This includes signs of a cold or flu (sore throat, runny nose, cough, rash, fever). It also includes a scrape or scratch near the surgery site.  If you have questions on the day of surgery, call your hospital or surgery center.  Eating and drinking guidelines  For your safety: Unless your surgeon tells you otherwise,  follow the guidelines below.  Eat and drink as usual until 8 hours before you arrive for surgery. After that, no food or milk.  Drink clear liquids until 2 hours before you arrive. These are liquids you can see through, like water, Gatorade, and Propel Water. They also include plain black coffee and tea (no cream or milk), candy, and breath mints. You can spit out gum when you arrive.  If you drink alcohol: Stop drinking it the night before surgery.  If your care team tells you to take medicine on the morning of surgery, it's okay to take it with a sip of water.  Preventing infection  Shower or bathe the night before and morning of your surgery. Follow the instructions your clinic gave you. (If no instructions, use regular soap.)  Don't shave or clip hair near your surgery site. We'll remove the hair if needed.  Don't smoke or vape the morning of surgery. You may chew nicotine gum up to 2 hours before surgery. A nicotine patch is okay.  Note: Some surgeries require you to completely quit smoking and nicotine. Check with your surgeon.  Your care team will make every effort to keep you safe from infection. We will:  Clean our hands often with soap and water (or an alcohol-based hand rub).  Clean the skin at your surgery site with a special soap that kills germs.  Give you a special gown to keep you warm. (Cold raises the risk of infection.)  Wear special hair covers, masks, gowns and gloves during surgery.  Give antibiotic medicine, if prescribed. Not all surgeries need antibiotics.  What to bring on the day of surgery  Photo ID and insurance card  Copy of your health care directive, if you have one  Glasses and hearing aids (bring cases)  You can't wear contacts during surgery  Inhaler and eye drops, if you use them (tell us about these when you arrive)  CPAP machine or breathing device, if you use them  A few personal items, if spending the night  If you have . . .  A pacemaker, ICD (cardiac defibrillator) or other  implant: Bring the ID card.  An implanted stimulator: Bring the remote control.  A legal guardian: Bring a copy of the certified (court-stamped) guardianship papers.  Please remove any jewelry, including body piercings. Leave jewelry and other valuables at home.  If you're going home the day of surgery  You must have a responsible adult drive you home. They should stay with you overnight as well.  If you don't have someone to stay with you, and you aren't safe to go home alone, we may keep you overnight. Insurance often won't pay for this.  After surgery  If it's hard to control your pain or you need more pain medicine, please call your surgeon's office.  Questions?   If you have any questions for your care team, list them here: _________________________________________________________________________________________________________________________________________________________________________ ____________________________________ ____________________________________ ____________________________________  For informational purposes only. Not to replace the advice of your health care provider. Copyright   2003, 2019 [facility]. All rights reserved. Clinically reviewed by Cookie Stout MD. [employer] 845785 - REV 12/22.

## 2024-04-09 NOTE — PROGRESS NOTES
Preoperative Evaluation  [address] [address]  Phone: [phone]  Primary Provider: Bel Swan  Pre-op Performing Provider: BEL SWAN  Apr 9, 2024     Ryland is a 54 year old, presenting for the following:  Pre-Op Exam    Surgical Information  Surgery/Procedure: Colonoscopy  Surgery Location: [facility]  Surgeon: Dr. Asif Dyer  Surgery Date: 04/11/2024  Time of Surgery: 8:00 AM  Where patient plans to recover: At home with family  Fax number for surgical facility: Note does not need to be faxed, will be available electronically in Epic.    Assessment & Plan     The proposed surgical procedure is considered LOW risk.    Preop general physical exam    Screen for colon cancer    Type 2 diabetes mellitus with diabetic polyneuropathy, without long-term current use of insulin (H) he cannot find his medication.     R shoulder pain - still quite painful. Will refer to ortho    Antiplatelet or Anticoagulation Medication Instructions   - Patient is on no antiplatelet or anticoagulation medications.    Additional Medication Instructions  Patient is to take all scheduled medications on the day of surgery EXCEPT for modifications listed below:   - sulfonylurea (e.g. glyburide, glipizide): HOLD day of surgery   - GLP-1 oral semaglutide: HOLD 7 days before surgery    RECOMMENDATION:  APPROVAL GIVEN to proceed with proposed procedure, without further diagnostic evaluation.      Dannie Swan MD     [address] [address]  Office: [phone]  [email or web address]           Subjective     HPI related to upcoming procedure: In need of colonoscopy        4/9/2024     1:51 PM   Preop Questions   1. Have you ever had a heart attack or stroke? No   2. Have you ever had surgery on your heart or blood vessels, such as a stent placement, a coronary artery bypass, or  surgery on an artery in your head, neck, heart, or legs? No   3. Do you have chest pain with activity? No   4. Do you have a history of  heart failure? No   5. Do you currently have a cold, bronchitis or symptoms of other infection? YES - persistent nasal drainage/congestion - using Flonase - could add Claritin or similar   6. Do you have a cough, shortness of breath, or wheezing? YES - see above   7. Do you or anyone in your family have previous history of blood clots? No   8. Do you or does anyone in your family have a serious bleeding problem such as prolonged bleeding following surgeries or cuts? No   9. Have you ever had problems with anemia or been told to take iron pills? No   10. Have you had any abnormal blood loss such as black, tarry or bloody stools? No   11. Have you ever had a blood transfusion? No   12. Are you willing to have a blood transfusion if it is medically needed before, during, or after your surgery? Yes   13. Have you or any of your relatives ever had problems with anesthesia? No   14. Do you have sleep apnea, excessive snoring or daytime drowsiness? No   15. Do you have any artifical heart valves or other implanted medical devices like a pacemaker, defibrillator, or continuous glucose monitor? No   16. Do you have artificial joints? No   17. Are you allergic to latex? No     Health Care Directive  Patient does not have a Health Care Directive or Living Will: Discussed advance care planning with patient; however, patient declined at this time.    Preoperative Review of    reviewed - controlled substances reflected in medication list.      Status of Chronic Conditions:  DIABETES - Patient has a longstanding history of DiabetesType Type II . Patient is being treated with oral agents and exercise and denies significant side effects. Control has been good.   Lab Results   Component Value Date    A1C 6.6 (H) 02/01/2024    A1C 8.7 (H) 04/15/2021      Patient Active Problem List    Diagnosis  Date Noted    Type 2 diabetes mellitus with diabetic polyneuropathy, without long-term current use of insulin (H) 11/18/2016     Priority: High    Major depressive disorder, recurrent episode, mild (H24) 05/23/2016     Priority: High    Morbid obesity due to excess calories (H) 10/18/2015     Priority: High    Hyperlipidemia LDL goal <70 11/02/2011     Priority: High    Ulcerative colitis (H)      Priority: High     Flare up ~2007 - better since      Diabetic ulcer of other part of left foot associated with type 2 diabetes mellitus, with fat layer exposed (H) 02/06/2023     Priority: Medium    Right foot pain 02/24/2022     Priority: Medium    Right Achilles tendinitis 02/24/2022     Priority: Medium    Bone spur of right ankle 02/24/2022     Priority: Medium    Low back pain with sciatica, sciatica laterality unspecified, unspecified back pain laterality, unspecified chronicity 02/24/2022     Priority: Medium    Chronic, continuous use of opioids - every 3 month medication checks 05/12/2021     Priority: Medium     Patient is followed by Todd Pastrana MD for ongoing prescription of pain medication.  All refills should only be approved by this provider, or covering partner.      Medication(s): Pregablin 75 mg capsule. Oxycodone 15 mg tablet. Oxycodone IR 15 mg tablet.   Maximum quantity per month: 180 , 60 , 60   Clinic visit frequency required: Q 3 months     Also on medical marijuana     PDMP Review         Value Time User    State PDMP site checked  Yes 5/12/2021  8:30 AM Todd Pastrana MD   Controlled substance agreement:  Patient-Level CSA:    Controlled Substance Agreement - Opioid - Scan on 4/15/2021  1:40 PM  Controlled Substance Agreement - Opioid - Scan on 1/16/2020  4:43 PM     Pain Clinic evaluation in the past: Yes    Opioid Risk Tool Total Score(s):  OPIOID RISK TOOL TOTAL SCORE 5/12/2021   Total Score 1   Total Risk Score Category Low Risk (0-3)       Palpitations 11/21/2020     Priority:  Medium    Lightheadedness 11/20/2020     Priority: Medium    Corn or callus 08/24/2020     Priority: Medium    Contracture of palmar fascia (Dupuytren's) 01/16/2020     Priority: Medium    Right lateral epicondylitis 01/16/2020     Priority: Medium    Screening for prostate cancer 01/03/2019     Priority: Medium    Hypotestosteronemia in male 01/30/2018     Priority: Medium    Peripheral edema 05/23/2016     Priority: Medium    Erectile dysfunction, unspecified erectile dysfunction type 05/23/2016     Priority: Medium    Peripheral polyneuropathy 05/12/2016     Priority: Medium    Chronic low back pain  05/12/2016     Priority: Medium    Abdominal pain 04/14/2016     Priority: Medium    Acromioclavicular joint arthritis 01/15/2016     Priority: Medium    Impingement syndrome of shoulder region 01/15/2016     Priority: Medium    Osteoarthritis of glenohumeral joint 01/15/2016     Priority: Medium    Calcific tendinitis of shoulder 01/15/2016     Priority: Medium    Situational anxiety 05/28/2015     Priority: Medium      Past Medical History:   Diagnosis Date    Diabetes (H)     IBD (inflammatory bowel disease) 12/2008    ulcerative colitis     Past Surgical History:   Procedure Laterality Date    ESOPHAGOSCOPY, GASTROSCOPY, DUODENOSCOPY (EGD), COMBINED N/A 5/26/2015    Procedure: COMBINED ESOPHAGOSCOPY, GASTROSCOPY, DUODENOSCOPY (EGD), BIOPSY SINGLE OR MULTIPLE;  Surgeon: Mario Patterson MD;  Location:  GI    VASECTOMY  2002    [facility] APPENDECTOMY  1993     Current Outpatient Medications   Medication Sig Dispense Refill    betamethasone dipropionate (DIPROSONE) 0.05 % external cream Apply topically 2 times daily 45 g 1    bisacodyl (DULCOLAX) 5 MG EC tablet Two days prior to exam take two (2) tablets at 4pm. One day prior to exam take two (2) tablets at 4pm 4 tablet 0    blood glucose (CONTOUR NEXT TEST) test strip 1 strip by In Vitro route 2 times daily 200 strip 11    blood glucose calibration (CONTOUR NEXT  CONTROL LEVEL 2) NORMAL solution Use to calibrate blood glucose monitor as needed as directed. 1 Bottle 11    blood glucose monitoring (ARTURO CONTOUR NEXT MONITOR) meter device kit Use to test blood sugar 3 times daily or as directed. 1 kit 0    blood glucose monitoring (ARTURO MICROLET) lancets Use to test blood sugar 3 times daily or as directed. 100 Box 11    Continuous Blood Gluc Sensor (DEXCOM G7 SENSOR) MISC 1 Device every 10 days 9 each 3    FLUoxetine (PROZAC) 10 MG capsule Take 1 capsule (10 mg) by mouth daily 90 capsule 3    fluticasone (FLONASE) 50 MCG/ACT nasal spray Spray 2 sprays into both nostrils daily (Patient taking differently: Spray 2 sprays into both nostrils daily as needed) 15.8 mL 11    glimepiride (AMARYL) 4 MG tablet Take 1 tablet (4 mg) by mouth daily before breakfast 90 tablet 3    MOVANTIK 25 MG TABS tablet Take 1 tablet (25 mg) by mouth every morning (before breakfast) 90 tablet 3    naloxone (NARCAN) 4 MG/0.1ML nasal spray Spray 1 spray (4 mg) into one nostril alternating nostrils as needed for opioid reversal every 2-3 minutes until assistance arrives 0.2 mL 0    omeprazole (PRILOSEC) 20 MG DR capsule Take 1 capsule (20 mg) by mouth daily 90 capsule 3    [START ON 4/28/2024] oxyCODONE IR (ROXICODONE) 15 MG tablet Take 1 tablet (15 mg) by mouth 2 times daily as needed for severe pain 60 tablet 0    polyethylene glycol (GOLYTELY) 236 g suspension Take as directed. Two days before your exam fill the first container with water. Cover and shake until mixed well. At 5:00pm drink one 8oz glass every 10-15 minutes until half (1/2) of the first container is empty. Store the remainder in the refrigerator. One day before your exam at 5:00pm drink the second half of the first container until it is gone. Before you go to bed mix the second container with water and put in refrigerator. Six hours before your check in time drink one 8oz glass every 10-15 minutes until half of container is empty.  "Discard the remainder of solution. 8000 mL 0    pregabalin (LYRICA) 75 MG capsule Take 1 capsule (75 mg) by mouth 2 times daily 180 capsule 1    Semaglutide (RYBELSUS) 7 MG tablet Take 1 tablet (7 mg) by mouth daily before breakfast -with pharmacist recommended needles. 90 tablet 3    simvastatin (ZOCOR) 20 MG tablet Take 1 tablet (20 mg) by mouth At Bedtime 90 tablet 3    traZODone (DESYREL) 50 MG tablet Take 1-2 tablets ( mg) by mouth nightly as needed for sleep 180 tablet 3     Allergies   Allergen Reactions    Latex      Flu like symptoms with Azacol        Social History     Tobacco Use    Smoking status: Never    Smokeless tobacco: Never   Substance Use Topics    Alcohol use: No     Comment: Does not drink - last was 11/2015       History   Drug Use No         Review of Systems  Objective    /78   Pulse 71   Temp 97.6  F (36.4  C) (Tympanic)   Resp 12   Ht 1.753 m (5' 9\")   Wt 125.2 kg (276 lb)   SpO2 99%   BMI 40.76 kg/m     Estimated body mass index is 40.76 kg/m  as calculated from the following:    Height as of this encounter: 1.753 m (5' 9\").    Weight as of this encounter: 125.2 kg (276 lb).  Physical Exam  GENERAL: alert and no distress  EYES: Eyes grossly normal to inspection, PERRL and conjunctivae and sclerae normal  HENT: ear canals and TM's normal, nose and mouth without ulcers or lesions  NECK: no adenopathy, no asymmetry, masses, or scars  RESP: lungs clear to auscultation - no rales, rhonchi or wheezes  CV: regular rate and rhythm, normal S1 S2, no S3 or S4, no murmur, click or rub, no peripheral edema  ABDOMEN: soft, nontender, no hepatosplenomegaly, no masses and bowel sounds normal  MS: no gross musculoskeletal defects noted, no edema  SKIN: no suspicious lesions or rashes  NEURO: Normal strength and tone, mentation intact and speech normal  PSYCH: mentation appears normal, affect normal/bright    Recent Labs   Lab Test 02/01/24  1541 02/06/23  1212    142   POTASSIUM " 4.6 4.6   CR 1.21* 1.08   A1C 6.6* 6.2*      Diagnostics  No labs were ordered during this visit.   No EKG required, no history of coronary heart disease, significant arrhythmia, peripheral arterial disease or other structural heart disease.    Revised Cardiac Risk Index (RCRI)  The patient has the following serious cardiovascular risks for perioperative complications:   - No serious cardiac risks = 0 points   RCRI Interpretation: 0 points: Class I (very low risk - 0.4% complication rate)     Signed Electronically by: Todd Pastrana MD  Copy of this evaluation report is provided to requesting physician.

## 2024-04-10 RX ORDER — LIDOCAINE 40 MG/G
CREAM TOPICAL
Status: CANCELLED | OUTPATIENT
Start: 2024-04-10

## 2024-04-10 RX ORDER — ONDANSETRON 2 MG/ML
4 INJECTION INTRAMUSCULAR; INTRAVENOUS
Status: CANCELLED | OUTPATIENT
Start: 2024-04-10

## 2024-04-11 ENCOUNTER — ANESTHESIA (OUTPATIENT)
Dept: SURGERY | Facility: CLINIC | Age: 54
End: 2024-04-11
Payer: COMMERCIAL

## 2024-04-11 ENCOUNTER — HOSPITAL ENCOUNTER (OUTPATIENT)
Facility: CLINIC | Age: 54
Discharge: HOME | End: 2024-04-11
Attending: STUDENT IN AN ORGANIZED HEALTH CARE EDUCATION/TRAINING PROGRAM | Admitting: STUDENT IN AN ORGANIZED HEALTH CARE EDUCATION/TRAINING PROGRAM
Payer: COMMERCIAL

## 2024-04-11 ENCOUNTER — ANESTHESIA EVENT (OUTPATIENT)
Dept: SURGERY | Facility: CLINIC | Age: 54
End: 2024-04-11
Payer: COMMERCIAL

## 2024-04-11 VITALS
WEIGHT: 268.3 LBS | OXYGEN SATURATION: 95 % | DIASTOLIC BLOOD PRESSURE: 84 MMHG | BODY MASS INDEX: 39.62 KG/M2 | HEART RATE: 51 BPM | RESPIRATION RATE: 15 BRPM | SYSTOLIC BLOOD PRESSURE: 125 MMHG | TEMPERATURE: 96.6 F

## 2024-04-11 DIAGNOSIS — K51.919 ULCERATIVE COLITIS WITH COMPLICATION, UNSPECIFIED LOCATION (H): Primary | ICD-10-CM

## 2024-04-11 LAB
COLONOSCOPY: NORMAL
GLUCOSE BLDC GLUCOMTR-MCNC: 118 MG/DL (ref 70–99)
GLUCOSE BLDC GLUCOMTR-MCNC: 97 MG/DL (ref 70–99)

## 2024-04-11 PROCEDURE — 250N000009 HC RX 250: Performed by: NURSE ANESTHETIST, CERTIFIED REGISTERED

## 2024-04-11 PROCEDURE — 82962 GLUCOSE BLOOD TEST: CPT

## 2024-04-11 PROCEDURE — 272N000001 HC OR GENERAL SUPPLY STERILE: Performed by: STUDENT IN AN ORGANIZED HEALTH CARE EDUCATION/TRAINING PROGRAM

## 2024-04-11 PROCEDURE — 360N000075 HC SURGERY LEVEL 2, PER MIN: Performed by: STUDENT IN AN ORGANIZED HEALTH CARE EDUCATION/TRAINING PROGRAM

## 2024-04-11 PROCEDURE — 88305 TISSUE EXAM BY PATHOLOGIST: CPT | Mod: TC | Performed by: STUDENT IN AN ORGANIZED HEALTH CARE EDUCATION/TRAINING PROGRAM

## 2024-04-11 PROCEDURE — 710N000012 HC RECOVERY PHASE 2, PER MINUTE: Performed by: STUDENT IN AN ORGANIZED HEALTH CARE EDUCATION/TRAINING PROGRAM

## 2024-04-11 PROCEDURE — 370N000017 HC ANESTHESIA TECHNICAL FEE, PER MIN: Performed by: STUDENT IN AN ORGANIZED HEALTH CARE EDUCATION/TRAINING PROGRAM

## 2024-04-11 PROCEDURE — 258N000003 HC RX IP 258 OP 636: Performed by: ANESTHESIOLOGY

## 2024-04-11 PROCEDURE — 250N000011 HC RX IP 250 OP 636: Performed by: NURSE ANESTHETIST, CERTIFIED REGISTERED

## 2024-04-11 PROCEDURE — 88305 TISSUE EXAM BY PATHOLOGIST: CPT | Mod: 26 | Performed by: PATHOLOGY

## 2024-04-11 PROCEDURE — 999N000141 HC STATISTIC PRE-PROCEDURE NURSING ASSESSMENT: Performed by: STUDENT IN AN ORGANIZED HEALTH CARE EDUCATION/TRAINING PROGRAM

## 2024-04-11 PROCEDURE — 258N000003 HC RX IP 258 OP 636: Performed by: NURSE ANESTHETIST, CERTIFIED REGISTERED

## 2024-04-11 PROCEDURE — 250N000009 HC RX 250: Performed by: STUDENT IN AN ORGANIZED HEALTH CARE EDUCATION/TRAINING PROGRAM

## 2024-04-11 RX ORDER — ONDANSETRON 4 MG/1
4 TABLET, ORALLY DISINTEGRATING ORAL EVERY 6 HOURS PRN
Status: DISCONTINUED | OUTPATIENT
Start: 2024-04-11 | End: 2024-04-11 | Stop reason: HOSPADM

## 2024-04-11 RX ORDER — PROPOFOL 10 MG/ML
INJECTION, EMULSION INTRAVENOUS PRN
Status: DISCONTINUED | OUTPATIENT
Start: 2024-04-11 | End: 2024-04-11

## 2024-04-11 RX ORDER — ONDANSETRON 4 MG/1
4 TABLET, ORALLY DISINTEGRATING ORAL EVERY 30 MIN PRN
Status: DISCONTINUED | OUTPATIENT
Start: 2024-04-11 | End: 2024-04-11 | Stop reason: HOSPADM

## 2024-04-11 RX ORDER — NALOXONE HYDROCHLORIDE 0.4 MG/ML
0.2 INJECTION, SOLUTION INTRAMUSCULAR; INTRAVENOUS; SUBCUTANEOUS
Status: DISCONTINUED | OUTPATIENT
Start: 2024-04-11 | End: 2024-04-11 | Stop reason: HOSPADM

## 2024-04-11 RX ORDER — PROPOFOL 10 MG/ML
INJECTION, EMULSION INTRAVENOUS CONTINUOUS PRN
Status: DISCONTINUED | OUTPATIENT
Start: 2024-04-11 | End: 2024-04-11

## 2024-04-11 RX ORDER — NALOXONE HYDROCHLORIDE 0.4 MG/ML
0.1 INJECTION, SOLUTION INTRAMUSCULAR; INTRAVENOUS; SUBCUTANEOUS
Status: DISCONTINUED | OUTPATIENT
Start: 2024-04-11 | End: 2024-04-11 | Stop reason: HOSPADM

## 2024-04-11 RX ORDER — SODIUM CHLORIDE, SODIUM LACTATE, POTASSIUM CHLORIDE, CALCIUM CHLORIDE 600; 310; 30; 20 MG/100ML; MG/100ML; MG/100ML; MG/100ML
INJECTION, SOLUTION INTRAVENOUS CONTINUOUS PRN
Status: DISCONTINUED | OUTPATIENT
Start: 2024-04-11 | End: 2024-04-11

## 2024-04-11 RX ORDER — HYDROMORPHONE HCL IN WATER/PF 6 MG/30 ML
0.2 PATIENT CONTROLLED ANALGESIA SYRINGE INTRAVENOUS EVERY 5 MIN PRN
Status: DISCONTINUED | OUTPATIENT
Start: 2024-04-11 | End: 2024-04-11 | Stop reason: HOSPADM

## 2024-04-11 RX ORDER — ONDANSETRON 2 MG/ML
4 INJECTION INTRAMUSCULAR; INTRAVENOUS EVERY 6 HOURS PRN
Status: DISCONTINUED | OUTPATIENT
Start: 2024-04-11 | End: 2024-04-11 | Stop reason: HOSPADM

## 2024-04-11 RX ORDER — ONDANSETRON 2 MG/ML
4 INJECTION INTRAMUSCULAR; INTRAVENOUS EVERY 30 MIN PRN
Status: DISCONTINUED | OUTPATIENT
Start: 2024-04-11 | End: 2024-04-11 | Stop reason: HOSPADM

## 2024-04-11 RX ORDER — ACETAMINOPHEN 325 MG/1
975 TABLET ORAL ONCE
Status: DISCONTINUED | OUTPATIENT
Start: 2024-04-11 | End: 2024-04-11 | Stop reason: HOSPADM

## 2024-04-11 RX ORDER — PROCHLORPERAZINE MALEATE 10 MG
10 TABLET ORAL EVERY 6 HOURS PRN
Status: DISCONTINUED | OUTPATIENT
Start: 2024-04-11 | End: 2024-04-11 | Stop reason: HOSPADM

## 2024-04-11 RX ORDER — FENTANYL CITRATE 50 UG/ML
50 INJECTION, SOLUTION INTRAMUSCULAR; INTRAVENOUS EVERY 5 MIN PRN
Status: DISCONTINUED | OUTPATIENT
Start: 2024-04-11 | End: 2024-04-11 | Stop reason: HOSPADM

## 2024-04-11 RX ORDER — SODIUM CHLORIDE, SODIUM LACTATE, POTASSIUM CHLORIDE, CALCIUM CHLORIDE 600; 310; 30; 20 MG/100ML; MG/100ML; MG/100ML; MG/100ML
INJECTION, SOLUTION INTRAVENOUS CONTINUOUS
Status: DISCONTINUED | OUTPATIENT
Start: 2024-04-11 | End: 2024-04-11 | Stop reason: HOSPADM

## 2024-04-11 RX ORDER — NALOXONE HYDROCHLORIDE 0.4 MG/ML
0.1 INJECTION, SOLUTION INTRAMUSCULAR; INTRAVENOUS; SUBCUTANEOUS
Status: CANCELLED | OUTPATIENT
Start: 2024-04-11

## 2024-04-11 RX ORDER — LABETALOL HYDROCHLORIDE 5 MG/ML
10 INJECTION, SOLUTION INTRAVENOUS EVERY 5 MIN PRN
Status: DISCONTINUED | OUTPATIENT
Start: 2024-04-11 | End: 2024-04-11 | Stop reason: HOSPADM

## 2024-04-11 RX ORDER — LIDOCAINE HYDROCHLORIDE 20 MG/ML
INJECTION, SOLUTION INFILTRATION; PERINEURAL PRN
Status: DISCONTINUED | OUTPATIENT
Start: 2024-04-11 | End: 2024-04-11

## 2024-04-11 RX ORDER — ONDANSETRON 2 MG/ML
4 INJECTION INTRAMUSCULAR; INTRAVENOUS EVERY 30 MIN PRN
Status: CANCELLED | OUTPATIENT
Start: 2024-04-11

## 2024-04-11 RX ORDER — LIDOCAINE 40 MG/G
CREAM TOPICAL
Status: DISCONTINUED | OUTPATIENT
Start: 2024-04-11 | End: 2024-04-11 | Stop reason: HOSPADM

## 2024-04-11 RX ORDER — FENTANYL CITRATE 50 UG/ML
25 INJECTION, SOLUTION INTRAMUSCULAR; INTRAVENOUS EVERY 5 MIN PRN
Status: DISCONTINUED | OUTPATIENT
Start: 2024-04-11 | End: 2024-04-11 | Stop reason: HOSPADM

## 2024-04-11 RX ORDER — OXYCODONE HYDROCHLORIDE 5 MG/1
10 TABLET ORAL
Status: CANCELLED | OUTPATIENT
Start: 2024-04-11

## 2024-04-11 RX ORDER — HYDROMORPHONE HCL IN WATER/PF 6 MG/30 ML
0.4 PATIENT CONTROLLED ANALGESIA SYRINGE INTRAVENOUS EVERY 5 MIN PRN
Status: DISCONTINUED | OUTPATIENT
Start: 2024-04-11 | End: 2024-04-11 | Stop reason: HOSPADM

## 2024-04-11 RX ORDER — OXYCODONE HYDROCHLORIDE 5 MG/1
5 TABLET ORAL
Status: CANCELLED | OUTPATIENT
Start: 2024-04-11

## 2024-04-11 RX ORDER — NALOXONE HYDROCHLORIDE 0.4 MG/ML
0.4 INJECTION, SOLUTION INTRAMUSCULAR; INTRAVENOUS; SUBCUTANEOUS
Status: DISCONTINUED | OUTPATIENT
Start: 2024-04-11 | End: 2024-04-11 | Stop reason: HOSPADM

## 2024-04-11 RX ORDER — OXYCODONE HYDROCHLORIDE 5 MG/1
5 TABLET ORAL
Status: DISCONTINUED | OUTPATIENT
Start: 2024-04-11 | End: 2024-04-11 | Stop reason: HOSPADM

## 2024-04-11 RX ORDER — OXYCODONE HYDROCHLORIDE 5 MG/1
10 TABLET ORAL
Status: DISCONTINUED | OUTPATIENT
Start: 2024-04-11 | End: 2024-04-11 | Stop reason: HOSPADM

## 2024-04-11 RX ORDER — KETOROLAC TROMETHAMINE 15 MG/ML
15 INJECTION, SOLUTION INTRAMUSCULAR; INTRAVENOUS
Status: DISCONTINUED | OUTPATIENT
Start: 2024-04-11 | End: 2024-04-11 | Stop reason: HOSPADM

## 2024-04-11 RX ORDER — FLUMAZENIL 0.1 MG/ML
0.2 INJECTION, SOLUTION INTRAVENOUS
Status: DISCONTINUED | OUTPATIENT
Start: 2024-04-11 | End: 2024-04-11 | Stop reason: HOSPADM

## 2024-04-11 RX ORDER — ACETAMINOPHEN 325 MG/1
975 TABLET ORAL ONCE
Status: DISCONTINUED | OUTPATIENT
Start: 2024-04-11 | End: 2024-04-11

## 2024-04-11 RX ORDER — ONDANSETRON 4 MG/1
4 TABLET, ORALLY DISINTEGRATING ORAL EVERY 30 MIN PRN
Status: CANCELLED | OUTPATIENT
Start: 2024-04-11

## 2024-04-11 RX ADMIN — PHENYLEPHRINE HYDROCHLORIDE 100 MCG: 10 INJECTION INTRAVENOUS at 08:14

## 2024-04-11 RX ADMIN — PHENYLEPHRINE HYDROCHLORIDE 100 MCG: 10 INJECTION INTRAVENOUS at 08:09

## 2024-04-11 RX ADMIN — PROPOFOL 150 MCG/KG/MIN: 10 INJECTION, EMULSION INTRAVENOUS at 07:55

## 2024-04-11 RX ADMIN — SODIUM CHLORIDE, POTASSIUM CHLORIDE, SODIUM LACTATE AND CALCIUM CHLORIDE: 600; 310; 30; 20 INJECTION, SOLUTION INTRAVENOUS at 07:50

## 2024-04-11 RX ADMIN — MIDAZOLAM 2 MG: 1 INJECTION INTRAMUSCULAR; INTRAVENOUS at 07:50

## 2024-04-11 RX ADMIN — LIDOCAINE HYDROCHLORIDE 20 MG: 20 INJECTION, SOLUTION INFILTRATION; PERINEURAL at 07:58

## 2024-04-11 RX ADMIN — PROPOFOL 50 MG: 10 INJECTION, EMULSION INTRAVENOUS at 07:55

## 2024-04-11 RX ADMIN — SODIUM CHLORIDE, POTASSIUM CHLORIDE, SODIUM LACTATE AND CALCIUM CHLORIDE: 600; 310; 30; 20 INJECTION, SOLUTION INTRAVENOUS at 07:03

## 2024-04-11 ASSESSMENT — ACTIVITIES OF DAILY LIVING (ADL)
ADLS_ACUITY_SCORE: 29
ADLS_ACUITY_SCORE: 29
ADLS_ACUITY_SCORE: 30
ADLS_ACUITY_SCORE: 30

## 2024-04-11 NOTE — ANESTHESIA CARE TRANSFER NOTE
Patient: Ryland Gee    Procedure: Procedure(s):  COLONOSCOPY with polypectomy and biopsies       Diagnosis: Colon cancer screening [Z12.11]  Diagnosis Additional Information: No value filed.    Anesthesia Type:   MAC     Note:    Oropharynx: spontaneously breathing  Level of Consciousness: drowsy  Oxygen Supplementation: face mask  Level of Supplemental Oxygen (L/min / FiO2): 6  Independent Airway: airway patency satisfactory and stable  Dentition: dentition unchanged  Vital Signs Stable: post-procedure vital signs reviewed and stable  Report to RN Given: handoff report given  Patient transferred to: Phase II    Handoff Report: Identifed the Patient, Identified the Reponsible Provider, Reviewed the pertinent medical history, Discussed the surgical course, Reviewed Intra-OP anesthesia mangement and issues during anesthesia, Set expectations for post-procedure period and Allowed opportunity for questions and acknowledgement of understanding      Vitals:  Vitals Value Taken Time   BP     Temp     Pulse     Resp     SpO2 96 % 04/11/24 0825   Vitals shown include unfiled device data.    Electronically Signed By: ADELE Saavedra CRNA  April 11, 2024  8:27 AM

## 2024-04-11 NOTE — ANESTHESIA PREPROCEDURE EVALUATION
Anesthesia Pre-Procedure Evaluation    Patient: Ryland eGe   MRN: [id number] : 1970        Procedure : Procedure(s):  COLONOSCOPY          Past Medical History:   Diagnosis Date    Diabetes (H)     IBD (inflammatory bowel disease) 2008    ulcerative colitis      Past Surgical History:   Procedure Laterality Date    COLONOSCOPY      ESOPHAGOSCOPY, GASTROSCOPY, DUODENOSCOPY (EGD), COMBINED N/A 2015    Procedure: COMBINED ESOPHAGOSCOPY, GASTROSCOPY, DUODENOSCOPY (EGD), BIOPSY SINGLE OR MULTIPLE;  Surgeon: Mario Patterson MD;  Location:  GI    VASECTOMY      [facility] APPENDECTOMY        No Known Allergies   Social History     Tobacco Use    Smoking status: Never     Passive exposure: Past    Smokeless tobacco: Never   Substance Use Topics    Alcohol use: No     Comment: Does not drink - last was 2015      Wt Readings from Last 1 Encounters:   24 121.7 kg (268 lb 4.8 oz)        Anesthesia Evaluation            ROS/MED HX  ENT/Pulmonary:  - neg pulmonary ROS     Neurologic:  - neg neurologic ROS     Cardiovascular:  - neg cardiovascular ROS     METS/Exercise Tolerance: >4 METS    Hematologic:  - neg hematologic  ROS     Musculoskeletal:  - neg musculoskeletal ROS     GI/Hepatic:     (+)       Inflammatory bowel disease,             Renal/Genitourinary:  - neg Renal ROS     Endo: Comment: Diabetic foot ulcer    (+)  type II DM,             Obesity,       Psychiatric/Substance Use:  - neg psychiatric ROS     Infectious Disease:  - neg infectious disease ROS     Malignancy:  - neg malignancy ROS     Other:  - neg other ROS          Physical Exam    Airway        Mallampati: II   TM distance: > 3 FB   Neck ROM: full   Mouth opening: > 3 cm    Respiratory Devices and Support         Dental       (+) Minor Abnormalities - some fillings, tiny chips      Cardiovascular   cardiovascular exam normal       Rhythm and rate: regular and normal     Pulmonary   pulmonary exam normal        breath  "sounds clear to auscultation           OUTSIDE LABS:  CBC:   Lab Results   Component Value Date    WBC 6.9 11/20/2020    WBC 6.9 04/18/2017    HGB 14.3 11/20/2020    HGB 14.0 04/18/2017    HCT 43.0 11/20/2020    HCT 42.9 04/18/2017     11/20/2020     04/18/2017     BMP:   Lab Results   Component Value Date     02/01/2024     02/06/2023    POTASSIUM 4.6 02/01/2024    POTASSIUM 4.6 02/06/2023    CHLORIDE 103 02/01/2024    CHLORIDE 106 02/06/2023    CO2 27 02/01/2024    CO2 25 02/06/2023    BUN 14.2 02/01/2024    BUN 12.7 02/06/2023    CR 1.21 (H) 02/01/2024    CR 1.08 02/06/2023     (H) 04/11/2024    GLC 69 (L) 02/01/2024     COAGS: No results found for: \"PTT\", \"INR\", \"FIBR\"  POC:   Lab Results   Component Value Date     (H) 11/21/2020     HEPATIC:   Lab Results   Component Value Date    ALBUMIN 4.7 02/01/2024    PROTTOTAL 8.1 02/01/2024    ALT 30 02/01/2024    AST 23 02/01/2024    ALKPHOS 68 02/01/2024    BILITOTAL 0.5 02/01/2024     OTHER:   Lab Results   Component Value Date    LACT 1.2 04/14/2016    A1C 6.6 (H) 02/01/2024    HEDY 9.3 02/01/2024    MAG 1.6 04/14/2016    LIPASE 62 (L) 05/11/2016    TSH 0.54 11/20/2020    CRP 5.0 11/21/2020       Anesthesia Plan    ASA Status:  3    NPO Status:  NPO Appropriate    Anesthesia Type: MAC.   Induction: Intravenous.   Maintenance: TIVA.        Consents    Anesthesia Plan(s) and associated risks, benefits, and realistic alternatives discussed. Questions answered and patient/representative(s) expressed understanding.     - Discussed: Risks, Benefits and Alternatives for BOTH SEDATION and the PROCEDURE were discussed     - Discussed with:  Patient       Use of blood products discussed: Yes.     - Discussed with: Patient.     - Consented: consented to blood products            Reason for refusal: other.     Postoperative Care    Pain management: IV analgesics, Oral pain medications.   PONV prophylaxis: Ondansetron (or other 5HT-3), " "Dexamethasone or Solumedrol     Comments:               Myra Rosas MD    I have reviewed the pertinent notes and labs in the chart from the past 30 days and (re)examined the patient.  Any updates or changes from those notes are reflected in this note.              # DMII: A1C = 6.6 % (Ref range: 0.0 - 5.6 %) within past 6 months  # Obesity: Estimated body mass index is 39.62 kg/m  as calculated from the following:    Height as of 4/9/24: 1.753 m (5' 9\").    Weight as of this encounter: 121.7 kg (268 lb 4.8 oz).      "

## 2024-04-11 NOTE — ANESTHESIA POSTPROCEDURE EVALUATION
Patient: Ryland Gee    Procedure: Procedure(s):  COLONOSCOPY with polypectomy and biopsies       Anesthesia Type:  MAC    Note:  Disposition: Outpatient   Postop Pain Control: Uneventful            Sign Out: Well controlled pain   PONV: No   Neuro/Psych: Uneventful            Sign Out: Acceptable/Baseline neuro status   Airway/Respiratory: Uneventful            Sign Out: Acceptable/Baseline resp. status   CV/Hemodynamics: Uneventful            Sign Out: Acceptable CV status; No obvious hypovolemia; No obvious fluid overload   Other NRE: NONE   DID A NON-ROUTINE EVENT OCCUR? No           Last vitals:  Vitals Value Taken Time   /65 04/11/24 0910   Temp 96.8  F (36  C) 04/11/24 0827   Pulse 51 04/11/24 0910   Resp     SpO2 98 % 04/11/24 0913   Vitals shown include unfiled device data.    Electronically Signed By: Myra Rosas MD  April 11, 2024  9:14 AM

## 2024-04-12 ENCOUNTER — DOCUMENTATION ONLY (OUTPATIENT)
Dept: GASTROENTEROLOGY | Facility: CLINIC | Age: 54
End: 2024-04-12

## 2024-04-12 LAB
PATH REPORT.COMMENTS IMP SPEC: NORMAL
PATH REPORT.FINAL DX SPEC: NORMAL
PATH REPORT.GROSS SPEC: NORMAL
PATH REPORT.MICROSCOPIC SPEC OTHER STN: NORMAL
PATH REPORT.RELEVANT HX SPEC: NORMAL
PHOTO IMAGE: NORMAL

## 2024-05-13 ENCOUNTER — OFFICE VISIT (OUTPATIENT)
Dept: ORTHOPEDICS | Facility: CLINIC | Age: 54
End: 2024-05-13
Payer: COMMERCIAL

## 2024-05-13 VITALS
WEIGHT: 271 LBS | BODY MASS INDEX: 40.14 KG/M2 | SYSTOLIC BLOOD PRESSURE: 109 MMHG | HEIGHT: 69 IN | DIASTOLIC BLOOD PRESSURE: 73 MMHG

## 2024-05-13 DIAGNOSIS — M75.41 SUBACROMIAL IMPINGEMENT, RIGHT: ICD-10-CM

## 2024-05-13 DIAGNOSIS — M75.81 TENDINITIS OF RIGHT ROTATOR CUFF: Primary | ICD-10-CM

## 2024-05-13 PROCEDURE — 99204 OFFICE O/P NEW MOD 45 MIN: CPT | Performed by: STUDENT IN AN ORGANIZED HEALTH CARE EDUCATION/TRAINING PROGRAM

## 2024-05-13 RX ORDER — METHYLPREDNISOLONE 4 MG
TABLET, DOSE PACK ORAL
Qty: 21 TABLET | Refills: 0 | Status: SHIPPED | OUTPATIENT
Start: 2024-05-13 | End: 2024-06-24

## 2024-05-13 NOTE — LETTER
5/13/2024         RE: Ryland Gee  [address]  [place] [address]        Dear Colleague,    Thank you for referring your patient, Ryland Gee, to the [facility] [place]. Please see a copy of my visit note below.    ASSESSMENT & PLAN    Ryland was seen today for pain.    Diagnoses and all orders for this visit:    Tendinitis of right rotator cuff  -     methylPREDNISolone (MEDROL DOSEPAK) 4 MG tablet therapy pack; Follow Package Directions  -     Physical Therapy  Referral; Future    Subacromial impingement, right  -     methylPREDNISolone (MEDROL DOSEPAK) 4 MG tablet therapy pack; Follow Package Directions  -     Physical Therapy  Referral; Future      This issue is acute and Worsening. Ryland presents to clinic today to discuss his acute and worsening right shoulder pain.  His previous radiographs were reviewed and show mild to moderate osteoarthritis of the AC joint and mild osteoarthritis of the glenohumeral joint.  On exam, the patient has pain that is reproduced with rotator cuff and subacromial impingement testing, making rotator cuff tendinitis and subacromial impingement the likely main  of his symptoms.  Reassuringly, he has 5/5 strength of all rotator cuff musculature and full range of motion.  We discussed these findings and the possible treatment options including anti-inflammatory medicines, physical therapy, and corticosteroid injections.  Given the patient's significant pain, I do think he would benefit from a corticosteroid injection today, however the patient chose to defer this at this time and proceed with oral steroids and physical therapy.  We determined the following plan:  -Medrol Dosepak sent to pharmacy  -Physical therapy referral placed today to work on rotator cuff strengthening, scapular mechanics  -He can continue to use over-the-counter pain medicines, ice, heat as needed  -He can follow-up in our clinic at any time  "for a CSI to the right subacromial bursa should he not be able to tolerate PT or not improving with the above interventions.          DO MOHINI Avitia [facility] [place]    -----  Chief Complaint   Patient presents with     Right Shoulder - Pain       SUBJECTIVE  Harm MADINA Gee is a/an 54 year old male who is seen in consultation at the request of  Todd Pastrana M.D. for evaluation of right shoulder pain.     The patient is seen by themselves.  The patient is right handed    Onset: 4 month(s) ago. Reports insidious onset without acute precipitating event.  Location of Pain: right anterior shoulder radiating down arm  Worsened by: laying on shoulder, reaching back  Better with: lidocaine patch  Treatments tried: ibuprofen, lidocaine patch  Associated symptoms: limited range of motion (back & forward)    Orthopedic/Surgical history: NO  Social History/Occupation:       REVIEW OF SYSTEMS:  Review of systems negative unless mentioned in HPI     OBJECTIVE:  /73   Ht 1.753 m (5' 9\")   Wt 122.9 kg (271 lb)   BMI 40.02 kg/m     General: healthy, alert and in no distress  Skin: no suspicious lesions or rash.  CV: distal perfusion intact   Resp: normal respiratory effort without conversational dyspnea   Psych: normal mood and affect  Gait: NORMAL  Neuro: Normal light sensory exam of RU extremity     Shoulder Examination (focused):   Right  Left   Skin intact intact   Inspection No erythema, ecchymosis  No erythema, ecchymosis    Palpation Tenderness: None Tenderness: None   Range of Motion (active) Forward flexion:175   GH Abduction: 90  Reach behind back: T10 Forward flexion:175   GH Abduction: 90  Reach behind back: T10   Range of Motion (passive) Forward flexion:175   GH Abduction: 90  ER at side: 70  ER at 90 deg abduction: 90  IR at 90 deg abduction: 60 Forward flexion:175   GH Abduction: 90  ER at side: 70  ER at 90 deg abduction: 90  IR at 90 deg abduction: 60 "   Crepitus No No   Strength Internal Rotation at side: 5+  External Rotation at side: 5+, painful  Belly Press: 5+, painful Internal Rotation at side: 5+  External Rotation at side: 5+  Belly Press: 5+    Special Tests Cortez: painful  Neer: painful  Willa: 5+, painful   Speed's: 5+  Cross arm: Negative  Cortez: NP  Neer: NP  Willa: 5+  Speed's: 5+  Cross arm: Negative      Other Positive Tests/ Notable Findings O'fawn's: NP  Bicep Load I/II: NP  Internal Impingement: NP  Yergason: NP   ERLS: NP  Hornblower: NP  Bear Hug: NP O'fawn's: NP  Bicep Load I/II: NP  Internal Impingement: NP  Yergason: NP   ERLS: NP  Hornblower: NP  Bear Hug: NP   Instability Generalized laxity: no  Anterior apprehension: Negative  Load and shift (anterior): NP  Load and shift (posterior): NP Generalized laxity: no  Anterior apprehension: Negative  Load and shift (anterior): NP  Load and shift (posterior): NP   Neurologic No abnormal sensation.   Scapular Motion Normal scapular alignment bilaterally without notable protraction/retraction, elevation/depression  No dynamic evidence of scapular dyskinesia           RADIOLOGY:  Final results and radiologist's interpretation, available in the [facility] health record.  Images were reviewed with the patient in the office today.  My personal interpretation of the performed imaging: Radiographs from 2/1/2024 were reviewed and show moderate acromioclavicular narrowing and osteophytosis, and mild glenohumeral joint space narrowing.             Again, thank you for allowing me to participate in the care of your patient.        Sincerely,        Jairo Edwards DO

## 2024-05-13 NOTE — PROGRESS NOTES
ASSESSMENT & PLAN    Ryland was seen today for pain.    Diagnoses and all orders for this visit:    Tendinitis of right rotator cuff  -     methylPREDNISolone (MEDROL DOSEPAK) 4 MG tablet therapy pack; Follow Package Directions  -     Physical Therapy  Referral; Future    Subacromial impingement, right  -     methylPREDNISolone (MEDROL DOSEPAK) 4 MG tablet therapy pack; Follow Package Directions  -     Physical Therapy  Referral; Future      This issue is acute and Worsening. Ryland presents to clinic today to discuss his acute and worsening right shoulder pain.  His previous radiographs were reviewed and show mild to moderate osteoarthritis of the AC joint and mild osteoarthritis of the glenohumeral joint.  On exam, the patient has pain that is reproduced with rotator cuff and subacromial impingement testing, making rotator cuff tendinitis and subacromial impingement the likely main  of his symptoms.  Reassuringly, he has 5/5 strength of all rotator cuff musculature and full range of motion.  We discussed these findings and the possible treatment options including anti-inflammatory medicines, physical therapy, and corticosteroid injections.  Given the patient's significant pain, I do think he would benefit from a corticosteroid injection today, however the patient chose to defer this at this time and proceed with oral steroids and physical therapy.  We determined the following plan:  -Medrol Dosepak sent to pharmacy  -Physical therapy referral placed today to work on rotator cuff strengthening, scapular mechanics  -He can continue to use over-the-counter pain medicines, ice, heat as needed  -He can follow-up in our clinic at any time for a CSI to the right subacromial bursa should he not be able to tolerate PT or not improving with the above interventions.          Jairo Edwards DO  [facility] [place]    -----  Chief Complaint   Patient presents with    Right  "Shoulder - Pain       SUBJECTIVE  Harm MADINA Gee is a/an 54 year old male who is seen in consultation at the request of  Todd Pastrana M.D. for evaluation of right shoulder pain.     The patient is seen by themselves.  The patient is right handed    Onset: 4 month(s) ago. Reports insidious onset without acute precipitating event.  Location of Pain: right anterior shoulder radiating down arm  Worsened by: laying on shoulder, reaching back  Better with: lidocaine patch  Treatments tried: ibuprofen, lidocaine patch  Associated symptoms: limited range of motion (back & forward)    Orthopedic/Surgical history: NO  Social History/Occupation:       REVIEW OF SYSTEMS:  Review of systems negative unless mentioned in HPI     OBJECTIVE:  /73   Ht 1.753 m (5' 9\")   Wt 122.9 kg (271 lb)   BMI 40.02 kg/m     General: healthy, alert and in no distress  Skin: no suspicious lesions or rash.  CV: distal perfusion intact   Resp: normal respiratory effort without conversational dyspnea   Psych: normal mood and affect  Gait: NORMAL  Neuro: Normal light sensory exam of RU extremity     Shoulder Examination (focused):   Right  Left   Skin intact intact   Inspection No erythema, ecchymosis  No erythema, ecchymosis    Palpation Tenderness: None Tenderness: None   Range of Motion (active) Forward flexion:175   GH Abduction: 90  Reach behind back: T10 Forward flexion:175   GH Abduction: 90  Reach behind back: T10   Range of Motion (passive) Forward flexion:175   GH Abduction: 90  ER at side: 70  ER at 90 deg abduction: 90  IR at 90 deg abduction: 60 Forward flexion:175   GH Abduction: 90  ER at side: 70  ER at 90 deg abduction: 90  IR at 90 deg abduction: 60   Crepitus No No   Strength Internal Rotation at side: 5+  External Rotation at side: 5+, painful  Belly Press: 5+, painful Internal Rotation at side: 5+  External Rotation at side: 5+  Belly Press: 5+    Special Tests Cortez: painful  Neer: painful  Willa: 5+, painful "   Speed's: 5+  Cross arm: Negative  Cortez: NP  Neer: NP  Willa: 5+  Speed's: 5+  Cross arm: Negative      Other Positive Tests/ Notable Findings O'fawn's: NP  Bicep Load I/II: NP  Internal Impingement: NP  Yergason: NP   ERLS: NP  Hornblower: NP  Bear Hug: NP O'fawn's: NP  Bicep Load I/II: NP  Internal Impingement: NP  Yergason: NP   ERLS: NP  Hornblower: NP  Bear Hug: NP   Instability Generalized laxity: no  Anterior apprehension: Negative  Load and shift (anterior): NP  Load and shift (posterior): NP Generalized laxity: no  Anterior apprehension: Negative  Load and shift (anterior): NP  Load and shift (posterior): NP   Neurologic No abnormal sensation.   Scapular Motion Normal scapular alignment bilaterally without notable protraction/retraction, elevation/depression  No dynamic evidence of scapular dyskinesia           RADIOLOGY:  Final results and radiologist's interpretation, available in the [facility] health record.  Images were reviewed with the patient in the office today.  My personal interpretation of the performed imaging: Radiographs from 2/1/2024 were reviewed and show moderate acromioclavicular narrowing and osteophytosis, and mild glenohumeral joint space narrowing.

## 2024-05-25 ENCOUNTER — HEALTH MAINTENANCE LETTER (OUTPATIENT)
Age: 54
End: 2024-05-25

## 2024-05-28 DIAGNOSIS — F11.90 CHRONIC, CONTINUOUS USE OF OPIOIDS: ICD-10-CM

## 2024-05-28 DIAGNOSIS — M54.50 CHRONIC LOW BACK PAIN, UNSPECIFIED BACK PAIN LATERALITY, UNSPECIFIED WHETHER SCIATICA PRESENT: ICD-10-CM

## 2024-05-28 DIAGNOSIS — G89.29 CHRONIC LOW BACK PAIN, UNSPECIFIED BACK PAIN LATERALITY, UNSPECIFIED WHETHER SCIATICA PRESENT: ICD-10-CM

## 2024-05-28 DIAGNOSIS — G62.9 PERIPHERAL POLYNEUROPATHY: ICD-10-CM

## 2024-05-29 RX ORDER — OXYCODONE HYDROCHLORIDE 15 MG/1
15 TABLET ORAL 2 TIMES DAILY PRN
Qty: 60 TABLET | Refills: 0 | Status: SHIPPED | OUTPATIENT
Start: 2024-05-30 | End: 2024-07-09

## 2024-07-09 ENCOUNTER — PATIENT OUTREACH (OUTPATIENT)
Dept: CARE COORDINATION | Facility: CLINIC | Age: 54
End: 2024-07-09

## 2024-07-09 DIAGNOSIS — G62.9 PERIPHERAL POLYNEUROPATHY: ICD-10-CM

## 2024-07-09 DIAGNOSIS — F11.90 CHRONIC, CONTINUOUS USE OF OPIOIDS: ICD-10-CM

## 2024-07-09 DIAGNOSIS — M54.50 CHRONIC LOW BACK PAIN, UNSPECIFIED BACK PAIN LATERALITY, UNSPECIFIED WHETHER SCIATICA PRESENT: ICD-10-CM

## 2024-07-09 DIAGNOSIS — G89.29 CHRONIC LOW BACK PAIN, UNSPECIFIED BACK PAIN LATERALITY, UNSPECIFIED WHETHER SCIATICA PRESENT: ICD-10-CM

## 2024-07-09 RX ORDER — OXYCODONE HYDROCHLORIDE 15 MG/1
15 TABLET ORAL 2 TIMES DAILY PRN
Qty: 60 TABLET | Refills: 0 | Status: SHIPPED | OUTPATIENT
Start: 2024-07-09 | End: 2024-08-13

## 2024-07-22 DIAGNOSIS — G62.9 PERIPHERAL POLYNEUROPATHY: ICD-10-CM

## 2024-07-22 RX ORDER — PREGABALIN 75 MG/1
75 CAPSULE ORAL 2 TIMES DAILY
Qty: 180 CAPSULE | Refills: 1 | Status: SHIPPED | OUTPATIENT
Start: 2024-07-30

## 2024-07-23 ENCOUNTER — PATIENT OUTREACH (OUTPATIENT)
Dept: CARE COORDINATION | Facility: CLINIC | Age: 54
End: 2024-07-23

## 2024-07-24 ENCOUNTER — TELEPHONE (OUTPATIENT)
Dept: FAMILY MEDICINE | Facility: CLINIC | Age: 54
End: 2024-07-24

## 2024-07-24 NOTE — TELEPHONE ENCOUNTER
Needs of attention regarding:  -Wellness (Physical) Visit     Health Maintenance Topics with due status: Overdue       Topic Date Due    Pneumococcal Vaccine: Pediatrics (0 to 5 Years) and At-Risk Patients (6 to 64 Years) 04/18/2018    A1C 05/01/2024    EYE EXAM 07/01/2024     Health Maintenance Topics with due status: Due On       Topic Date Due    ZOSTER IMMUNIZATION 03/28/2024     Health Maintenance Topics with due status: Due Soon       Topic Date Due    YEARLY PREVENTIVE VISIT 08/07/2024    URINE DRUG SCREEN 08/07/2024    ANNUAL REVIEW OF HM ORDERS 08/07/2024       Communication:  See Letter

## 2024-08-13 DIAGNOSIS — G89.29 CHRONIC LOW BACK PAIN, UNSPECIFIED BACK PAIN LATERALITY, UNSPECIFIED WHETHER SCIATICA PRESENT: ICD-10-CM

## 2024-08-13 DIAGNOSIS — G62.9 PERIPHERAL POLYNEUROPATHY: ICD-10-CM

## 2024-08-13 DIAGNOSIS — F11.90 CHRONIC, CONTINUOUS USE OF OPIOIDS: ICD-10-CM

## 2024-08-13 DIAGNOSIS — M54.50 CHRONIC LOW BACK PAIN, UNSPECIFIED BACK PAIN LATERALITY, UNSPECIFIED WHETHER SCIATICA PRESENT: ICD-10-CM

## 2024-08-13 RX ORDER — OXYCODONE HYDROCHLORIDE 15 MG/1
15 TABLET ORAL 2 TIMES DAILY PRN
Qty: 60 TABLET | Refills: 0 | Status: SHIPPED | OUTPATIENT
Start: 2024-08-13 | End: 2024-09-13

## 2024-09-13 DIAGNOSIS — M54.50 CHRONIC LOW BACK PAIN, UNSPECIFIED BACK PAIN LATERALITY, UNSPECIFIED WHETHER SCIATICA PRESENT: ICD-10-CM

## 2024-09-13 DIAGNOSIS — G62.9 PERIPHERAL POLYNEUROPATHY: ICD-10-CM

## 2024-09-13 DIAGNOSIS — G89.29 CHRONIC LOW BACK PAIN, UNSPECIFIED BACK PAIN LATERALITY, UNSPECIFIED WHETHER SCIATICA PRESENT: ICD-10-CM

## 2024-09-13 DIAGNOSIS — F11.90 CHRONIC, CONTINUOUS USE OF OPIOIDS: ICD-10-CM

## 2024-09-13 RX ORDER — OXYCODONE HYDROCHLORIDE 15 MG/1
15 TABLET ORAL 2 TIMES DAILY PRN
Qty: 60 TABLET | Refills: 0 | Status: SHIPPED | OUTPATIENT
Start: 2024-09-13

## 2024-09-13 NOTE — TELEPHONE ENCOUNTER
overDue for wellness and med check, please schedule, 1 refill sent for now.    Last visit in this dept:    4/9/2024     Last visit -this provider:  7/24/2024     Next visit in this dept:   Future Appointments 9/13/2024 - 3/12/2025      None            Health Maintenance   Topic Date Due    Pneumococcal Vaccine: Pediatrics (0 to 5 Years) and At-Risk Patients (6 to 64 Years) (2 of 2 - PCV) 04/18/2018    A1C  05/01/2024    EYE EXAM  07/01/2024    YEARLY PREVENTIVE VISIT  08/07/2024    URINE DRUG SCREEN  08/07/2024    ANNUAL REVIEW OF HM ORDERS  08/07/2024    INFLUENZA VACCINE (1) 09/01/2024    ZOSTER IMMUNIZATION (2 of 2) 03/28/2024    CONTROLLED SUBSTANCE AGREEMENT FOR CHRONIC PAIN MANAGEMENT  09/18/2024    BMP  02/01/2025    CMP  02/01/2025    LIPID  02/01/2025    MICROALBUMIN  02/01/2025    PSA  02/01/2025    DIABETIC FOOT EXAM  02/01/2025    BUSHRA ASSESSMENT  04/09/2025    PHQ-9  04/09/2025    DTAP/TDAP/TD IMMUNIZATION (2 - Td or Tdap) 04/18/2027    ADVANCE CARE PLANNING  04/09/2029    COLORECTAL CANCER SCREENING  04/11/2034    HEPATITIS C SCREENING  Completed    HIV SCREENING  Addressed    DEPRESSION ACTION PLAN  Addressed    HPV IMMUNIZATION  Aged Out    MENINGITIS IMMUNIZATION  Aged Out    RSV MONOCLONAL ANTIBODY  Aged Out    HEPATITIS B IMMUNIZATION  Discontinued    COVID-19 Vaccine  Discontinued        CSA -- Patient Level:     [Media Unavailable] Controlled Substance Agreement - Opioid - Scan on 9/18/2023  2:08 PM   [Media Unavailable] Controlled Substance Agreement - Opioid - Scan on 6/15/2022  5:25 PM   [Media Unavailable] Controlled Substance Agreement - Opioid - Scan on 4/15/2021  1:40 PM   [Media Unavailable] Controlled Substance Agreement - Opioid - Scan on 1/16/2020  4:43 PM

## 2024-09-25 ENCOUNTER — TRANSFERRED RECORDS (OUTPATIENT)
Dept: MULTI SPECIALTY CLINIC | Facility: CLINIC | Age: 54
End: 2024-09-25
Payer: COMMERCIAL

## 2024-09-25 LAB — RETINOPATHY: NORMAL

## 2024-10-10 ENCOUNTER — OFFICE VISIT (OUTPATIENT)
Dept: FAMILY MEDICINE | Facility: CLINIC | Age: 54
End: 2024-10-10
Payer: COMMERCIAL

## 2024-10-10 ENCOUNTER — TELEPHONE (OUTPATIENT)
Dept: FAMILY MEDICINE | Facility: CLINIC | Age: 54
End: 2024-10-10

## 2024-10-10 VITALS
HEIGHT: 69 IN | RESPIRATION RATE: 14 BRPM | SYSTOLIC BLOOD PRESSURE: 112 MMHG | OXYGEN SATURATION: 98 % | TEMPERATURE: 98.1 F | HEART RATE: 61 BPM | DIASTOLIC BLOOD PRESSURE: 74 MMHG | BODY MASS INDEX: 37.62 KG/M2 | WEIGHT: 254 LBS

## 2024-10-10 DIAGNOSIS — E78.5 HYPERLIPIDEMIA LDL GOAL <70: ICD-10-CM

## 2024-10-10 DIAGNOSIS — T40.2X5A CONSTIPATION DUE TO OPIOID THERAPY: ICD-10-CM

## 2024-10-10 DIAGNOSIS — R10.13 EPIGASTRIC PAIN: ICD-10-CM

## 2024-10-10 DIAGNOSIS — G47.00 INSOMNIA, UNSPECIFIED TYPE: ICD-10-CM

## 2024-10-10 DIAGNOSIS — E66.01 SEVERE OBESITY (BMI 35.0-39.9) WITH COMORBIDITY (H): ICD-10-CM

## 2024-10-10 DIAGNOSIS — R09.82 POST-NASAL DRAINAGE: ICD-10-CM

## 2024-10-10 DIAGNOSIS — E11.42 TYPE 2 DIABETES MELLITUS WITH DIABETIC POLYNEUROPATHY, WITHOUT LONG-TERM CURRENT USE OF INSULIN (H): ICD-10-CM

## 2024-10-10 DIAGNOSIS — K51.919 ULCERATIVE COLITIS WITH COMPLICATION, UNSPECIFIED LOCATION (H): ICD-10-CM

## 2024-10-10 DIAGNOSIS — R25.2 CRAMP OF LIMB: ICD-10-CM

## 2024-10-10 DIAGNOSIS — F41.9 ANXIETY: ICD-10-CM

## 2024-10-10 DIAGNOSIS — Z00.00 ROUTINE GENERAL MEDICAL EXAMINATION AT A HEALTH CARE FACILITY: Primary | ICD-10-CM

## 2024-10-10 DIAGNOSIS — G62.9 PERIPHERAL POLYNEUROPATHY: ICD-10-CM

## 2024-10-10 DIAGNOSIS — F33.0 MAJOR DEPRESSIVE DISORDER, RECURRENT EPISODE, MILD (H): ICD-10-CM

## 2024-10-10 DIAGNOSIS — G89.29 CHRONIC LOW BACK PAIN, UNSPECIFIED BACK PAIN LATERALITY, UNSPECIFIED WHETHER SCIATICA PRESENT: ICD-10-CM

## 2024-10-10 DIAGNOSIS — Z12.5 SCREENING FOR MALIGNANT NEOPLASM OF PROSTATE: ICD-10-CM

## 2024-10-10 DIAGNOSIS — R10.9 RIGHT FLANK PAIN: ICD-10-CM

## 2024-10-10 DIAGNOSIS — F11.90 CHRONIC, CONTINUOUS USE OF OPIOIDS: ICD-10-CM

## 2024-10-10 DIAGNOSIS — M54.50 CHRONIC LOW BACK PAIN, UNSPECIFIED BACK PAIN LATERALITY, UNSPECIFIED WHETHER SCIATICA PRESENT: ICD-10-CM

## 2024-10-10 DIAGNOSIS — K59.03 CONSTIPATION DUE TO OPIOID THERAPY: ICD-10-CM

## 2024-10-10 DIAGNOSIS — R53.83 OTHER FATIGUE: ICD-10-CM

## 2024-10-10 PROBLEM — L97.522 DIABETIC ULCER OF OTHER PART OF LEFT FOOT ASSOCIATED WITH TYPE 2 DIABETES MELLITUS, WITH FAT LAYER EXPOSED (H): Status: RESOLVED | Noted: 2023-02-06 | Resolved: 2024-10-10

## 2024-10-10 PROBLEM — E11.621 DIABETIC ULCER OF OTHER PART OF LEFT FOOT ASSOCIATED WITH TYPE 2 DIABETES MELLITUS, WITH FAT LAYER EXPOSED (H): Status: RESOLVED | Noted: 2023-02-06 | Resolved: 2024-10-10

## 2024-10-10 PROBLEM — R42 LIGHTHEADEDNESS: Status: RESOLVED | Noted: 2020-11-20 | Resolved: 2024-10-10

## 2024-10-10 LAB
ALBUMIN UR-MCNC: NEGATIVE MG/DL
APPEARANCE UR: CLEAR
BILIRUB UR QL STRIP: NEGATIVE
COLOR UR AUTO: YELLOW
GLUCOSE UR STRIP-MCNC: 500 MG/DL
HGB UR QL STRIP: NEGATIVE
KETONES UR STRIP-MCNC: NEGATIVE MG/DL
LEUKOCYTE ESTERASE UR QL STRIP: ABNORMAL
NITRATE UR QL: NEGATIVE
PH UR STRIP: 5.5 [PH] (ref 5–7)
RBC #/AREA URNS AUTO: NORMAL /HPF
SP GR UR STRIP: 1.01 (ref 1–1.03)
UROBILINOGEN UR STRIP-ACNC: 0.2 E.U./DL
WBC #/AREA URNS AUTO: NORMAL /HPF

## 2024-10-10 PROCEDURE — G0481 DRUG TEST DEF 8-14 CLASSES: HCPCS | Performed by: FAMILY MEDICINE

## 2024-10-10 PROCEDURE — 99214 OFFICE O/P EST MOD 30 MIN: CPT | Mod: 25 | Performed by: FAMILY MEDICINE

## 2024-10-10 PROCEDURE — 99396 PREV VISIT EST AGE 40-64: CPT | Performed by: FAMILY MEDICINE

## 2024-10-10 PROCEDURE — 81001 URINALYSIS AUTO W/SCOPE: CPT | Performed by: FAMILY MEDICINE

## 2024-10-10 RX ORDER — ACYCLOVIR 400 MG/1
1 TABLET ORAL
Qty: 9 EACH | Refills: 4 | Status: SHIPPED | OUTPATIENT
Start: 2024-10-10

## 2024-10-10 RX ORDER — TRAZODONE HYDROCHLORIDE 50 MG/1
50-100 TABLET, FILM COATED ORAL
Qty: 180 TABLET | Refills: 4 | Status: SHIPPED | OUTPATIENT
Start: 2024-10-10

## 2024-10-10 RX ORDER — FLUTICASONE PROPIONATE 50 MCG
1-2 SPRAY, SUSPENSION (ML) NASAL DAILY
Qty: 15.8 ML | Refills: 11 | Status: SHIPPED | OUTPATIENT
Start: 2024-10-10

## 2024-10-10 RX ORDER — SIMVASTATIN 20 MG
20 TABLET ORAL AT BEDTIME
Qty: 90 TABLET | Refills: 4 | Status: SHIPPED | OUTPATIENT
Start: 2024-10-10

## 2024-10-10 RX ORDER — GLIMEPIRIDE 4 MG/1
4 TABLET ORAL
Qty: 90 TABLET | Refills: 4 | Status: SHIPPED | OUTPATIENT
Start: 2024-10-10

## 2024-10-10 RX ORDER — OXYCODONE HYDROCHLORIDE 15 MG/1
15 TABLET ORAL 2 TIMES DAILY PRN
Qty: 60 TABLET | Refills: 0 | Status: SHIPPED | OUTPATIENT
Start: 2024-10-17

## 2024-10-10 RX ORDER — ORAL SEMAGLUTIDE 7 MG/1
7 TABLET ORAL DAILY
Qty: 90 TABLET | Refills: 4 | Status: SHIPPED | OUTPATIENT
Start: 2024-10-10

## 2024-10-10 RX ORDER — PREGABALIN 75 MG/1
75 CAPSULE ORAL 2 TIMES DAILY
Qty: 180 CAPSULE | Refills: 4 | Status: SHIPPED | OUTPATIENT
Start: 2024-10-10

## 2024-10-10 RX ORDER — NALOXEGOL OXALATE 25 MG/1
25 TABLET, FILM COATED ORAL
Qty: 90 TABLET | Refills: 4 | Status: SHIPPED | OUTPATIENT
Start: 2024-10-10

## 2024-10-10 RX ORDER — FLUOXETINE 10 MG/1
10 CAPSULE ORAL DAILY
Qty: 90 CAPSULE | Refills: 4 | Status: SHIPPED | OUTPATIENT
Start: 2024-10-10

## 2024-10-10 SDOH — HEALTH STABILITY: PHYSICAL HEALTH: ON AVERAGE, HOW MANY MINUTES DO YOU ENGAGE IN EXERCISE AT THIS LEVEL?: 20 MIN

## 2024-10-10 SDOH — HEALTH STABILITY: PHYSICAL HEALTH: ON AVERAGE, HOW MANY DAYS PER WEEK DO YOU ENGAGE IN MODERATE TO STRENUOUS EXERCISE (LIKE A BRISK WALK)?: 5 DAYS

## 2024-10-10 ASSESSMENT — ANXIETY QUESTIONNAIRES
GAD7 TOTAL SCORE: 0
5. BEING SO RESTLESS THAT IT IS HARD TO SIT STILL: NOT AT ALL
GAD7 TOTAL SCORE: 0
7. FEELING AFRAID AS IF SOMETHING AWFUL MIGHT HAPPEN: NOT AT ALL
1. FEELING NERVOUS, ANXIOUS, OR ON EDGE: NOT AT ALL
IF YOU CHECKED OFF ANY PROBLEMS ON THIS QUESTIONNAIRE, HOW DIFFICULT HAVE THESE PROBLEMS MADE IT FOR YOU TO DO YOUR WORK, TAKE CARE OF THINGS AT HOME, OR GET ALONG WITH OTHER PEOPLE: NOT DIFFICULT AT ALL
4. TROUBLE RELAXING: NOT AT ALL
GAD7 TOTAL SCORE: 0
6. BECOMING EASILY ANNOYED OR IRRITABLE: NOT AT ALL
3. WORRYING TOO MUCH ABOUT DIFFERENT THINGS: NOT AT ALL
7. FEELING AFRAID AS IF SOMETHING AWFUL MIGHT HAPPEN: NOT AT ALL
8. IF YOU CHECKED OFF ANY PROBLEMS, HOW DIFFICULT HAVE THESE MADE IT FOR YOU TO DO YOUR WORK, TAKE CARE OF THINGS AT HOME, OR GET ALONG WITH OTHER PEOPLE?: NOT DIFFICULT AT ALL
2. NOT BEING ABLE TO STOP OR CONTROL WORRYING: NOT AT ALL

## 2024-10-10 ASSESSMENT — PAIN SCALES - GENERAL: PAINLEVEL: NO PAIN (0)

## 2024-10-10 ASSESSMENT — PATIENT HEALTH QUESTIONNAIRE - PHQ9
10. IF YOU CHECKED OFF ANY PROBLEMS, HOW DIFFICULT HAVE THESE PROBLEMS MADE IT FOR YOU TO DO YOUR WORK, TAKE CARE OF THINGS AT HOME, OR GET ALONG WITH OTHER PEOPLE: SOMEWHAT DIFFICULT
SUM OF ALL RESPONSES TO PHQ QUESTIONS 1-9: 8
SUM OF ALL RESPONSES TO PHQ QUESTIONS 1-9: 8

## 2024-10-10 ASSESSMENT — SOCIAL DETERMINANTS OF HEALTH (SDOH): HOW OFTEN DO YOU GET TOGETHER WITH FRIENDS OR RELATIVES?: ONCE A WEEK

## 2024-10-10 NOTE — PATIENT INSTRUCTIONS
Patient Education   Preventive Care Advice   This is general advice given by our system to help you stay healthy. However, your care team may have specific advice just for you. Please talk to your care team about your preventive care needs.  Nutrition  Eat 5 or more servings of fruits and vegetables each day.  Try wheat bread, brown rice and whole grain pasta (instead of white bread, rice, and pasta).  Get enough calcium and vitamin D. Check the label on foods and aim for 100% of the RDA (recommended daily allowance).  Lifestyle  Exercise at least 150 minutes each week  (30 minutes a day, 5 days a week).  Do muscle strengthening activities 2 days a week. These help control your weight and prevent disease.  No smoking.  Wear sunscreen to prevent skin cancer.  Have a dental exam and cleaning every 6 months.  Yearly exams  See your health care team every year to talk about:  Any changes in your health.  Any medicines your care team has prescribed.  Preventive care, family planning, and ways to prevent chronic diseases.  Shots (vaccines)   HPV shots (up to age 26), if you've never had them before.  Hepatitis B shots (up to age 59), if you've never had them before.  COVID-19 shot: Get this shot when it's due.  Flu shot: Get a flu shot every year.  Tetanus shot: Get a tetanus shot every 10 years.  Pneumococcal, hepatitis A, and RSV shots: Ask your care team if you need these based on your risk.  Shingles shot (for age 50 and up)  General health tests  Diabetes screening:  Starting at age 35, Get screened for diabetes at least every 3 years.  If you are younger than age 35, ask your care team if you should be screened for diabetes.  Cholesterol test: At age 39, start having a cholesterol test every 5 years, or more often if advised.  Bone density scan (DEXA): At age 50, ask your care team if you should have this scan for osteoporosis (brittle bones).  Hepatitis C: Get tested at least once in your life.  STIs (sexually  transmitted infections)  Before age 24: Ask your care team if you should be screened for STIs.  After age 24: Get screened for STIs if you're at risk. You are at risk for STIs (including HIV) if:  You are sexually active with more than one person.  You don't use condoms every time.  You or a partner was diagnosed with a sexually transmitted infection.  If you are at risk for HIV, ask about PrEP medicine to prevent HIV.  Get tested for HIV at least once in your life, whether you are at risk for HIV or not.  Cancer screening tests  Cervical cancer screening: If you have a cervix, begin getting regular cervical cancer screening tests starting at age 21.  Breast cancer scan (mammogram): If you've ever had breasts, begin having regular mammograms starting at age 40. This is a scan to check for breast cancer.  Colon cancer screening: It is important to start screening for colon cancer at age 45.  Have a colonoscopy test every 10 years (or more often if you're at risk) Or, ask your provider about stool tests like a FIT test every year or Cologuard test every 3 years.  To learn more about your testing options, visit:   .  For help making a decision, visit:   https://bit.ly/ra21228.  Prostate cancer screening test: If you have a prostate, ask your care team if a prostate cancer screening test (PSA) at age 55 is right for you.  Lung cancer screening: If you are a current or former smoker ages 50 to 80, ask your care team if ongoing lung cancer screenings are right for you.  For informational purposes only. Not to replace the advice of your health care provider. Copyright   2023 [facility]. All rights reserved. Clinically reviewed by the [facility] Transitions Program. [employer] 238698 - REV 01/24.  Learning About Depression Screening  What is depression screening?  Depression screening is a way to see if you have depression symptoms. It may be done by a doctor or counselor. It's often part of a routine  "checkup. That's because your mental health is just as important as your physical health.  Depression is a mental health condition that affects how you feel, think, and act. You may:  Have less energy.  Lose interest in your daily activities.  Feel sad and grouchy for a long time.  Depression is very common. It affects people of all ages.  Many things can lead to depression. Some people become depressed after they have a stroke or find out they have a major illness like cancer or heart disease. The death of a loved one or a breakup may lead to depression. It can run in families. Most experts believe that a combination of inherited genes and stressful life events can cause it.  What happens during screening?  You may be asked to fill out a form about your depression symptoms. You and the doctor will discuss your answers. The doctor may ask you more questions to learn more about how you think, act, and feel.  What happens after screening?  If you have symptoms of depression, your doctor will talk to you about your options.  Doctors usually treat depression with medicines or counseling. Often, combining the two works best. Many people don't get help because they think that they'll get over the depression on their own. But people with depression may not get better unless they get treatment.  The cause of depression is not well understood. There may be many factors involved. But if you have depression, it's not your fault.  A serious symptom of depression is thinking about death or suicide. If you or someone you care about talks about this or about feeling hopeless, get help right away.  It's important to know that depression can be treated. Medicine, counseling, and self-care may help.  Where can you learn more?  Go to https://www.eJamming.net/patiented  Enter T185 in the search box to learn more about \"Learning About Depression Screening.\"  Current as of: June 24, 2023  Content Version: 14.2 2024 [employer].   Care instructions adapted under license by your healthcare professional. If you have questions about a medical condition or this instruction, always ask your healthcare professional. [employer] disclaims any warranty or liability for your use of this information.

## 2024-10-10 NOTE — PROGRESS NOTES
Preventive Care Visit  [facility] PRIOR HERNÁNDEZ  Todd Pastrana MD, Family Medicine  Oct 10, 2024          Assessment & Plan     Routine general medical examination at a health care facility      Peripheral polyneuropathy  Chronic, continuous use of opioids - every 3 month medication checks  Chronic low back pain  Ongoing symptoms, medication does help him to but needs to get done in a day.  - oxyCODONE IR (ROXICODONE) 15 MG tablet  Dispense: 60 tablet; Refill: 0  - pregabalin (LYRICA) 75 MG capsule  Dispense: 180 capsule; Refill: 4  - [id number] - Urine Drug Confirmation Panel (Comprehensive)    Type 2 diabetes mellitus with diabetic polyneuropathy, without long-term current use of insulin (H)  Controlled - continue medication(s).  - HEMOGLOBIN A1C  - Semaglutide (RYBELSUS) 7 MG tablet  Dispense: 90 tablet; Refill: 4  - Continuous Glucose Sensor (DEXCOM G7 SENSOR) MISC  Dispense: 9 each; Refill: 4  - glimepiride (AMARYL) 4 MG tablet  Dispense: 90 tablet; Refill: 4  - HEMOGLOBIN A1C    Major depressive disorder, recurrent episode, mild (H)  Anxiety  Stable - continue medication(s).  - FLUoxetine (PROZAC) 10 MG capsule  Dispense: 90 capsule; Refill: 4    Constipation due to opioid therapy  Intermittent symptoms and uses medication pretty regularly.  - MOVANTIK 25 MG TABS tablet  Dispense: 90 tablet; Refill: 4    Epigastric pain  Okay to continue symptoms control  - omeprazole (PRILOSEC) 20 MG DR capsule  Dispense: 90 capsule; Refill: 4    Hyperlipidemia LDL goal <70  Controlled - continue medication(s).  - simvastatin (ZOCOR) 20 MG tablet  Dispense: 90 tablet; Refill: 4    Insomnia, unspecified type  Ongoing symptoms and okay to continue.  - traZODone (DESYREL) 50 MG tablet  Dispense: 180 tablet; Refill: 4    Severe obesity (BMI 35.0-39.9) with comorbidity (H)  Healthy diet and activity as possible.    Ulcerative colitis with complication, unspecified location (H)  Prior history, no recent  "symptoms.    Cramp of limb  Mainly in the hands, recommended stretching, heat and massage affected muscles.  Will check electrolytes  - Magnesium  - Magnesium    Post-nasal drainage  Ongoing symptoms and care for okay to continue  - fluticasone (FLONASE) 50 MCG/ACT nasal spray  Dispense: 15.8 mL; Refill: 11    Right flank pain  Recent symptoms, no hematuria noted, bit better today.  Could be muscular and will rule out possible kidney stones which is what his concerns  - UA Macroscopic with reflex to Microscopic and Culture - Lab Collect  - UA Macroscopic with reflex to Microscopic and Culture - Lab Collect    Other fatigue  Sleeps okay, he is wondering about testosterone levels and I will also check thyroid  - Testosterone Free and Total  - Testosterone Free and Total        BMI  Estimated body mass index is 37.51 kg/m  as calculated from the following:    Height as of this encounter: 1.753 m (5' 9\").    Weight as of this encounter: 115.2 kg (254 lb).   Weight management plan: Discussed healthy diet and exercise guidelines      Reviewed preventive health counseling, as reflected in patient instructions    No follow-ups on file.    Follow-up Visit   Expected date:  Apr 10, 2025 (Approximate)      Follow Up Appointment Details:     Follow-up with whom?: Me    Follow-Up for what?: Chronic Disease f/u    Chronic Disease f/u:  Diabetes  General (Other)       Additional Details: chronic pain    How?: In Person    Is this an as-needed follow-up?: No             Follow-up Visit   Expected date:  Oct 10, 2025 (Approximate)      Follow Up Appointment Details:     Follow-up with whom?: PCP    Follow-Up for what?: Adult Preventive    How?: In Person                         Dannie Pastrana MD     [address] [address]  [email or web address]     Office: [phone]           Subjective   Harm is a 54 year old, presenting for the following:  Physical    Via the Health Maintenance " questionnaire, the patient has reported the following services have been completed -Eye Exam: [employer] 2024-09-25, this information has been sent to the abstraction team.  Health Care Directive  Patient does not have a Health Care Directive or Living Will: Advance Directive received and scanned. Click on Code in the patient header to view.    HPI      Diabetes Follow-up    How often are you checking your blood sugar? A few times a week  Have you had any blood sugars above 200?  Yes   Have you had any blood sugars below 70?  No  What symptoms do you notice when your blood sugar is low?  None  What concerns do you have today about your diabetes? None and Blood sugar is often over 200   Do you have any of these symptoms? (Select all that apply)  Numbness in feet      BP Readings from Last 2 Encounters:   10/10/24 112/74   05/13/24 109/73     Hemoglobin A1C (%)   Date Value   02/01/2024 6.6 (H)   02/06/2023 6.2 (H)   04/15/2021 8.7 (H)   01/18/2020 7.9 (H)     LDL Cholesterol Calculated (mg/dL)   Date Value   02/01/2024 101 (H)   06/14/2022 85   04/15/2021 70   01/18/2020 54             Hyperlipidemia Follow-Up    Are you regularly taking any medication or supplement to lower your cholesterol?   Yes- Simvastatin   Are you having muscle aches or other side effects that you think could be caused by your cholesterol lowering medication?  No    Depression and Anxiety   How are you doing with your depression since your last visit? Same   How are you doing with your anxiety since your last visit?  Same   Are you having other symptoms that might be associated with depression or anxiety? No  Have you had a significant life event? No   Do you have any concerns with your use of alcohol or other drugs? No    Social History     Tobacco Use    Smoking status: Never     Passive exposure: Past    Smokeless tobacco: Never   Vaping Use    Vaping status: Never Used   Substance Use Topics    Alcohol use: No     Comment: Does not drink  - last was 11/2015    Drug use: No         2/1/2024     2:41 PM 4/9/2024     1:48 PM 10/10/2024     8:40 AM   PHQ   PHQ-9 Total Score 7 5 8   Q9: Thoughts of better off dead/self-harm past 2 weeks Not at all Not at all Not at all         2/1/2024     2:43 PM 4/9/2024     1:53 PM 10/10/2024     8:41 AM   BUSHRA-7 SCORE   Total Score 0 (minimal anxiety) 2 (minimal anxiety) 0 (minimal anxiety)   Total Score 0 2 0         10/10/2024     8:40 AM   Last PHQ-9   1.  Little interest or pleasure in doing things 1   2.  Feeling down, depressed, or hopeless 1   3.  Trouble falling or staying asleep, or sleeping too much 1   4.  Feeling tired or having little energy 3   5.  Poor appetite or overeating 1   6.  Feeling bad about yourself 1   7.  Trouble concentrating 0   8.  Moving slowly or restless 0   Q9: Thoughts of better off dead/self-harm past 2 weeks 0   PHQ-9 Total Score 8         10/10/2024     8:41 AM   BUSHRA-7    1. Feeling nervous, anxious, or on edge 0   2. Not being able to stop or control worrying 0   3. Worrying too much about different things 0   4. Trouble relaxing 0   5. Being so restless that it is hard to sit still 0   6. Becoming easily annoyed or irritable 0   7. Feeling afraid, as if something awful might happen 0   BUSHRA-7 Total Score 0   If you checked any problems, how difficult have they made it for you to do your work, take care of things at home, or get along with other people? Not difficult at all       Suicide Assessment Five-step Evaluation and Treatment (SAFE-T)        10/10/2024   General Health   How would you rate your overall physical health? (!) FAIR   Feel stress (tense, anxious, or unable to sleep) Only a little      (!) STRESS CONCERN      10/10/2024   Nutrition   Three or more servings of calcium each day? (!) NO   Diet: Gluten-free/reduced   How many servings of fruit and vegetables per day? (!) 0-1   How many sweetened beverages each day? 0-1            10/10/2024   Exercise   Days per week  of moderate/strenous exercise 5 days   Average minutes spent exercising at this level 20 min            10/10/2024   Social Factors   Frequency of gathering with friends or relatives Once a week   Worry food won't last until get money to buy more No   Food not last or not have enough money for food? Yes   Do you have housing? (Housing is defined as stable permanent housing and does not include staying ouside in a car, in a tent, in an abandoned building, in an overnight shelter, or couch-surfing.) Yes   Are you worried about losing your housing? No   Lack of transportation? No   Unable to get utilities (heat,electricity)? No      (!) FOOD SECURITY CONCERN PRESENT      10/10/2024   Fall Risk   Fallen 2 or more times in the past year? No   Trouble with walking or balance? No             10/10/2024   Dental   Dentist two times every year? Yes            10/10/2024   TB Screening   Were you born outside of the [place]? No          Today's PHQ-9 Score:       10/10/2024     8:40 AM   PHQ-9 SCORE   PHQ-9 Total Score MyChart 8 (Mild depression)   PHQ-9 Total Score 8         10/10/2024   Substance Use   Alcohol more than 3/day or more than 7/wk No   Do you use any other substances recreationally? No        Social History     Tobacco Use    Smoking status: Never     Passive exposure: Past    Smokeless tobacco: Never   Vaping Use    Vaping status: Never Used   Substance Use Topics    Alcohol use: No     Comment: Does not drink - last was 11/2015    Drug use: No           10/10/2024   STI Screening   New sexual partner(s) since last STI/HIV test? No      ASCVD Risk   The 10-year ASCVD risk score (Tamiko BLANCO, et al., 2019) is: 5.5%    Values used to calculate the score:      Age: 54 years      Sex: Male      Is Non- : No      Diabetic: Yes      Tobacco smoker: No      Systolic Blood Pressure: 112 mmHg      Is BP treated: No      HDL Cholesterol: 44 mg/dL      Total Cholesterol: 162 mg/dL          "  Reviewed and updated as needed this visit by Provider   Tobacco  Allergies  Meds  Problems  Med Hx  Surg Hx  Fam Hx           Objective    Exam  /74   Pulse 61   Temp 98.1  F (36.7  C) (Tympanic)   Resp 14   Ht 1.753 m (5' 9\")   Wt 115.2 kg (254 lb)   SpO2 98%   BMI 37.51 kg/m     Estimated body mass index is 37.51 kg/m  as calculated from the following:    Height as of this encounter: 1.753 m (5' 9\").    Weight as of this encounter: 115.2 kg (254 lb).    Physical Exam  GENERAL: healthy, alert and no distress  EYES: Eyes grossly normal to inspection, PERRL and conjunctivae and sclerae normal  HENT: ear canals and TM's normal, nose and mouth without ulcers or lesions  NECK: no adenopathy, no asymmetry, masses, or scars and thyroid normal to palpation  RESP: lungs clear to auscultation - no rales, rhonchi or wheezes  BREAST: normal without masses, tenderness or nipple discharge and no palpable axillary masses or adenopathy  CV: regular rate and rhythm, normal S1 S2, no S3 or S4, no murmur, click or rub, no peripheral edema and peripheral pulses strong  ABDOMEN: soft, nontender, no hepatosplenomegaly, no masses and bowel sounds normal   (male): normal male genitalia without lesions or urethral discharge, no hernia  MS: no gross musculoskeletal defects noted, no edema  SKIN: no suspicious lesions or rashes  NEURO: Normal strength and tone, mentation intact and speech normal  PSYCH: mentation appears normal, affect normal/bright  LYMPH: no cervical, supraclavicular, axillary, or inguinal adenopathy   RECTAL: declined exam  Diabetic foot exam: normal DP and PT pulses, no trophic changes or ulcerative lesions, and reduced sensation at to the distal shin bilaterally        Signed Electronically by: Todd Pastrana MD    "

## 2024-10-10 NOTE — LETTER

## 2024-10-10 NOTE — TELEPHONE ENCOUNTER
Prior Authorization Retail Medication Request    Medication/Dose: Needs prior auth for Movantik  Diagnosis and ICD code (if different than what is on RX):  k5903  New/renewal/insurance change PA/secondary ins. PA:  Previously Tried and Failed:  na   Rationale:  na    Insurance   Primary: [facility] MEDONE   Insurance ID:  [id number]    Secondary (if applicable):NA  Insurance ID:  GIA        Clinic Information  Preferred routing pool for dept communication: [facility]     Thank You,  Linn Richardson, Pharm Tech   [facility]

## 2024-10-11 LAB — CREAT UR-MCNC: 69 MG/DL

## 2024-10-12 ENCOUNTER — HEALTH MAINTENANCE LETTER (OUTPATIENT)
Age: 54
End: 2024-10-12

## 2024-10-14 LAB
OXYCODONE UR CFM-MCNC: 195 NG/ML
OXYCODONE/CREAT UR: 283 NG/MG {CREAT}
PREGABALIN UR QL CFM: PRESENT

## 2024-10-15 ENCOUNTER — LAB (OUTPATIENT)
Dept: LAB | Facility: CLINIC | Age: 54
End: 2024-10-15
Payer: COMMERCIAL

## 2024-10-15 DIAGNOSIS — K51.919 ULCERATIVE COLITIS WITH COMPLICATION, UNSPECIFIED LOCATION (H): ICD-10-CM

## 2024-10-15 DIAGNOSIS — F33.0 MAJOR DEPRESSIVE DISORDER, RECURRENT EPISODE, MILD (H): ICD-10-CM

## 2024-10-15 DIAGNOSIS — E66.01 SEVERE OBESITY (BMI 35.0-39.9) WITH COMORBIDITY (H): ICD-10-CM

## 2024-10-15 DIAGNOSIS — E11.42 TYPE 2 DIABETES MELLITUS WITH DIABETIC POLYNEUROPATHY, WITHOUT LONG-TERM CURRENT USE OF INSULIN (H): ICD-10-CM

## 2024-10-15 DIAGNOSIS — F11.90 CHRONIC, CONTINUOUS USE OF OPIOIDS: ICD-10-CM

## 2024-10-15 DIAGNOSIS — T40.2X5A CONSTIPATION DUE TO OPIOID THERAPY: ICD-10-CM

## 2024-10-15 DIAGNOSIS — R09.82 POST-NASAL DRAINAGE: ICD-10-CM

## 2024-10-15 DIAGNOSIS — K59.03 CONSTIPATION DUE TO OPIOID THERAPY: ICD-10-CM

## 2024-10-15 DIAGNOSIS — R53.83 OTHER FATIGUE: ICD-10-CM

## 2024-10-15 DIAGNOSIS — M54.50 CHRONIC LOW BACK PAIN, UNSPECIFIED BACK PAIN LATERALITY, UNSPECIFIED WHETHER SCIATICA PRESENT: ICD-10-CM

## 2024-10-15 DIAGNOSIS — R25.2 CRAMP OF LIMB: ICD-10-CM

## 2024-10-15 DIAGNOSIS — R10.13 EPIGASTRIC PAIN: ICD-10-CM

## 2024-10-15 DIAGNOSIS — F41.9 ANXIETY: ICD-10-CM

## 2024-10-15 DIAGNOSIS — Z00.00 ROUTINE GENERAL MEDICAL EXAMINATION AT A HEALTH CARE FACILITY: ICD-10-CM

## 2024-10-15 DIAGNOSIS — G62.9 PERIPHERAL POLYNEUROPATHY: ICD-10-CM

## 2024-10-15 DIAGNOSIS — E78.5 HYPERLIPIDEMIA LDL GOAL <70: ICD-10-CM

## 2024-10-15 DIAGNOSIS — G89.29 CHRONIC LOW BACK PAIN, UNSPECIFIED BACK PAIN LATERALITY, UNSPECIFIED WHETHER SCIATICA PRESENT: ICD-10-CM

## 2024-10-15 DIAGNOSIS — G47.00 INSOMNIA, UNSPECIFIED TYPE: ICD-10-CM

## 2024-10-15 DIAGNOSIS — R10.9 RIGHT FLANK PAIN: ICD-10-CM

## 2024-10-15 DIAGNOSIS — Z12.5 SCREENING FOR MALIGNANT NEOPLASM OF PROSTATE: ICD-10-CM

## 2024-10-15 LAB
ALBUMIN SERPL BCG-MCNC: 4.4 G/DL (ref 3.5–5.2)
ALP SERPL-CCNC: 78 U/L (ref 40–150)
ALT SERPL W P-5'-P-CCNC: 24 U/L (ref 0–70)
ANION GAP SERPL CALCULATED.3IONS-SCNC: 9 MMOL/L (ref 7–15)
AST SERPL W P-5'-P-CCNC: 18 U/L (ref 0–45)
BILIRUB SERPL-MCNC: 0.6 MG/DL
BUN SERPL-MCNC: 17.1 MG/DL (ref 6–20)
CALCIUM SERPL-MCNC: 9.2 MG/DL (ref 8.8–10.4)
CHLORIDE SERPL-SCNC: 99 MMOL/L (ref 98–107)
CHOLEST SERPL-MCNC: 169 MG/DL
CREAT SERPL-MCNC: 1.03 MG/DL (ref 0.67–1.17)
EGFRCR SERPLBLD CKD-EPI 2021: 87 ML/MIN/1.73M2
EST. AVERAGE GLUCOSE BLD GHB EST-MCNC: 249 MG/DL
FASTING STATUS PATIENT QL REPORTED: YES
FASTING STATUS PATIENT QL REPORTED: YES
GLUCOSE SERPL-MCNC: 221 MG/DL (ref 70–99)
HBA1C MFR BLD: 10.3 % (ref 0–5.6)
HCO3 SERPL-SCNC: 28 MMOL/L (ref 22–29)
HDLC SERPL-MCNC: 43 MG/DL
LDLC SERPL CALC-MCNC: 99 MG/DL
MAGNESIUM SERPL-MCNC: 1.9 MG/DL (ref 1.7–2.3)
NONHDLC SERPL-MCNC: 126 MG/DL
POTASSIUM SERPL-SCNC: 4.3 MMOL/L (ref 3.4–5.3)
PROT SERPL-MCNC: 7.5 G/DL (ref 6.4–8.3)
PSA SERPL DL<=0.01 NG/ML-MCNC: 1.41 NG/ML (ref 0–3.5)
SHBG SERPL-SCNC: 20 NMOL/L (ref 11–80)
SODIUM SERPL-SCNC: 136 MMOL/L (ref 135–145)
TRIGL SERPL-MCNC: 136 MG/DL
TSH SERPL DL<=0.005 MIU/L-ACNC: 1.76 UIU/ML (ref 0.3–4.2)

## 2024-10-15 PROCEDURE — 80053 COMPREHEN METABOLIC PANEL: CPT

## 2024-10-15 PROCEDURE — 84443 ASSAY THYROID STIM HORMONE: CPT

## 2024-10-15 PROCEDURE — 83735 ASSAY OF MAGNESIUM: CPT

## 2024-10-15 PROCEDURE — 83036 HEMOGLOBIN GLYCOSYLATED A1C: CPT

## 2024-10-15 PROCEDURE — 36415 COLL VENOUS BLD VENIPUNCTURE: CPT

## 2024-10-15 PROCEDURE — 84270 ASSAY OF SEX HORMONE GLOBUL: CPT

## 2024-10-15 PROCEDURE — G0103 PSA SCREENING: HCPCS

## 2024-10-15 PROCEDURE — 80061 LIPID PANEL: CPT

## 2024-10-15 PROCEDURE — 84403 ASSAY OF TOTAL TESTOSTERONE: CPT

## 2024-10-15 NOTE — TELEPHONE ENCOUNTER
Retail Pharmacy Prior Authorization Team   Phone: [phone]    PA Initiation    Medication: MOVANTIK 25 MG PO TABS  Insurance Company: [employer]    Pharmacy Filling the Rx: [facility] - [place], [place] - [address] [place] [address]  Filling Pharmacy Phone: [phone]  Filling Pharmacy Fax:    Start Date: 10/15/2024

## 2024-10-17 LAB
TESTOST FREE SERPL-MCNC: 7.79 NG/DL
TESTOST SERPL-MCNC: 305 NG/DL (ref 240–950)

## 2024-10-17 NOTE — RESULT ENCOUNTER NOTE
Dear Ryland,    Here is a summary of your recent test results:  -PSA (prostate specific antigen) test is normal.  This indicates a low likelihood of prostate cancer.  ADVISE: rechecking this in 1 year.  -Cholesterol levels are at your goal levels.  ADVISE: continuing your medication, a regular exercise program with at least 150 minutes of aerobic exercise per week, and eating a low saturated fat/low carbohydrate diet.  Also, you should recheck this fasting cholesterol panel in 12 months.  -Liver and gallbladder tests (ALT,AST, Alk phos,bilirubin) are normal.  -Kidney function (GFR) is normal.  -Sodium is normal.  -Potassium is normal.  -Calcium is normal.  -Glucose is elevated due to your diabetes.  -A1C test (average blood sugar the last 2-3 months) is above your goal.   ADVISE: Optimizing your diet and making sure to take diabetic medications on a regular basis. Making a diabetic followup appointment in  3 months . Please check and record your blood sugars at least 4 times daily for 1 week prior to your appointment and bring for review.  Also, you should recheck your A1C in 3 months.  -TSH (thyroid stimulating hormone) level is normal which indicates normal thyroid function.  -Magnesium is normal.    For additional lab test information, www.[email or web address].com is a very good reference.    In addition, here is a list of due or overdue Health Maintenance reminders:  Pneumococcal Vaccine(2 of 2 - PCV) due on 04/18/2018  Flu Vaccine(1) due on 09/01/2024  CONTROLLED SUBSTANCE AGREEMENT FOR CHRONIC PAIN MANAGEMENT due on 09/18/2024  Zoster (Shingles) Vaccine(2 of 2) due on 03/28/2024    Please call us at [phone] (or use [employer]) to address the above recommendations if needed.           Thank you very much for trusting me and [facility].     Have a peaceful day.    Healthy regards,  Dannie Pastrana MD

## 2024-10-17 NOTE — RESULT ENCOUNTER NOTE
Dear Ryland,    Here is a summary of your recent test results:  -Testosterone levels are in the normal range.      For additional lab test information, www.[email or web address].com is a very good reference.    In addition, here is a list of due or overdue Health Maintenance reminders:  Pneumococcal Vaccine(2 of 2 - PCV) due on 04/18/2018  Flu Vaccine(1) due on 09/01/2024  CONTROLLED SUBSTANCE AGREEMENT FOR CHRONIC PAIN MANAGEMENT due on 09/18/2024  Zoster (Shingles) Vaccine(2 of 2) due on 03/28/2024    Please call us at [phone] (or use [employer]) to address the above recommendations if needed.           Thank you very much for trusting me and [facility].     Have a peaceful day.    Healthy regards,  Dannie Pastrana MD

## 2024-10-17 NOTE — TELEPHONE ENCOUNTER
Retail Pharmacy Prior Authorization Team   Phone: [phone]    Prior Authorization Approval    Medication: MOVANTIK 25 MG PO TABS  Authorization Effective Date: 10/17/2024  Authorization Expiration Date: 10/17/2025  Insurance Company:  [employer]  Which Pharmacy is filling the prescription: [facility] [place], [place] - [address] [place] [address]  Pharmacy Notified: YES  Patient Notified: YES **Instructed pharmacy to notify patient when script is ready to /ship.**

## 2024-10-17 NOTE — RESULT ENCOUNTER NOTE
Dear Harm,    Here is a summary of your recent test results:  -Urine is normal.  -Drug screen showed expected results.    For additional lab test information, www.[email or web address] is a very good reference.    In addition, here is a list of due or overdue Health Maintenance reminders:  Pneumococcal Vaccine(2 of 2 - PCV) due on 04/18/2018  Flu Vaccine(1) due on 09/01/2024  CONTROLLED SUBSTANCE AGREEMENT FOR CHRONIC PAIN MANAGEMENT due on 09/18/2024  Zoster (Shingles) Vaccine(2 of 2) due on 03/28/2024    Please call us at [phone] (or use Office Center) to address the above recommendations if needed.           Thank you very much for trusting me and [facility].     Have a peaceful day.    Healthy regards,  Dannie Pastrana MD

## 2024-11-22 DIAGNOSIS — M54.50 CHRONIC LOW BACK PAIN, UNSPECIFIED BACK PAIN LATERALITY, UNSPECIFIED WHETHER SCIATICA PRESENT: ICD-10-CM

## 2024-11-22 DIAGNOSIS — G62.9 PERIPHERAL POLYNEUROPATHY: ICD-10-CM

## 2024-11-22 DIAGNOSIS — G89.29 CHRONIC LOW BACK PAIN, UNSPECIFIED BACK PAIN LATERALITY, UNSPECIFIED WHETHER SCIATICA PRESENT: ICD-10-CM

## 2024-11-22 DIAGNOSIS — F11.90 CHRONIC, CONTINUOUS USE OF OPIOIDS: ICD-10-CM

## 2024-11-25 RX ORDER — OXYCODONE HYDROCHLORIDE 15 MG/1
15 TABLET ORAL 2 TIMES DAILY PRN
Qty: 60 TABLET | Refills: 0 | Status: SHIPPED | OUTPATIENT
Start: 2024-11-25

## 2024-12-02 NOTE — PROGRESS NOTES
ASSESSMENT & PLAN    Ryland was seen today for pain and follow up.    Diagnoses and all orders for this visit:    Tendinitis of right rotator cuff  -     Physical Therapy  Referral; Future    Subacromial impingement, right  -     Physical Therapy  Referral; Future    Other orders  -     Large Joint Injection/Arthocentesis: R subacromial bursa      This issue is acute on chronic and Worsening.  Ryland presents our clinic today to follow-up on his chronic right shoulder pain.  At her last visit, exam findings and imaging findings were consistent with rotator cuff tendinitis and subacromial impingement as the main drivers of his symptoms.  He was prescribed a Medrol Dosepak at that time, which improved his symptoms for approximately 3 weeks.  He did not participate in physical therapy.  He reports that since then his pain has slowly returned and is now back to its previous levels.  On examination today, he continues to have symptoms consistent with rotator cuff tendinitis and subacromial impingement, however he still has full range of motion and 5/5 strength in all planes so there is low suspicion for a large rotator cuff tear.  We discussed that he would need physical therapy to help optimize the mechanics of the shoulder and minimize episodes of subacromial impingement.  We discussed the role of corticosteroid injections for both pain relief and to better participate physical therapy, as well as the risks and benefits in the setting of the patient's type 2 diabetes, and after this discussion the patient wished to proceed with this today as well.  We determined the following plan:  - CSI to the right subacromial bursa performed today under ultrasound guidance, see procedure note below for details  - Physical therapy referral placed  - He can otherwise use over-the-counter pain medicines, ice, heat as needed  - He can follow-up with me for further evaluation and treatment in 6 to 8 weeks     Jairo  "DO MOHINI Edwards [facility] [place]    SUBJECTIVE- Interim History December 2, 2024    Chief Complaint   Patient presents with    Right Shoulder - Pain, Follow Up       Ryland Gee is a 54 year old male who is seen in f/u up for    Tendinitis of right rotator cuff  Subacromial impingement, right. Since last visit on 5/13/2024 patient's shoulder has gotten worse.  He has been using ibuprofen.  He has not done any physical therapy.  - Now ~ 11 months from initial onset    Worsened by: shoulder external rotation, reaching behind him  Better with: nothing  Treatments tried:  rest/activity avoidance, ibuprofen  Associated symptoms:  radiating pain from shoulder down to forearm    The patient is seen by themselves.  The patient is Right handed    Orthopedic/Surgical history: NO  Social History/Occupation:       REVIEW OF SYSTEMS:  Negative unless mentioned above    OBJECTIVE:  /80   Ht 1.753 m (5' 9\")   Wt 115.2 kg (254 lb)   BMI 37.51 kg/m     General: healthy, alert and in no distress  Skin: no suspicious lesions or rash.  CV: distal perfusion intact   Resp: normal respiratory effort without conversational dyspnea   Psych: normal mood and affect  Gait: NORMAL  Neuro: Normal light sensory exam of RU extremity     Shoulder Examination (focused):    Right  Left   Skin intact intact   Inspection No erythema, ecchymosis  No erythema, ecchymosis    Palpation Tenderness: None Tenderness: None   Range of Motion (active) Forward flexion:175   GH Abduction: 90  Reach behind back: T10 Forward flexion:175   GH Abduction: 90  Reach behind back: T10   Range of Motion (passive) Forward flexion:175   GH Abduction: 90  ER at side: 70  ER at 90 deg abduction: 90  IR at 90 deg abduction: 60 Forward flexion:175   GH Abduction: 90  ER at side: 70  ER at 90 deg abduction: 90  IR at 90 deg abduction: 60   Crepitus No No   Strength Internal Rotation at side: 5+  External Rotation at side: 5+, " painful  Belly Press: 5+, painful Internal Rotation at side: 5+  External Rotation at side: 5+  Belly Press: 5+    Special Tests Cortez: painful  Neer: painful  Willa: 5+, painful   Speed's: 5+  Cross arm: Negative  Cortez: NP  Neer: NP  Willa: 5+  Speed's: 5+  Cross arm: Negative       Other Positive Tests/ Notable Findings O'fawn's: NP  Bicep Load I/II: NP  Internal Impingement: NP  Yergason: NP   ERLS: NP  Hornblower: NP  Bear Hug: NP O'fawn's: NP  Bicep Load I/II: NP  Internal Impingement: NP  Yergason: NP   ERLS: NP  Hornblower: NP  Bear Hug: NP   Instability Generalized laxity: no  Anterior apprehension: Negative  Load and shift (anterior): NP  Load and shift (posterior): NP Generalized laxity: no  Anterior apprehension: Negative  Load and shift (anterior): NP  Load and shift (posterior): NP   Neurologic No abnormal sensation.   Scapular Motion Normal scapular alignment bilaterally without notable protraction/retraction, elevation/depression  No dynamic evidence of scapular dyskinesia         RADIOLOGY:  No new imaging taken in clinic    Large Joint Injection/Arthocentesis: R subacromial bursa    Date/Time: 12/9/2024 10:44 AM    Performed by: Jairo Edwards DO  Authorized by: Jairo Edwards DO    Indications:  Pain  Needle Size:  25 G  Guidance: ultrasound    Approach:  Anterolateral  Location:  Shoulder      Site:  R subacromial bursa  Medications:  6 mg betamethasone acet & sod phos 6 (3-3) MG/ML; 5 mL lidocaine 1 %; 2 mL ROPivacaine 5 MG/ML  Medications comment:  1ml of 8.4% Sodium Bicarbonate solution was used to buffer the local numbing agent for today's injection    Outcome:  Tolerated well, no immediate complications  Procedure discussed: discussed risks, benefits, and alternatives    Consent Given by:  Patient  Timeout: timeout called immediately prior to procedure    Prep: patient was prepped and draped in usual sterile fashion     Ultrasound was used to ensure safe and accurate needle placement  and injection. Ultrasound images of the procedure were permanently stored.

## 2024-12-09 ENCOUNTER — OFFICE VISIT (OUTPATIENT)
Dept: ORTHOPEDICS | Facility: CLINIC | Age: 54
End: 2024-12-09
Payer: COMMERCIAL

## 2024-12-09 VITALS
BODY MASS INDEX: 37.62 KG/M2 | SYSTOLIC BLOOD PRESSURE: 132 MMHG | WEIGHT: 254 LBS | DIASTOLIC BLOOD PRESSURE: 80 MMHG | HEIGHT: 69 IN

## 2024-12-09 DIAGNOSIS — M75.81 TENDINITIS OF RIGHT ROTATOR CUFF: Primary | ICD-10-CM

## 2024-12-09 DIAGNOSIS — M75.41 SUBACROMIAL IMPINGEMENT, RIGHT: ICD-10-CM

## 2024-12-09 PROCEDURE — 20611 DRAIN/INJ JOINT/BURSA W/US: CPT | Mod: RT | Performed by: STUDENT IN AN ORGANIZED HEALTH CARE EDUCATION/TRAINING PROGRAM

## 2024-12-09 PROCEDURE — 99213 OFFICE O/P EST LOW 20 MIN: CPT | Mod: 25 | Performed by: STUDENT IN AN ORGANIZED HEALTH CARE EDUCATION/TRAINING PROGRAM

## 2024-12-09 RX ORDER — BETAMETHASONE SODIUM PHOSPHATE AND BETAMETHASONE ACETATE 3; 3 MG/ML; MG/ML
6 INJECTION, SUSPENSION INTRA-ARTICULAR; INTRALESIONAL; INTRAMUSCULAR; SOFT TISSUE
Status: COMPLETED | OUTPATIENT
Start: 2024-12-09 | End: 2024-12-09

## 2024-12-09 RX ORDER — LIDOCAINE HYDROCHLORIDE 10 MG/ML
5 INJECTION, SOLUTION INFILTRATION; PERINEURAL
Status: COMPLETED | OUTPATIENT
Start: 2024-12-09 | End: 2024-12-09

## 2024-12-09 RX ORDER — ROPIVACAINE HYDROCHLORIDE 5 MG/ML
2 INJECTION, SOLUTION EPIDURAL; INFILTRATION; PERINEURAL
Status: COMPLETED | OUTPATIENT
Start: 2024-12-09 | End: 2024-12-09

## 2024-12-09 RX ADMIN — ROPIVACAINE HYDROCHLORIDE 2 ML: 5 INJECTION, SOLUTION EPIDURAL; INFILTRATION; PERINEURAL at 10:44

## 2024-12-09 RX ADMIN — LIDOCAINE HYDROCHLORIDE 5 ML: 10 INJECTION, SOLUTION INFILTRATION; PERINEURAL at 10:44

## 2024-12-09 RX ADMIN — BETAMETHASONE SODIUM PHOSPHATE AND BETAMETHASONE ACETATE 6 MG: 3; 3 INJECTION, SUSPENSION INTRA-ARTICULAR; INTRALESIONAL; INTRAMUSCULAR; SOFT TISSUE at 10:44

## 2024-12-09 NOTE — LETTER
12/9/2024      Ryland Gee  [address]  [facility] [address]      Dear Colleague,    Thank you for referring your patient, Ryland Gee, to the [facility] [place]. Please see a copy of my visit note below.    ASSESSMENT & PLAN    Ryland was seen today for pain and follow up.    Diagnoses and all orders for this visit:    Tendinitis of right rotator cuff  -     Physical Therapy  Referral; Future    Subacromial impingement, right  -     Physical Therapy  Referral; Future    Other orders  -     Large Joint Injection/Arthocentesis: R subacromial bursa      This issue is acute on chronic and Worsening.  Ryland presents our clinic today to follow-up on his chronic right shoulder pain.  At her last visit, exam findings and imaging findings were consistent with rotator cuff tendinitis and subacromial impingement as the main drivers of his symptoms.  He was prescribed a Medrol Dosepak at that time, which improved his symptoms for approximately 3 weeks.  He did not participate in physical therapy.  He reports that since then his pain has slowly returned and is now back to its previous levels.  On examination today, he continues to have symptoms consistent with rotator cuff tendinitis and subacromial impingement, however he still has full range of motion and 5/5 strength in all planes so there is low suspicion for a large rotator cuff tear.  We discussed that he would need physical therapy to help optimize the mechanics of the shoulder and minimize episodes of subacromial impingement.  We discussed the role of corticosteroid injections for both pain relief and to better participate physical therapy, as well as the risks and benefits in the setting of the patient's type 2 diabetes, and after this discussion the patient wished to proceed with this today as well.  We determined the following plan:  - CSI to the right subacromial bursa performed today under ultrasound  "guidance, see procedure note below for details  - Physical therapy referral placed  - He can otherwise use over-the-counter pain medicines, ice, heat as needed  - He can follow-up with me for further evaluation and treatment in 6 to 8 weeks     DO MOHINI [facility] [place]    SUBJECTIVE- Interim History December 2, 2024    Chief Complaint   Patient presents with     Right Shoulder - Pain, Follow Up       Ryland Gee is a 54 year old male who is seen in f/u up for    Tendinitis of right rotator cuff  Subacromial impingement, right. Since last visit on 5/13/2024 patient's shoulder has gotten worse.  He has been using ibuprofen.  He has not done any physical therapy.  - Now ~ 11 months from initial onset    Worsened by: shoulder external rotation, reaching behind him  Better with: nothing  Treatments tried:  rest/activity avoidance, ibuprofen  Associated symptoms:  radiating pain from shoulder down to forearm    The patient is seen by themselves.  The patient is Right handed    Orthopedic/Surgical history: NO  Social History/Occupation:       REVIEW OF SYSTEMS:  Negative unless mentioned above    OBJECTIVE:  /80   Ht 1.753 m (5' 9\")   Wt 115.2 kg (254 lb)   BMI 37.51 kg/m     General: healthy, alert and in no distress  Skin: no suspicious lesions or rash.  CV: distal perfusion intact   Resp: normal respiratory effort without conversational dyspnea   Psych: normal mood and affect  Gait: NORMAL  Neuro: Normal light sensory exam of RU extremity     Shoulder Examination (focused):    Right  Left   Skin intact intact   Inspection No erythema, ecchymosis  No erythema, ecchymosis    Palpation Tenderness: None Tenderness: None   Range of Motion (active) Forward flexion:175   GH Abduction: 90  Reach behind back: T10 Forward flexion:175   GH Abduction: 90  Reach behind back: T10   Range of Motion (passive) Forward flexion:175   GH Abduction: 90  ER at side: 70  ER at " 90 deg abduction: 90  IR at 90 deg abduction: 60 Forward flexion:175   GH Abduction: 90  ER at side: 70  ER at 90 deg abduction: 90  IR at 90 deg abduction: 60   Crepitus No No   Strength Internal Rotation at side: 5+  External Rotation at side: 5+, painful  Belly Press: 5+, painful Internal Rotation at side: 5+  External Rotation at side: 5+  Belly Press: 5+    Special Tests Cortez: painful  Neer: painful  Willa: 5+, painful   Speed's: 5+  Cross arm: Negative  Cortez: NP  Neer: NP  Willa: 5+  Speed's: 5+  Cross arm: Negative       Other Positive Tests/ Notable Findings O'fawn's: NP  Bicep Load I/II: NP  Internal Impingement: NP  Yergason: NP   ERLS: NP  Hornblower: NP  Bear Hug: NP O'fawn's: NP  Bicep Load I/II: NP  Internal Impingement: NP  Yergason: NP   ERLS: NP  Hornblower: NP  Bear Hug: NP   Instability Generalized laxity: no  Anterior apprehension: Negative  Load and shift (anterior): NP  Load and shift (posterior): NP Generalized laxity: no  Anterior apprehension: Negative  Load and shift (anterior): NP  Load and shift (posterior): NP   Neurologic No abnormal sensation.   Scapular Motion Normal scapular alignment bilaterally without notable protraction/retraction, elevation/depression  No dynamic evidence of scapular dyskinesia         RADIOLOGY:  No new imaging taken in clinic    Large Joint Injection/Arthocentesis: R subacromial bursa    Date/Time: 12/9/2024 10:44 AM    Performed by: Jairo Edwards DO  Authorized by: Jairo Edwards DO    Indications:  Pain  Needle Size:  25 G  Guidance: ultrasound    Approach:  Anterolateral  Location:  Shoulder      Site:  R subacromial bursa  Medications:  6 mg betamethasone acet & sod phos 6 (3-3) MG/ML; 5 mL lidocaine 1 %; 2 mL ROPivacaine 5 MG/ML  Medications comment:  1ml of 8.4% Sodium Bicarbonate solution was used to buffer the local numbing agent for today's injection    Outcome:  Tolerated well, no immediate complications  Procedure discussed: discussed  risks, benefits, and alternatives    Consent Given by:  Patient  Timeout: timeout called immediately prior to procedure    Prep: patient was prepped and draped in usual sterile fashion     Ultrasound was used to ensure safe and accurate needle placement and injection. Ultrasound images of the procedure were permanently stored.                     Again, thank you for allowing me to participate in the care of your patient.        Sincerely,        Jairo Edwards, DO

## 2024-12-26 DIAGNOSIS — F11.90 CHRONIC, CONTINUOUS USE OF OPIOIDS: ICD-10-CM

## 2024-12-26 DIAGNOSIS — M54.50 CHRONIC LOW BACK PAIN, UNSPECIFIED BACK PAIN LATERALITY, UNSPECIFIED WHETHER SCIATICA PRESENT: ICD-10-CM

## 2024-12-26 DIAGNOSIS — G62.9 PERIPHERAL POLYNEUROPATHY: ICD-10-CM

## 2024-12-26 DIAGNOSIS — G89.29 CHRONIC LOW BACK PAIN, UNSPECIFIED BACK PAIN LATERALITY, UNSPECIFIED WHETHER SCIATICA PRESENT: ICD-10-CM

## 2024-12-26 RX ORDER — OXYCODONE HYDROCHLORIDE 15 MG/1
15 TABLET ORAL 2 TIMES DAILY PRN
Qty: 60 TABLET | Refills: 0 | Status: SHIPPED | OUTPATIENT
Start: 2024-12-26

## 2025-01-25 ENCOUNTER — HEALTH MAINTENANCE LETTER (OUTPATIENT)
Age: 55
End: 2025-01-25

## 2025-01-27 DIAGNOSIS — M54.50 CHRONIC LOW BACK PAIN, UNSPECIFIED BACK PAIN LATERALITY, UNSPECIFIED WHETHER SCIATICA PRESENT: ICD-10-CM

## 2025-01-27 DIAGNOSIS — G89.29 CHRONIC LOW BACK PAIN, UNSPECIFIED BACK PAIN LATERALITY, UNSPECIFIED WHETHER SCIATICA PRESENT: ICD-10-CM

## 2025-01-27 DIAGNOSIS — F11.90 CHRONIC, CONTINUOUS USE OF OPIOIDS: ICD-10-CM

## 2025-01-27 DIAGNOSIS — G62.9 PERIPHERAL POLYNEUROPATHY: ICD-10-CM

## 2025-01-27 RX ORDER — OXYCODONE HYDROCHLORIDE 15 MG/1
15 TABLET ORAL 2 TIMES DAILY PRN
Qty: 60 TABLET | Refills: 0 | Status: SHIPPED | OUTPATIENT
Start: 2025-01-27

## 2025-02-25 DIAGNOSIS — G62.9 PERIPHERAL POLYNEUROPATHY: ICD-10-CM

## 2025-02-25 DIAGNOSIS — F11.90 CHRONIC, CONTINUOUS USE OF OPIOIDS: ICD-10-CM

## 2025-02-25 DIAGNOSIS — G89.29 CHRONIC LOW BACK PAIN, UNSPECIFIED BACK PAIN LATERALITY, UNSPECIFIED WHETHER SCIATICA PRESENT: ICD-10-CM

## 2025-02-25 DIAGNOSIS — M54.50 CHRONIC LOW BACK PAIN, UNSPECIFIED BACK PAIN LATERALITY, UNSPECIFIED WHETHER SCIATICA PRESENT: ICD-10-CM

## 2025-02-25 RX ORDER — OXYCODONE HYDROCHLORIDE 15 MG/1
15 TABLET ORAL 2 TIMES DAILY PRN
Qty: 60 TABLET | Refills: 0 | Status: SHIPPED | OUTPATIENT
Start: 2025-02-27

## 2025-03-11 ENCOUNTER — PATIENT OUTREACH (OUTPATIENT)
Dept: CARE COORDINATION | Facility: CLINIC | Age: 55
End: 2025-03-11

## 2025-03-25 DIAGNOSIS — G62.9 PERIPHERAL POLYNEUROPATHY: ICD-10-CM

## 2025-03-25 DIAGNOSIS — M54.50 CHRONIC LOW BACK PAIN, UNSPECIFIED BACK PAIN LATERALITY, UNSPECIFIED WHETHER SCIATICA PRESENT: ICD-10-CM

## 2025-03-25 DIAGNOSIS — G89.29 CHRONIC LOW BACK PAIN, UNSPECIFIED BACK PAIN LATERALITY, UNSPECIFIED WHETHER SCIATICA PRESENT: ICD-10-CM

## 2025-03-25 DIAGNOSIS — F11.90 CHRONIC, CONTINUOUS USE OF OPIOIDS: ICD-10-CM

## 2025-03-25 RX ORDER — OXYCODONE HYDROCHLORIDE 15 MG/1
15 TABLET ORAL 2 TIMES DAILY PRN
Qty: 60 TABLET | Refills: 0 | Status: SHIPPED | OUTPATIENT
Start: 2025-03-29

## 2025-03-26 NOTE — TELEPHONE ENCOUNTER
Still due for medication check, please schedule within the next month or so.  Virtual or in person okay.

## 2025-04-26 DIAGNOSIS — G62.9 PERIPHERAL POLYNEUROPATHY: ICD-10-CM

## 2025-04-26 DIAGNOSIS — F11.90 CHRONIC, CONTINUOUS USE OF OPIOIDS: ICD-10-CM

## 2025-04-26 DIAGNOSIS — M54.50 CHRONIC LOW BACK PAIN, UNSPECIFIED BACK PAIN LATERALITY, UNSPECIFIED WHETHER SCIATICA PRESENT: ICD-10-CM

## 2025-04-26 DIAGNOSIS — G89.29 CHRONIC LOW BACK PAIN, UNSPECIFIED BACK PAIN LATERALITY, UNSPECIFIED WHETHER SCIATICA PRESENT: ICD-10-CM

## 2025-04-26 RX ORDER — OXYCODONE HYDROCHLORIDE 15 MG/1
15 TABLET ORAL 2 TIMES DAILY PRN
Qty: 60 TABLET | Refills: 0 | Status: SHIPPED | OUTPATIENT
Start: 2025-04-30

## 2025-04-28 ENCOUNTER — OFFICE VISIT (OUTPATIENT)
Dept: FAMILY MEDICINE | Facility: CLINIC | Age: 55
End: 2025-04-28
Payer: COMMERCIAL

## 2025-04-28 VITALS
SYSTOLIC BLOOD PRESSURE: 124 MMHG | DIASTOLIC BLOOD PRESSURE: 72 MMHG | HEART RATE: 85 BPM | TEMPERATURE: 97.6 F | RESPIRATION RATE: 18 BRPM | OXYGEN SATURATION: 100 % | WEIGHT: 254 LBS | HEIGHT: 69 IN | BODY MASS INDEX: 37.62 KG/M2

## 2025-04-28 DIAGNOSIS — M54.50 CHRONIC LOW BACK PAIN, UNSPECIFIED BACK PAIN LATERALITY, UNSPECIFIED WHETHER SCIATICA PRESENT: ICD-10-CM

## 2025-04-28 DIAGNOSIS — E11.42 TYPE 2 DIABETES MELLITUS WITH DIABETIC POLYNEUROPATHY, WITHOUT LONG-TERM CURRENT USE OF INSULIN (H): Primary | ICD-10-CM

## 2025-04-28 DIAGNOSIS — E11.43 DIABETIC GASTROPARESIS ASSOCIATED WITH TYPE 2 DIABETES MELLITUS (H): ICD-10-CM

## 2025-04-28 DIAGNOSIS — K31.84 DIABETIC GASTROPARESIS ASSOCIATED WITH TYPE 2 DIABETES MELLITUS (H): ICD-10-CM

## 2025-04-28 DIAGNOSIS — F11.90 CHRONIC, CONTINUOUS USE OF OPIOIDS: ICD-10-CM

## 2025-04-28 DIAGNOSIS — R53.83 FATIGUE, UNSPECIFIED TYPE: ICD-10-CM

## 2025-04-28 DIAGNOSIS — R09.89 THROAT CLEARING: ICD-10-CM

## 2025-04-28 DIAGNOSIS — G62.9 PERIPHERAL POLYNEUROPATHY: ICD-10-CM

## 2025-04-28 DIAGNOSIS — G89.29 CHRONIC LOW BACK PAIN, UNSPECIFIED BACK PAIN LATERALITY, UNSPECIFIED WHETHER SCIATICA PRESENT: ICD-10-CM

## 2025-04-28 LAB
CREAT UR-MCNC: 159 MG/DL
EST. AVERAGE GLUCOSE BLD GHB EST-MCNC: 177 MG/DL
HBA1C MFR BLD: 7.8 % (ref 0–5.6)
MICROALBUMIN UR-MCNC: <12 MG/L
MICROALBUMIN/CREAT UR: NORMAL MG/G{CREAT}

## 2025-04-28 PROCEDURE — 99214 OFFICE O/P EST MOD 30 MIN: CPT | Performed by: FAMILY MEDICINE

## 2025-04-28 PROCEDURE — 3074F SYST BP LT 130 MM HG: CPT | Performed by: FAMILY MEDICINE

## 2025-04-28 PROCEDURE — 36415 COLL VENOUS BLD VENIPUNCTURE: CPT | Performed by: FAMILY MEDICINE

## 2025-04-28 PROCEDURE — 82043 UR ALBUMIN QUANTITATIVE: CPT | Performed by: FAMILY MEDICINE

## 2025-04-28 PROCEDURE — 3078F DIAST BP <80 MM HG: CPT | Performed by: FAMILY MEDICINE

## 2025-04-28 PROCEDURE — 83036 HEMOGLOBIN GLYCOSYLATED A1C: CPT | Performed by: FAMILY MEDICINE

## 2025-04-28 PROCEDURE — 82570 ASSAY OF URINE CREATININE: CPT | Performed by: FAMILY MEDICINE

## 2025-04-28 PROCEDURE — G2211 COMPLEX E/M VISIT ADD ON: HCPCS | Performed by: FAMILY MEDICINE

## 2025-04-28 RX ORDER — LANCETS
EACH MISCELLANEOUS
Qty: 100 EACH | Refills: 11 | Status: SHIPPED | OUTPATIENT
Start: 2025-04-28

## 2025-04-28 RX ORDER — NALOXONE HCL 8 MG/.1ML
SPRAY NASAL
COMMUNITY
Start: 2025-04-21

## 2025-04-28 RX ORDER — LORATADINE 10 MG/1
10 TABLET ORAL DAILY
COMMUNITY
Start: 2025-04-28

## 2025-04-28 ASSESSMENT — ANXIETY QUESTIONNAIRES
7. FEELING AFRAID AS IF SOMETHING AWFUL MIGHT HAPPEN: SEVERAL DAYS
8. IF YOU CHECKED OFF ANY PROBLEMS, HOW DIFFICULT HAVE THESE MADE IT FOR YOU TO DO YOUR WORK, TAKE CARE OF THINGS AT HOME, OR GET ALONG WITH OTHER PEOPLE?: SOMEWHAT DIFFICULT
5. BEING SO RESTLESS THAT IT IS HARD TO SIT STILL: SEVERAL DAYS
1. FEELING NERVOUS, ANXIOUS, OR ON EDGE: MORE THAN HALF THE DAYS
2. NOT BEING ABLE TO STOP OR CONTROL WORRYING: SEVERAL DAYS
GAD7 TOTAL SCORE: 9
6. BECOMING EASILY ANNOYED OR IRRITABLE: MORE THAN HALF THE DAYS
4. TROUBLE RELAXING: SEVERAL DAYS
3. WORRYING TOO MUCH ABOUT DIFFERENT THINGS: SEVERAL DAYS
IF YOU CHECKED OFF ANY PROBLEMS ON THIS QUESTIONNAIRE, HOW DIFFICULT HAVE THESE PROBLEMS MADE IT FOR YOU TO DO YOUR WORK, TAKE CARE OF THINGS AT HOME, OR GET ALONG WITH OTHER PEOPLE: SOMEWHAT DIFFICULT
7. FEELING AFRAID AS IF SOMETHING AWFUL MIGHT HAPPEN: SEVERAL DAYS

## 2025-04-28 NOTE — PROGRESS NOTES
Assessment & Plan     Type 2 diabetes mellitus with diabetic polyneuropathy, without long-term current use of insulin (H)  - A1c has improved from 10 to 7.8.    - Order test strips, lancets, and a new meter. Recommend once to twice daily testing. Discussed the need for a controlled substance agreement.  - Hemoglobin A1c  - Hemoglobin A1c  - blood glucose monitoring (NO BRAND SPECIFIED) meter device kit  Dispense: 1 kit; Refill: 0  - blood glucose (NO BRAND SPECIFIED) test strip  Dispense: 100 strip; Refill: 6  - thin (NO BRAND SPECIFIED) lancets  Dispense: 100 each; Refill: 11    Diabetic gastroparesis associated with type 2 diabetes mellitus (H)  stable  - Albumin Random Urine Quantitative with Creat Ratio  - Albumin Random Urine Quantitative with Creat Ratio    Fatigue, unspecified type  - Discussed testosterone levels, which are at the cutoff for replacement therapy. Previous levels were low, but current levels are within the normal range.  - Consider testosterone replacement therapy with gels or patches. Schedule a another morning lab appointment for testosterone levels.  - Risks and side effects: Discussed potential side effects of testosterone replacement, including changes in cholesterol profile, blood pressure increase, acne, testicular atrophy, and increased risk of blood clots.  - Testosterone Free and Total    Throat clearing  - Possible causes include irritants, allergies, or acid reflux.  - Try Claritin 10 mg for a week or two to see if symptoms improve. Use Flonase as needed.   - loratadine (CLARITIN) 10 MG tablet    Chronic low back pain  Peripheral polyneuropathy  Chronic, continuous use of opioids - every 3 month medication checks  Continue with pain medications, CSA signed today    Consent was obtained from the patient to use an AI documentation tool in the creation of this note.    Return in about 6 months (around 10/28/2025) for wellness exam with Dannie Pastrana MD, and, medication recheck.     Follow-up Visit   Expected date: Apr 28, 2025      Follow Up Appointment Details:     Follow-up with whom?: Other Primary Care Services    Follow-Up for what?: Lab Visit    How?: In Person               The longitudinal plan of care for the diagnosis(es)/condition(s) as documented were addressed during this visit. Due to the added complexity in care, I will continue to support Ryland in the subsequent management and with ongoing continuity of care.        Dannie Pastrana MD     [address] [address]  [employer]     Office: [phone]           Rosales Marcelino is a 55 year old, presenting for the following health issues:  Recheck Medication        4/28/2025    10:22 AM   Additional Questions   Roomed by Terra PAINTER CMA   Consent was obtained from the patient to use an AI documentation tool in the creation of this note.      History of Present Illness       Mental Health Follow-up:  Patient presents to follow-up on Depression & Anxiety.Patient's depression since last visit has been:  No change  The patient is not having other symptoms associated with depression.  Patient's anxiety since last visit has been:  No change  The patient is not having other symptoms associated with anxiety.  Any significant life events: No  Patient is not feeling anxious or having panic attacks.  Patient has no concerns about alcohol or drug use.    Diabetes:   He presents for follow up of diabetes.    He is not checking blood glucose.        He is concerned about other.   He is having numbness in feet, burning in feet, blurry vision and weight gain.            He eats 0-1 servings of fruits and vegetables daily.He consumes 1 sweetened beverage(s) daily.He exercises with enough effort to increase his heart rate 20 to 29 minutes per day.  He exercises with enough effort to increase his heart rate 4 days per week. He is missing 7 dose(s) of medications per week.  He is not taking  prescribed medications regularly due to side effects.   He reports that his A1c has decreased to 7.8 from a previous level of 10. He attributes this improvement to resuming his diabetes management regimen after a period of discontinuation last spring. He uses a home meter for monitoring but has issues with the Dexcom device due to it being dislodged frequently. He uses a Contour Next meter for testing and requires test strips and lancets for regular monitoring.    Testosterone levels  He mentions previous testosterone levels fluctuating, with a recent level around 300, which is within the normal range. He has not undergone testosterone replacement therapy in the past, despite previously low levels. He is considering testosterone replacement after discussing it with peers who reported positive effects.    Throat clearing  He experiences frequent throat clearing in the mornings, which he attributes to possible irritants or allergies. He has not been allergy tested but reports sneezing every morning. He occasionally uses Flonase for relief and has not been diagnosed with acid reflux, though he experiences occasional heartburn.    Hip and shoulder pain an ongoing chronic low back paind   He reports experiencing pain in his hip and shoulder, particularly when lying on them for extended periods. He describes the pain as a stroke-like sensation in the hip and attributes it to possible arthritis or muscle issues. He has not reported any pain when lifting his leg.    Fatigue  He mentions experiencing fatigue, which he suspects may be related to his testosterone levels. He has not undergone any treatment for this fatigue.      Social History     Tobacco Use    Smoking status: Never     Passive exposure: Past    Smokeless tobacco: Never   Vaping Use    Vaping status: Never Used   Substance Use Topics    Alcohol use: No     Comment: Does not drink - last was 11/2015    Drug use: No         4/9/2024     1:48 PM 10/10/2024      8:40 AM 4/28/2025    10:00 AM   PHQ   PHQ-9 Total Score 5 8 11    Q9: Thoughts of better off dead/self-harm past 2 weeks Not at all Not at all Not at all       Patient-reported         4/9/2024     1:53 PM 10/10/2024     8:41 AM 4/28/2025    10:02 AM   BUSHRA-7 SCORE   Total Score 2 (minimal anxiety) 0 (minimal anxiety) 9 (mild anxiety)   Total Score 2 0 9        Patient-reported         4/28/2025    10:00 AM   Last PHQ-9   1.  Little interest or pleasure in doing things 1   2.  Feeling down, depressed, or hopeless 1   3.  Trouble falling or staying asleep, or sleeping too much 1   4.  Feeling tired or having little energy 3   5.  Poor appetite or overeating 2   6.  Feeling bad about yourself 1   7.  Trouble concentrating 1   8.  Moving slowly or restless 1   Q9: Thoughts of better off dead/self-harm past 2 weeks 0   PHQ-9 Total Score 11        Patient-reported         4/28/2025    10:02 AM   BUSHRA-7    1. Feeling nervous, anxious, or on edge 2   2. Not being able to stop or control worrying 1   3. Worrying too much about different things 1   4. Trouble relaxing 1   5. Being so restless that it is hard to sit still 1   6. Becoming easily annoyed or irritable 2   7. Feeling afraid, as if something awful might happen 1   BUSHRA-7 Total Score 9    If you checked any problems, how difficult have they made it for you to do your work, take care of things at home, or get along with other people? Somewhat difficult       Patient-reported       Suicide Assessment Five-step Evaluation and Treatment (SAFE-T)      Hyperlipidemia Follow-Up    Are you regularly taking any medication or supplement to lower your cholesterol?   Yes- simvastatin 20 MG  Are you having muscle aches or other side effects that you think could be caused by your cholesterol lowering medication?  No    Medication Followup of Chronic back pain on oxyCODONE IR (ROXICODONE) 15 MG tablet   Taking Medication as prescribed: yes  Side Effects:  None  Medication Helping  "Symptoms:  yes          Objective    /72   Pulse 85   Temp 97.6  F (36.4  C) (Tympanic)   Resp 18   Ht 1.753 m (5' 9\")   Wt 115.2 kg (254 lb)   SpO2 100%   BMI 37.51 kg/m    Body mass index is 37.51 kg/m .  Physical Exam   GENERAL: alert and no distress  HENT: ear canals and TM's normal, nose and mouth without ulcers or lesions  NECK: no adenopathy, no asymmetry, masses, or scars  RESP: lungs clear to auscultation - no rales, rhonchi or wheezes  CV: regular rate and rhythm, normal S1 S2, no S3 or S4, no murmur, click or rub, no peripheral edema  ABDOMEN: soft, nontender, no hepatosplenomegaly, no masses and bowel sounds normal  MS: no gross musculoskeletal defects noted, no edema  SKIN: no suspicious lesions or rashes  BACK: no CVA tenderness, no paralumbar tenderness  PSYCH: mentation appears normal, affect normal/bright    Results for orders placed or performed in visit on 04/28/25 (from the past 24 hours)   Hemoglobin A1c   Result Value Ref Range    Estimated Average Glucose 177 (H) <117 mg/dL    Hemoglobin A1C 7.8 (H) 0.0 - 5.6 %    Narrative    Results confirmed by repeat test.            Signed Electronically by: Todd Pastrana MD    "

## 2025-04-28 NOTE — LETTER

## 2025-04-29 NOTE — RESULT ENCOUNTER NOTE
Dear Ryland,    Here is a summary of your recent test results:  -A1C (test of diabetes control the last 2-3 months) is better but still above your goal (A1c less than 7). Please continue with your current plan. Also, you should make an appointment to see me and recheck your A1C test in 6 months.   -Microalbumin (urine protein) test is normal.  ADVISE: rechecking this annually.    For additional lab test information, www.[employer].com is a very good reference.    In addition, here is a list of due or overdue Health Maintenance reminders:  Pneumococcal Vaccine(2 of 2 - PCV) due on 04/18/2018  Zoster (Shingles) Vaccine(2 of 2) due on 03/28/2024  CONTROLLED SUBSTANCE AGREEMENT FOR CHRONIC PAIN MANAGEMENT due on 09/18/2024    Please call us at [phone] (or use [employer]) to address the above recommendations if needed.           Thank you very much for trusting me and [facility].     Have a peaceful day.    Healthy regards,  Dannie Pastrana MD

## 2025-05-03 NOTE — TELEPHONE ENCOUNTER
He is overdue for a pain med recheck.  (Missed recent appointment I believe) please schedule visit in person within the next 15 to 30 days.  1 refill sent for now    Last visit in this dept:    8/24/2020     Next visit in this dept:       Health Maintenance   Topic Date Due     ADVANCE CARE PLANNING  Never done     HEPATITIS C SCREENING  Never done     EYE EXAM  01/19/2019     A1C  04/18/2020     INFLUENZA VACCINE (1) 09/01/2020     URINE DRUG SCREEN  01/16/2021     LIPID  01/18/2021     PSA  01/18/2021     PHQ-9  02/24/2021     PREVENTIVE CARE VISIT  08/24/2021     MICROALBUMIN  08/24/2021     DIABETIC FOOT EXAM  08/24/2021     BMP  11/20/2021     ZOSTER IMMUNIZATION (1 of 2) 01/09/2022     DTAP/TDAP/TD IMMUNIZATION (2 - Td) 04/18/2027     Pneumococcal Vaccine: Pediatrics (0 to 5 Years) and At-Risk Patients (6 to 64 Years) (2 of 2) 01/09/2037     HIV SCREENING  Addressed     DEPRESSION ACTION PLAN  Addressed     IPV IMMUNIZATION  Aged Out     MENINGITIS IMMUNIZATION  Aged Out     HEPATITIS B IMMUNIZATION  Discontinued           English

## 2025-05-06 ENCOUNTER — LAB (OUTPATIENT)
Dept: LAB | Facility: CLINIC | Age: 55
End: 2025-05-06
Payer: COMMERCIAL

## 2025-05-06 DIAGNOSIS — R53.83 FATIGUE, UNSPECIFIED TYPE: ICD-10-CM

## 2025-05-06 LAB — SHBG SERPL-SCNC: 16 NMOL/L (ref 11–80)

## 2025-05-06 PROCEDURE — 36415 COLL VENOUS BLD VENIPUNCTURE: CPT

## 2025-05-06 PROCEDURE — 84270 ASSAY OF SEX HORMONE GLOBUL: CPT

## 2025-05-10 LAB
TESTOST FREE SERPL-MCNC: 6.15 NG/DL
TESTOST SERPL-MCNC: 225 NG/DL (ref 240–950)

## 2025-05-13 ENCOUNTER — RESULTS FOLLOW-UP (OUTPATIENT)
Dept: FAMILY MEDICINE | Facility: CLINIC | Age: 55
End: 2025-05-13

## 2025-05-13 DIAGNOSIS — R79.89 LOW TESTOSTERONE IN MALE: Primary | ICD-10-CM

## 2025-05-14 NOTE — RESULT ENCOUNTER NOTE
Dear Ryland,    Here is a summary of your recent test results:  -Testosterone levels are borderline low.  You could start testosterone replacement.  You can apply gel as we discussed and you mentioned you did not want to do injections.  Would you like me to send in a prescription for a gel (pills are not an option)?    For additional lab test information, www.[email or web address] is a very good reference.    In addition, here is a list of due or overdue Health Maintenance reminders:  Health Maintenance Due   Topic Date Due    Pneumococcal Vaccine (2 of 2 - PCV) 04/18/2018       Please call us at [phone] (or use [employer]) to address the above recommendations if needed.           Thank you very much for trusting me and [facility].     Have a peaceful day.    Healthy regards,  Dannie Pastrana MD

## 2025-05-16 RX ORDER — TESTOSTERONE 1.62 MG/G
1 GEL TRANSDERMAL DAILY
Qty: 75 G | Refills: 5 | Status: SHIPPED | OUTPATIENT
Start: 2025-05-16

## 2025-05-17 ENCOUNTER — TELEPHONE (OUTPATIENT)
Dept: FAMILY MEDICINE | Facility: CLINIC | Age: 55
End: 2025-05-17
Payer: COMMERCIAL

## 2025-05-17 NOTE — TELEPHONE ENCOUNTER
Prior Authorization Retail Medication Request    Medication/Dose: Insurance is requiring a prior auth for Testosterone 20.25 mg/act gel  Diagnosis and ICD code (if different than what is on RX):    New/renewal/insurance change PA/secondary ins. PA:  Previously Tried and Failed:  Unknown  Rationale:  Unknown    Insurance   Primary: [employer] [place] [employer]  Insurance ID:  [id number]    Secondary (if applicable):N/A  Insurance ID:  N/A    Thank You,  Ava Jaimes [facility]  [phone]     Clinic Information  Preferred routing pool for dept communication: [facility]

## 2025-05-21 DIAGNOSIS — R79.89 LOW TESTOSTERONE IN MALE: ICD-10-CM

## 2025-05-21 RX ORDER — TESTOSTERONE 1.62 MG/G
1 GEL TRANSDERMAL DAILY
Qty: 75 G | Refills: 5 | OUTPATIENT
Start: 2025-05-21

## 2025-05-21 NOTE — TELEPHONE ENCOUNTER
PA Initiation    Medication:    Insurance Company:    [place] [facility] [place] [address]  Filling Pharmacy Phone: [phone]  Filling Pharmacy Fax:    Start Date: 5/21/2025

## 2025-05-22 NOTE — TELEPHONE ENCOUNTER
PA Initiation    Medication:    Insurance Company:    Pharmacy [place]illing the Rx[place] [facility] [place] [address]  Filling Pharmacy Phone: [phone]  Filling Pharmacy Fax:    Start Date: 5/21/2025

## 2025-05-29 DIAGNOSIS — G89.29 CHRONIC LOW BACK PAIN, UNSPECIFIED BACK PAIN LATERALITY, UNSPECIFIED WHETHER SCIATICA PRESENT: ICD-10-CM

## 2025-05-29 DIAGNOSIS — G62.9 PERIPHERAL POLYNEUROPATHY: ICD-10-CM

## 2025-05-29 DIAGNOSIS — M54.50 CHRONIC LOW BACK PAIN, UNSPECIFIED BACK PAIN LATERALITY, UNSPECIFIED WHETHER SCIATICA PRESENT: ICD-10-CM

## 2025-05-29 DIAGNOSIS — F11.90 CHRONIC, CONTINUOUS USE OF OPIOIDS: ICD-10-CM

## 2025-05-29 RX ORDER — OXYCODONE HYDROCHLORIDE 15 MG/1
15 TABLET ORAL 2 TIMES DAILY PRN
Qty: 60 TABLET | Refills: 0 | Status: SHIPPED | OUTPATIENT
Start: 2025-05-30

## 2025-06-24 DIAGNOSIS — G62.9 PERIPHERAL POLYNEUROPATHY: ICD-10-CM

## 2025-06-24 DIAGNOSIS — G89.29 CHRONIC LOW BACK PAIN, UNSPECIFIED BACK PAIN LATERALITY, UNSPECIFIED WHETHER SCIATICA PRESENT: ICD-10-CM

## 2025-06-24 DIAGNOSIS — F11.90 CHRONIC, CONTINUOUS USE OF OPIOIDS: ICD-10-CM

## 2025-06-24 DIAGNOSIS — M54.50 CHRONIC LOW BACK PAIN, UNSPECIFIED BACK PAIN LATERALITY, UNSPECIFIED WHETHER SCIATICA PRESENT: ICD-10-CM

## 2025-06-24 RX ORDER — OXYCODONE HYDROCHLORIDE 15 MG/1
15 TABLET ORAL 2 TIMES DAILY PRN
Qty: 60 TABLET | Refills: 0 | Status: SHIPPED | OUTPATIENT
Start: 2025-06-29

## 2025-07-23 DIAGNOSIS — G89.29 CHRONIC LOW BACK PAIN, UNSPECIFIED BACK PAIN LATERALITY, UNSPECIFIED WHETHER SCIATICA PRESENT: ICD-10-CM

## 2025-07-23 DIAGNOSIS — F11.90 CHRONIC, CONTINUOUS USE OF OPIOIDS: ICD-10-CM

## 2025-07-23 DIAGNOSIS — M54.50 CHRONIC LOW BACK PAIN, UNSPECIFIED BACK PAIN LATERALITY, UNSPECIFIED WHETHER SCIATICA PRESENT: ICD-10-CM

## 2025-07-23 DIAGNOSIS — G62.9 PERIPHERAL POLYNEUROPATHY: ICD-10-CM

## 2025-07-24 RX ORDER — OXYCODONE HYDROCHLORIDE 15 MG/1
15 TABLET ORAL 2 TIMES DAILY PRN
Qty: 60 TABLET | Refills: 0 | Status: SHIPPED | OUTPATIENT
Start: 2025-07-30 | End: 2025-08-29

## 2025-07-24 RX ORDER — OXYCODONE HYDROCHLORIDE 15 MG/1
15 TABLET ORAL 2 TIMES DAILY PRN
Qty: 60 TABLET | Refills: 0 | Status: SHIPPED | OUTPATIENT
Start: 2025-08-29 | End: 2025-09-28

## 2025-07-24 RX ORDER — OXYCODONE HYDROCHLORIDE 15 MG/1
15 TABLET ORAL 2 TIMES DAILY PRN
Qty: 60 TABLET | Refills: 0 | Status: SHIPPED | OUTPATIENT
Start: 2025-09-28

## 2025-08-16 ENCOUNTER — HEALTH MAINTENANCE LETTER (OUTPATIENT)
Age: 55
End: 2025-08-16

## (undated) DEVICE — ENDO FORCEP SPIKED SERRATED SHAFT JUMBO 239CM G56998

## (undated) DEVICE — SOL WATER IRRIG 1000ML BOTTLE 2F7114

## (undated) DEVICE — PAD CHUX UNDERPAD 30X36" P3036C

## (undated) DEVICE — BAG CLEAR TRASH 1.3M 39X33" P4040C

## (undated) DEVICE — SYR 50ML LL W/O NDL 309653

## (undated) DEVICE — SUCTION MANIFOLD NEPTUNE 2 SYS 4 PORT 0702-020-000

## (undated) DEVICE — TUBING SUCTION MEDI-VAC SOFT 3/16"X20' N520A

## (undated) RX ORDER — PROPOFOL 10 MG/ML
INJECTION, EMULSION INTRAVENOUS
Status: DISPENSED
Start: 2024-04-11

## (undated) RX ORDER — SIMETHICONE 40MG/0.6ML
SUSPENSION, DROPS(FINAL DOSAGE FORM)(ML) ORAL
Status: DISPENSED
Start: 2024-04-11

## (undated) RX ORDER — GLYCOPYRROLATE 0.2 MG/ML
INJECTION, SOLUTION INTRAMUSCULAR; INTRAVENOUS
Status: DISPENSED
Start: 2024-04-11

## (undated) RX ORDER — FENTANYL CITRATE-0.9 % NACL/PF 10 MCG/ML
PLASTIC BAG, INJECTION (ML) INTRAVENOUS
Status: DISPENSED
Start: 2024-04-11

## (undated) RX ORDER — LIDOCAINE HYDROCHLORIDE 10 MG/ML
INJECTION, SOLUTION EPIDURAL; INFILTRATION; INTRACAUDAL; PERINEURAL
Status: DISPENSED
Start: 2024-04-11